# Patient Record
Sex: FEMALE | Race: BLACK OR AFRICAN AMERICAN | Employment: OTHER | URBAN - METROPOLITAN AREA
[De-identification: names, ages, dates, MRNs, and addresses within clinical notes are randomized per-mention and may not be internally consistent; named-entity substitution may affect disease eponyms.]

---

## 2017-01-17 DIAGNOSIS — E03.9 HYPOTHYROIDISM, UNSPECIFIED TYPE: Primary | ICD-10-CM

## 2017-01-20 ENCOUNTER — ANESTHESIA EVENT (OUTPATIENT)
Dept: ENDOSCOPY | Age: 67
End: 2017-01-20
Payer: MEDICARE

## 2017-01-23 ENCOUNTER — SURGERY (OUTPATIENT)
Age: 67
End: 2017-01-23

## 2017-01-23 ENCOUNTER — ANESTHESIA (OUTPATIENT)
Dept: ENDOSCOPY | Age: 67
End: 2017-01-23
Payer: MEDICARE

## 2017-01-23 PROCEDURE — 74011000250 HC RX REV CODE- 250

## 2017-01-23 PROCEDURE — 74011250636 HC RX REV CODE- 250/636

## 2017-01-23 RX ORDER — PROPOFOL 10 MG/ML
INJECTION, EMULSION INTRAVENOUS AS NEEDED
Status: DISCONTINUED | OUTPATIENT
Start: 2017-01-23 | End: 2017-01-23 | Stop reason: HOSPADM

## 2017-01-23 RX ORDER — LIDOCAINE HYDROCHLORIDE 20 MG/ML
INJECTION, SOLUTION EPIDURAL; INFILTRATION; INTRACAUDAL; PERINEURAL AS NEEDED
Status: DISCONTINUED | OUTPATIENT
Start: 2017-01-23 | End: 2017-01-23 | Stop reason: HOSPADM

## 2017-01-23 RX ADMIN — PROPOFOL 20 MG: 10 INJECTION, EMULSION INTRAVENOUS at 08:13

## 2017-01-23 RX ADMIN — PROPOFOL 20 MG: 10 INJECTION, EMULSION INTRAVENOUS at 08:19

## 2017-01-23 RX ADMIN — PROPOFOL 50 MG: 10 INJECTION, EMULSION INTRAVENOUS at 08:10

## 2017-01-23 RX ADMIN — PROPOFOL 20 MG: 10 INJECTION, EMULSION INTRAVENOUS at 08:15

## 2017-01-23 RX ADMIN — LIDOCAINE HYDROCHLORIDE 20 MG: 20 INJECTION, SOLUTION EPIDURAL; INFILTRATION; INTRACAUDAL; PERINEURAL at 08:10

## 2017-01-23 RX ADMIN — PROPOFOL 20 MG: 10 INJECTION, EMULSION INTRAVENOUS at 08:21

## 2017-01-23 RX ADMIN — PROPOFOL 20 MG: 10 INJECTION, EMULSION INTRAVENOUS at 08:17

## 2017-01-23 NOTE — ANESTHESIA PREPROCEDURE EVALUATION
Anesthetic History   No history of anesthetic complications            Review of Systems / Medical History  Patient summary reviewed and pertinent labs reviewed    Pulmonary    COPD: mild    Sleep apnea: No treatment           Neuro/Psych     seizures    TIA     Cardiovascular    Hypertension        Dysrhythmias       Exercise tolerance: >4 METS     GI/Hepatic/Renal  Within defined limits              Endo/Other      Hypothyroidism: well controlled       Other Findings   Comments: Current Smoker? NO       Elective Surgery? Yes       Abstained from smoking 24 hours prior to anesthesia? NA      Risk Factors for Postoperative nausea/vomiting:       History of postoperative nausea/vomiting? NO       Female? Yes       Motion sickness? NO       Intended opioid administration for postoperative analgesia?   NO         Physical Exam    Airway  Mallampati: II  TM Distance: 4 - 6 cm  Neck ROM: normal range of motion   Mouth opening: Normal     Cardiovascular  Regular rate and rhythm,  S1 and S2 normal,  no murmur, click, rub, or gallop  Rhythm: regular  Rate: normal         Dental    Dentition: Lower dentition intact, Upper dentition intact and Caps/crowns  Comments: Cap R upper incisor   Pulmonary  Breath sounds clear to auscultation               Abdominal  GI exam deferred       Other Findings            Anesthetic Plan    ASA: 3  Anesthesia type: MAC          Induction: Intravenous  Anesthetic plan and risks discussed with: Patient

## 2017-01-24 NOTE — ANESTHESIA POSTPROCEDURE EVALUATION
Post-Anesthesia Evaluation and Assessment    Patient: Loki Aleman MRN: [id number]  SSN: [id number]    YOB: 1950  Age: 74 y.o. Sex: female       Cardiovascular Function/Vital Signs  Visit Vitals    /76 (BP 1 Location: Left arm, BP Patient Position: At rest)    Pulse 75    Temp 36.1 °C (97 °F)    Resp 16    Ht 5' 4\" (1.626 m)    Wt 77.1 kg (170 lb)    SpO2 100%    Breastfeeding No    BMI 29.18 kg/m2       Patient is status post MAC anesthesia for Procedure(s):  COLONOSCOPY. Nausea/Vomiting: None    Postoperative hydration reviewed and adequate. Pain:  Pain Scale 1: Numeric (0 - 10) (01/23/17 7794)  Pain Intensity 1: 0 (01/23/17 4554)   Managed    Neurological Status: At baseline    Mental Status and Level of Consciousness: Alert and oriented     Pulmonary Status:   O2 Device: Room air (01/23/17 6461)   Adequate oxygenation and airway patent    Complications related to anesthesia: None    Post-anesthesia assessment completed.  No concerns    Signed By: Kraig Agudelo MD     January 24, 2017

## 2017-01-26 ENCOUNTER — OFFICE VISIT (OUTPATIENT)
Dept: FAMILY MEDICINE CLINIC | Age: 67
End: 2017-01-26

## 2017-01-26 VITALS
SYSTOLIC BLOOD PRESSURE: 119 MMHG | HEIGHT: 64 IN | WEIGHT: 173.8 LBS | HEART RATE: 94 BPM | TEMPERATURE: 96.6 F | BODY MASS INDEX: 29.67 KG/M2 | RESPIRATION RATE: 16 BRPM | OXYGEN SATURATION: 100 % | DIASTOLIC BLOOD PRESSURE: 80 MMHG

## 2017-01-26 DIAGNOSIS — I35.0 AORTIC VALVE STENOSIS, UNSPECIFIED ETIOLOGY: Chronic | ICD-10-CM

## 2017-01-26 DIAGNOSIS — E78.5 DYSLIPIDEMIA: Chronic | ICD-10-CM

## 2017-01-26 DIAGNOSIS — I48.91 ATRIAL FIBRILLATION, UNSPECIFIED TYPE (HCC): Chronic | ICD-10-CM

## 2017-01-26 DIAGNOSIS — M89.9 DISORDER OF BONE: ICD-10-CM

## 2017-01-26 DIAGNOSIS — I34.2 NON-RHEUMATIC MITRAL VALVE STENOSIS: Chronic | ICD-10-CM

## 2017-01-26 RX ORDER — ACETAMINOPHEN AND CODEINE PHOSPHATE 300; 15 MG/1; MG/1
1 TABLET ORAL
Qty: 30 TAB | Refills: 5 | Status: SHIPPED | OUTPATIENT
Start: 2017-01-26 | End: 2017-03-28

## 2017-01-26 NOTE — PROGRESS NOTES
Skylar Strickland is a 74 y.o.  female and presents with    Chief Complaint   Patient presents with    Cholesterol Problem    Irregular Heart Beat    Arthritis    Other     rheumatic heart dz           Subjective:  Has 2 wk of hoarse, sore throat, and chocking. Note she has 30+ pack year smoking hx. Cardiovascular Review:  The patient has hypertension, hyperlipidemia, coronary artery disease and rheumatic heart dz  . Diet and Lifestyle: generally follows a low fat low cholesterol diet  Home BP Monitoring: is not measured at home. Pertinent ROS: taking medications as instructed, no medication side effects noted, no TIA's, no chest pain on exertion, no dyspnea on exertion, no swelling of ankles. Osteoarthritis and Chronic Pain:  Patient has osteoarthritis, primarily affecting the back. Symptoms onset: problem is longstanding. Rheumatological ROS: using NSAID medication regularly, daily. Response to treatment plan: stable. Additional Concerns:          Patient Active Problem List    Diagnosis Date Noted    Advance directive in chart 06/28/2016    Dyslipidemia     Rheumatic heart disease     Atrial fibrillation     Arthritis 01/06/2016    Glaucoma 01/15/2014     Current Outpatient Prescriptions   Medication Sig Dispense Refill    acetaminophen (TYLENOL) 325 mg tablet 650 mg.      bimatoprost (LUMIGAN) 0.01 % ophthalmic drops Administer 1 Drop to left eye nightly.  aspirin delayed-release 81 mg tablet take 1 tablet by mouth once daily 90 Tab 3    ezetimibe (ZETIA) 10 mg tablet Take 1 Tab by mouth daily. 90 Tab 3    omeprazole (PRILOSEC) 20 mg capsule Take 1 Cap by mouth two (2) times a day. 180 Cap 4    ferrous sulfate (IRON) 325 mg (65 mg iron) EC tablet Take 1 Tab by mouth three (3) times daily (with meals). 100 Tab 12    prenatal vit-iron fumarate-fa (PRENATAL PLUS WITH IRON) 28 mg iron- 800 mcg tab Take 1 Tab by mouth daily.  Any reliable brand prenatal vitamins is fine.  100 Tab 12     Allergies   Allergen Reactions    Contrast Dye [Iodine] Itching and Swelling    Iodinated Contrast Media - Oral And Iv Dye Itching and Swelling    Seafood Itching and Swelling     LOBSTER ONLY    Statins-Hmg-Coa Reductase Inhibitors Myalgia and Other (comments)     Myalgia    Sulfa (Sulfonamide Antibiotics) Itching and Swelling     Past Medical History   Diagnosis Date    Advance directive in chart      6/28/2016    Amaurosis fugax     Aortic stenosis      mean gradient  26.5 mmHg (CATH 4/13), 32 mmHg (ECHO 9/15)    Aortic valve replacement      21mm MAGNA EASE pericardial  2/16    Arthritis     Atrial appendage resection      2/16      Atrial fibrillation      CHADS score 2  (-CHF, -HTN, -AGE, -DM +AMAUROSIS FUGAX)    Atrial fibrillation ablation      surgical left sided cryoablation  2/16    Cataract      OS    Cervical dysplasia      2009   post leep and cone    Chronic pain     COPD      mild  2/16    Duodenal AVM      argon plasma coagulation 2/16    Dyslipidemia     Fibroids     Glaucoma      1/15/2014    Lung nodule      3/19/2012   stable    Mitral stenosis      mean gradient  6 mmHg (CATH 4/13), 7.6 mmHg (MANUEL 10/15)    Mitral valve replacement      25 mm MAGNA EASE pericardial  2/16    Pacemaker      dual chamber MEDTRONIC 2/16    Post-op ventricular asystole     Remote tobacco     Rheumatic heart disease     Sleep apnea      no CPAP    Thyroid nodule      3/20/2013   multiple     Past Surgical History   Procedure Laterality Date    Hx gyn       VAINIII, VELASQUEZ 1, keratinous labial cyst    Hx leep procedure       2010    Hx gyn       2012  Cold knife conization of cervix    Hx bunionectomy       left    Hx other surgical       lump left neck, benign    Hx other surgical       multiple breast aspirations    Hx aortic valve replacement       2/16    Hx mitral valve replacement       2/16    Hx afib ablation       2/16  surgical cryoablation    Hx pacemaker      Hx breast biopsy Right 10/13/2014     Right breast needle [facility]OC biopsy performed by Fe Pathak MD at [address] Hx breast lumpectomy       Right    Colonoscopy N/A 2017     COLONOSCOPY performed [facility] Nikia Martines MD at [facility] END[facility]     Family History   Problem Relation Age of Onset    Cancer Mother      oral,    [facility] Alzheimer Father         [facility] Breast Cancer Maternal Aunt      unsure age   [facility] Colon Cancer Maternal Grandfather      Social History   Substance Use Topics    Smoking status: Former Smoker     Years: 37.00     Quit date: 2004    Smokeless tobacco: Never Used      Comment: stop around     Alcohol use No       ROS       All other systems reviewed and are negative. Objective:  Vitals:    17 0950   BP: 119/80   Pulse: 94   Resp: 16   Temp: 96.6 °F (35.9 °C)   TempSrc: Oral   SpO2: 100%   Weight: 173 lb 12.8 oz (78.8 kg)   Height: 5' 4\" (1.626 m)   PainSc:  10 - Worst pain ever   PainLoc: Back                 alert, well appearing, and in no distress, oriented to person, place, and time and normal appearing weight  Nose - normal and patent, no erythema, discharge or polyps  Mouth - mucous membranes moist, pharynx normal without lesions  Neck - supple, no significant adenopathy  Lymphatics - no palpable lymphadenopathy, no hepatosplenomegaly  Chest - clear to auscultation, no wheezes, rales or rhonchi, symmetric air entry  Heart - normal rate, regular rhythm, normal S1, S2, no murmurs, rubs, clicks or gallops  Abdomen - soft, nontender, nondistended, no masses or organomegaly        LABS     TESTS      Assessment/Plan:    Hypertension - stable  Hyperlipidemia - stable  arthritsi ongiong fairly stable  Rheumatic heart dz stable      Lab review: labs are reviewed, up to date and normal      I have discussed the diagnosis with the patient and the intended plan as seen in the above orders.   The patient has received an after-visit summary and questions were answered concerning future plans. I have discussed medication side effects and warnings with the patient as well. I have reviewed the plan of care with the patient, accepted their input and they are in agreement with the treatment goals.      Follow-up Disposition: Not on File

## 2017-01-26 NOTE — MR AVS SNAPSHOT
Visit Information Date & Time Provider Department Dept. Phone Encounter #  
 1/26/2017 10:00 AM Karlene Harrison DO [address] [address] [phone] [id number] Follow-up Instructions Return in about 3 months (around 4/26/2017) for physical, labs prior, mammo prior, dexa prior, EKG next visit, spirometry next visit. Follow-up and Disposition History Your Appointments 2/15/2017  1:00 PM  
PROCEDURE with Nhan Trevino MD  
[facility] [address] ([facility]) Appt Note: Return for cysto, review CTU.  
 [address] [facility] [address] [address]  
  
    
 6/20/2017 10:00 AM  
Follow Up with Baltazar Haile MD  
Cardio Specialist at [facility] Appt Note: 6 m f/u  
 [address] [place] [address] [place] [address] Upcoming Health Maintenance Date Due  
 GLAUCOMA SCREENING Q2Y 1/24/2017 MEDICARE YEARLY EXAM 3/25/2017 Pneumococcal 65+ Low/Medium Risk (2 of 2 - PPSV23) 9/20/2017 BREAST CANCER SCRN MAMMOGRAM 9/26/2018 PAP AKA CERVICAL CYTOLOGY 5/5/2019 DTaP/Tdap/Td series (2 - Td) 6/28/2026 COLONOSCOPY 1/23/2027 Allergies as of 1/26/2017  Review Complete On: 1/26/2017 By: Karlene Harrisno DO Severity Noted Reaction Type Reactions Contrast Dye [Iodine]  10/08/2014    Itching, Swelling Iodinated Contrast Media - Oral And Iv Dye  12/13/2016    Itching, Swelling Seafood  10/08/2014    Itching, Swelling LOBSTER ONLY Statins-hmg-coa Reductase Inhibitors  10/05/2011    Myalgia, Other (comments) Myalgia Sulfa (Sulfonamide Antibiotics)    Itching, Swelling Current Immunizations  Reviewed on 9/15/2014 Name Date Influenza Vaccine 10/5/2016, 9/15/2014, 10/17/2013 Influenza Vaccine Alcario Ravens) 9/3/2015 Influenza Vaccine (Quad) PF  Incomplete Influenza Vaccine Split 10/5/2011 Influenza Vaccine Whole 9/20/2012 Pneumococcal Vaccine (Unspecified Type) 9/20/2012, 10/5/2011 TD Vaccine 8/11/2011 Zoster Vaccine, Live 9/3/2015 Not reviewed this visit You Were Diagnosed With   
  
 Codes Comments At[facility], unspecified [facility]     ICD-10-CM: I48.91 
ICD-9-CM: 427.31 Dyslipidemia     ICD-10-CM: E78.5 ICD-9-CM: 272.4 Aortic valve stenosis, unspecified etiology     ICD-10-CM: I35.0 ICD-9-CM: 424.1 Non-rheumatic mitral valve stenosis     ICD-10-CM: I34.2 ICD-9-CM: 424.0 Disorder of bone     ICD-10-CM: M89.9 ICD-9-CM: 733.90 Vitals BP Pulse Temp Resp Height(growth percentile) Weight(growth percentile) 119/80 (BP 1 Location: Right arm, BP Patient Position: Sitting) 94 96.6 °F (35.9 °C) (Oral) 16 5' 4\" (1.626 m) 173 lb 12.8 oz (78.8 kg) SpO2 BMI OB Status Smoking Status 100% 29.83 kg/m2 Postmenopausal Former Smoker BMI and BSA Data Body Mass Index Body Surface Area  
 29.83 kg/m 2 1.89 m 2 Preferred Pharmacy Pharmacy Name Phone [facility] [phone] Your Updated Medication List  
  
   
This list is accurate as of: 1/26/17 10:09 AM.  Always use your most recent med list.  
  
  
  
  
 acetaminophen-codeine 300-15 mg per tablet Commonly known as:  TYLENOL #2 Take 1 Tab by mouth every four (4) hours as needed for Pain. Max Daily Amount: 6 Tabs. aspirin delayed-release 81 mg tablet  
take 1 tablet by mouth once daily  
  
 ezetimibe 10 mg tablet Commonly known as:  Zelpha Bock Take 1 Tab by mouth daily. ferrous sulfate 325 mg (65 mg iron) EC tablet Commonly known as:  IRON Take 1 Tab by mouth three (3) times daily (with meals). LUMIGAN 0.01 % ophthalmic drops Generic drug:  bimatoprost  
Administer 1 Drop to left eye nightly. omeprazole 20 mg capsule Commonly known as:  PriLOSEC Take 1 Cap by mouth two (2) times a day. prenatal vit-iron fumarate-fa 28 mg iron- 800 mcg Tab Commonly known as:  PRENATAL PLUS with IRON Take 1 Tab by mouth daily. Any reliable brand prenatal vitamins is fine. Prescriptions Printed Refills  
 acetaminophen-codeine (TYLENOL #2) 300-15 mg per tablet 5 Sig: Take 1 Tab by mouth every four (4) hours as needed for Pain. Max Daily Amount: 6 Tabs. Class: Print Route: Oral  
  
Follow-up Instructions Return in about 3 months (around 4/26/2017) for physical, labs prior, mammo prior, dexa prior, EKG next visit, spirometry next visit. To-Do List   
 Around 04/26/2017 Lab:  CBC WITH AUTOMATED DIFF   
  
 04/26/2017 Imaging:  DEXA BONE DENSITY STUDY AXIAL Around 04/26/2017 Lab:  LIPID PANEL   
  
 04/26/2017 Imaging:  BK MAMMO BI SCREENING INCL CAD Around 04/26/2017 Lab:  METABOLIC PANEL, COMPREHENSIVE Around 04/26/2017 Lab:  TSH 3RD GENERATION Around 04/26/2017 Lab:  URINALYSIS W/ RFLX MICROSCOPIC Introducing [facility] & HEALTH SERVICES! Dear Marisel Robertson: Thank you for requesting a Ebook Glue account. Our records indicate that you already have an active Ebook Glue account. You can access your account anytime at [email or web address] Did you know that you can access your hospital and ER discharge instructions at any time in Ebook Glue? You can also review all of your test results from your hospital stay or ER visit. Additional Information If you have questions, please visit the Frequently Asked Questions section of the Ebook Glue website at [email or web address] Remember, Ebook Glue is NOT to be used for urgent needs. For medical emergencies, dial 911. Now available from your iPhone and Android! Please provide this summary of care documentation to your next provider. Your primary care clinician is listed as [address] [facility]. If you have any questions after today's visit, please call [phone].

## 2017-02-02 ENCOUNTER — APPOINTMENT (OUTPATIENT)
Dept: PHYSICAL THERAPY | Age: 67
End: 2017-02-02

## 2017-02-28 ENCOUNTER — OFFICE VISIT (OUTPATIENT)
Dept: FAMILY MEDICINE CLINIC | Age: 67
End: 2017-02-28

## 2017-02-28 VITALS
TEMPERATURE: 97.1 F | OXYGEN SATURATION: 100 % | RESPIRATION RATE: 16 BRPM | SYSTOLIC BLOOD PRESSURE: 113 MMHG | BODY MASS INDEX: 29.26 KG/M2 | DIASTOLIC BLOOD PRESSURE: 86 MMHG | HEART RATE: 88 BPM | HEIGHT: 64 IN | WEIGHT: 171.4 LBS

## 2017-02-28 DIAGNOSIS — N30.00 ACUTE CYSTITIS WITHOUT HEMATURIA: ICD-10-CM

## 2017-02-28 DIAGNOSIS — R31.9 BLOOD IN URINE: Primary | ICD-10-CM

## 2017-02-28 DIAGNOSIS — M79.673 PAIN OF FOOT, UNSPECIFIED LATERALITY: ICD-10-CM

## 2017-02-28 LAB
BILIRUB UR QL STRIP: NEGATIVE
GLUCOSE UR-MCNC: NEGATIVE MG/DL
KETONES P FAST UR STRIP-MCNC: NORMAL MG/DL
PH UR STRIP: 5.5 [PH] (ref 4.6–8)
PROT UR QL STRIP: NORMAL MG/DL
SP GR UR STRIP: 1.02 (ref 1–1.03)
UA UROBILINOGEN AMB POC: NORMAL (ref 0.2–1)
URINALYSIS CLARITY POC: CLEAR
URINALYSIS COLOR POC: NORMAL
URINE BLOOD POC: NORMAL
URINE LEUKOCYTES POC: NORMAL
URINE NITRITES POC: NEGATIVE

## 2017-02-28 RX ORDER — CIPROFLOXACIN 250 MG/1
250 TABLET, FILM COATED ORAL EVERY 12 HOURS
Qty: 30 TAB | Refills: 0 | Status: SHIPPED | OUTPATIENT
Start: 2017-02-28 | End: 2017-03-15

## 2017-02-28 NOTE — PATIENT INSTRUCTIONS
Roma Dakin -- on     [employer]   Directions   4.56 Google reviews Dance store in [place], [address] [facility] in: [place]   Address: [place] [address] [address], [place], [address]   Phone: [phone]   Hours:   Open today · 10AM7PM

## 2017-02-28 NOTE — MR AVS SNAPSHOT
Visit Information Date & Time Provider Department Dept. Phone Encounter #  
 2/28/2017  1:30 PM Brigid Bland [phone] [id number] Your Appointments 3/7/2017  2:30 PM  
ROUTINE CARE with Olena [place].,  [address] [facility]) Appt Note: Pain in left foot [address] [place] [address]  
  
   
 [address] [facility] [place] [address]  
  
    
 6/20/2017 10:00 AM  
Follow Up with Montserrat Hernandez MD  
Cardio Specialist at [facility]) Appt Note: 6 m f/u  
 [facility] [employer] [address] [place] 8/15/2017 10:30 AM  
ULTRASOUND with [facility] ULTRASOUND [facility] ([facility]) Appt Note: Return in about 6 months (around 8/15/2017) for renal us prior. [address] [facility] [address] [address]  
  
    
  
 8/22/2017 10:15 AM  
Any with Dereje Dove MD  
[facility] ([facility]) Appt Note: Return in about 6 months (around 8/15/2017) for renal us prior. [address] [facility] [address]  
[phone]  
  
   
 [facility] [address] [address] Upcoming Health Maintenance Date Due  
 MEDICARE YEARLY EXAM 3/25/2017 Pneumococcal 65+ Low/Medium Risk (2 of 2 - PPSV23) 9/20/2017 BREAST CANCER SCRN MAMMOGRAM 9/26/2018 GLAUCOMA SCREENING Q2Y 2/2/2019 DTaP/Tdap/Td series (2 - Td) 6/28/2026 COLONOSCOPY 1/23/2027 Allergies as of 2/28/2017  Review Complete On: 2/15/2017 By: Bruce Leger Severity Noted Reaction Type Reactions Contrast Dye [Iodine]  10/08/2014    Itching, Swelling Iodinated Contrast Media - Oral And Iv Dye  12/13/2016    Itching, Swelling Seafood  10/08/2014    Itching, Swelling LOBSTER ONLY Statins-hmg-coa Reductase Inhibitors  10/05/2011    Myalgia, Other (comments) Myalgia Sulfa (Sulfonamide Antibiotics)    Itching, Swelling Current Immunizations  Reviewed on 9/15/2014 Name Date Influenza Vaccine 10/5/2016, 9/15/2014, 10/17/2013 Influenza Vaccine Tosha Bloodgood) 9/3/2015 Influenza Vaccine (Quad) PF  Incomplete Influenza Vaccine Split 10/5/2011 Influenza Vaccine Whole 9/20/2012 Pneumococcal Vaccine (Unspecified Type) 9/20/2012, 10/5/2011 TD Vaccine 8/11/2011 Zoster Vaccine, Live 9/3/2015 Not reviewed this visit You Were Diagnosed With   
  
 Codes Comments Blood in urine    -  Primary ICD-10-CM: R31.9 ICD-9-CM: 599.70 Acute cystitis without hematuria     ICD-10-CM: N30.00 ICD-9-CM: 595.0 Pain of foot, unspecified laterality     ICD-10-CM: A86.918 ICD-9-CM: 729.5 Vitals BP  
  
  
  
  
  
 113/86 (BP 1 Location: Right arm, BP Patient Position: Sitting) BMI and BSA Data Body Mass Index Body Surface Area  
 29.42 kg/m 2 1.87 m 2 Preferred Pharmacy Pharmacy Name Phone [facility] [address] [facility] [phone] Your Updated Medication List  
  
   
This list is accurate as of: 2/28/17  1:52 PM.  Always use your most recent med list.  
  
  
  
  
 acetaminophen-codeine 300-15 mg per tablet Commonly known as:  TYLENOL #2 Take 1 Tab by mouth every four (4) hours as needed for Pain. Max Daily Amount: 6 Tabs. aspirin delayed-release 81 mg tablet  
take 1 tablet by mouth once daily  
  
 ciprofloxacin HCl 250 mg tablet Commonly known as:  CIPRO Take 1 Tab by mouth every twelve (12) hours for 15 days. ezetimibe 10 mg tablet Commonly known as:  Pilar Cullen Take 1 Tab by mouth daily. ferrous sulfate 325 mg (65 mg iron) EC tablet Commonly known as:  IRON  
 Take 1 Tab by mouth three (3) times daily (with meals). LUMIGAN 0.01 % ophthalmic drops Generic drug:  bimatoprost  
Administer 1 Drop to left eye nightly. omeprazole 20 mg capsule Commonly known as:  PriLOSEC Take 1 Cap by mouth two (2) times a day. oxybutynin chloride XL 10 mg CR tablet Commonly known as:  DITROPAN XL Take 1 Tab by mouth daily. prenatal vit-iron fumarate-fa 28 mg iron- 800 mcg Tab Commonly known as:  PRENATAL PLUS with IRON Take 1 Tab by mouth daily. Any reliable brand prenatal vitamins is fine. RESTASIS 0.05 % ophthalmic emulsion Generic drug:  cycloSPORINE Administer 1 Drop to both eyes two (2) times a day. Prescriptions Sent to Pharmacy Refills  
 ciprofloxacin HCl (CIPRO) 250 mg tablet 0 Sig: Take 1 Tab by mouth every twelve (12) hours for 15 days. Class: Normal  
 [facility] [address] [facility]: [phone] Route: Oral  
  
We Performed the Following AMB POC URINALYSIS DIP STICK AUTO W/O MICRO [17797 CPT(R)] Comments:  
 Verbal order with read back per Dr. Eladio Mcdonald request  
  
To-Do List   
 03/07/2017 1:30 PM  
  Appointment with [address] at [address] ([phone]) OUTSIDE FILMS  - Any outside films related to the study being scheduled should be brought with you on the day of the exam.  If this cannot be done there may be a delay in the reading of the study. MEDICATIONS  - Patient must bring a complete list of all medications currently taking to include prescriptions, over-the-counter meds, herbals, vitamins & any dietary supplements - Patient must discontinue use of calcium, vitamins, or calcium supplements including antacids (calcium based) 24 hours before scan. CHECK IN INSTRUCTIONS  For DEXA/Bone Density Studies:  Check in to the [facility] Medical Desk 15 minutes prior to your appointment. [address] [address] [address] [facility], [facility] [address] Patient Instructions Maddy Ramirez -- on  
 
Pantry Website Directions 4.56 Google reviews Dance store in [place], [place] Located in: [address] Address: [address], [address] [place] [address] [address], [place], [address] [place] [address] Road Phone: [phone] Hours:  
Open today · 10AM7PM 
 
 
  
Introducing [facility] & HEALTH SERVICES! Dear MONIQUE VILLALOBOS [facility]: Thank you for requesting a TxtFeedback account. Our records indicate that you already have an active TxtFeedback account. You can access your account anytime at [email or web address] Did you know that you can access your hospital and ER discharge instructions at any time in TxtFeedback? You can also review all of your test results from your hospital stay or ER visit. Additional Information If you have questions, please visit the Frequently Asked Questions section of the TxtFeedback website at [email or web address] Remember, TxtFeedback is NOT to be used for urgent needs. For medical emergencies, dial 911. Now available from your iPhone and Android! Please provide this summary of care documentation to your next provider. Your primary care clinician is listed as [address]. If you have any questions after today's visit, please call [phone].

## 2017-02-28 NOTE — PROGRESS NOTES
1. Have you been to the ER, urgent care clinic since your last visit? Hospitalized since your last visit? No    2. Have you seen or consulted any other health care providers outside of the [facility]\"" since your last visit? Include any pap smears or colon screening.  No

## 2017-03-02 DIAGNOSIS — K92.1 MELENA: ICD-10-CM

## 2017-03-02 DIAGNOSIS — D50.0 IRON DEFICIENCY ANEMIA DUE TO CHRONIC BLOOD LOSS: ICD-10-CM

## 2017-03-02 DIAGNOSIS — I48.20 CHRONIC ATRIAL FIBRILLATION (HCC): ICD-10-CM

## 2017-03-02 DIAGNOSIS — K25.4 GASTROINTESTINAL HEMORRHAGE ASSOCIATED WITH GASTRIC ULCER: ICD-10-CM

## 2017-03-03 RX ORDER — LANOLIN ALCOHOL/MO/W.PET/CERES
CREAM (GRAM) TOPICAL
Qty: 100 TAB | Refills: 12 | Status: SHIPPED | OUTPATIENT
Start: 2017-03-03 | End: 2017-03-28

## 2017-03-03 RX ORDER — VITAMIN A ACETATE, BETA CAROTENE, ASCORBIC ACID, CHOLECALCIFEROL, .ALPHA.-TOCOPHEROL ACETATE, DL-, THIAMINE MONONITRATE, RIBOFLAVIN, NIACINAMIDE, PYRIDOXINE HYDROCHLORIDE, FOLIC ACID, CYANOCOBALAMIN, CALCIUM CARBONATE, FERROUS FUMARATE, ZINC OXIDE, CUPRIC OXIDE 3080; 12; 120; 400; 1; 1.84; 3; 20; 22; 920; 25; 200; 27; 10; 2 [IU]/1; UG/1; MG/1; [IU]/1; MG/1; MG/1; MG/1; MG/1; MG/1; [IU]/1; MG/1; MG/1; MG/1; MG/1; MG/1
TABLET, FILM COATED ORAL
Qty: 100 TAB | Refills: 12 | Status: SHIPPED | OUTPATIENT
Start: 2017-03-03 | End: 2017-03-28 | Stop reason: SDUPTHER

## 2017-03-03 RX ORDER — OMEPRAZOLE 20 MG/1
CAPSULE, DELAYED RELEASE ORAL
Qty: 180 CAP | Refills: 4 | Status: SHIPPED | OUTPATIENT
Start: 2017-03-03 | End: 2017-03-28 | Stop reason: SDUPTHER

## 2017-03-07 ENCOUNTER — HOSPITAL ENCOUNTER (OUTPATIENT)
Dept: GENERAL RADIOLOGY | Age: 67
Discharge: HOME | End: 2017-03-07
Attending: FAMILY MEDICINE
Payer: MEDICARE

## 2017-03-07 ENCOUNTER — HOSPITAL ENCOUNTER (OUTPATIENT)
Dept: LAB | Age: 67
Discharge: HOME | End: 2017-03-07
Payer: MEDICARE

## 2017-03-07 DIAGNOSIS — I35.0 AORTIC VALVE STENOSIS, UNSPECIFIED ETIOLOGY: Chronic | ICD-10-CM

## 2017-03-07 DIAGNOSIS — I34.2 NON-RHEUMATIC MITRAL VALVE STENOSIS: Chronic | ICD-10-CM

## 2017-03-07 DIAGNOSIS — M89.9 DISORDER OF BONE: ICD-10-CM

## 2017-03-07 DIAGNOSIS — E78.5 DYSLIPIDEMIA: Chronic | ICD-10-CM

## 2017-03-07 DIAGNOSIS — I48.91 ATRIAL FIBRILLATION, UNSPECIFIED TYPE (HCC): Chronic | ICD-10-CM

## 2017-03-07 LAB
ALBUMIN SERPL BCP-MCNC: 3.9 G/DL (ref 3.4–5)
ALBUMIN/GLOB SERPL: 1.1 {RATIO} (ref 0.8–1.7)
ALP SERPL-CCNC: 80 U/L (ref 45–117)
ALT SERPL-CCNC: 28 U/L (ref 13–56)
ANION GAP BLD CALC-SCNC: 7 MMOL/L (ref 3–18)
APPEARANCE UR: CLEAR
AST SERPL W P-5'-P-CCNC: 24 U/L (ref 15–37)
BASOPHILS # BLD AUTO: 0 K/UL (ref 0–0.06)
BASOPHILS # BLD: 0 % (ref 0–2)
BILIRUB SERPL-MCNC: 0.4 MG/DL (ref 0.2–1)
BILIRUB UR QL: NEGATIVE
BUN SERPL-MCNC: 12 MG/DL (ref 7–18)
BUN/CREAT SERPL: 13 (ref 12–20)
CALCIUM SERPL-MCNC: 9 MG/DL (ref 8.5–10.1)
CHLORIDE SERPL-SCNC: 104 MMOL/L (ref 100–108)
CHOLEST SERPL-MCNC: 246 MG/DL
CO2 SERPL-SCNC: 30 MMOL/L (ref 21–32)
COLOR UR: YELLOW
CREAT SERPL-MCNC: 0.92 MG/DL (ref 0.6–1.3)
DIFFERENTIAL METHOD BLD: NORMAL
EOSINOPHIL # BLD: 0.2 K/UL (ref 0–0.4)
EOSINOPHIL NFR BLD: 4 % (ref 0–5)
ERYTHROCYTE [DISTWIDTH] IN BLOOD BY AUTOMATED COUNT: 14.4 % (ref 11.6–14.5)
GLOBULIN SER CALC-MCNC: 3.5 G/DL (ref 2–4)
GLUCOSE SERPL-MCNC: 85 MG/DL (ref 74–99)
GLUCOSE UR STRIP.AUTO-MCNC: NEGATIVE MG/DL
HCT VFR BLD AUTO: 42.6 % (ref 35–45)
HDLC SERPL-MCNC: 55 MG/DL (ref 40–60)
HDLC SERPL: 4.5 {RATIO} (ref 0–5)
HGB BLD-MCNC: 13.6 G/DL (ref 12–16)
HGB UR QL STRIP: NEGATIVE
KETONES UR QL STRIP.AUTO: NEGATIVE MG/DL
LDLC SERPL CALC-MCNC: 160.2 MG/DL (ref 0–100)
LEUKOCYTE ESTERASE UR QL STRIP.AUTO: NEGATIVE
LIPID PROFILE,FLP: ABNORMAL
LYMPHOCYTES # BLD AUTO: 34 % (ref 21–52)
LYMPHOCYTES # BLD: 1.9 K/UL (ref 0.9–3.6)
MCH RBC QN AUTO: 30.2 PG (ref 24–34)
MCHC RBC AUTO-ENTMCNC: 31.9 G/DL (ref 31–37)
MCV RBC AUTO: 94.7 FL (ref 74–97)
MONOCYTES # BLD: 0.1 K/UL (ref 0.05–1.2)
MONOCYTES NFR BLD AUTO: 3 % (ref 3–10)
NEUTS SEG # BLD: 3.2 K/UL (ref 1.8–8)
NEUTS SEG NFR BLD AUTO: 59 % (ref 40–73)
NITRITE UR QL STRIP.AUTO: NEGATIVE
PH UR STRIP: 6.5 [PH] (ref 5–8)
PLATELET # BLD AUTO: 177 K/UL (ref 135–420)
PMV BLD AUTO: 11.5 FL (ref 9.2–11.8)
POTASSIUM SERPL-SCNC: 3.6 MMOL/L (ref 3.5–5.5)
PROT SERPL-MCNC: 7.4 G/DL (ref 6.4–8.2)
PROT UR STRIP-MCNC: NEGATIVE MG/DL
RBC # BLD AUTO: 4.5 M/UL (ref 4.2–5.3)
SODIUM SERPL-SCNC: 141 MMOL/L (ref 136–145)
SP GR UR REFRACTOMETRY: 1.01 (ref 1–1.03)
TRIGL SERPL-MCNC: 154 MG/DL (ref ?–150)
TSH SERPL DL<=0.05 MIU/L-ACNC: 0.88 UIU/ML (ref 0.36–3.74)
UROBILINOGEN UR QL STRIP.AUTO: 0.2 EU/DL (ref 0.2–1)
VLDLC SERPL CALC-MCNC: 30.8 MG/DL
WBC # BLD AUTO: 5.5 K/UL (ref 4.6–13.2)

## 2017-03-07 PROCEDURE — 77080 DXA BONE DENSITY AXIAL: CPT

## 2017-03-15 ENCOUNTER — DOCUMENTATION ONLY (OUTPATIENT)
Dept: CARDIOLOGY CLINIC | Age: 67
End: 2017-03-15

## 2017-03-15 NOTE — PROGRESS NOTES
Faxed most recent office note to [facility] at [phone]. We are unsure if Dr. Citlaly Cabral referred patiently recently or before he left.

## 2017-03-21 ENCOUNTER — OFFICE VISIT (OUTPATIENT)
Dept: OBGYN CLINIC | Age: 67
End: 2017-03-21

## 2017-03-21 ENCOUNTER — TELEPHONE (OUTPATIENT)
Dept: FAMILY MEDICINE CLINIC | Age: 67
End: 2017-03-21

## 2017-03-21 VITALS
HEART RATE: 91 BPM | WEIGHT: 172 LBS | BODY MASS INDEX: 29.37 KG/M2 | DIASTOLIC BLOOD PRESSURE: 77 MMHG | HEIGHT: 64 IN | SYSTOLIC BLOOD PRESSURE: 116 MMHG

## 2017-03-21 DIAGNOSIS — N76.4 ABSCESS OF RIGHT GENITAL LABIA: Primary | ICD-10-CM

## 2017-03-21 NOTE — PROCEDURES
[facility] OB[facility]  OFFICE PROCEDURE PROGRESS NOTE        Chart reviewed for the following:   Parul ARCEO DO, have reviewed the History, Physical and updated the Allergic reactions for Ashish Lat Ringgold     TIME OUT performed immediately prior to start of procedure:   Parul ARCEO DO, have performed the following reviews on Guerry Seat prior to the start of the procedure:            * Patient was identified by name and date of birth   * Agreement on procedure being performed was verified  * Risks and Benefits explained to the patient  * Procedure site verified and marked as necessary  * Patient was positioned for comfort  * Consent was signed and verified     Time: 1026      Date of procedure: 3/21/2017    Procedure performed by:  Parul Corbin DO    Provider assisted by: Jerson Painter LPN    Patient assisted by: self    How tolerated by patient: tolerated the procedure well with no complications    Post Procedural Pain Scale: 0 - No Hurt    Comments: none      Procedure Note:   After informed consent was obtained, using betadine for cleansing and 1% lidocaine with epinephrine for anesthetic, with sterile technique, cruciate incision and drainage of abscess was performed. Minimal purulent drainage and bleeding noted. Rolled gauze is applied, and wound care instructions provided. Be alert for any signs of cutaneous infection. The procedure was well tolerated without complications. Follow up: the patient may return prn. Plan of care discussed. Patient expressed understanding and agreement.

## 2017-03-21 NOTE — PROGRESS NOTES
Subjective:    Joshua Rubio is a 74 y.o. female who presents for lesion evaluation and possibly incision and drainage. Patient reports noticing burning with wiping last Sunday and palpated a lump on right labia/vagina. States it is painful to sit or walk. Denies drainage. ROS: No fever, nausea, vomiting. No pelvic pain, dysuria, urinary frequency. No vaginal bleeding  Past Medical History:   Diagnosis Date    Advance directive in chart     6/28/2016    Amaurosis fugax     Aortic stenosis     mean gradient  26.5 mmHg (CATH 4/13), 32 mmHg (ECHO 9/15)    Aortic valve replacement     21mm MAGNA EASE pericardial  2/16    Arthritis     Atrial appendage resection     2/16      Atrial fibrillation     CHADS score 2  (-CHF, -HTN, -AGE, -DM +AMAUROSIS FUGAX)    Atrial fibrillation ablation     surgical left sided cryoablation  2/16    Cataract     OS    Cervical dysplasia     2009   post leep and cone    Chronic pain     COPD     mild  2/16    Duodenal AVM     argon plasma coagulation 2/16    Dyslipidemia     Fibroids     Glaucoma     1/15/2014    Lung nodule     3/19/2012   stable    Mitral stenosis     mean gradient  6 mmHg (CATH 4/13), 7.6 mmHg (MANUEL 10/15)    Mitral valve replacement     25 mm MAGNA EASE pericardial  2/16    Pacemaker     dual chamber MEDTRONIC 2/16    Post-op ventricular asystole     Remote tobacco     Rheumatic heart disease     Sleep apnea     no CPAP    Thyroid nodule     3/20/2013   multiple     ALL:  Patient denies allergy to betadine and lidocaine. Oubjective:   Patient appears well. Visit Vitals    /77    Pulse 91    Ht 5' 4\" (1.626 m)    Wt 172 lb (78 kg)    BMI 29.52 kg/m2     Gen:  NAD  Pelvic:  Right labia abscess, mid labia, 1 cm, tender to palpation, no drainage. No evidence of bartholin's cyst/abscess. Assessment:       ICD-10-CM ICD-9-CM    1.  Abscess of right genital labia N76.4 616.4 I&D OF VULVA/PERINEUM ABSCESS Sitz baths vs I&D offered. Patient elects for I&D today. Plan of care discussed. Patient expressed understanding and agreement.

## 2017-03-21 NOTE — TELEPHONE ENCOUNTER
DOCUMENTATION ONLY: Glaucoma Screening examination from 03/14/2017 received and sent to central scanning. Documentation sent to central scanning.

## 2017-03-28 ENCOUNTER — OFFICE VISIT (OUTPATIENT)
Dept: FAMILY MEDICINE CLINIC | Age: 67
End: 2017-03-28

## 2017-03-28 VITALS
BODY MASS INDEX: 29.33 KG/M2 | OXYGEN SATURATION: 96 % | RESPIRATION RATE: 16 BRPM | DIASTOLIC BLOOD PRESSURE: 77 MMHG | SYSTOLIC BLOOD PRESSURE: 115 MMHG | TEMPERATURE: 97.1 F | HEIGHT: 64 IN | WEIGHT: 171.8 LBS | HEART RATE: 86 BPM

## 2017-03-28 DIAGNOSIS — I09.9 RHEUMATIC HEART DISEASE: ICD-10-CM

## 2017-03-28 DIAGNOSIS — I48.91 ATRIAL FIBRILLATION, UNSPECIFIED TYPE (HCC): Chronic | ICD-10-CM

## 2017-03-28 DIAGNOSIS — Z00.00 ROUTINE GENERAL MEDICAL EXAMINATION AT A HEALTH CARE FACILITY: Primary | ICD-10-CM

## 2017-03-28 DIAGNOSIS — I48.20 CHRONIC ATRIAL FIBRILLATION (HCC): ICD-10-CM

## 2017-03-28 DIAGNOSIS — E78.5 DYSLIPIDEMIA: Chronic | ICD-10-CM

## 2017-03-28 DIAGNOSIS — K25.4 GASTROINTESTINAL HEMORRHAGE ASSOCIATED WITH GASTRIC ULCER: ICD-10-CM

## 2017-03-28 PROBLEM — N76.4 ABSCESS OF RIGHT GENITAL LABIA: Status: RESOLVED | Noted: 2017-03-21 | Resolved: 2017-03-28

## 2017-03-28 RX ORDER — OMEPRAZOLE 20 MG/1
CAPSULE, DELAYED RELEASE ORAL
Qty: 180 CAP | Refills: 4 | Status: SHIPPED | OUTPATIENT
Start: 2017-03-28 | End: 2018-03-08 | Stop reason: SDUPTHER

## 2017-03-28 NOTE — PROGRESS NOTES
THE SUBSEQUENT MEDICARE ANNUAL WELLNESS VISIT PROGRESS NOTES    This is a Subsequent Medicare Annual Wellness Visit providing Personalized Prevention Plan Services (PPPS) (Performed 12 months after initial AWV and PPPS )    I have reviewed the patient's medical history in detail and updated the computerized patient record. Radha Matos is a 74 y.o.  female and presents for an subsequent annual wellness exam     Patient Active Problem List    Diagnosis Date Noted    Advance directive in chart 06/28/2016    Dyslipidemia     Rheumatic heart disease     Atrial fibrillation     Arthritis 01/06/2016    Glaucoma 01/15/2014     Current Outpatient Prescriptions   Medication Sig Dispense Refill    ferrous sulfate 325 mg (65 mg iron) tablet take 1 tablet by mouth three times a day with meals 100 Tab 12    PRENATAL PLUS, CALCIUM CARB, 27 mg iron- 1 mg tab TAKE 1 TABLET BY MOUTH DAILY 100 Tab 12    omeprazole (PRILOSEC) 20 mg capsule take 1 capsule by mouth twice a day 180 Cap 4    cycloSPORINE (RESTASIS) 0.05 % ophthalmic emulsion Administer 1 Drop to both eyes two (2) times a day.  oxybutynin chloride XL (DITROPAN XL) 10 mg CR tablet Take 1 Tab by mouth daily. 90 Tab 3    acetaminophen-codeine (TYLENOL #2) 300-15 mg per tablet Take 1 Tab by mouth every four (4) hours as needed for Pain. Max Daily Amount: 6 Tabs. 30 Tab 5    bimatoprost (LUMIGAN) 0.01 % ophthalmic drops Administer 1 Drop to left eye nightly.  aspirin delayed-release 81 mg tablet take 1 tablet by mouth once daily 90 Tab 3    ezetimibe (ZETIA) 10 mg tablet Take 1 Tab by mouth daily.  90 Tab 3     Allergies   Allergen Reactions    Contrast Dye [Iodine] Itching and Swelling    Iodinated Contrast Media - Oral And Iv Dye Itching and Swelling    Seafood Itching and Swelling     LOBSTER ONLY    Statins-Hmg-Coa Reductase Inhibitors Myalgia and Other (comments)     Myalgia    Sulfa (Sulfonamide Antibiotics) Itching and Swelling     Past Medical History:   Diagnosis Date    Advance directive in chart     6/28/2016    Amaurosis fugax     Aortic stenosis     mean gradient  26.5 mmHg (CATH 4/13), 32 mmHg (ECHO 9/15)    Aortic valve replacement     21mm MAGNA EASE pericardial  2/16    Arthritis     Atrial appendage resection     2/16      Atrial fibrillation     CHADS score 2  (-CHF, -HTN, -AGE, -DM +AMAUROSIS FUGAX)    Atrial fibrillation ablation     surgical left sided cryoablation  2/16    Cataract     OS    Cervical dysplasia     2009   post leep and cone    Chronic pain     COPD     mild  2/16    Duodenal AVM     argon plasma coagulation 2/16    Dyslipidemia     Fibroids     Glaucoma     1/15/2014    Lung nodule     3/19/2012   stable    Mitral stenosis     mean gradient  6 mmHg (CATH 4/13), 7.6 mmHg (MANUEL 10/15)    Mitral valve replacement     25 mm MAGNA EASE pericardial  2/16    Pacemaker     dual chamber MEDTRONIC 2/16    Post-op ventricular asystole     Remote tobacco     Rheumatic heart disease     Sleep apnea     no CPAP    Thyroid nodule     3/20/2013   multiple     Past Surgical History:   Procedure Laterality Date    COLONOSCOPY N/A 1/23/2017    [facility]    HX AFIB ABLATION      2/16  surgical cryoablation    HX AORTIC VALVE REPLACEMENT      2/16    HX BREAST BIOPSY Right 10/13/2014    Right breast needle [facility]OC biopsy performed by Galilea Cain MD at [address] [place] [address] HX BREAST LUMPECTOMY      Right    HX BUNIONECTOMY      left    HX GYN      VAINIII, VELASQUEZ 1, keratinous labial cyst    HX GYN      2012  Cold knife conization of cervix    HX LEEP PROCEDURE      2010    HX MITRAL VALVE REPLACEMENT      2/16    HX OTHER SURGICAL      lump left neck, benign    HX OTHER SURGICAL      multiple breast aspirations    HX PACEMAKER      I&D OF VULVA/PERINEUM ABSCESS  3/21/2017          Family History   Problem Relation Age of Onset    Cancer Mother      oral,    Verlinda Smoker Alzheimer Father         Verlinda Smoker Breast Cancer Maternal Aunt      unsure age   Verlinda Smoker Colon Cancer Maternal Grandfather      Social History   Substance Use Topics    Smoking status: Former Smoker     Years: 37.00     Quit date: 2004    Smokeless tobacco: Never Used      Comment: stop around     Alcohol use No         ROS       All other systems reviewed and are negative.       History     Past Medical History:   Diagnosis Date    Advance directive in chart     2016    Amaurosis fugax     Aortic stenosis     mean gradient  26.5 mmHg (CATH ), 32 mmHg (ECHO 9/15)    Aortic valve replacement     21mm MAGNA EASE pericardial      Arthritis     Atrial appendage resection           Atrial fibrillation     CHADS score 2  (-CHF, -HTN, -AGE, -DM +AMAUROSIS FUGAX)    Atrial fibrillation ablation     surgical left sided cryoablation      Cataract     OS    Cervical dysplasia        post leep and cone    Chronic pain     COPD     mild      Duodenal AVM     argon plasma coagulation     Dyslipidemia     Fibroids     Glaucoma     1/15/2014    Lung nodule     3/19/2012   stable    Mitral stenosis     mean gradient  6 mmHg (CATH ), 7.6 mmHg (MANUEL 10/15)    Mitral valve replacement     25 mm MAGNA EASE pericardial      Pacemaker     dual chamber MEDTRONIC     Post-op ventricular asystole     Remote tobacco     Rheumatic heart disease     Sleep apnea     no CPAP    Thyroid nodule     3/20/2013   multiple      Past Surgical History:   Procedure Laterality Date    COLONOSCOPY N/A 2017    COLONOSCOPY performed [facility] Nikia Martines MD at [facility] END[facility]COPY    HX AFIB ABLATION        surgical cryoablation    HX AORTIC VALVE REPLACEMENT          HX BREAST BIOPSY Right 10/13/2014    Right breast needle [facility]OC biopsy performed by Fe Pathak MD at [facility] [address] HX BREAST LUMPECTOMY      Right    HX BUNIONECTOMY left    HX GYN      VAINIII, VELASQUEZ 1, keratinous labial cyst    HX GYN      2012  Cold knife conization of cervix    HX LEEP PROCEDURE          HX MITRAL VALVE REPLACEMENT          HX OTHER SURGICAL      lump left neck, benign    HX OTHER SURGICAL      multiple breast aspirations    HX PACEMAKER      I&D OF VULVA/PERINEUM ABSCESS  3/21/2017          Current Outpatient Prescriptions   Medication Sig Dispense Refill    ferrous sulfate 325 mg (65 mg iron) tablet take 1 tablet by mouth three times a day with meals 100 Tab 12    PRENATAL PLUS, CALCIUM CARB, 27 mg iron- 1 mg tab TAKE 1 TABLET BY MOUTH DAILY 100 Tab 12    omeprazole (PRILOSEC) 20 mg capsule take 1 capsule by mouth twice a day 180 Cap 4    cycloSPORINE (RESTASIS) 0.05 % ophthalmic emulsion Administer 1 Drop to both eyes two (2) times a day.  oxybutynin chloride XL (DITROPAN XL) 10 mg CR tablet Take 1 Tab by mouth daily. 90 Tab 3    acetaminophen-codeine (TYLENOL #2) 300-15 mg per tablet Take 1 Tab by mouth every four (4) hours as needed for Pain. Max Daily Amount: 6 Tabs. 30 Tab 5    bimatoprost (LUMIGAN) 0.01 % ophthalmic drops Administer 1 Drop to left eye nightly.  aspirin delayed-release 81 mg tablet take 1 tablet by mouth once daily 90 Tab 3    ezetimibe (ZETIA) 10 mg tablet Take 1 Tab by mouth daily.  90 Tab 3     Allergies   Allergen Reactions    Contrast Dye [Iodine] Itching and Swelling    Iodinated Contrast Media - Oral And Iv Dye Itching and Swelling    Seafood Itching and Swelling     LOBSTER ONLY    Statins-Hmg-Coa Reductase Inhibitors Myalgia and Other (comments)     Myalgia    Sulfa (Sulfonamide Antibiotics) Itching and Swelling     Family History   Problem Relation Age of Onset    Cancer Mother      oral,     Alzheimer Father          Breast Cancer Maternal Aunt      unsure age   Jean Yousuf Colon Cancer Maternal Grandfather      Social History   Substance Use Topics    Smoking status: Former Smoker     Years: 37.00     Quit date: 2/8/2004    Smokeless tobacco: Never Used      Comment: stop around 2004    Alcohol use No     Patient Active Problem List   Diagnosis Code    Glaucoma H40.9    Arthritis M19.90    Dyslipidemia E78.5    Rheumatic heart disease I09.9    Atrial fibrillation I48.91    Advance directive in chart Z78.9       Health Maintenance History  Immunizations reviewed, dtap utd  , pneumovax utd , flu utd , zoster utd   Colonoscopy: utd ,   Chest CT : na ,  Eye exam: utd   Mammo utd   Dexascan utd    Health Care Directive or Living Will: yes    Depression Risk Factor Screening:      Patient Health Questionnaire (PHQ-2)   Over the last 2 weeks, how often have you been bothered by any of the following problems? · Little interest or pleasure in doing things? · Not at all. [0]  · Feeling down, depressed, or hopeless? · Not at all. [0]    Total Score: 0/6  PHQ-2 Assessment Scoring:   A score of 2 or more requires further screening with the PHQ-9    Alcohol Risk Factor Screening:     Women: On any occasion during the past 3 months, have you had more than 3 drinks containing alcohol? Do you average more than 7 drinks per week? Men: On any occasion during the past 3 months, have you had more than 4 drinks containing alcohol? Do you average more than 14 drinks per week? Functional Ability and Level of Safety:     Hearing Loss    mild    Activities of Daily Living   Self-care. Requires assistance with: no ADLs    Fall Risk   No fall risk factors    Abuse Screen   None      Examination   Physical Examination  Vitals:    03/28/17 1043   BP: 115/77   Pulse: 86   Resp: 16   Temp: 97.1 °F (36.2 °C)   TempSrc: Oral   SpO2: 96%   Weight: 171 lb 12.8 oz (77.9 kg)   Height: 5' 4\" (1.626 m)   PainSc:   8   PainLoc: Vagina     Body mass index is 29.49 kg/(m^2).     Evaluation of Cognitive Function:  Mood/affect:appropriate   Appearance: ell groomed   Family member/caregiver input:na    alert, well appearing, and in no distress, oriented to person, place, and time and normal appearing weight    Patient Care Team:  Ese Nash., DO as PCP - General (Family Practice)  Génesis Jimenez MD (Pulmonary Disease)  Lu Ayala MD (Endocrinology)  Alveta Blizzard, MD (Obstetrics & Gynecology)  Nae Mcdonald MD (Cardiology)  Peyton Shea MD (Ophthalmology)  Ben Crocker MD (Colon and Rectal Surgery)  Betsy Astudillo MD (Cardiology)  Vidal Shearer MD (Gastroenterology)  Ana Mcgarry MD (Surgery)  Rajiv Hernandez MD (Orthopedic Surgery)    Advice/Referrals/Counseling/Plan:   Education and counseling provided:  Are appropriate based on today's review and evaluation  End-of-Life planning (with patient's consent)  Include in education list (weight loss, physical activity, smoking cessation, fall prevention, and nutrition)  current treatment plan is effective, no change in therapy. I have discussed the diagnosis with the patient and the intended plan as seen in the above orders. The patient has received an after-visit summary and questions were answered concerning future plans. I have discussed medication side effects and warnings with the patient as well. I have reviewed the plan of care with the patient, accepted their input and they are in agreement with the treatment goals. Follow-up Disposition: Not on File    _____________________________________________________________    Problem Assessment    for treatment of   Chief Complaint   Patient presents with    Irregular Heart Beat    Arthritis    Cholesterol Problem    Depression    Thyroid Problem    Yeast Infection         SUBJECTIVE      Cardiovascular Review:  The patient has hypertension and hyperlipidemia. Diet and Lifestyle: not attempting to follow a low fat, low cholesterol diet, not attempting to follow a low sodium diet  Home BP Monitoring: is not measured at home.   Pertinent ROS: taking medications as instructed, no medication side effects noted, no TIA's, no chest pain on exertion, no dyspnea on exertion, no swelling of ankles. Depression Review:  Patient is seen for followup of depression. Treatment includes Zoloft and no other therapies. Ongoing symptoms include fatigue. She denies depressed mood. She experiences the following side effects from the treatment: none. Osteoarthritis and Chronic Pain:  Patient has osteoarthritis, primarily affecting the diffuse. Symptoms onset: problem is longstanding. Rheumatological ROS: no current joint or muscle symptoms, essentially pain-free. Response to treatment plan: stable. Vaginal pain worried about infectino vs UTI      Additional Concerns:        Visit Vitals    /77 (BP 1 Location: Left arm, BP Patient Position: Sitting)    Pulse 86    Temp 97.1 °F (36.2 °C) (Oral)    Resp 16    Ht 5' 4\" (1.626 m)    Wt 171 lb 12.8 oz (77.9 kg)    SpO2 96%    BMI 29.49 kg/m2     General:  Alert, cooperative, no distress, appears stated age. Head:  Normocephalic, without obvious abnormality, atraumatic. Eyes:  Conjunctivae/corneas clear. PERRL, EOMs intact. Fundi benign. Ears:  Normal TMs and external ear canals both ears. Nose: Nares normal. Septum midline. Mucosa normal. No drainage or sinus tenderness. Throat: Lips, mucosa, and tongue normal. Teeth and gums normal.   Neck: Supple, symmetrical, trachea midline, no adenopathy, thyroid: no enlargement/tenderness/nodules, no carotid bruit and no JVD. Back:   Symmetric, no curvature. ROM normal. No CVA tenderness. Lungs:   Clear to auscultation bilaterally. Chest wall:  No tenderness or deformity. Heart:  Regular rate and rhythm, S1, S2 normal, no murmur, click, rub or gallop. Breast Exam:  No tenderness, masses, or nipple abnormality. Abdomen:   Soft, non-tender. Bowel sounds normal. No masses,  No organomegaly. Genitalia:  Normal female without lesion, discharge or tenderness. Rectal:  Normal tone,  no masses or tenderness  Guaiac negative stool. Extremities: Extremities normal, atraumatic, no cyanosis or edema. Pulses: 2+ and symmetric all extremities. Skin: Skin color, texture, turgor normal. No rashes or lesions. Lymph nodes: Cervical, supraclavicular, and axillary nodes normal.   Neurologic: CNII-XII intact. Normal strength, sensation and reflexes throughout. LABS   Component      Latest Ref Rng & Units 3/7/2017 3/7/2017 3/7/2017 3/7/2017           2:24 PM  2:24 PM  2:24 PM  2:24 PM   WBC      4.6 - 13.2 K/uL    5.5   RBC      4.20 - 5.30 M/uL    4.50   HGB      12.0 - 16.0 g/dL    13.6   HCT      35.0 - 45.0 %    42.6   MCV      74.0 - 97.0 FL    94.7   MCH      24.0 - 34.0 PG    30.2   MCHC      31.0 - 37.0 g/dL    31.9   RDW      11.6 - 14.5 %    14.4   PLATELET      856 - 270 K/uL    177   MPV      9.2 - 11.8 FL    11.5   NEUTROPHILS      40 - 73 %    59   LYMPHOCYTES      21 - 52 %    34   MONOCYTES      3 - 10 %    3   EOSINOPHILS      0 - 5 %    4   BASOPHILS      0 - 2 %    0   ABS. NEUTROPHILS      1.8 - 8.0 K/UL    3.2   ABS. LYMPHOCYTES      0.9 - 3.6 K/UL    1.9   ABS. MONOCYTES      0.05 - 1.2 K/UL    0.1   ABS. EOSINOPHILS      0.0 - 0.4 K/UL    0.2   ABS. BASOPHILS      0.0 - 0.06 K/UL    0.0   DF          AUTOMATED   Sodium      136 - 145 mmol/L   141    Potassium      3.5 - 5.5 mmol/L   3.6    Chloride      100 - 108 mmol/L   104    CO2      21 - 32 mmol/L   30    Anion gap      3.0 - 18 mmol/L   7    Glucose      74 - 99 mg/dL   85    BUN      7.0 - 18 MG/DL   12    Creatinine      0.6 - 1.3 MG/DL   0.92    BUN/Creatinine ratio      12 - 20     13    GFR est AA      >60 ml/min/1.73m2   >60    GFR est non-AA      >60 ml/min/1.73m2   >60    Calcium      8.5 - 10.1 MG/DL   9.0    Bilirubin, total      0.2 - 1.0 MG/DL   0.4    ALT      13 - 56 U/L   28    AST      15 - 37 U/L   24    Alk.  phosphatase      45 - 117 U/L   80    Protein, total      6.4 - 8.2 g/dL   7.4    Albumin      3.4 - 5.0 g/dL   3.9    Globulin      2.0 - 4.0 g/dL   3.5    A-G Ratio      0.8 - 1.7     1.1    Color (UA POC)             Clarity (UA POC)             Glucose (UA POC)      Negative       Bilirubin (UA POC)      Negative       Ketones (UA POC)      Negative       Specific gravity (UA POC)      1.001 - 1.035       Blood (UA POC)      Negative       pH (UA POC)      4.6 - 8.0       Protein (UA POC)      Negative mg/dL       Urobilinogen (UA POC)      0.2 - 1       Nitrites (UA POC)      Negative       Leukocyte esterase (UA POC)      Negative       Color             Appearance             Specific gravity      1.005 - 1.030         pH (UA)      5.0 - 8.0         Protein      NEG mg/dL       Glucose      NEG mg/dL       Ketone      NEG mg/dL       Bilirubin      NEG         Blood      NEG         Urobilinogen      0.2 - 1.0 EU/dL       Nitrites      NEG         Leukocyte Esterase      NEG         Cholesterol, total      <200 MG/DL  246 (H)     Triglyceride      <150 MG/DL  154 (H)     HDL Cholesterol      40 - 60 MG/DL  55     LDL, calculated      0 - 100 MG/DL  160.2 (H)     VLDL, calculated      MG/DL  30.8     CHOL/HDL Ratio      0 - 5.0    4.5     TSH      0.36 - 3.74 uIU/mL 0.88        Component      Latest Ref Rng & Units 3/7/2017 2/28/2017           2:24 PM  1:41 PM   WBC      4.6 - 13.2 K/uL     RBC      4.20 - 5.30 M/uL     HGB      12.0 - 16.0 g/dL     HCT      35.0 - 45.0 %     MCV      74.0 - 97.0 FL     MCH      24.0 - 34.0 PG     MCHC      31.0 - 37.0 g/dL     RDW      11.6 - 14.5 %     PLATELET      097 - 154 K/uL     MPV      9.2 - 11.8 FL     NEUTROPHILS      40 - 73 %     LYMPHOCYTES      21 - 52 %     MONOCYTES      3 - 10 %     EOSINOPHILS      0 - 5 %     BASOPHILS      0 - 2 %     ABS. NEUTROPHILS      1.8 - 8.0 K/UL     ABS. LYMPHOCYTES      0.9 - 3.6 K/UL     ABS. MONOCYTES      0.05 - 1.2 K/UL     ABS. EOSINOPHILS      0.0 - 0.4 K/UL     ABS.  BASOPHILS      0.0 - 0.06 K/UL     DF           Sodium      136 - 145 mmol/L     Potassium      3.5 - 5.5 mmol/L     Chloride      100 - 108 mmol/L     CO2      21 - 32 mmol/L     Anion gap      3.0 - 18 mmol/L     Glucose      74 - 99 mg/dL     BUN      7.0 - 18 MG/DL     Creatinine      0.6 - 1.3 MG/DL     BUN/Creatinine ratio      12 - 20       GFR est AA      >60 ml/min/1.73m2     GFR est non-AA      >60 ml/min/1.73m2     Calcium      8.5 - 10.1 MG/DL     Bilirubin, total      0.2 - 1.0 MG/DL     ALT      13 - 56 U/L     AST      15 - 37 U/L     Alk.  phosphatase      45 - 117 U/L     Protein, total      6.4 - 8.2 g/dL     Albumin      3.4 - 5.0 g/dL     Globulin      2.0 - 4.0 g/dL     A-G Ratio      0.8 - 1.7       Color (UA POC)        Dark Vicki   Clarity (UA POC)        Clear   Glucose (UA POC)      Negative  Negative   Bilirubin (UA POC)      Negative  Negative   Ketones (UA POC)      Negative  Trace   Specific gravity (UA POC)      1.001 - 1.035  1.025   Blood (UA POC)      Negative  3+   pH (UA POC)      4.6 - 8.0  5.5   Protein (UA POC)      Negative mg/dL  2+   Urobilinogen (UA POC)      0.2 - 1  0.2 mg/dL   Nitrites (UA POC)      Negative  Negative   Leukocyte esterase (UA POC)      Negative  3+   Color       YELLOW    Appearance       CLEAR    Specific gravity      1.005 - 1.030   1.015    pH (UA)      5.0 - 8.0   6.5    Protein      NEG mg/dL NEGATIVE    Glucose      NEG mg/dL NEGATIVE    Ketone      NEG mg/dL NEGATIVE    Bilirubin      NEG   NEGATIVE    Blood      NEG   NEGATIVE    Urobilinogen      0.2 - 1.0 EU/dL 0.2    Nitrites      NEG   NEGATIVE    Leukocyte Esterase      NEG   NEGATIVE    Cholesterol, total      <200 MG/DL     Triglyceride      <150 MG/DL     HDL Cholesterol      40 - 60 MG/DL     LDL, calculated      0 - 100 MG/DL     VLDL, calculated      MG/DL     CHOL/HDL Ratio      0 - 5.0       TSH      0.36 - 3.74 uIU/mL       TESTS  ekg  Paced VVI    Assessment/Plan:      Hypertension - stable  Hyperlipidemia - stable  Depression ongiong  Atrial fib paced   Arthritis ongiong  Thyroid looks fine-- being evaluated by Dr Anuradha Dowling will await his input  Vagina no signs of infection or abscess at this time  - no other tx needed  Urine seeing urlogy -- prev urine culutre neg  I note some atopic changes on vagina have recommended her back to Dr Brittany Sumner for consideration of topical estrogen therapy          Lab review: labs are reviewed, up to date and normal, orders written for new lab studies as appropriate; see orders

## 2017-03-28 NOTE — PROGRESS NOTES
Stacey Cabral is a 74 y.o. female presents to clinic for a complete physical exam. Pt c/o 6/10 with a possible yeast infection. Pt states that she has depression because of where she lives\" the neighbors are cooking meth. \" She has a list of concerns for Dr. Magdaleno Sousa to address. Pt declined student observation. 1. Have you been to the ER, urgent care clinic since your last visit? Hospitalized since your last visit? No    2. Have you seen or consulted any other health care providers outside of the [address] [place] [address] since your last visit? Include any pap smears or colon screening.  No   Learning Assessment 9/7/2016   PRIMARY LEARNER Patient   HIGHEST LEVEL OF EDUCATION - PRIMARY LEARNER  -   PRIMARY LANGUAGE ENGLISH   LEARNER PREFERENCE PRIMARY DEMONSTRATION     -   ANSWERED BY Patient   RELATIONSHIP SELF

## 2017-04-04 ENCOUNTER — OFFICE VISIT (OUTPATIENT)
Dept: OBGYN CLINIC | Age: 67
End: 2017-04-04

## 2017-04-04 VITALS
BODY MASS INDEX: 29.19 KG/M2 | SYSTOLIC BLOOD PRESSURE: 113 MMHG | DIASTOLIC BLOOD PRESSURE: 84 MMHG | HEART RATE: 77 BPM | WEIGHT: 171 LBS | HEIGHT: 64 IN

## 2017-04-04 DIAGNOSIS — N76.0 BV (BACTERIAL VAGINOSIS): ICD-10-CM

## 2017-04-04 DIAGNOSIS — B96.89 BV (BACTERIAL VAGINOSIS): ICD-10-CM

## 2017-04-04 DIAGNOSIS — R31.9 BLOOD IN URINE: ICD-10-CM

## 2017-04-04 DIAGNOSIS — N89.8 VAGINAL DISCHARGE: Primary | ICD-10-CM

## 2017-04-04 DIAGNOSIS — N89.8 VAGINAL ITCHING: ICD-10-CM

## 2017-04-04 LAB
BILIRUB UR QL STRIP: NORMAL
GLUCOSE UR-MCNC: NORMAL MG/DL
KETONES P FAST UR STRIP-MCNC: NORMAL MG/DL
PH UR STRIP: NORMAL [PH] (ref 4.6–8)
PROT UR QL STRIP: NORMAL MG/DL
SP GR UR STRIP: NORMAL (ref 1–1.03)
UA UROBILINOGEN AMB POC: NORMAL (ref 0.2–1)
URINALYSIS CLARITY POC: NORMAL
URINALYSIS COLOR POC: NORMAL
URINE BLOOD POC: NORMAL
URINE LEUKOCYTES POC: NORMAL
URINE NITRITES POC: NORMAL

## 2017-04-04 RX ORDER — FLUCONAZOLE 150 MG/1
150 TABLET ORAL
Qty: 1 TAB | Refills: 0 | Status: SHIPPED | OUTPATIENT
Start: 2017-04-04 | End: 2017-05-30

## 2017-04-04 RX ORDER — METRONIDAZOLE 500 MG/1
500 TABLET ORAL 2 TIMES DAILY
Qty: 14 TAB | Refills: 0 | Status: SHIPPED | OUTPATIENT
Start: 2017-04-04 | End: 2017-04-11

## 2017-04-04 NOTE — PROGRESS NOTES
[facility] WEST  [address] [facility], [facility] Alyssa   [phone]        Priscila Canas is a 74 y.o. female presents today c/o   Chief Complaint   Patient presents with    Vaginal Discharge    Urinary Frequency     Reports recent hematuria and dysuria, self treated with leftover cipro x 3 days. Now has increased vaginal discharge associated with vaginal itching. No odor. No pelvic pain. Denies douching. Uses KY prn intercourse. Has replens at home but not using. Review of Systems:  General ROS: negative for - fever, chills  Gastrointestinal ROS:negative for - abdominal pain, blood in stools, change in bowel habits, constipation, diarrhea, hematemesis or nausea/vomiting  Genito-Urinary ROS: negative for - change in menstrual cycle, dysmenorrhea,  dysuria, genital ulcers, hematuria, incontinence, pelvic pain or urinary frequency/urgency. Positive for vaginal dryness and dyspareunia.     OB History     No data available        Past Medical History:   Diagnosis Date    Advance directive in chart     6/28/2016    Amaurosis fugax     Aortic stenosis     mean gradient  26.5 mmHg (CATH 4/13), 32 mmHg (ECHO 9/15)    Aortic valve replacement     21mm MAGNA EASE pericardial  2/16    Arthritis     Atrial appendage resection     2/16      Atrial fibrillation     CHADS score 2  (-CHF, -HTN, -AGE, -DM +AMAUROSIS FUGAX)    Atrial fibrillation ablation     surgical left sided cryoablation  2/16    Cataract     OS    Cervical dysplasia     2009   post leep and cone    Chronic pain     COPD     mild  2/16    Duodenal AVM     argon plasma coagulation 2/16    Dyslipidemia     Fibroids     Glaucoma     1/15/2014    Lung nodule     3/19/2012   stable    Mitral stenosis     mean gradient  6 mmHg (CATH 4/13), 7.6 mmHg (MANUEL 10/15)    Mitral valve replacement     25 mm MAGNA EASE pericardial  2/16    Pacemaker     dual chamber MEDTRONIC 2/16    Post-op ventricular asystole     Remote tobacco     Rheumatic heart disease     Sleep apnea     no CPAP    Thyroid nodule     3/20/2013   multiple     Past Surgical History:   Procedure Laterality Date    COLONOSCOPY N/A 1/23/2017    COLONOSCOPY performed [facility] Ashanti Kaufman MD at [address] HX AFIB ABLATION      2/16  surgical cryoablation    HX AORTIC VALVE REPLACEMENT      2/16    HX BREAST BIOPSY Right 10/13/2014    Right breast needle [facility]OC biopsy performed by Reggie Valentine MD at [facility] HX BREAST LUMPECTOMY      Right    HX BUNIONECTOMY      left    HX GYN      VAINIII, VELASQUEZ 1, keratinous labial cyst    HX GYN      2012  Cold knife conization of cervix    HX LEEP PROCEDURE      2010    HX MITRAL VALVE REPLACEMENT      2/16    HX OTHER SURGICAL      lump left neck, benign    HX OTHER SURGICAL      multiple breast aspirations    HX PACEMAKER      I&D OF VULVA/PERINEUM ABSCESS  3/21/2017          Social History     Social History    Marital status:      Spouse name: N/A    Number of children: N/A    Years of education: N/A     Occupational History    Not on file. Social History Main Topics    Smoking status: Former Smoker     Years: 37.00     Quit date: 2/8/2004    Smokeless tobacco: Never Used      Comment: stop around 2004    Alcohol use No    Drug use: No    Sexual activity: Yes     Partners: Male     Birth control/ protection: None     Other Topics Concern    Not on file     Social History Narrative     Allergies   Allergen Reactions    Contrast Dye [Iodine] Itching and Swelling    Iodinated Contrast Media - Oral And Iv Dye Itching and Swelling    Seafood Itching and Swelling     LOBSTER ONLY    Statins-Hmg-Coa Reductase Inhibitors Myalgia and Other (comments)     Myalgia    Sulfa (Sulfonamide Antibiotics) Itching and Swelling     Prior to Admission medications    Medication Sig Start Date End Date Taking?  Authorizing Provider   metroNIDAZOLE (FLAGYL) 500 mg tablet Take 1 Tab by mouth two (2) times a day for 7 days. 4/4/17 4/11/17 Yes Marty Rodrigez, DO   fluconazole (DIFLUCAN) 150 mg tablet Take 1 Tab by mouth once as needed for up to 1 dose. 4/4/17  Yes Marty Rodrigez, DO   prenatal vit-calcium-iron-fa (PRENATAL PLUS, CALCIUM CARB,) 27 mg iron- 1 mg tab TAKE 1 TABLET BY MOUTH DAILY 3/28/17  Yes Delfina Archer., DO   omeprazole (PRILOSEC) 20 mg capsule take 1 capsule by mouth twice a day 3/28/17  Yes Delfina Luther, DO   cycloSPORINE (RESTASIS) 0.05 % ophthalmic emulsion Administer 1 Drop to both eyes two (2) times a day. Yes Historical Provider   bimatoprost (LUMIGAN) 0.01 % ophthalmic drops Administer 1 Drop to left eye nightly. 6/5/15  Yes Historical Provider   aspirin delayed-release 81 mg tablet take 1 tablet by mouth once daily 11/3/16  Yes Juan José ROSEN MD   ezetimibe (ZETIA) 10 mg tablet Take 1 Tab by mouth daily. 9/7/16  Yes Francie Bowers MD        Objective:     Vitals:    04/04/17 1348   BP: 113/84   Pulse: 77   Weight: 171 lb (77.6 kg)   Height: 5' 4\" (1.626 m)     Body mass index is 29.35 kg/(m^2). Physical Exam:  General appearance - well appearing, and in no distress and well hydrated  Mental status - alert, oriented to person, place, and time, normal mood, behavior, speech, dress, motor activity, and thought processes  Pelvic - Normal urethral meatus and no bladder tenderness to palpation, VULVA: normal appearing vulva with no masses, tenderness or lesions, right labia I&D site healed, VAGINA:atrophic with minimal discharge, no lesions, Relaxed vaginal wall. Cervix not visible. No palpable mass or tenderness on bimanual exam.  WET MOUNT done - results: clue cells, pH > 4.5  Extremities - No edema/varicosities  Skin - normal coloration and turgor  UA unremarkable. Assessment/Plan:       ICD-10-CM ICD-9-CM    1. Vaginal discharge N89.8 623.5 AMB POC URINALYSIS DIP STICK MANUAL W/O MICRO   2.  Vaginal itching L29.8 698.1 AMB POC URINALYSIS DIP STICK MANUAL W/O MICRO      fluconazole (DIFLUCAN) 150 mg tablet   3. Blood in urine R31.9 599.70 AMB POC URINALYSIS DIP STICK MANUAL W/O MICRO   4. BV (bacterial vaginosis) N76.0 616.10 metroNIDAZOLE (FLAGYL) 500 mg tablet    B96.89 041.9    No evidence of persistent UTI, patient reassured. S/sx of vaginitis, management, and prevention discussed. Use replens as directed and KY prn IC. Plan of care discussed. Patient expressed understanding.          Signed By:  Carmita Jin DO     April 4, 2017

## 2017-04-04 NOTE — PATIENT INSTRUCTIONS
Bacterial Vaginosis: Care Instructions  Your Care Instructions    Bacterial vaginosis is a type of vaginal infection. It is caused by excess growth of certain bacteria that are normally found in the vagina. Symptoms can include itching, swelling, pain when you urinate or have sex, and a gray or yellow discharge with a \"fishy\" odor. It is not considered an infection that is spread through sexual contact. Although symptoms can be annoying and uncomfortable, bacterial vaginosis does not usually cause other health problems. However, if you have it while you are pregnant, it can cause complications. While the infection may go away on its own, most doctors use antibiotics to treat it. You may have been prescribed pills or vaginal cream. With treatment, bacterial vaginosis usually clears up in 5 to 7 days. Follow-up care is a key part of your treatment and safety. Be sure to make and go to all appointments, and call your doctor if you are having problems. It's also a good idea to know your test results and keep a list of the medicines you take. How can you care for yourself at home? · Take your antibiotics as directed. Do not stop taking them just because you feel better. You need to take the full course of antibiotics. · Do not eat or drink anything that contains alcohol if you are taking metronidazole (Flagyl). · Keep using your medicine if you start your period. Use pads instead of tampons while using a vaginal cream or suppository. Tampons can absorb the medicine. · Wear loose cotton clothing. Do not wear nylon and other materials that hold body heat and moisture close to the skin. · Do not scratch. Relieve itching with a cold pack or a cool bath. · Do not wash your vaginal area more than once a day. Use plain water or a mild, unscented soap. Do not douche. When should you call for help?   Watch closely for changes in your health, and be sure to contact your doctor if:  · You have unexpected vaginal bleeding. · You have a fever. · You have new or increased pain in your vagina or pelvis. · You are not getting better after 1 week. · Your symptoms return after you finish the course of your medicine. Where can you learn more? Go to http://diogenes-lee.info/. Genie Fernandez in the search box to learn more about \"Bacterial Vaginosis: Care Instructions. \"  Current as of: October 13, 2016  Content Version: 11.2  © 6181-4846 [employer]. Care instructions adapted under license by [employer] (which disclaims liability or warranty for this information). If you have questions about a medical condition or this instruction, always ask your healthcare professional. [address] any warranty or liability for your use of this information.

## 2017-04-04 NOTE — MR AVS SNAPSHOT
Visit Information Date & Time Provider Department Dept. Phone Encounter #  
 4/4/2017  2:00 PM Lacy Krabbe, DO [place] [phone] [id number] Your Appointments 5/10/2017 10:15 AM  
PROCEDURE with Abbey Liang Csi Cardio Specialist at [facility]) Appt Note: 1 yr [place] now it is a 14 month check, unable to come in sooner [place] [address] [address] [address]  
  
   
 [address]  
  
    
 6/20/2017 10:00 AM  
Follow Up with Deja Douglass MD  
Cardio Specialist at [facility]) Appt Note: 6 m f/u  
 [address]  
  
   
 [address] 8/15/2017 10:30 AM  
ULTRASOUND with [facility] ULTRASOUND [facility]. [address] ([facility]) Appt Note: Return in about 6 months (around 8/15/2017) for renal us prior. [address] [facility] [address]  
  
    
  
 8/22/2017 10:15 AM  
Any with Dav Tolliver MD  
[facility]. [address] ([facility]) Appt Note: Return in about 6 months (around 8/15/2017) for renal us prior. [address] [address]  
[phone]  
  
   
 [facility] [id number] Upcoming Health Maintenance Date Due  
 BREAST CANCER SCRN MAMMOGRAM 9/26/2018 COLONOSCOPY 1/23/2027 Allergies as of 4/4/2017  Review Complete On: 4/4/2017 By: Torrey Granda LPN Severity Noted Reaction Type Reactions Contrast Dye [Iodine]  10/08/2014    Itching, Swelling Iodinated Contrast Media - Oral And Iv Dye  12/13/2016    Itching, Swelling Seafood  10/08/2014    Itching, Swelling LOBSTER ONLY Statins-hmg-coa Reductase Inhibitors  10/05/2011    Myalgia, Other (comments) Myalgia Sulfa (Sulfonamide Antibiotics)    Itching, Swelling Current Immunizations  Reviewed on 9/15/2014 Name Date Influenza Vaccine 10/5/2016, 9/15/2014, 10/17/2013 Influenza Vaccine Fer Feeler) 9/3/2015 Influenza Vaccine (Quad) PF  Incomplete Influenza Vaccine Split 10/5/2011 Influenza Vaccine Whole 9/20/2012 Pneumococcal Vaccine (Unspecified Type) 9/20/2012, 10/5/2011 TD Vaccine 8/11/2011 Zoster Vaccine, Live 9/3/2015 Not reviewed this visit You Were Diagnosed With   
  
 Codes Comments Vaginal discharge    -  Primary ICD-10-CM: N89.8 ICD-9-CM: 623.5 Vaginal itching     ICD-10-CM: L29.8 ICD-9-CM: 698.1 Blood in urine     ICD-10-CM: R31.9 ICD-9-CM: 599.70 BV (bacterial vaginosis)     ICD-10-CM: N76.0, B96.89 
ICD-9-CM: 616.10, 041.9 Vitals BP Pulse Height(growth percentile) Weight(growth percentile) BMI OB Status 113/84 77 5' 4\" (1.626 m) 171 lb (77.6 kg) 29.35 kg/m2 Postmenopausal  
 Smoking Status Former Smoker Vitals History BMI and BSA Data Body Mass Index Body Surface Area  
 29.35 kg/m 2 1.87 m 2 Preferred Pharmacy Pharmacy Name Phone [phone] [facility] [phone] Your Updated Medication List  
  
   
This list is accurate as of: 4/4/17  2:19 PM.  Always use your most recent med list.  
  
  
  
  
 aspirin delayed-release 81 mg tablet  
take 1 tablet by mouth once daily  
  
 ezetimibe 10 mg tablet Commonly known as:  Cleta Pique Take 1 Tab by mouth daily. fluconazole 150 mg tablet Commonly known as:  DIFLUCAN Take 1 Tab by mouth once as needed for up to 1 dose. LUMIGAN 0.01 % ophthalmic drops Generic drug:  bimatoprost  
Administer 1 Drop to left eye nightly. metroNIDAZOLE 500 mg tablet Commonly known as:  FLAGYL Take 1 Tab by mouth two (2) times a day for 7 days. omeprazole 20 mg capsule Commonly known as:  PRILOSEC  
take 1 capsule by mouth twice a day  
  
 prenatal vit-calcium-iron-fa 27 mg iron- 1 mg Tab Commonly known as:  PRENATAL PLUS (CALCIUM CARB) TAKE 1 TABLET BY MOUTH DAILY RESTASIS 0.05 % ophthalmic emulsion Generic drug:  cycloSPORINE Administer 1 Drop to both eyes two (2) times a day. Prescriptions Sent to Pharmacy Refills  
 metroNIDAZOLE (FLAGYL) 500 mg tablet 0 Sig: Take 1 Tab by mouth two (2) times a day for 7 days. Class: Normal  
 [facility] [address] #: [phone] Route: Oral  
 fluconazole (DIFLUCAN) 150 mg tablet 0 Sig: Take 1 Tab by mouth once as needed for up to 1 dose. Class: Normal  
 [facility] #: [phone] Route: Oral  
  
We Performed the Following AMB POC URINALYSIS DIP STICK MANUAL W/O MICRO [24382 CPT(R)] Introducing [facility] SERVICES! Dear Brook Myrick: Thank you for requesting a [email or web address] account. Our records indicate that you already have an active [email or web address] account. You can access your account anytime at [email or web address] Did you know that you can access your hospital and ER discharge instructions at any time in [email or web address]? You can also review all of your test results from your hospital stay or ER visit. Additional Information If you have questions, please visit the Frequently Asked Questions section of the [email or web address] website at [email or web address] Remember, [email or web address] is NOT to be used for urgent needs. For medical emergencies, dial 911. Now available from your iPhone and Android! Please provide this summary of care documentation to your next provider. Your primary care clinician is listed as [address].  If you have any questions after today's visit, please call [phone].

## 2017-04-25 ENCOUNTER — HOSPITAL ENCOUNTER (OUTPATIENT)
Dept: LAB | Age: 67
Discharge: HOME | End: 2017-04-25

## 2017-04-25 ENCOUNTER — HOSPITAL ENCOUNTER (OUTPATIENT)
Dept: CT IMAGING | Age: 67
Discharge: HOME | End: 2017-04-25
Attending: INTERNAL MEDICINE
Payer: MEDICARE

## 2017-04-25 DIAGNOSIS — R91.1 SOLITARY PULMONARY NODULE: ICD-10-CM

## 2017-04-25 LAB
GLUCOSE P FAST SERPL-MCNC: 110 MG/DL (ref 74–106)
GLUCOSE, 1 HR,GTT1: 167 MG/DL (ref 85–160)
GLUCOSE, 2 HR,GTT2: 166 MG/DL (ref 70–125)

## 2017-04-25 PROCEDURE — 71250 CT THORAX DX C-: CPT

## 2017-04-25 PROCEDURE — 36415 COLL VENOUS BLD VENIPUNCTURE: CPT | Performed by: INTERNAL MEDICINE

## 2017-04-25 PROCEDURE — 82951 GLUCOSE TOLERANCE TEST (GTT): CPT | Performed by: INTERNAL MEDICINE

## 2017-05-09 ENCOUNTER — HOSPITAL ENCOUNTER (OUTPATIENT)
Dept: LAB | Age: 67
Discharge: HOME | End: 2017-05-09
Payer: MEDICARE

## 2017-05-09 DIAGNOSIS — E04.2 NONTOXIC MULTINODULAR GOITER: ICD-10-CM

## 2017-05-09 LAB
T3FREE SERPL-MCNC: 3 PG/ML (ref 2.3–4.2)
T4 FREE SERPL-MCNC: 0.9 NG/DL (ref 0.7–1.5)

## 2017-05-09 PROCEDURE — 84439 ASSAY OF FREE THYROXINE: CPT | Performed by: INTERNAL MEDICINE

## 2017-05-09 PROCEDURE — 36415 COLL VENOUS BLD VENIPUNCTURE: CPT | Performed by: INTERNAL MEDICINE

## 2017-05-09 PROCEDURE — 84481 FREE ASSAY (FT-3): CPT | Performed by: INTERNAL MEDICINE

## 2017-05-22 ENCOUNTER — TELEPHONE (OUTPATIENT)
Dept: CARDIOLOGY CLINIC | Age: 67
End: 2017-05-22

## 2017-05-30 ENCOUNTER — OFFICE VISIT (OUTPATIENT)
Dept: FAMILY MEDICINE CLINIC | Age: 67
End: 2017-05-30

## 2017-05-30 VITALS
RESPIRATION RATE: 12 BRPM | OXYGEN SATURATION: 98 % | HEART RATE: 90 BPM | BODY MASS INDEX: 29.6 KG/M2 | TEMPERATURE: 96.2 F | WEIGHT: 173.4 LBS | DIASTOLIC BLOOD PRESSURE: 88 MMHG | SYSTOLIC BLOOD PRESSURE: 129 MMHG | HEIGHT: 64 IN

## 2017-05-30 DIAGNOSIS — K25.4 GASTROINTESTINAL HEMORRHAGE ASSOCIATED WITH GASTRIC ULCER: ICD-10-CM

## 2017-05-30 DIAGNOSIS — E78.5 DYSLIPIDEMIA: Chronic | ICD-10-CM

## 2017-05-30 DIAGNOSIS — Z00.00 ROUTINE GENERAL MEDICAL EXAMINATION AT A HEALTH CARE FACILITY: ICD-10-CM

## 2017-05-30 DIAGNOSIS — I09.9 RHEUMATIC HEART DISEASE: ICD-10-CM

## 2017-05-30 DIAGNOSIS — J06.9 VIRAL UPPER RESPIRATORY TRACT INFECTION: Primary | ICD-10-CM

## 2017-05-30 DIAGNOSIS — I48.20 CHRONIC ATRIAL FIBRILLATION (HCC): ICD-10-CM

## 2017-05-30 DIAGNOSIS — I48.91 ATRIAL FIBRILLATION, UNSPECIFIED TYPE (HCC): Chronic | ICD-10-CM

## 2017-05-30 RX ORDER — CODEINE PHOSPHATE AND GUAIFENESIN 10; 100 MG/5ML; MG/5ML
5 SOLUTION ORAL
Qty: 120 ML | Refills: 1 | Status: SHIPPED | OUTPATIENT
Start: 2017-05-30 | End: 2017-06-20 | Stop reason: ALTCHOICE

## 2017-05-30 RX ORDER — CLARITHROMYCIN 500 MG/1
500 TABLET, FILM COATED ORAL 2 TIMES DAILY
Qty: 20 TAB | Refills: 0 | Status: SHIPPED | OUTPATIENT
Start: 2017-05-30 | End: 2017-06-09

## 2017-05-30 NOTE — PROGRESS NOTES
Mayra Hayward is a 74 y.o. female presented to clinic for persistent cough x 2 weeks. Pt c/o 2/10 shoulder pain. 1. Have you been to the ER, urgent care clinic since your last visit? Hospitalized since your last visit? No    2. Have you seen or consulted any other health care providers outside of the [address] [place] [address] since your last visit? Include any pap smears or colon screening.  No     Learning Assessment 9/7/2016   PRIMARY LEARNER Patient   HIGHEST LEVEL OF EDUCATION - PRIMARY LEARNER  -   PRIMARY LANGUAGE ENGLISH   LEARNER PREFERENCE PRIMARY DEMONSTRATION     -   ANSWERED BY Patient   RELATIONSHIP SELF

## 2017-05-30 NOTE — MR AVS SNAPSHOT
Visit Information Date & Time Provider Department Dept. Phone Encounter #  
 5/30/2017 10:30 AM Randal Austin, Jw 6 [phone] [id number] Follow-up Instructions Return in about 4 weeks (around 6/27/2017). Your Appointments 6/20/2017 10:00 AM  
Follow Up with Pee Cooper MD  
Cardio Specialist at [facility]) Appt Note: 6 m f/u  
 [address] [facility] [address] [address]  
  
   
 [address] [facility] [address]  
  
    
 8/9/2017 10:00 AM  
PROCEDURE with Abbey Liang Csi Cardio Specialist at [facility]) Appt Note: 1 yr pacer check -[facility] now it is a 14 month check, unable to come in sooner. Transmitted last in Dec 2017. -[facility]; r/s due to provider out of office.; LMOM to r/s 5/22/17-hsm; office rescheduled [address] [facility] [address]  
  
   
 [address] [address] 8/15/2017 10:30 AM  
ULTRASOUND with [facility] ULTRASOUND [facility]. [address] ([facility]) Appt Note: Return in about 6 months (around 8/15/2017) for renal us prior. [address] [facility] [facility] [address]  
  
    
  
 8/22/2017 10:15 AM  
Any with Nicole Castañeda MD  
[facility] ([facility]) Appt Note: Return in about 6 months (around 8/15/2017) for renal us prior. [address] [address]  
[phone]  
  
   
 [place] [address] Upcoming Health Maintenance Date Due INFLUENZA AGE 9 TO ADULT 8/1/2017 Pneumococcal 65+ Low/Medium Risk (2 of 2 - PPSV23) 9/20/2017 MEDICARE YEARLY EXAM 3/29/2018 BREAST CANCER SCRN MAMMOGRAM 9/26/2018 GLAUCOMA SCREENING Q2Y 2/2/2019 DTaP/Tdap/Td series (2 - Td) 6/28/2026 COLONOSCOPY 1/23/2027 Allergies as of 5/30/2017  Review Complete On: 5/30/2017 By: Khai Cisneros., DO Severity Noted Reaction Type Reactions Contrast Dye [Iodine]  10/08/2014    Itching, Swelling Iodinated Contrast Media - Oral And Iv Dye  12/13/2016    Itching, Swelling Seafood  10/08/2014    Itching, Swelling LOBSTER ONLY Statins-hmg-coa Reductase Inhibitors  10/05/2011    Myalgia, Other (comments) Myalgia Sulfa (Sulfonamide Antibiotics)    Itching, Swelling Current Immunizations  Reviewed on 9/15/2014 Name Date Influenza Vaccine 10/5/2016, 9/15/2014, 10/17/2013 Influenza Vaccine Elane Francheska) 9/3/2015 Influenza Vaccine (Quad) PF  Incomplete Influenza Vaccine Split 10/5/2011 Influenza Vaccine Whole 9/20/2012 Pneumococcal Vaccine (Unspecified Type) 9/20/2012, 10/5/2011 TD Vaccine 8/11/2011 Zoster Vaccine, Live 9/3/2015 Not reviewed this visit You Were Diagnosed With   
  
 Codes Comments Viral upper respiratory tract infection    -  Primary ICD-10-CM: J06.9, B97.89 ICD-9-CM: 465.9 Chronic atrial fibrillation (HCC)     ICD-10-CM: O64.6 ICD-9-CM: 427.31 Gastrointestinal hemorrhage associated with gastric ulcer     ICD-10-CM: K25.4 ICD-9-CM: 531.40 Rheumatic heart disease     ICD-10-CM: I09.9 ICD-9-CM: 398.90 Atrial fibrillation, unspecified type ([facility]     ICD-10-CM: I48.91 
ICD-9-CM: 427.31 Routine general medical examination at a health care facility     ICD-10-CM: Z00.00 ICD-9-CM: V70.0 Dyslipidemia     ICD-10-CM: E78.5 ICD-9-CM: 272.4 Vitals BP Pulse Temp Resp Height(growth percentile) Weight(growth percentile) 129/88 (BP 1 Location: Right arm, BP Patient Position: Sitting) 90 96.2 °F (35.7 °C) (Oral) 12 5' 4\" (1.626 m) 173 lb 6.4 oz (78.7 kg) SpO2 BMI OB Status Smoking Status 98% 29.76 kg/m2 Postmenopausal Former Smoker BMI and BSA Data Body Mass Index Body Surface Area 29.76 kg/m 2 1.89 m 2 Preferred Pharmacy Pharmacy Name Phone [phone] [facility] [place] [phone] Your Updated Medication List  
  
   
This list is accurate as of: 5/30/17 10:37 AM.  Always use your most recent med list.  
  
  
  
  
 aspirin delayed-release 81 mg tablet  
take 1 tablet by mouth once daily  
  
 clarithromycin 500 mg tablet Commonly known as:  Ranjan Fail Take 1 Tab by mouth two (2) times a day for 10 days. ezetimibe 10 mg tablet Commonly known as:  Marijean Socks Take 1 Tab by mouth daily. guaiFENesin-codeine 100-10 mg/5 mL solution Commonly known as:  ROBITUSSIN AC Take 5 mL by mouth four (4) times daily as needed for Cough. Max Daily Amount: 20 mL. LUMIGAN 0.01 % ophthalmic drops Generic drug:  bimatoprost  
Administer 1 Drop to left eye nightly. omeprazole 20 mg capsule Commonly known as:  PRILOSEC  
take 1 capsule by mouth twice a day  
  
 prenatal vit-calcium-iron-fa 27 mg iron- 1 mg Tab Commonly known as:  PRENATAL PLUS (CALCIUM CARB) TAKE 1 TABLET BY MOUTH DAILY RESTASIS 0.05 % ophthalmic emulsion Generic drug:  cycloSPORINE Administer 1 Drop to both eyes two (2) times a day. Prescriptions Printed Refills  
 guaiFENesin-codeine (ROBITUSSIN AC) 100-10 mg/5 mL solution 1 Sig: Take 5 mL by mouth four (4) times daily as needed for Cough. Max Daily Amount: 20 mL. Class: Print Route: Oral  
  
Prescriptions Sent to Pharmacy Refills  
 clarithromycin (BIAXIN) 500 mg tablet 0 Sig: Take 1 Tab by mouth two (2) times a day for 10 days. Class: Normal  
 [facility]: [facility] [place] Ph #: [phone] Route: Oral  
 prenatal vit-calcium-iron-fa (PRENATAL PLUS, CALCIUM CARB,) 27 mg iron- 1 mg tab 12 Sig: TAKE 1 TABLET BY MOUTH DAILY  Class: Normal  
 [facility] [place] Ph #: [phone] Follow-up Instructions Return in about 4 weeks (around 6/27/2017). Introducing [facility] SERVICES! Dear Tano Mena: Thank you for requesting a LeanData account. Our records indicate that you already have an active LeanData account. You can access your account anytime at [email or web address] Did you know that you can access your hospital and ER discharge instructions at any time in LeanData? You can also review all of your test results from your hospital stay or ER visit. Additional Information If you have questions, please visit the Frequently Asked Questions section of the LeanData website at [email or web address] Remember, LeanData is NOT to be used for urgent needs. For medical emergencies, dial 911. Now available from your iPhone and Android! Please provide this summary of care documentation to your next provider. Your primary care clinician is listed as [id number] [facility]. If you have any questions after today's visit, please call [phone].

## 2017-05-30 NOTE — PROGRESS NOTES
Abebe Barrios is a 74 y.o.  female and presents with    Chief Complaint   Patient presents with    Cough       States that her neighbors have been making especially strong meth and this has caused her to cough and be congested. Has sore throat. And now also has back and right arm pain;      Subjective: Additional Concerns:          Patient Active Problem List    Diagnosis Date Noted    Advance directive in chart 06/28/2016    Dyslipidemia     Rheumatic heart disease     Atrial fibrillation     Arthritis 01/06/2016    Glaucoma 01/15/2014     Current Outpatient Prescriptions   Medication Sig Dispense Refill    clarithromycin (BIAXIN) 500 mg tablet Take 1 Tab by mouth two (2) times a day for 10 days. 20 Tab 0    guaiFENesin-codeine (ROBITUSSIN AC) 100-10 mg/5 mL solution Take 5 mL by mouth four (4) times daily as needed for Cough. Max Daily Amount: 20 mL. 120 mL 1    prenatal vit-calcium-iron-fa (PRENATAL PLUS, CALCIUM CARB,) 27 mg iron- 1 mg tab TAKE 1 TABLET BY MOUTH DAILY 100 Tab 12    omeprazole (PRILOSEC) 20 mg capsule take 1 capsule by mouth twice a day 180 Cap 4    cycloSPORINE (RESTASIS) 0.05 % ophthalmic emulsion Administer 1 Drop to both eyes two (2) times a day.  bimatoprost (LUMIGAN) 0.01 % ophthalmic drops Administer 1 Drop to left eye nightly.  aspirin delayed-release 81 mg tablet take 1 tablet by mouth once daily 90 Tab 3    ezetimibe (ZETIA) 10 mg tablet Take 1 Tab by mouth daily.  90 Tab 3     Allergies   Allergen Reactions    Contrast Dye [Iodine] Itching and Swelling    Iodinated Contrast Media - Oral And Iv Dye Itching and Swelling    Seafood Itching and Swelling     LOBSTER ONLY    Statins-Hmg-Coa Reductase Inhibitors Myalgia and Other (comments)     Myalgia    Sulfa (Sulfonamide Antibiotics) Itching and Swelling     Past Medical History:   Diagnosis Date    Advance directive in chart     6/28/2016    Amaurosis fugax     Aortic stenosis mean gradient  26.5 mmHg (CATH ), 32 mmHg (ECHO 9/15)    Aortic valve replacement     21mm MAGNA EASE pericardial      Arthritis     Atrial appendage resection           Atrial fibrillation     CHADS score 2  (-CHF, -HTN, -AGE, -DM +AMAUROSIS FUGAX)    Atrial fibrillation ablation     surgical left sided cryoablation      Cataract     OS    Cervical dysplasia     2009   post leep and cone    Chronic pain     COPD     mild      Duodenal AVM     argon plasma coagulation     Dyslipidemia     Fibroids     Glaucoma     1/15/2014    Lung nodule     3/19/2012   stable    Mitral stenosis     mean gradient  6 mmHg (CATH ), 7.6 mmHg (MANUEL 10/15)    Mitral valve replacement     25 mm MAGNA EASE pericardial      Pacemaker     dual chamber MEDTRONIC     Post-op ventricular asystole     Remote tobacco     Rheumatic heart disease     Sleep apnea     no CPAP    Thyroid nodule     3/20/2013   multiple     Past Surgical History:   Procedure Laterality Date    COLONOSCOPY N/A 2017    COLONOSCOPY performed [facility] Ashanti Kaufman [facility] at [facility] ENDOSCOPY    HX AFIB ABLATION        surgical cryoablation    HX AORTIC VALVE REPLACEMENT          HX BREAST BIOPSY Right 10/13/2014    Right breast needle [facility]OC biopsy performed by Reggie Valentine MD at [address] HX BREAST LUMPECTOMY      Right    HX BUNIONECTOMY      left    HX GYN      VAINIII, VELASQUEZ 1, keratinous labial cyst    HX GYN      2012  Cold knife conization of cervix    HX LEEP PROCEDURE          HX MITRAL VALVE REPLACEMENT          HX OTHER SURGICAL      lump left neck, benign    HX OTHER SURGICAL      multiple breast aspirations    HX PACEMAKER      I&D OF VULVA/PERINEUM ABSCESS  3/21/2017          Family History   Problem Relation Age of Onset    Cancer Mother      oral,     Alzheimer Father         [facility] Breast Cancer Maternal Aunt      unsure age   [facility] Colon Cancer Maternal Grandfather      Social History   Substance Use Topics    Smoking status: Former Smoker     Years: 37.00     Quit date: 2/8/2004    Smokeless tobacco: Never Used      Comment: stop around 2004    Alcohol use No       ROS       All other systems reviewed and are negative. Objective:  Vitals:    05/30/17 1023   BP: 129/88   Pulse: 90   Resp: 12   Temp: 96.2 °F (35.7 °C)   TempSrc: Oral   SpO2: 98%   Weight: 173 lb 6.4 oz (78.7 kg)   Height: 5' 4\" (1.626 m)   PainSc:   2   PainLoc: Shoulder                 alert, well appearing, and in no distress, oriented to person, place, and time and normal appearing weight  Ears - bilateral TM's and external ear canals normal  Nose - normal and patent, no erythema, discharge or polyps  Mouth - mucous membranes moist, pharynx normal without lesions  Neck - supple, no significant adenopathy  Lymphatics - no palpable lymphadenopathy, no hepatosplenomegaly  Chest - clear to auscultation, no wheezes, rales or rhonchi, symmetric air entry  Heart - irregular rhythm, normal S1, S2, no murmurs, rubs, clicks or gallops        LABS     TESTS      Assessment/Plan:    Probably a uri  Will cover with biaxin and colt ac and f/u INI   1 wk      Lab review:       I have discussed the diagnosis with the patient and the intended plan as seen in the above orders. The patient has received an after-visit summary and questions were answered concerning future plans. I have discussed medication side effects and warnings with the patient as well. I have reviewed the plan of care with the patient, accepted their input and they are in agreement with the treatment goals.      Follow-up Disposition: Not on File

## 2017-06-20 ENCOUNTER — OFFICE VISIT (OUTPATIENT)
Dept: CARDIOLOGY CLINIC | Age: 67
End: 2017-06-20

## 2017-06-20 ENCOUNTER — OFFICE VISIT (OUTPATIENT)
Dept: FAMILY MEDICINE CLINIC | Age: 67
End: 2017-06-20

## 2017-06-20 VITALS
OXYGEN SATURATION: 99 % | RESPIRATION RATE: 18 BRPM | TEMPERATURE: 96.4 F | HEIGHT: 64 IN | BODY MASS INDEX: 29.3 KG/M2 | DIASTOLIC BLOOD PRESSURE: 81 MMHG | WEIGHT: 171.6 LBS | HEART RATE: 86 BPM | SYSTOLIC BLOOD PRESSURE: 108 MMHG

## 2017-06-20 VITALS — BODY MASS INDEX: 29.37 KG/M2 | HEIGHT: 64 IN | WEIGHT: 172 LBS

## 2017-06-20 DIAGNOSIS — I09.9 RHEUMATIC HEART DISEASE: Primary | ICD-10-CM

## 2017-06-20 DIAGNOSIS — J06.9 VIRAL UPPER RESPIRATORY TRACT INFECTION: ICD-10-CM

## 2017-06-20 DIAGNOSIS — E78.5 DYSLIPIDEMIA: ICD-10-CM

## 2017-06-20 DIAGNOSIS — I48.0 PAROXYSMAL ATRIAL FIBRILLATION (HCC): ICD-10-CM

## 2017-06-20 RX ORDER — SIMVASTATIN 10 MG/1
10 TABLET, FILM COATED ORAL
Qty: 30 TAB | Refills: 6 | Status: SHIPPED | OUTPATIENT
Start: 2017-06-20 | End: 2017-10-24

## 2017-06-20 RX ORDER — CHOLECALCIFEROL (VITAMIN D3) 125 MCG
100 CAPSULE ORAL DAILY
Qty: 30 CAP | Refills: 5 | Status: SHIPPED | OUTPATIENT
Start: 2017-06-20 | End: 2017-09-21

## 2017-06-20 RX ORDER — CODEINE PHOSPHATE AND GUAIFENESIN 10; 100 MG/5ML; MG/5ML
5 SOLUTION ORAL
Qty: 120 ML | Refills: 1 | Status: SHIPPED | OUTPATIENT
Start: 2017-06-20 | End: 2017-06-26

## 2017-06-20 NOTE — PROGRESS NOTES
Zeinab Ppoe is a 74 y.o.  female and presents with    Chief Complaint   Patient presents with    Hypertension    Coronary Artery Disease    Cough           Subjective:    Cardiovascular Review:  The patient has hypertension and hyperlipidemia. Diet and Lifestyle: not attempting to follow a low fat, low cholesterol diet, not attempting to follow a low sodium diet  Home BP Monitoring: is not measured at home. Pertinent ROS: taking medications as instructed, no medication side effects noted, no TIA's, no chest pain on exertion, no dyspnea on exertion, no swelling of ankles. Chronic cough. Ongoing mild    Additional Concerns:          Patient Active Problem List    Diagnosis Date Noted    Advance directive in chart 06/28/2016    Dyslipidemia     Rheumatic heart disease     Atrial fibrillation     Arthritis 01/06/2016    Glaucoma 01/15/2014     Current Outpatient Prescriptions   Medication Sig Dispense Refill    guaiFENesin-codeine (ROBITUSSIN AC) 100-10 mg/5 mL solution Take 5 mL by mouth four (4) times daily as needed for Cough. Max Daily Amount: 20 mL. 120 mL 1    LACTOBACILLUS ACIDOPHILUS (PROBIOTIC ACIDOPHILUS PO) Take  by mouth daily.  co-enzyme Q-10 (CO Q-10) 100 mg capsule Take 1 Cap by mouth daily. 30 Cap 5    simvastatin (ZOCOR) 10 mg tablet Take 1 Tab by mouth nightly. 30 Tab 6    prenatal vit-calcium-iron-fa (PRENATAL PLUS, CALCIUM CARB,) 27 mg iron- 1 mg tab TAKE 1 TABLET BY MOUTH DAILY 100 Tab 12    omeprazole (PRILOSEC) 20 mg capsule take 1 capsule by mouth twice a day 180 Cap 4    cycloSPORINE (RESTASIS) 0.05 % ophthalmic emulsion Administer 1 Drop to both eyes two (2) times a day.  bimatoprost (LUMIGAN) 0.01 % ophthalmic drops Administer 1 Drop to left eye nightly.  aspirin delayed-release 81 mg tablet take 1 tablet by mouth once daily 90 Tab 3    ezetimibe (ZETIA) 10 mg tablet Take 1 Tab by mouth daily.  90 Tab 3     Allergies   Allergen Reactions  Contrast Dye [Iodine] Itching and Swelling    Iodinated Contrast- Oral And Iv Dye Itching and Swelling    Seafood Itching and Swelling     LOBSTER ONLY    Statins-Hmg-Coa Reductase Inhibitors Myalgia and Other (comments)     Myalgia    Sulfa (Sulfonamide Antibiotics) Itching and Swelling     Past Medical History:   Diagnosis Date    Advance directive in chart     6/28/2016    Amaurosis fugax     Aortic stenosis     mean gradient  26.5 mmHg (CATH 4/13), 32 mmHg (ECHO 9/15)    Aortic valve replacement     21mm MAGNA EASE pericardial  2/16    Arthritis     Atrial appendage resection     2/16      Atrial fibrillation     CHADS score 2  (-CHF, -HTN, -AGE, -DM +AMAUROSIS FUGAX)    Atrial fibrillation ablation     surgical left sided cryoablation  2/16    Cataract     OS    Cervical dysplasia     2009   post leep and cone    COPD     mild  2/16    Duodenal AVM     argon plasma coagulation 2/16    Dyslipidemia     Fibroids     Glaucoma     1/15/2014    Lung nodule     3/19/2012   stable    Mitral stenosis     mean gradient  6 mmHg (CATH 4/13), 7.6 mmHg (MANUEL 10/15)    Mitral valve replacement     25 mm MAGNA EASE pericardial  2/16    Pacemaker     dual chamber MEDTRONIC 2/16    Post-op ventricular asystole     02/16    Remote tobacco     Sleep apnea     no CPAP    Thyroid nodule     3/20/2013   multiple     Past Surgical History:   Procedure Laterality Date    COLONOSCOPY N/A 1/23/2017    COLONOSCOPY performed [facility] Tiffani L[facility]pez MD at [facility] ENDOSCOPY    HX AFIB ABLATION      2/16  surgical cryoablation    HX AORTIC VALVE REPLACEMENT      2/16    HX BREAST BIOPSY Right 10/13/2014    Right breast needle [facility]OC biopsy performed by Brian Lam MD at [address] HX BREAST LUMPECTOMY      Right    HX BUNIONECTOMY      left    HX GYN      VAINIII, VELASQUEZ 1, keratinous labial cyst    HX GYN      2012  Cold knife conization of cervix    HX LEEP PROCEDURE      2010    HX MITRAL VALVE REPLACEMENT          HX OTHER SURGICAL      lump left neck, benign    HX OTHER SURGICAL      multiple breast aspirations    HX PACEMAKER      I&D OF VULVA/PERINEUM ABSCESS  3/21/2017          Family History   Problem Relation Age of Onset    Cancer Mother      oral,    [facility] Alzheimer Father         [facility] Breast Cancer Maternal Aunt      unsure age   [facility] Colon Cancer Maternal Grandfather      Social History   Substance Use Topics    Smoking status: Former Smoker     Years: 37.00     Quit date: 2004    Smokeless tobacco: Never Used      Comment: stop around     Alcohol use No       ROS       All other systems reviewed and are negative. Objective:  Vitals:    17 1059   BP: 108/81   Pulse: 86   Resp: 18   Temp: 96.4 °F (35.8 °C)   TempSrc: Oral   SpO2: 99%   Weight: 171 lb 9.6 oz (77.8 kg)   Height: 5' 4\" (1.626 m)   PainSc:   0 - No pain                 alert, well appearing, and in no distress, oriented to person, place, and time and normal appearing weight  Chest - clear to auscultation, no wheezes, rales or rhonchi, symmetric air entry  Heart - normal rate, regular rhythm, normal S1, S2, no murmurs, rubs, clicks or gallops  Abdomen - soft, nontender, nondistended, no masses or organomegaly        LABS     TESTS      Assessment/Plan:    Chronic cough -- intermittent will tx symptomatically  Has had extensive pulm eval. Nothing more needed at this time    She has mild aortic stenosis -- not really heart dz  Dr Ike Michaud has recently started zocor -- will see how that goes      Lab review: labs are reviewed, up to date and normal      I have discussed the diagnosis with the patient and the intended plan as seen in the above orders. The patient has received an after-visit summary and questions were answered concerning future plans. I have discussed medication side effects and warnings with the patient as well.  I have reviewed the plan of care with the patient, accepted their input and they are in agreement with the treatment goals. Follow-up Disposition:  Return in about 3 months (around 9/20/2017) for rov.

## 2017-06-20 NOTE — PROGRESS NOTES
Lora Yost is a 74 y.o. female presents today for follow up on her URI. Patient reports she still has a cough. Pt is in Room # 5        1. Have you been to the ER, urgent care clinic since your last visit? Hospitalized since your last visit? No    2. Have you seen or consulted any other health care providers outside of the [address] [place] [address] since your last visit? Include any pap smears or colon screening.  Yes When: last week and yesterday Where: Dr. Les Olszewski and Dr. Cifuentes Early Reason for visit: pulmonary and cardiology follow up care\

## 2017-06-20 NOTE — PROGRESS NOTES
1. Have you been to the ER, urgent care clinic since your last visit? Hospitalized since your last visit? No    2. Have you seen or consulted any other health care providers outside of the [address] [place] [address] since your last visit? Include any pap smears or colon screening.  No

## 2017-06-20 NOTE — PROGRESS NOTES
[facility]    HPI:   Ms. Mine Villarreal is a 74 y.o. woman with dyslipidemia. She does not have obstructive atherosclerotic disease as evaluated by cardiac catheterization in  November 2015. Her anatomy is as follows:    HEMODYNAMICS:  LM - normal  LAD - normal  D1 - normal  Cx - normal  OM1 - normal  OM2 - normal  LPDA - patent  RCA - normal    LEFT:  RA - 6 mmHg  RV - 44/6 mmHg  PA - 38/14 mmHg  Wedge - 16 mmHg  CO / CI (DAVIAN) - 4.15 / 2.27    She has a history of rheumatic heart disease complicated by moderate to severe aortic stenosis and moderate mitral stenosis. She is status post aortic and mitral valve replacements in February of 2016 with the following: Aortic valve - 21 mm MAGNA EASE pericardial bioprosthesis  Mitral valve -  25 mm MAGNA EASE pericardial bioprosthesis    She has paroxysmal atrial fibrillation / flutter complicated by an embolic event manifesting as amaurosis fugax. She is status post surgical left sided cryoablation and atrial appendage resection in February of 2016. This occurred at the time of her aortic and mitral valve replacement. Her surgical course was notable for postoperative ventricular asystole requiring permanent pacemaker implantation. I last saw Ms. Morales in the office in September of 2016. She returns today for follow up. Today she does not report any exertional chest pressure or heaviness. She denies any shortness of breath at rest.  She has mild chronic dyspnea with activity. She does not report any change in her activity level. She denies any PND or orthopnea. She does not report significant edema. She denies any awareness of palpitations. She does not report any syncope or loss of consciousness. She does tell me that she has recently been feeling somewhat anxious and \"jumpy\".       Past Medical History:   Diagnosis Date    Advance directive in chart     6/28/2016    Amaurosis fugax     Aortic stenosis     mean gradient  26.5 mmHg (CATH 4/13), 32 mmHg (ECHO 9/15)    Aortic valve replacement     21mm MAGNA EASE pericardial  2/16    Arthritis     Atrial appendage resection     2/16      Atrial fibrillation     CHADS score 2  (-CHF, -HTN, -AGE, -DM +AMAUROSIS FUGAX)    Atrial fibrillation ablation     surgical left sided cryoablation  2/16    Cataract     OS    Cervical dysplasia     2009   post leep and cone    COPD     mild  2/16    Duodenal AVM     argon plasma coagulation 2/16    Dyslipidemia     Fibroids     Glaucoma     1/15/2014    Lung nodule     3/19/2012   stable    Mitral stenosis     mean gradient  6 mmHg (CATH 4/13), 7.6 mmHg (MANUEL 10/15)    Mitral valve replacement     25 mm MAGNA EASE pericardial  2/16    Pacemaker     dual chamber MEDTRONIC 2/16    Post-op ventricular asystole     02/16    Remote tobacco     Sleep apnea     no CPAP    Thyroid nodule     3/20/2013   multiple       Past Surgical History:   Procedure Laterality Date    COLONOSCOPY N/A 1/23/2017    COL[facility]COPY performed [facility] at [facility] ENDOSCOPY    HX AFIB ABLATION      2/16  surgical cryoablation    HX AORTIC VALVE REPLACEMENT      2/16    HX BREAST BIOPSY Right 10/13/2014    Right breast needle [facility]OC biopsy performed by Igor Talavera MD at [address] HX BREAST LUMPECTOMY      Right    HX BUNIONECTOMY      left    HX GYN      VAINIII, VELASQUEZ 1, keratinous labial cyst    HX GYN      2012  Cold knife conization of cervix    HX LEEP PROCEDURE      2010    HX MITRAL VALVE REPLACEMENT      2/16    HX OTHER SURGICAL      lump left neck, benign    HX OTHER SURGICAL      multiple breast aspirations    HX PACEMAKER      I&D OF VULVA/PERINEUM ABSCESS  3/21/2017            Current Outpatient Prescriptions   Medication Sig    guaiFENesin-codeine (ROBITUSSIN AC) 100-10 mg/5 mL solution Take 5 mL by mouth four (4) times daily as needed for Cough. Max Daily Amount: 20 mL.     prenatal vit-calcium-iron-fa (PRENATAL PLUS, CALCIUM CARB,) 27 mg iron- 1 mg tab TAKE 1 TABLET BY MOUTH DAILY    omeprazole (PRILOSEC) 20 mg capsule take 1 capsule by mouth twice a day    cycloSPORINE (RESTASIS) 0.05 % ophthalmic emulsion Administer 1 Drop to both eyes two (2) times a day.  bimatoprost (LUMIGAN) 0.01 % ophthalmic drops Administer 1 Drop to left eye nightly.  aspirin delayed-release 81 mg tablet take 1 tablet by mouth once daily    ezetimibe (ZETIA) 10 mg tablet Take 1 Tab by mouth daily. No current facility-administered medications for this visit. Allergies   Allergen Reactions    Contrast Dye [Iodine] Itching and Swelling    Iodinated Contrast- Oral And Iv Dye Itching and Swelling    Seafood Itching and Swelling     LOBSTER ONLY    Statins-Hmg-Coa Reductase Inhibitors Myalgia and Other (comments)     Myalgia    Sulfa (Sulfonamide Antibiotics) Itching and Swelling       Family History:   Non contributory for premature coronary disease in first degree relatives (female <65, male <55). Social History:   She  reports that she quit smoking about 13 years ago. She quit after 37.00 years of use. She has never used smokeless tobacco.  She  reports that she does not drink alcohol. Review of Systems:   As above otherwise 11 point review of systems negative including constitutional, skin, HENT, eyes, respiratory, cardiovascular, gastrointestinal, genitourinary, musculoskeletal, endo/heme/aller, neurological.      Physical:   Vitals:   BP:    HR:    Wt:      Exam:   General: Middle aged woman, no complaints.     Head/Neck: No JVD    No bruits. No edema  Lungs:  No respiratory distress. Clear with good air movement. Chest:  Keloid scar  Heart:  Regular. Soft systolic murmur. No rubs or gallops. Abdomen: Bowel sounds present  Extremities: Intact pulses. No edema.     Neurological: Alert and oriented to person, place, time. No gross neurological deficit. Skin:  Warm and dry.     Review of Data:   LABS:   Lab Results   Component Value Date/Time    WBC 5.5 03/07/2017 02:24 PM    Hemoglobin (POC) 9.8 02/22/2016 02:39 PM    Hemoglobin (POC) 8.8 02/10/2016 11:18 AM    HGB 13.6 03/07/2017 02:24 PM    Hematocrit (POC) 26 02/10/2016 11:18 AM    HCT 42.6 03/07/2017 02:24 PM    PLATELET 974 40/21/3616 02:24 PM    MCV 94.7 03/07/2017 02:24 PM     Lab Results   Component Value Date/Time    Sodium 141 03/07/2017 02:24 PM    Potassium 3.6 03/07/2017 02:24 PM    Chloride 104 03/07/2017 02:24 PM    CO2 30 03/07/2017 02:24 PM    Anion gap 7 03/07/2017 02:24 PM    Glucose 85 03/07/2017 02:24 PM    Glucose, fasting 110 04/25/2017 09:56 AM    BUN 12 03/07/2017 02:24 PM    Creatinine 0.92 03/07/2017 02:24 PM    BUN/Creatinine ratio 13 03/07/2017 02:24 PM    GFR est AA >60 03/07/2017 02:24 PM    GFR est non-AA >60 03/07/2017 02:24 PM    Calcium 9.0 03/07/2017 02:24 PM    Bilirubin, total 0.4 03/07/2017 02:24 PM    AST (SGOT) 24 03/07/2017 02:24 PM    Alk.  phosphatase 80 03/07/2017 02:24 PM    Protein, total 7.4 03/07/2017 02:24 PM    Albumin 3.9 03/07/2017 02:24 PM    Globulin 3.5 03/07/2017 02:24 PM    A-G Ratio 1.1 03/07/2017 02:24 PM    ALT (SGPT) 28 03/07/2017 02:24 PM     Lab Results   Component Value Date/Time    Cholesterol, total 246 03/07/2017 02:24 PM    HDL Cholesterol 55 03/07/2017 02:24 PM    LDL, calculated 160.2 03/07/2017 02:24 PM    VLDL, calculated 30.8 03/07/2017 02:24 PM    Triglyceride 154 03/07/2017 02:24 PM    CHOL/HDL Ratio 4.5 03/07/2017 02:24 PM     Lab Results   Component Value Date/Time    Hemoglobin A1c 6.6 07/01/2016 12:34 PM     Lab Results   Component Value Date/Time    TSH 0.88 03/07/2017 02:24 PM    Triiodothyronine (T3), free 3.0 05/09/2017 10:04 AM    T4, Free 0.9 05/09/2017 10:04 AM     10 YEAR CVD RISK:   9.1 %    Age    62 yo    Race    AA     Gender   Female    Total cholesterol  272 mg/dL    HDL    71 mg/dL    SBP    134 mmHg    BP meds   No    Diabetes   No    Tobacco   No    EKG: August 2016:  100% AV paced 80 bpm.    PFTs: July 2015:  No air flow limitation. Adequate exhalation time. Increased airway resistance. No air trapping, hyperinflation or restriction. Diffusion capacity is within the lower limit of normal.  Improved when compared to previous. CAROTID DUPLEX: February 2010:  1. Less than 50% bilateral internal carotid artery stenosis. 2. Vertebral arteries demonstrate normal hemodynamics. TRANSESOPHAGEAL ECHOCARDIOGRAM: October 2015:  Left ventricle:  Size was normal. Systolic function was vigorous. There were no regional wall motion abnormalities. Wall thickness was normal.    Right ventricle: The size was normal.    Left atrium:  The atrium was dilated. Left atrial appendage:  No thrombus was identified. There was no spontaneous echo contrast (\"smoke\") in the appendage. Atrial septum:  The septum bows from left to right, consistent with increased left atrial pressure. There was no evidence of intracardiac shunt by peripherally-administered agitated saline contrast.    Right atrium:  Size was normal.    Mitral valve: The posterior leaflet was thickened and calcific throughout. Excursion was restricted. The anterior leaflet was thickened at the distal third with minimal calcification. Leaflet excursion was restricted primarily at the distal margin. Hemodynamics were obtained when at least three consecutive sinus beats were captured. A mean gradient across the valve was measured at 7.6 mmHg. Pressure half time was measured at 153 ms with a calculated valve area of 1.4 cm. There was mild, multi-jet regurgitation. The subvalvular apparatus was not significantly involved. The findings were consistent with moderate mitral stenosis. Aortic valve: The valve was trileaflet. Leaflets were thickened and nodularly calcific. Leaflet excursion was restricted. Valve area was planimetered between 0.9 and 1.0 cm. There was mild aortic regurgitation. There was mild to moderate stenosis.     Aorta, systemic arteries: The root exhibited normal size. There was no atheroma. There was no evidence for dissection. TRANSTHORACIC ECHOCARDIOGRAM: June 2016:  LEFT VENTRICLE: Size was normal. Systolic function was normal by visual  assessment. Ejection fraction was estimated to be 55 %. No obvious wall  motion abnormalities identified in the views obtained. Wall thickness was  normal. DOPPLER: Unable to evaluate LV diastolic function due to mitral  valve replacement. VENTRICULAR SEPTUM: There was post-op \"bounce\" motion. RIGHT VENTRICLE: The size was normal. Systolic function was normal. Wall  thickness was normal. A pacing wire was present. DOPPLER: Estimated peak  pressure was in the range of 25 mmHg to 30 mmHg. LEFT ATRIUM: Size was at the upper limits of normal. LA volume index was  29 ml/m squared. RIGHT ATRIUM: Size was normal.    MITRAL VALVE: A bioprosthesis was present. It exhibited normal function. It appears well seated. DOPPLER: There was mild regurgitation. Mean  transmitral gradient was 7 mmHg. AORTIC VALVE: A bioprosthesis was present. It exhibited normal function. It appears well seated. DOPPLER: There was no significant regurgitation. Valve mean gradient was 22 mmHg. TRICUSPID VALVE: There was normal leaflet separation. DOPPLER: The  transtricuspid velocity was within the normal range. There was no evidence  for tricuspid stenosis. There was mild to moderate regurgitation. PULMONIC VALVE: Not well visualized, but normal Doppler findings. DOPPLER:  There was no stenosis. There was trivial regurgitation. AORTA: The root exhibited normal size. SYSTEMIC VEINS: IVC: The inferior vena cava was normal in size and course. Respirophasic changes were normal.    PERICARDIUM: No significant pericardial effusion identified. STRESS TEST (EST, PHARM, NUC, ECHO etc): March 2011:  1. NORMAL SCAN. 2. NORMAL GATED ACQUISITION WITH AN EJECTION FRACTION OF 75%.     CATHETERIZATION: November 2015:  HEMODYNAMICS:  LM - normal  LAD - normal  D1 - normal  Cx - normal  OM1 - normal  OM2 - normal  LPDA - patent  RCA - normal    LEFT:  RA - 6 mmHg  RV - 44/6 mmHg  PA - 38/14 mmHg  Wedge - 16 mmHg  CO / CI (DAVIAN) - 4.15 / 2.27      Impression / Plan:     Dyslipidemia    Rheumatic heart disease    Atrial fibrillation    Post-op ventricular asystole       Ms. Cheryl Rivas returns to the office for follow up. With regards to dyslipidemia, she is on Ezetimibe monotherapy. She has been intolerant to numerous statins. The last cholesterol profile that I have available for review is noted above. Ideally she really should be on statin therapy. The last time she was here I asked that she have vitamin D levels drawn. She had blood work drawn, but it does not appear that vitamin D levels were performed. Once again, today I would ask that she have vitamin D levels performed. If her vitamin D levels are low, then she will be offered supplementation. After appropriate supplementation then statins will be reinitiated. Otherwise if she has therapeutic vitamin D levels, I would consider offering her a PCSK9 inhibitor. With regards to rheumatic heart disease, this has been complicated by moderate to severe aortic stenosis, as well as moderate mitral stenosis. Clinically this has resulted in progressive shortness of breath and dyspnea with activity and decline in functional capacity. She underwent aortic valve replacement and mitral valve replacement with bioprosthetic devices in February, 2016. Her last echocardiogram was in July, 2016, the results are noted above. Baseline values for mean gradients have been documented for both bioprostheses. Today she tells me that she is doing well. She is at her functional baseline. She does not have any complaints or findings concerning for volume overload. I would continue with her current medical regimen.   Lifelong antibiotic prophylaxis is recommended. With regards to atrial fibrillation, it is paroxysmal in nature. She is unaware of the arrhythmia. Her course has been complicated by embolic events manifesting as amaurosis fugax. Historically she was intolerant of rate controlling agents because of symptomatic bradycardia. She is now status post permanent pacemaker implantation for complete heart block. Her heart rate today is well controlled on no specific AV ana rosa medications. She was previously anticoagulated with Warfarin. 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 She did undergo argon plasma coagulation in February of 2016. In addition to the history of GI bleed, she has a history of vaginal bleed reportedly secondary to fibroids. The vaginal bleeding occurred in the setting of subtherapeutic INR. Given her bleeding issues and the operative management of her atrial fibrillation with left sided surgical cryoablation and atrial appendage resection, it has been felt that Ms. Morales would be best served by aspirin as her anticoagulant. Today I would not suggest any changes. With regards to postoperative ventricular asystole, this was a complication of her valve surgery. She underwent pacemaker implantation after her surgery. Ms. Coleman Nickerson reports that she has been sending pacemaker interrogations from home. There are no pacemaker interrogations in the electronic medical record. There appears to have been a transmission issue. Ms. Coleman Nickerson today has been given instructions to try and rectify the situation. If she cannot transmit a report within the next day or so then she is to return to the office with her transmission device, at which point a pacemaker representative will assist.    Other than the above, or unless any additional issues arise, I have asked that she follow up in six months.

## 2017-06-20 NOTE — MR AVS SNAPSHOT
Visit Information Date & Time Provider Department Dept. Phone Encounter #  
 6/20/2017 10:00 AM Pee Boyle  [facility] Specialist at [facility] [phone] [id number] Follow-up Instructions Return in about 9 months (around 3/20/2018). Your Appointments 6/20/2017 12:30 PM  
[place] [id number] [facility] [address]) Appt Note: 3 week f/u cough  syb 05/30/17  
 [address] [facility] [address] [id number]  
[address]  
  
    
 8/9/2017 10:00 AM  
PROCEDURE with Pacer Garth [facility] Cardio Specialist at [address]) Appt Note: 1 yr pacer check -kmc now it is a 14 month check, unable to come in sooner. Transmitted last in Dec 2017. -kmc; r/s due to provider out of office.; LMOM to r/s 5/22/17-hsm; office rescheduled [address]  
  
   
 [address] 8/15/2017 10:30 AM  
ULTRASOUND with [facility] ULTRASOUND [facility]. [address] ([address] [place] [address]) Appt Note: Return in about 6 months (around 8/15/2017) for renal us prior. [address] [place] [address] [facility]  
  
   
 [facility] [phone]  
  
    
  
 8/22/2017 10:15 AM  
Any with Soni Coker MD  
[facility]. [address] ([address] [place] [address]) Appt Note: Return in about 6 months (around 8/15/2017) for renal us prior. [address] [address]  
[phone]  
  
   
 [place] [id number] Upcoming Health Maintenance Date Due INFLUENZA AGE 9 TO ADULT 8/1/2017 Pneumococcal 65+ Low/Medium Risk (2 of 2 - PPSV23) 9/20/2017 MEDICARE YEARLY EXAM 3/29/2018 BREAST CANCER SCRN MAMMOGRAM 9/26/2018 GLAUCOMA SCREENING Q2Y 2/2/2019 DTaP/Tdap/Td series (2 - Td) 6/28/2026 COLONOSCOPY 1/23/2027 Allergies as of 6/20/2017  Review Complete On: 5/30/2017 By: Isis Potter, DO Severity Noted Reaction Type Reactions Contrast Dye [Iodine]  10/08/2014    Itching, Swelling Iodinated Contrast- Oral And Iv Dye  12/13/2016    Itching, Swelling Seafood  10/08/2014    Itching, Swelling LOBSTER ONLY Statins-hmg-coa Reductase Inhibitors  10/05/2011    Myalgia, Other (comments) Myalgia Sulfa (Sulfonamide Antibiotics)    Itching, Swelling Current Immunizations  Reviewed on 9/15/2014 Name Date Influenza Vaccine 10/5/2016, 9/15/2014, 10/17/2013 Influenza Vaccine Ronn Benedict) 9/3/2015 Influenza Vaccine (Quad) PF  Incomplete Influenza Vaccine Split 10/5/2011 Influenza Vaccine Whole 9/20/2012 Pneumococcal Vaccine (Unspecified Type) 9/20/2012, 10/5/2011 TD Vaccine 8/11/2011 Zoster Vaccine, Live 9/3/2015 Not reviewed this visit Vitals Height(growth percentile) Weight(growth percentile) BMI OB Status Smoking Status 5' 4\" (1.626 m) 172 lb (78 kg) 29.52 kg/m2 Postmenopausal Former Smoker BMI and BSA Data Body Mass Index Body Surface Area  
 29.52 kg/m 2 1.88 m 2 Preferred Pharmacy Pharmacy Name Phone [facility] [phone] Your Updated Medication List  
  
   
This list is accurate as of: 6/20/17 10:25 AM.  Always use your most recent med list.  
  
  
  
  
 aspirin delayed-release 81 mg tablet  
take 1 tablet by mouth once daily  
  
 ezetimibe 10 mg tablet Commonly known as:  Lyndalady Ryan Take 1 Tab by mouth daily. LUMIGAN 0.01 % ophthalmic drops Generic drug:  bimatoprost  
Administer 1 Drop to left eye nightly. omeprazole 20 mg capsule Commonly known as:  PRILOSEC  
take 1 capsule by mouth twice a day  
  
 prenatal vit-calcium-iron-fa 27 mg iron- 1 mg Tab Commonly known as:  PRENATAL PLUS (CALCIUM CARB) TAKE 1 TABLET BY MOUTH DAILY PROBIOTIC ACIDOPHILUS PO Take  by mouth daily. RESTASIS 0.05 % ophthalmic emulsion Generic drug:  cycloSPORINE Administer 1 Drop to both eyes two (2) times a day. Follow-up Instructions Return in about 9 months (around 3/20/2018). Patient Instructions Start Co Q10 100mg daily Start Simvastatin 10mg Daily Simvastatin (Zocor) - (By mouth) Why this medicine is used:  
Treats high cholesterol and triglyceride levels. May reduce the risk of heart attack, stroke, and related health conditions. Contact a nurse or doctor right away if you have: · Blistering, peeling, red skin rash · Fast or uneven heartbeat · Dark urine or pale stools, yellow skin or eyes · Nausea, vomiting, loss of appetite, stomach pain · Muscle pain, tenderness, weakness, tiredness © 2017 [address] Information is for End User's use only and may not be sold, redistributed or otherwise used for commercial purposes. Coenzyme Q10 (By mouth) Coenzyme Q10 (KO-en-zyme Q10) Used as a dietary supplement. Brand Name(s): [employer]ighbor Coenzyme Q-10, [facility] Co Q10, Leader Co Q10, [employer] Co Q-10, Carlos Natural CO Q-10, Nature's Blend Coenzyme Q10, Nature's Blend HiSorb Co-Enzyme Q10, PharmAssure Coenzyme Q-10, PharmAssure Premium Coenzyme Q-10, [facility] Coenzyme Q-10, Sunmark Coenzyme Q-10 There may be other brand names for this medicine. When This Medicine Should Not Be Used: This medicine is not right for everyone. Do not use it if you had an allergic reaction to coenzyme Q10. How to Use This Medicine:  
Chewable Tablet, Capsule · If you are using this medicine without a doctor's order, follow the instructions on the medicine label. · Missed dose: Try not to miss any doses. However, this is a dietary supplement, so do not worry if you miss 1 or 2 doses. · Store the medicine in a closed container at room temperature, away from heat, moisture, and direct light. Drugs and Foods to Avoid: Ask your doctor or pharmacist before using any other medicine, including over-the-counter medicines, vitamins, and herbal products. Warnings While Using This Medicine: · Tell your doctor if you are pregnant or breastfeeding. · Keep all medicine out of the reach of children. Never share your medicine with anyone. Possible Side Effects While Using This Medicine:  
Call your doctor right away if you notice any of these side effects: · Allergic reaction: Itching or hives, swelling in your face or hands, swelling or tingling in your mouth or throat, chest tightness, trouble breathing If you notice other side effects that you think are caused by this medicine, tell your doctor. Call your doctor for medical advice about side effects. You may report side effects to FDA at 9-974-FDA-2437 © 2017 [address] Information is for End User's use only and may not be sold, redistributed or otherwise used for commercial purposes. The above information is an  only. It is not intended as medical advice for individual conditions or treatments. Talk to your doctor, nurse or pharmacist before following any medical regimen to see if it is safe and effective for you. Introducing \[facility] [place] [facility]\"" & HEALTH SERVICES! Dear Shari Way: Thank you for requesting a [employer] account. Our records indicate that you already have an active [employer] account. You can access your account anytime at [email or web address] Did you know that you can access your hospital and ER discharge instructions at any time in Well[employer]? You can also review all of your test results from your hospital stay or ER visit. Additional Information If you have questions, please visit the Frequently Asked Questions section of the i[employer]i website at [email or web address] Remember, i[employer]i is NOT to be used for urgent needs. For medical emergencies, dial 911. Now available from your iPhone and Android! Please provide this summary of care documentation to your next provider. Your primary care clinician is listed as [id number] [facility]. If you have any questions after today's visit, please call [phone].

## 2017-06-20 NOTE — PATIENT INSTRUCTIONS
Start Co Q10 100mg daily  Start Simvastatin 10mg Daily    Simvastatin (Zocor) - (By mouth)   Why this medicine is used:   Treats high cholesterol and triglyceride levels. May reduce the risk of heart attack, stroke, and related health conditions. Contact a nurse or doctor right away if you have:  · Blistering, peeling, red skin rash  · Fast or uneven heartbeat  · Dark urine or pale stools, yellow skin or eyes  · Nausea, vomiting, loss of appetite, stomach pain  · Muscle pain, tenderness, weakness, tiredness   © 2017 2600 [employer] Information is for End User's use only and may not be sold, redistributed or otherwise used for commercial purposes. Coenzyme Q10 (By mouth)   Coenzyme Q10 (KO-en-zyme Q10)  Used as a dietary supplement. Brand Name(s): [facility] Neighbor Coenzyme Q-10, [facility] Co Q10, Leader Co Q10, Maryana Co Q-10, Carlos Natural CO Q-10, Nature's Blend Coenzyme Q10, Nature's Blend HiSorb Co-Enzyme Q10, PharmAssure Coenzyme Q-10, PharmAssure Premium Coenzyme Q-10, [employer] Coenzyme Q-10, Sunmark Coenzyme Q-10   There may be other brand names for this medicine. When This Medicine Should Not Be Used: This medicine is not right for everyone. Do not use it if you had an allergic reaction to coenzyme Q10. How to Use This Medicine:   Chewable Tablet, Capsule  · If you are using this medicine without a doctor's order, follow the instructions on the medicine label. · Missed dose: Try not to miss any doses. However, this is a dietary supplement, so do not worry if you miss 1 or 2 doses. · Store the medicine in a closed container at room temperature, away from heat, moisture, and direct light. Drugs and Foods to Avoid:      Ask your doctor or pharmacist before using any other medicine, including over-the-counter medicines, vitamins, and herbal products. Warnings While Using This Medicine:   · Tell your doctor if you are pregnant or breastfeeding.   · Keep all medicine out of the reach of children. Never share your medicine with anyone. Possible Side Effects While Using This Medicine:   Call your doctor right away if you notice any of these side effects:  · Allergic reaction: Itching or hives, swelling in your face or hands, swelling or tingling in your mouth or throat, chest tightness, trouble breathing  If you notice other side effects that you think are caused by this medicine, tell your doctor. Call your doctor for medical advice about side effects. You may report side effects to FDA at 9-981-FDA-3670  © 2017 2600 Bimal Luna Information is for End User's use only and may not be sold, redistributed or otherwise used for commercial purposes. The above information is an  only. It is not intended as medical advice for individual conditions or treatments. Talk to your doctor, nurse or pharmacist before following any medical regimen to see if it is safe and effective for you.

## 2017-06-20 NOTE — MR AVS SNAPSHOT
Visit Information Date & Time Provider Department Dept. Phone Encounter #  
 6/20/2017 12:30 PM Patti Saleem., [address] [facility] [phone] [id number] Follow-up Instructions Return in about 3 months (around 9/20/2017) for rov. Your Appointments 6/20/2017 12:30 PM  
[place] [address] [facility] [address]) Appt Note: 3 week f/u cough  syb 05/30/17; $15 CP  syb 05/30/17; LMOM 6/20/2017 ce  
 [address] [facility] [phone]  
[address] [facility] 133[address]  
  
    
 8/9/2017 10:00 AM  
PROCEDURE with Pacer Garth Ureñai Cardio Specialist at [facility]/[address] [place] [address]) Appt Note: 1 yr pacer check -[facility] now it is a 14 month check, unable to come in sooner. Transmitted last in Dec 2017. -km; r/s due to provider out of office.; LMOM to r/s 5/22/17-hsm; office rescheduled [facility] [address] [facility] 8/15/2017 10:30 AM  
ULTRASOUND with [facility] ULTRASOUND [facility]. [address] ([facility]) Appt Note: Return in about 6 months (around 8/15/2017) for renal us prior. [address] [phone]  
  
    
  
 8/22/2017 10:15 AM  
Any with Dawson Beth MD  
[facility]. [address] ([facility]) Appt Note: Return in about 6 months (around 8/15/2017) for renal us prior. [address] [address]  
[phone]  
  
   
 [facility] [phone] Upcoming Health Maintenance Date Due INFLUENZA AGE 9 TO ADULT 8/1/2017 Pneumococcal 65+ Low/Medium Risk (2 of 2 - PPSV23) 9/20/2017 MEDICARE YEARLY EXAM 3/29/2018 BREAST CANCER SCRN MAMMOGRAM 9/26/2018 GLAUCOMA SCREENING Q2Y 2/2/2019 DTaP/Tdap/Td series (2 - Td) 6/28/2026 COLONOSCOPY 1/23/2027 Allergies as of 6/20/2017  Review Complete On: 6/20/2017 By: Delfina Archer., DO Severity Noted Reaction Type Reactions Contrast Dye [Iodine]  10/08/2014    Itching, Swelling Iodinated Contrast- Oral And Iv Dye  12/13/2016    Itching, Swelling Seafood  10/08/2014    Itching, Swelling LOBSTER ONLY Statins-hmg-coa Reductase Inhibitors  10/05/2011    Myalgia, Other (comments) Myalgia Sulfa (Sulfonamide Antibiotics)    Itching, Swelling Current Immunizations  Reviewed on 9/15/2014 Name Date Influenza Vaccine 10/5/2016, 9/15/2014, 10/17/2013 Influenza Vaccine Ruthann Credit) 9/3/2015 Influenza Vaccine (Quad) PF  Incomplete Influenza Vaccine Split 10/5/2011 Influenza Vaccine Whole 9/20/2012 Pneumococcal Vaccine (Unspecified Type) 9/20/2012, 10/5/2011 TD Vaccine 8/11/2011 Zoster Vaccine, Live 9/3/2015 Not reviewed this visit You Were Diagnosed With   
  
 Codes Comments Viral upper respiratory tract infection     ICD-10-CM: J06.9, B97.89 ICD-9-CM: 465.9 Vitals BP Pulse Temp Resp Height(growth percentile) Weight(growth percentile) 108/81 (BP 1 Location: Right arm, BP Patient Position: Sitting) 86 96.4 °F (35.8 °C) (Oral) 18 5' 4\" (1.626 m) 171 lb 9.6 oz (77.8 kg) SpO2 BMI OB Status Smoking Status 99% 29.46 kg/m2 Postmenopausal Former Smoker BMI and BSA Data Body Mass Index Body Surface Area  
 29.46 kg/m 2 1.87 m 2 Preferred Pharmacy Pharmacy Name Phone [phone] [facility] [phone] Your Updated Medication List  
  
   
This list is accurate as of: 6/20/17 11:17 AM.  Always use your most recent med list.  
  
  
  
  
 aspirin delayed-release 81 mg tablet  
take 1 tablet by mouth once daily  
  
 co-enzyme Q-10 100 mg capsule Commonly known as:  CO Q-10 Take 1 Cap by mouth daily. ezetimibe 10 mg tablet Commonly known as:  Arabi Siad Take 1 Tab by mouth daily. guaiFENesin-codeine 100-10 mg/5 mL solution Commonly known as:  ROBITUSSIN AC Take 5 mL by mouth four (4) times daily as needed for Cough. Max Daily Amount: 20 mL. LUMIGAN 0.01 % ophthalmic drops Generic drug:  bimatoprost  
Administer 1 Drop to left eye nightly. omeprazole 20 mg capsule Commonly known as:  PRILOSEC  
take 1 capsule by mouth twice a day  
  
 prenatal vit-calcium-iron-fa 27 mg iron- 1 mg Tab Commonly known as:  PRENATAL PLUS (CALCIUM CARB) TAKE 1 TABLET BY MOUTH DAILY PROBIOTIC ACIDOPHILUS PO Take  by mouth daily. RESTASIS 0.05 % ophthalmic emulsion Generic drug:  cycloSPORINE Administer 1 Drop to both eyes two (2) times a day. simvastatin 10 mg tablet Commonly known as:  ZOCOR Take 1 Tab by mouth nightly. Prescriptions Printed Refills  
 guaiFENesin-codeine (ROBITUSSIN AC) 100-10 mg/5 mL solution 1 Sig: Take 5 mL by mouth four (4) times daily as needed for Cough. Max Daily Amount: 20 mL. Class: Print Route: Oral  
  
Follow-up Instructions Return in about 3 months (around 9/20/2017) for roelaina. Introducing [facility]! Dear Scott Moore: Thank you for requesting a [employer] account. Our records indicate that you already have an active [employer] account. You can access your account anytime at [email or web address] Did you know that you can access your hospital and ER discharge instructions at any time in [employer]? You can also review all of your test results from your hospital stay or ER visit. Additional Information If you have questions, please visit the Frequently Asked Questions section of the [employer] website at [email or web address] Remember, [employer] is NOT to be used for urgent needs.  For medical emergencies, dial 911. Now available from your iPhone and Android! Please provide this summary of care documentation to your next provider. Your primary care clinician is listed as [id number] [facility]. If you have any questions after today's visit, please call [phone].

## 2017-06-20 NOTE — PROGRESS NOTES
[facility]    HPI:   Ms. Tanesha Nagy is a 74 y.o. woman with dyslipidemia. She does not have obstructive atherosclerotic disease as evaluated by cardiac catheterization in  November 2015. Her anatomy is as follows:    HEMODYNAMICS:  LM - normal  LAD - normal  D1 - normal  Cx - normal  OM1 - normal  OM2 - normal  LPDA - patent  RCA - normal    LEFT:  RA - 6 mmHg  RV - 44/6 mmHg  PA - 38/14 mmHg  Wedge - 16 mmHg  CO / CI (DAVIAN) - 4.15 / 2.27    She has a history of rheumatic heart disease complicated by moderate to severe aortic stenosis and moderate mitral stenosis. She is status post aortic and mitral valve replacements in February of 2016 with the following: Aortic valve - 21 mm MAGNA EASE pericardial bioprosthesis  Mitral valve -  25 mm MAGNA EASE pericardial bioprosthesis    She has paroxysmal atrial fibrillation / flutter complicated by an embolic event manifesting as amaurosis fugax. She is status post surgical left sided cryoablation and atrial appendage resection in February of 2016. This occurred at the time of her aortic and mitral valve replacement. Her surgical course was notable for postoperative ventricular asystole requiring permanent pacemaker implantation in 2016. Ms. Tanesha Nagy is here today for follow up appointment. She used to be an established patient of Dr. José Suárez. She is here today to establish care with me. Ms. Tanesha Nagy says that she has no new symptoms. She denies any chest pain or chest tightness to be concerned of angina. She denies any palpitations, presyncope or syncope. She has started going to the gym. She denies any PND. She occasionally continues to have some pain at the site of incision.     Past Medical History:   Diagnosis Date    Advance directive in chart     6/28/2016    Amaurosis fugax     Aortic stenosis     mean gradient  26.5 mmHg (CATH 4/13), 32 mmHg (ECHO 9/15)    Aortic valve replacement     21mm MAGNA EASE pericardial  2/16    Arthritis  Atrial appendage resection     2/16      Atrial fibrillation     CHADS score 2  (-CHF, -HTN, -AGE, -DM +AMAUROSIS FUGAX)    Atrial fibrillation ablation     surgical left sided cryoablation  2/16    Cataract     OS    Cervical dysplasia     2009   post leep and cone    COPD     mild  2/16    Duodenal AVM     argon plasma coagulation 2/16    Dyslipidemia     Fibroids     Glaucoma     1/15/2014    Lung nodule     3/19/2012   stable    Mitral stenosis     mean gradient  6 mmHg (CATH 4/13), 7.6 mmHg (MANUEL 10/15)    Mitral valve replacement     25 mm MAGNA EASE pericardial  2/16    Pacemaker     dual chamber MEDTRONIC 2/16    Post-op ventricular asystole     02/16    Remote tobacco     Sleep apnea     no CPAP    Thyroid nodule     3/20/2013   multiple       Past Surgical History:   Procedure Laterality Date    COLONOSCOPY N/A 1/23/2017    COLONOSCOPY performed [facility] Marii Zazueta MD at [address] [facility] HX AFIB ABLATION      2/16  surgical cryoablation    HX AORTIC VALVE REPLACEMENT      2/16    HX BREAST BIOPSY Right 10/13/2014    Right breast needle [facility]OC biopsy performed by Margarette Leger MD at [address] HX BREAST LUMPECTOMY      Right    HX BUNIONECTOMY      left    HX GYN      VAINIII, VELASQUEZ 1, keratinous labial cyst    HX GYN      2012  Cold knife conization of cervix    HX LEEP PROCEDURE      2010    HX MITRAL VALVE REPLACEMENT      2/16    HX OTHER SURGICAL      lump left neck, benign    HX OTHER SURGICAL      multiple breast aspirations    HX PACEMAKER      I&D OF VULVA/PERINEUM ABSCESS  3/21/2017            Current Outpatient Prescriptions   Medication Sig    LACTOBACILLUS ACIDOPHILUS (PROBIOTIC ACIDOPHILUS PO) Take  by mouth daily.     prenatal vit-calcium-iron-fa (PRENATAL PLUS, CALCIUM CARB,) 27 mg iron- 1 mg tab TAKE 1 TABLET BY MOUTH DAILY    omeprazole (PRILOSEC) 20 mg capsule take 1 capsule by mouth twice a day    cycloSPORINE (RESTASIS) 0.05 % ophthalmic emulsion Administer 1 Drop to both eyes two (2) times a day.  bimatoprost (LUMIGAN) 0.01 % ophthalmic drops Administer 1 Drop to left eye nightly.  aspirin delayed-release 81 mg tablet take 1 tablet by mouth once daily    ezetimibe (ZETIA) 10 mg tablet Take 1 Tab by mouth daily. No current facility-administered medications for this visit. Allergies   Allergen Reactions    Contrast Dye [Iodine] Itching and Swelling    Iodinated Contrast- Oral And Iv Dye Itching and Swelling    Seafood Itching and Swelling     LOBSTER ONLY    Statins-Hmg-Coa Reductase Inhibitors Myalgia and Other (comments)     Myalgia    Sulfa (Sulfonamide Antibiotics) Itching and Swelling       Family History:   Non contributory for premature coronary disease in first degree relatives (female <65, male <55). Social History:   She  reports that she quit smoking about 13 years ago. She quit after 37.00 years of use. She has never used smokeless tobacco.  She  reports that she does not drink alcohol. Review of Systems:   As above otherwise 11 point review of systems negative including constitutional, skin, HENT, eyes, respiratory, cardiovascular, gastrointestinal, genitourinary, musculoskeletal, endo/heme/aller, neurological.      Physical:   Vitals:   BP:    HR:    Wt: 172 lb (78 kg)    Exam:   General: Middle aged woman, no complaints.     Head/Neck: No JVD    No bruits. No edema  Lungs:  No respiratory distress. Clear with good air movement. Chest:  Keloid scar  Heart:  Regular. Soft systolic murmur. No rubs or gallops. Abdomen: Bowel sounds present  Extremities: Intact pulses.     No edema.         Review of Data:   LABS:   Lab Results   Component Value Date/Time    WBC 5.5 03/07/2017 02:24 PM    Hemoglobin (POC) 9.8 02/22/2016 02:39 PM    Hemoglobin (POC) 8.8 02/10/2016 11:18 AM    HGB 13.6 03/07/2017 02:24 PM    Hematocrit (POC) 26 02/10/2016 11:18 AM    HCT 42.6 03/07/2017 02:24 PM    PLATELET 756 03/07/2017 02:24 PM    MCV 94.7 03/07/2017 02:24 PM     Lab Results   Component Value Date/Time    Sodium 141 03/07/2017 02:24 PM    Potassium 3.6 03/07/2017 02:24 PM    Chloride 104 03/07/2017 02:24 PM    CO2 30 03/07/2017 02:24 PM    Anion gap 7 03/07/2017 02:24 PM    Glucose 85 03/07/2017 02:24 PM    Glucose, fasting 110 04/25/2017 09:56 AM    BUN 12 03/07/2017 02:24 PM    Creatinine 0.92 03/07/2017 02:24 PM    BUN/Creatinine ratio 13 03/07/2017 02:24 PM    GFR est AA >60 03/07/2017 02:24 PM    GFR est non-AA >60 03/07/2017 02:24 PM    Calcium 9.0 03/07/2017 02:24 PM    Bilirubin, total 0.4 03/07/2017 02:24 PM    AST (SGOT) 24 03/07/2017 02:24 PM    Alk. phosphatase 80 03/07/2017 02:24 PM    Protein, total 7.4 03/07/2017 02:24 PM    Albumin 3.9 03/07/2017 02:24 PM    Globulin 3.5 03/07/2017 02:24 PM    A-G Ratio 1.1 03/07/2017 02:24 PM    ALT (SGPT) 28 03/07/2017 02:24 PM     Lab Results   Component Value Date/Time    Cholesterol, total 246 03/07/2017 02:24 PM    HDL Cholesterol 55 03/07/2017 02:24 PM    LDL, calculated 160.2 03/07/2017 02:24 PM    VLDL, calculated 30.8 03/07/2017 02:24 PM    Triglyceride 154 03/07/2017 02:24 PM    CHOL/HDL Ratio 4.5 03/07/2017 02:24 PM     Lab Results   Component Value Date/Time    Hemoglobin A1c 6.6 07/01/2016 12:34 PM     Lab Results   Component Value Date/Time    TSH 0.88 03/07/2017 02:24 PM    Triiodothyronine (T3), free 3.0 05/09/2017 10:04 AM    T4, Free 0.9 05/09/2017 10:04 AM     10 YEAR CVD RISK:   9.1 %    Age    64 yo    Race    AA     Gender   Female    Total cholesterol  272 mg/dL    HDL    71 mg/dL    SBP    134 mmHg    BP meds   No    Diabetes   No    Tobacco   No    EKG: August 2016:  100% AV paced 80 bpm.    PFTs: July 2015:  No air flow limitation. Adequate exhalation time. Increased airway resistance. No air trapping, hyperinflation or restriction. Diffusion capacity is within the lower limit of normal.  Improved when compared to previous.     CAROTID DUPLEX: February 2010:  1. Less than 50% bilateral internal carotid artery stenosis. 2. Vertebral arteries demonstrate normal hemodynamics. TRANSESOPHAGEAL ECHOCARDIOGRAM: October 2015:  Left ventricle:  Size was normal. Systolic function was vigorous. There were no regional wall motion abnormalities. Wall thickness was normal.    Right ventricle: The size was normal.    Left atrium:  The atrium was dilated. Left atrial appendage:  No thrombus was identified. There was no spontaneous echo contrast (\"smoke\") in the appendage. Atrial septum:  The septum bows from left to right, consistent with increased left atrial pressure. There was no evidence of intracardiac shunt by peripherally-administered agitated saline contrast.    Right atrium:  Size was normal.    Mitral valve: The posterior leaflet was thickened and calcific throughout. Excursion was restricted. The anterior leaflet was thickened at the distal third with minimal calcification. Leaflet excursion was restricted primarily at the distal margin. Hemodynamics were obtained when at least three consecutive sinus beats were captured. A mean gradient across the valve was measured at 7.6 mmHg. Pressure half time was measured at 153 ms with a calculated valve area of 1.4 cm. There was mild, multi-jet regurgitation. The subvalvular apparatus was not significantly involved. The findings were consistent with moderate mitral stenosis. Aortic valve: The valve was trileaflet. Leaflets were thickened and nodularly calcific. Leaflet excursion was restricted. Valve area was planimetered between 0.9 and 1.0 cm. There was mild aortic regurgitation. There was mild to moderate stenosis. Aorta, systemic arteries: The root exhibited normal size. There was no atheroma. There was no evidence for dissection. TRANSTHORACIC ECHOCARDIOGRAM: June 2016:  LEFT VENTRICLE: Size was normal. Systolic function was normal by visual  assessment.  Ejection fraction was estimated to be 55 %. No obvious wall  motion abnormalities identified in the views obtained. Wall thickness was  normal. DOPPLER: Unable to evaluate LV diastolic function due to mitral  valve replacement. VENTRICULAR SEPTUM: There was post-op \"bounce\" motion. RIGHT VENTRICLE: The size was normal. Systolic function was normal. Wall  thickness was normal. A pacing wire was present. DOPPLER: Estimated peak  pressure was in the range of 25 mmHg to 30 mmHg. LEFT ATRIUM: Size was at the upper limits of normal. LA volume index was  29 ml/m squared. RIGHT ATRIUM: Size was normal.    MITRAL VALVE: A bioprosthesis was present. It exhibited normal function. It appears well seated. DOPPLER: There was mild regurgitation. Mean  transmitral gradient was 7 mmHg. AORTIC VALVE: A bioprosthesis was present. It exhibited normal function. It appears well seated. DOPPLER: There was no significant regurgitation. Valve mean gradient was 22 mmHg. TRICUSPID VALVE: There was normal leaflet separation. DOPPLER: The  transtricuspid velocity was within the normal range. There was no evidence  for tricuspid stenosis. There was mild to moderate regurgitation. PULMONIC VALVE: Not well visualized, but normal Doppler findings. DOPPLER:  There was no stenosis. There was trivial regurgitation. AORTA: The root exhibited normal size. SYSTEMIC VEINS: IVC: The inferior vena cava was normal in size and course. Respirophasic changes were normal.    PERICARDIUM: No significant pericardial effusion identified. STRESS TEST (EST, PHARM, NUC, ECHO etc): March 2011:  1. NORMAL SCAN. 2. NORMAL GATED ACQUISITION WITH AN EJECTION FRACTION OF 75%.     CATHETERIZATION: November 2015:  HEMODYNAMICS:  LM - normal  LAD - normal  D1 - normal  Cx - normal  OM1 - normal  OM2 - normal  LPDA - patent  RCA - normal    LEFT:  RA - 6 mmHg  RV - 44/6 mmHg  PA - 38/14 mmHg  Wedge - 16 mmHg  CO / CI (DAVIAN) - 4.15 / 2.27      Impression / Plan:     Dyslipidemia    Rheumatic heart disease    Atrial fibrillation    Post-op ventricular asystole       Rheumatic heart disease:  Ms. Mine Villarreal has rheumatic heart disease, which was complicated by moderate to severe aortic stenosis and moderate mitral stenosis. She has undergone bioprosthetic aortic and mitral valve replacement in February, 2016. Echocardiogram was performed in July, 2016. She has been doing relatively okay without any symptoms to suggest angina or heart failure or any problems. She is on aspirin, which I recommend to continue lifelong. She has no fluid overload on exam. Lifelong antibiotic prophylaxis is recommended. Hyperlipidemia:  Ms. Mine Villarreal has significant hyperlipidemia. Currently she is only on monotherapy with Zetia. Her last LDL was 160 in March, 2017. She had tried Atorvastatin in the past and other medication, but she does not remember the name. I believe that she should at least try Simvastatin. I am going to prescribe her Simvastatin low dose, along with COQ10. She is going to try this medication. Repeat lipid profile will be performed if she can tolerate this Simvastatin. Atrial fibrillation: This is paroxysmal in nature. 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  She has been in sinus rhythm on exam.  She had a permanent pacemaker paced with complete heart block at the time of her valvular surgery. 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 In addition she also has history of GI bleed, as well as vaginal bleed reportedly due to fibroids. She also had left sided surgical cryoablation and left atrial appendage resection so she is only on aspirin, which I recommend to continue. Pacemaker:  Ms. Mine Villarreal had postoperative ventricular asystole requiring pacemaker implantation in July, 2016.   This will be interrogated per electrophysiology clinic protocol. Currently she has no symptoms and issues with pacemaker. Other than the above, or unless any additional issues arise, I have asked that she follow up in 6-9 months.

## 2017-06-26 ENCOUNTER — HOSPITAL ENCOUNTER (EMERGENCY)
Age: 67
Discharge: HOME | End: 2017-06-26
Attending: EMERGENCY MEDICINE
Payer: MEDICARE

## 2017-06-26 VITALS
HEART RATE: 86 BPM | BODY MASS INDEX: 28.16 KG/M2 | DIASTOLIC BLOOD PRESSURE: 91 MMHG | TEMPERATURE: 98 F | HEIGHT: 65 IN | OXYGEN SATURATION: 97 % | WEIGHT: 169 LBS | SYSTOLIC BLOOD PRESSURE: 139 MMHG | RESPIRATION RATE: 16 BRPM

## 2017-06-26 DIAGNOSIS — N39.0 ACUTE UTI: Primary | ICD-10-CM

## 2017-06-26 LAB
APPEARANCE UR: ABNORMAL
BACTERIA URNS QL MICRO: ABNORMAL /HPF
BILIRUB UR QL: ABNORMAL
COLOR UR: ABNORMAL
EPITH CASTS URNS QL MICRO: 0 /LPF (ref 0–5)
GLUCOSE UR STRIP.AUTO-MCNC: NEGATIVE MG/DL
HGB UR QL STRIP: ABNORMAL
KETONES UR QL STRIP.AUTO: NEGATIVE MG/DL
LEUKOCYTE ESTERASE UR QL STRIP.AUTO: ABNORMAL
NITRITE UR QL STRIP.AUTO: POSITIVE
PH UR STRIP: 5.5 [PH] (ref 5–8)
PROT UR STRIP-MCNC: 100 MG/DL
RBC #/AREA URNS HPF: ABNORMAL /HPF (ref 0–5)
SP GR UR REFRACTOMETRY: 1.01 (ref 1–1.03)
UROBILINOGEN UR QL STRIP.AUTO: 0.2 EU/DL (ref 0.2–1)
WBC URNS QL MICRO: ABNORMAL /HPF (ref 0–4)

## 2017-06-26 PROCEDURE — 99283 EMERGENCY DEPT VISIT LOW MDM: CPT

## 2017-06-26 PROCEDURE — 74011250637 HC RX REV CODE- 250/637: Performed by: EMERGENCY MEDICINE

## 2017-06-26 PROCEDURE — 81001 URINALYSIS AUTO W/SCOPE: CPT | Performed by: EMERGENCY MEDICINE

## 2017-06-26 PROCEDURE — 87086 URINE CULTURE/COLONY COUNT: CPT | Performed by: EMERGENCY MEDICINE

## 2017-06-26 RX ORDER — CEPHALEXIN 250 MG/1
500 CAPSULE ORAL
Status: COMPLETED | OUTPATIENT
Start: 2017-06-26 | End: 2017-06-26

## 2017-06-26 RX ORDER — PHENAZOPYRIDINE HYDROCHLORIDE 100 MG/1
200 TABLET, FILM COATED ORAL ONCE
Status: COMPLETED | OUTPATIENT
Start: 2017-06-26 | End: 2017-06-26

## 2017-06-26 RX ORDER — CEPHALEXIN 500 MG/1
500 CAPSULE ORAL 4 TIMES DAILY
Qty: 28 CAP | Refills: 0 | Status: SHIPPED | OUTPATIENT
Start: 2017-06-26 | End: 2017-07-03

## 2017-06-26 RX ORDER — PHENAZOPYRIDINE HYDROCHLORIDE 200 MG/1
200 TABLET, FILM COATED ORAL 3 TIMES DAILY
Qty: 6 TAB | Refills: 0 | Status: SHIPPED | OUTPATIENT
Start: 2017-06-26 | End: 2017-06-28

## 2017-06-26 RX ORDER — CEPHALEXIN 250 MG/1
500 CAPSULE ORAL EVERY 6 HOURS
Status: DISCONTINUED | OUTPATIENT
Start: 2017-06-26 | End: 2017-06-26 | Stop reason: HOSPADM

## 2017-06-26 RX ADMIN — PHENAZOPYRIDINE HYDROCHLORIDE 200 MG: 100 TABLET ORAL at 18:38

## 2017-06-26 RX ADMIN — CEPHALEXIN 500 MG: 250 CAPSULE ORAL at 18:38

## 2017-06-26 NOTE — DISCHARGE INSTRUCTIONS
Urinary Tract Infection in Women: Care Instructions  Your Care Instructions    A urinary tract infection, or UTI, is a general term for an infection anywhere between the kidneys and the urethra (where urine comes out). Most UTIs are bladder infections. They often cause pain or burning when you urinate. UTIs are caused by bacteria and can be cured with antibiotics. Be sure to complete your treatment so that the infection goes away. Follow-up care is a key part of your treatment and safety. Be sure to make and go to all appointments, and call your doctor if you are having problems. It's also a good idea to know your test results and keep a list of the medicines you take. How can you care for yourself at home? · Take your antibiotics as directed. Do not stop taking them just because you feel better. You need to take the full course of antibiotics. · Drink extra water and other fluids for the next day or two. This may help wash out the bacteria that are causing the infection. (If you have kidney, heart, or liver disease and have to limit fluids, talk with your doctor before you increase your fluid intake.)  · Avoid drinks that are carbonated or have caffeine. They can irritate the bladder. · Urinate often. Try to empty your bladder each time. · To relieve pain, take a hot bath or lay a heating pad set on low over your lower belly or genital area. Never go to sleep with a heating pad in place. To prevent UTIs  · Drink plenty of water each day. This helps you urinate often, which clears bacteria from your system. (If you have kidney, heart, or liver disease and have to limit fluids, talk with your doctor before you increase your fluid intake.)  · Urinate when you need to. · Urinate right after you have sex. · Change sanitary pads often. · Avoid douches, bubble baths, feminine hygiene sprays, and other feminine hygiene products that have deodorants.   · After going to the bathroom, wipe from front to back.  When should you call for help? Call your doctor now or seek immediate medical care if:  · Symptoms such as fever, chills, nausea, or vomiting get worse or appear for the first time. · You have new pain in your back just below your rib cage. This is called flank pain. · There is new blood or pus in your urine. · You have any problems with your antibiotic medicine. Watch closely for changes in your health, and be sure to contact your doctor if:  · You are not getting better after taking an antibiotic for 2 days. · Your symptoms go away but then come back. Where can you learn more? Go to http://diogenes-lee.info/. Enter I434 in the search box to learn more about \"Urinary Tract Infection in Women: Care Instructions. \"  Current as of: November 28, 2016  Content Version: 11.3  © 5492-6278 [employer], [email or web address]. Care instructions adapted under license by [employer] (which disclaims liability or warranty for this information). If you have questions about a medical condition or this instruction, always ask your healthcare professional. [address] any warranty or liability for your use of this information.

## 2017-06-26 NOTE — ED PROVIDER NOTES
HPI Comments: 5:03 PM Akanksha Simon is a 74 y.o. female with h/o a-fib, who presents to ED complaining of hematuria starting today. She is also complaining of some back pain. She denies smoking. There are no other symptoms or concerns at this time. PCP: Patti Saleem., DO      The history is provided by the patient. No  was used.         Past Medical History:   Diagnosis Date    Advance directive in chart     6/28/2016    Amaurosis fugax     Aortic stenosis     mean gradient  26.5 mmHg (CATH 4/13), 32 mmHg (ECHO 9/15)    Aortic valve replacement     21mm MAGNA EASE pericardial  2/16    Arthritis     Atrial appendage resection     2/16      Atrial fibrillation     CHADS score 2  (-CHF, -HTN, -AGE, -DM +AMAUROSIS FUGAX)    Atrial fibrillation ablation     surgical left sided cryoablation  2/16    Cataract     OS    Cervical dysplasia     2009   post leep and cone    COPD     mild  2/16    Duodenal AVM     argon plasma coagulation 2/16    Dyslipidemia     Fibroids     Glaucoma     1/15/2014    Lung nodule     3/19/2012   stable    Mitral stenosis     mean gradient  6 mmHg (CATH 4/13), 7.6 mmHg (MANUEL 10/15)    Mitral valve replacement     25 mm MAGNA EASE pericardial  2/16    Pacemaker     dual chamber MEDTRONIC 2/16    Post-op ventricular asystole     02/16    Remote tobacco     Sleep apnea     no CPAP    Thyroid nodule     3/20/2013   multiple       Past Surgical History:   Procedure Laterality Date    COLONOSCOPY N/A 1/23/2017    COLONOSCOPY performed [facility] Brea Saucedo MD at [facility] ENDOSCOPY    HX AFIB ABLATION      2/16  surgical cryoablation    HX AORTIC VALVE REPLACEMENT      2/16    HX BREAST BIOPSY Right 10/13/2014    Right breast needle [facility]OC biopsy performed by Belle Phalen, MD at [facility] [address] HX BREAST LUMPECTOMY      Right    HX BUNIONECTOMY      left    HX GYN      VAINIII, VELASQUEZ 1, keratinous labial cyst    HX GYN      2012  Cold knife conization of cervix    HX LEEP PROCEDURE          HX MITRAL VALVE REPLACEMENT          HX OTHER SURGICAL      lump left neck, benign    HX OTHER SURGICAL      multiple breast aspirations    HX PACEMAKER      I&D OF VULVA/PERINEUM ABSCESS  3/21/2017              Family History:   Problem Relation Age of Onset    Cancer Mother      oral,     Alzheimer Father         [facility] Breast Cancer Maternal Aunt      unsure age   [facility] Colon Cancer Maternal Grandfather        Social History     Social History    Marital status:      Spouse name: N/A    Number of children: N/A    Years of education: N/A     Occupational History    Not on file. Social History Main Topics    Smoking status: Former Smoker     Years: 37.00     Quit date: 2004    Smokeless tobacco: Never Used      Comment: stop around     Alcohol use No    Drug use: No    Sexual activity: Yes     Partners: Male     Birth control/ protection: None     Other Topics Concern    Not on file     Social History Narrative         ALLERGIES: Contrast dye [iodine]; Iodinated contrast- oral and iv dye; Seafood; Statins-hmg-coa reductase inhibitors; and Sulfa (sulfonamide antibiotics)    Review of Systems   Constitutional: Negative for chills and fever. Respiratory: Negative for shortness of breath. Cardiovascular: Negative for chest pain. Gastrointestinal: Negative for abdominal pain, diarrhea, nausea and vomiting. Genitourinary: Positive for hematuria. Musculoskeletal: Positive for back pain. Negative for neck pain. Neurological: Negative for headaches. All other systems reviewed and are negative. Vitals:    17 1700   BP: (!) 139/91   Pulse: 86   Resp: 16   Temp: 98 °F (36.7 °C)   SpO2: 97%   Weight: 76.7 kg (169 lb)   Height: 5' 4.5\" (1.638 m)            Physical Exam   Constitutional: She is oriented to person, place, and time. She appears well-developed and well-nourished. No distress.    HENT: Head: Normocephalic and atraumatic. Mouth/Throat: Oropharynx is clear and moist.   Eyes: Conjunctivae and EOM are normal. Pupils are equal, round, and reactive to light. No scleral icterus. Neck: Normal range of motion. Neck supple. Cardiovascular: Normal rate, regular rhythm and normal heart sounds. No murmur heard. Pulmonary/Chest: Effort normal and breath sounds normal. No respiratory distress. Abdominal: Soft. Bowel sounds are normal. She exhibits no distension. There is no tenderness. Musculoskeletal: She exhibits no edema. Mild suprapubic tenderness  Mild L CVA tenderness   Lymphadenopathy:     She has no cervical adenopathy. Neurological: She is alert and oriented to person, place, and time. Coordination normal.   Skin: Skin is warm and dry. No rash noted. Psychiatric: She has a normal mood and affect. Her behavior is normal.   Nursing note and vitals reviewed.        MDM  Number of Diagnoses or Management Options  Acute UTI:   Diagnosis management comments: Acute dysuria nontoxic appearing noted ua will tx culture sent        Amount and/or Complexity of Data Reviewed  Clinical lab tests: ordered and reviewed      ED Course       Procedures    Vitals:  Patient Vitals for the past 12 hrs:   Temp Pulse Resp BP SpO2   06/26/17 1700 98 °F (36.7 °C) 86 16 (!) 139/91 97 %       Medications ordered:   Medications   cephALEXin (KEFLEX) capsule 500 mg (not administered)   cephALEXin (KEFLEX) capsule 500 mg (not administered)   phenazopyridine (PYRIDIUM) tablet 200 mg (not administered)         Lab findings:  Recent Results (from the past 12 hour(s))   URINALYSIS W/ RFLX MICROSCOPIC    Collection Time: 06/26/17  5:03 PM   Result Value Ref Range    Color RED      Appearance TURBID      Specific gravity 1.015 1.005 - 1.030      pH (UA) 5.5 5.0 - 8.0      Protein 100 (A) NEG mg/dL    Glucose NEGATIVE  NEG mg/dL    Ketone NEGATIVE  NEG mg/dL    Bilirubin MODERATE (A) NEG      Blood LARGE (A) NEG Urobilinogen 0.2 0.2 - 1.0 EU/dL    Nitrites POSITIVE (A) NEG      Leukocyte Esterase LARGE (A) NEG     URINE MICROSCOPIC ONLY    Collection Time: 06/26/17  5:03 PM   Result Value Ref Range    WBC TOO NUMEROUS TO COUNT 0 - 4 /hpf    RBC TOO NUMEROUS TO COUNT 0 - 5 /hpf    Epithelial cells 0 0 - 5 /lpf    Bacteria 1+ (A) NEG /hpf       EKG interpretation by ED Physician:      X-Ray, CT or other radiology findings or impressions:  No orders to display       Progress notes, Consult notes or additional Procedure notes:     Reevaluation of patient:       Disposition:  Diagnosis:   1. Acute UTI        Disposition: home     Follow-up Information     Follow up With Details Comments Contact Info    [facility] EMERGENCY DEPT   [address] [place] [address] Dixie Ellsworth., DO Schedule an appointment as soon as possible for a visit for ED follow up  [id number] [place], [place] [phone]             Patient's Medications   Start Taking    CEPHALEXIN (KEFLEX) 500 MG CAPSULE    Take 1 Cap by mouth four (4) times daily for 7 days. PHENAZOPYRIDINE (PYRIDIUM) 200 MG TABLET    Take 1 Tab by mouth three (3) times daily for 2 days. Continue Taking    ASPIRIN DELAYED-RELEASE 81 MG TABLET    take 1 tablet by mouth once daily    BIMATOPROST (LUMIGAN) 0.01 % OPHTHALMIC DROPS    Administer 1 Drop to left eye nightly. CO-ENZYME Q-10 (CO Q-10) 100 MG CAPSULE    Take 1 Cap by mouth daily. CYCLOSPORINE (RESTASIS) 0.05 % OPHTHALMIC EMULSION    Administer 1 Drop to both eyes two (2) times a day. EZETIMIBE (ZETIA) 10 MG TABLET    Take 1 Tab by mouth daily. LACTOBACILLUS ACIDOPHILUS (PROBIOTIC ACIDOPHILUS PO)    Take  by mouth daily.     OMEPRAZOLE (PRILOSEC) 20 MG CAPSULE    take 1 capsule by mouth twice a day    PRENATAL VIT-CALCIUM-IRON-FA (PRENATAL PLUS, CALCIUM CARB,) 27 MG IRON- 1 MG TAB    TAKE 1 TABLET BY MOUTH DAILY    SIMVASTATIN (ZOCOR) 10 MG TABLET Take 1 Tab by mouth nightly. These Medications have changed    No medications on file   Stop Taking    GUAIFENESIN-CODEINE (ROBITUSSIN AC) 100-10 MG/5 ML SOLUTION    Take 5 mL by mouth four (4) times daily as needed for Cough. Max Daily Amount: 20 mL. Scribe Attestation  Bárbara Coe acting as a scribe for and in the presence of Gris Segundo MD  June 26, 2017 at 6:27 PM       Provider Attestation:  I personally performed the services described in the documentation, reviewed the documentation, as recorded by the scribe in my presence, and it accurately and completely records my words and actions.   Gris Segundo MD      Signed by: Bárbara Mary, June 26, 2017 at 6:27 PM

## 2017-06-28 LAB
BACTERIA SPEC CULT: NORMAL
SERVICE CMNT-IMP: NORMAL

## 2017-08-09 ENCOUNTER — HOSPITAL ENCOUNTER (OUTPATIENT)
Dept: LAB | Age: 67
Discharge: HOME | End: 2017-08-09
Payer: MEDICARE

## 2017-08-09 ENCOUNTER — CLINICAL SUPPORT (OUTPATIENT)
Dept: CARDIOLOGY CLINIC | Age: 67
End: 2017-08-09

## 2017-08-09 ENCOUNTER — OFFICE VISIT (OUTPATIENT)
Dept: OBGYN CLINIC | Age: 67
End: 2017-08-09

## 2017-08-09 VITALS
HEART RATE: 64 BPM | HEIGHT: 64 IN | BODY MASS INDEX: 30.05 KG/M2 | SYSTOLIC BLOOD PRESSURE: 120 MMHG | WEIGHT: 176 LBS | DIASTOLIC BLOOD PRESSURE: 80 MMHG | RESPIRATION RATE: 18 BRPM

## 2017-08-09 DIAGNOSIS — I48.91 ATRIAL FIBRILLATION, UNSPECIFIED TYPE (HCC): Primary | Chronic | ICD-10-CM

## 2017-08-09 DIAGNOSIS — Z11.3 SCREEN FOR STD (SEXUALLY TRANSMITTED DISEASE): ICD-10-CM

## 2017-08-09 DIAGNOSIS — Z95.0 PACEMAKER: ICD-10-CM

## 2017-08-09 DIAGNOSIS — N89.8 VAGINAL DISCHARGE: ICD-10-CM

## 2017-08-09 DIAGNOSIS — R35.0 URINE FREQUENCY: Primary | ICD-10-CM

## 2017-08-09 LAB
BILIRUB UR QL STRIP: NEGATIVE
GLUCOSE UR-MCNC: NEGATIVE MG/DL
KETONES P FAST UR STRIP-MCNC: NEGATIVE MG/DL
PH UR STRIP: 5.5 [PH] (ref 4.6–8)
PROT UR QL STRIP: NEGATIVE MG/DL
SP GR UR STRIP: 1.03 (ref 1–1.03)
UA UROBILINOGEN AMB POC: NORMAL (ref 0.2–1)
URINALYSIS CLARITY POC: CLEAR
URINALYSIS COLOR POC: YELLOW
URINE BLOOD POC: NEGATIVE
URINE LEUKOCYTES POC: NEGATIVE
URINE NITRITES POC: NEGATIVE

## 2017-08-09 PROCEDURE — 86780 TREPONEMA PALLIDUM: CPT | Performed by: OBSTETRICS & GYNECOLOGY

## 2017-08-09 PROCEDURE — 87389 HIV-1 AG W/HIV-1&-2 AB AG IA: CPT | Performed by: OBSTETRICS & GYNECOLOGY

## 2017-08-09 PROCEDURE — 36415 COLL VENOUS BLD VENIPUNCTURE: CPT | Performed by: OBSTETRICS & GYNECOLOGY

## 2017-08-09 PROCEDURE — 87340 HEPATITIS B SURFACE AG IA: CPT | Performed by: OBSTETRICS & GYNECOLOGY

## 2017-08-09 PROCEDURE — 87491 CHLMYD TRACH DNA AMP PROBE: CPT | Performed by: OBSTETRICS & GYNECOLOGY

## 2017-08-09 PROCEDURE — 86803 HEPATITIS C AB TEST: CPT | Performed by: OBSTETRICS & GYNECOLOGY

## 2017-08-09 NOTE — MR AVS SNAPSHOT
Visit Information Date & Time Provider Department Dept. Phone Encounter #  
 8/9/2017  1:30 PM Alejandra Desai, [address] [facility] [phone] [id number] Your Appointments 9/21/2017 11:00 AM  
Office Visit with Felicity Martinez.,  [address]) Appt Note: Return in 3 months (9/20/17).; Returnin 3 months (9/20/17) ROV. [address] [facility] [phone]  
  
   
 [address] [facility] [address] [place] [address] [address] Upcoming Health Maintenance Date Due INFLUENZA AGE 9 TO ADULT 8/1/2017 BREAST CANCER SCRN MAMMOGRAM 9/26/2018 COLONOSCOPY 1/23/2027 Allergies as of 8/9/2017  Review Complete On: 6/26/2017 By: Dhara Vasquez RN Severity Noted Reaction Type Reactions Contrast Dye [Iodine]  10/08/2014    Itching, Swelling Iodinated Contrast- Oral And Iv Dye  12/13/2016    Itching, Swelling Seafood  10/08/2014    Itching, Swelling LOBSTER ONLY Statins-hmg-coa Reductase Inhibitors  10/05/2011    Myalgia, Other (comments) Myalgia Sulfa (Sulfonamide Antibiotics)    Itching, Swelling Current Immunizations  Reviewed on 9/15/2014 Name Date Influenza Vaccine 10/5/2016, 9/15/2014, 10/17/2013 Influenza Vaccine Raina Broody) 9/3/2015 Influenza Vaccine (Quad) PF  Incomplete Influenza Vaccine Split 10/5/2011 Influenza Vaccine Whole 9/20/2012 TD Vaccine 8/11/2011 ZZZ-RETIRED (DO NOT USE) Pneumococcal Vaccine (Unspecified Type) 9/20/2012, 10/5/2011 Zoster Vaccine, Live 9/3/2015 Not reviewed this visit You Were Diagnosed With   
  
 Codes Comments Urine frequency    -  Primary ICD-10-CM: R35.0 ICD-9-CM: 788.41 Vitals BP Pulse Resp Height(growth percentile) Weight(growth percentile) BMI  
 120/80 64 18 5' 4\" (1.626 m) 176 lb (79.8 kg) 30.21 kg/m2 OB Status Smoking Status Postmenopausal Former Smoker Vitals History BMI and BSA Data Body Mass Index Body Surface Area  
 30.21 kg/m 2 1.9 m 2 Preferred Pharmacy Pharmacy Name Phone [phone] [facility] [phone] Your Updated Medication List  
  
   
This list is accurate as of: 8/9/17  2:21 PM.  Always use your most recent med list.  
  
  
  
  
 aspirin delayed-release 81 mg tablet  
take 1 tablet by mouth once daily  
  
 co-enzyme Q-10 100 mg capsule Commonly known as:  CO Q-10 Take 1 Cap by mouth daily. ezetimibe 10 mg tablet Commonly known as:  Kelli Del Toro Take 1 Tab by mouth daily. LUMIGAN 0.01 % ophthalmic drops Generic drug:  bimatoprost  
Administer 1 Drop to left eye nightly. omeprazole 20 mg capsule Commonly known as:  PRILOSEC  
take 1 capsule by mouth twice a day  
  
 prenatal vit-calcium-iron-fa 27 mg iron- 1 mg Tab Commonly known as:  PRENATAL PLUS (CALCIUM CARB) TAKE 1 TABLET BY MOUTH DAILY PROBIOTIC ACIDOPHILUS PO Take  by mouth daily. RESTASIS 0.05 % ophthalmic emulsion Generic drug:  cycloSPORINE Administer 1 Drop to both eyes two (2) times a day. simvastatin 10 mg tablet Commonly known as:  ZOCOR Take 1 Tab by mouth nightly. We Performed the Following AMB POC URINALYSIS DIP STICK MANUAL W/O MICRO [51791 CPT(R)] Introducing [facility]! Dear Kelly Oconnell: Thank you for requesting a [employer] account. Our records indicate that you already have an active [employer] account. You can access your account anytime at [email or web address] Did you know that you can access your hospital and ER discharge instructions at any time in [employer]? You can also review all of your test results from your hospital stay or ER visit. Additional Information If you have questions, please visit the Frequently Asked Questions section of the [employer] website at [email or web address] Remember, MyChart is NOT to be used for urgent needs. For medical emergencies, dial 911. Now available from your iPhone and Android! Please provide this summary of care documentation to your next provider. Your primary care clinician is listed as [phone] [facility]. If you have any questions after today's visit, please call [phone].

## 2017-08-10 LAB
C TRACH RRNA SPEC QL NAA+PROBE: NEGATIVE
HBV SURFACE AG SER QL: <0.1 INDEX
HBV SURFACE AG SER QL: NEGATIVE
HCV AB SER IA-ACNC: 0.12 INDEX
HCV AB SERPL QL IA: NEGATIVE
HCV COMMENT,HCGAC: NORMAL
N GONORRHOEA RRNA SPEC QL NAA+PROBE: NEGATIVE
SPECIMEN SOURCE: NORMAL
T PALLIDUM AB SER QL IA: NEGATIVE
T VAGINALIS RRNA SPEC QL NAA+PROBE: NEGATIVE
WET MOUNT POCT, WMPOCT: NORMAL

## 2017-08-10 NOTE — PROGRESS NOTES
Subjective:      Patient complains of frequent urination and urgency, but denies dysuria. She states that this has been a longstanding problem, but that she has not wanted medication because she doesn't like the possible side effects. She also complains of an increased vaginal discharge with some irritation, but says that this has been improving. She requests STD screening today because she has become sexually active again. Current Outpatient Prescriptions   Medication Sig Dispense Refill    LACTOBACILLUS ACIDOPHILUS (PROBIOTIC ACIDOPHILUS PO) Take  by mouth daily.  co-enzyme Q-10 (CO Q-10) 100 mg capsule Take 1 Cap by mouth daily. 30 Cap 5    simvastatin (ZOCOR) 10 mg tablet Take 1 Tab by mouth nightly. 30 Tab 6    prenatal vit-calcium-iron-fa (PRENATAL PLUS, CALCIUM CARB,) 27 mg iron- 1 mg tab TAKE 1 TABLET BY MOUTH DAILY 100 Tab 12    omeprazole (PRILOSEC) 20 mg capsule take 1 capsule by mouth twice a day 180 Cap 4    cycloSPORINE (RESTASIS) 0.05 % ophthalmic emulsion Administer 1 Drop to both eyes two (2) times a day.  bimatoprost (LUMIGAN) 0.01 % ophthalmic drops Administer 1 Drop to left eye nightly.  aspirin delayed-release 81 mg tablet take 1 tablet by mouth once daily 90 Tab 3    ezetimibe (ZETIA) 10 mg tablet Take 1 Tab by mouth daily.  90 Tab 3     Allergies   Allergen Reactions    Contrast Dye [Iodine] Itching and Swelling    Iodinated Contrast- Oral And Iv Dye Itching and Swelling    Seafood Itching and Swelling     LOBSTER ONLY    Statins-Hmg-Coa Reductase Inhibitors Myalgia and Other (comments)     Myalgia    Sulfa (Sulfonamide Antibiotics) Itching and Swelling     Past Medical History:   Diagnosis Date    Advance directive in chart     6/28/2016    Amaurosis fugax     Aortic stenosis     mean gradient  26.5 mmHg (CATH 4/13), 32 mmHg (ECHO 9/15)    Aortic valve replacement     21mm MAGNA EASE pericardial  2/16    Arthritis     Atrial appendage resection     2/16  Atrial fibrillation     CHADS score 2  (-CHF, -HTN, -AGE, -DM +AMAUROSIS FUGAX)    Atrial fibrillation ablation     surgical left sided cryoablation      Cataract     OS    Cervical dysplasia     2009   post leep and cone    COPD     mild      Duodenal AVM     argon plasma coagulation     Dyslipidemia     Fibroids     Glaucoma     1/15/2014    Lung nodule     3/19/2012   stable    Mitral stenosis     mean gradient  6 mmHg (CATH ), 7.6 mmHg (MANUEL 10/15)    Mitral valve replacement     25 mm MAGNA EASE pericardial      Pacemaker     dual chamber MEDTRONIC     Post-op ventricular asystole         Remote tobacco     Sleep apnea     no CPAP    Thyroid nodule     3/20/2013   multiple     Past Surgical History:   Procedure Laterality Date    COLONOSCOPY N/A 2017    COLON[facility] Lucas [facility]rios MD at [facility] ENDOSCOPY    HX AFIB ABLATION        surgical cryoablation    HX AORTIC VALVE REPLACEMENT          HX BREAST BIOPSY Right 10/13/2014    Right breast needle [facility]OC biopsy performed by Hong Andersen MD at [address] [place] [address] HX BREAST LUMPECTOMY      Right    HX BUNIONECTOMY      left    HX GYN      VAINIII, VELASQUEZ 1, keratinous labial cyst    HX GYN      2012  Cold knife conization of cervix    HX LEEP PROCEDURE          HX MITRAL VALVE REPLACEMENT          HX OTHER SURGICAL      lump left neck, benign    HX OTHER SURGICAL      multiple breast aspirations    HX PACEMAKER      I&D OF VULVA/PERINEUM ABSCESS  3/21/2017          Family History   Problem Relation Age of Onset    Cancer Mother      oral,    [facility] Alzheimer Father         [facility] Breast Cancer Maternal Aunt      unsure age   [facility] Colon Cancer Maternal Grandfather      Social History   Substance Use Topics    Smoking status: Former Smoker     Years: 37.00     Quit date: 2004    Smokeless tobacco: Never Used      Comment: stop around     Alcohol use No Review of Systems    A comprehensive review of systems was negative except for that written in the HPI. Objective:     Visit Vitals    /80    Pulse 64    Resp 18    Ht 5' 4\" (1.626 m)    Wt 176 lb (79.8 kg)    BMI 30.21 kg/m2     General appearance: alert, cooperative, no distress, appears stated age  Pelvic: External genitalia normal, Vagina normal with physiologic discharge, cervix normal in appearance    Wet prep: no clue cells, no yeast, no trich, minimal WBCs  UA: SG > 1.030, all negative       Assessment/Plan:     Frequent urination consistent with overactive bladder. Discussed with the patient that there are newer medications available with fewer side effects and she states that she would like to try one of them. Rx sent. Vaginal discharge, no evidence of infection. Patient reassured. STD screening pending. Follow up prn. Plan of care discussed. Patient expressed understanding.

## 2017-08-11 LAB
HIV 1+2 AB+HIV1 P24 AG SERPL QL IA: NONREACTIVE
HIV12 RESULT COMMENT, HHIVC: NORMAL

## 2017-09-11 RX ORDER — EZETIMIBE 10 MG/1
10 TABLET ORAL DAILY
Qty: 90 TAB | Refills: 3 | Status: SHIPPED | OUTPATIENT
Start: 2017-09-11 | End: 2018-03-08 | Stop reason: SDUPTHER

## 2017-09-21 ENCOUNTER — OFFICE VISIT (OUTPATIENT)
Dept: FAMILY MEDICINE CLINIC | Age: 67
End: 2017-09-21

## 2017-09-21 ENCOUNTER — OFFICE VISIT (OUTPATIENT)
Dept: CARDIOLOGY CLINIC | Age: 67
End: 2017-09-21

## 2017-09-21 ENCOUNTER — HOSPITAL ENCOUNTER (OUTPATIENT)
Dept: GENERAL RADIOLOGY | Age: 67
Discharge: HOME | End: 2017-09-21
Attending: FAMILY MEDICINE
Payer: MEDICARE

## 2017-09-21 VITALS
HEIGHT: 64 IN | WEIGHT: 170.6 LBS | OXYGEN SATURATION: 97 % | HEART RATE: 90 BPM | DIASTOLIC BLOOD PRESSURE: 88 MMHG | TEMPERATURE: 97.1 F | BODY MASS INDEX: 29.12 KG/M2 | SYSTOLIC BLOOD PRESSURE: 128 MMHG | RESPIRATION RATE: 16 BRPM

## 2017-09-21 VITALS
HEIGHT: 64 IN | OXYGEN SATURATION: 98 % | SYSTOLIC BLOOD PRESSURE: 118 MMHG | HEART RATE: 81 BPM | WEIGHT: 170 LBS | BODY MASS INDEX: 29.02 KG/M2 | DIASTOLIC BLOOD PRESSURE: 80 MMHG

## 2017-09-21 DIAGNOSIS — R20.0 NUMBNESS AND TINGLING OF UPPER AND LOWER EXTREMITIES OF BOTH SIDES: ICD-10-CM

## 2017-09-21 DIAGNOSIS — M19.90 ARTHRITIS: ICD-10-CM

## 2017-09-21 DIAGNOSIS — Z23 ENCOUNTER FOR IMMUNIZATION: ICD-10-CM

## 2017-09-21 DIAGNOSIS — R20.0 NUMBNESS AND TINGLING OF UPPER AND LOWER EXTREMITIES OF BOTH SIDES: Primary | ICD-10-CM

## 2017-09-21 DIAGNOSIS — R20.2 NUMBNESS AND TINGLING OF UPPER AND LOWER EXTREMITIES OF BOTH SIDES: Primary | ICD-10-CM

## 2017-09-21 DIAGNOSIS — I48.91 ATRIAL FIBRILLATION, UNSPECIFIED TYPE (HCC): Chronic | ICD-10-CM

## 2017-09-21 DIAGNOSIS — I63.89 OTHER CEREBRAL INFARCTION (HCC): ICD-10-CM

## 2017-09-21 DIAGNOSIS — G45.3 AMAUROSIS FUGAX: ICD-10-CM

## 2017-09-21 DIAGNOSIS — M25.512 CHRONIC LEFT SHOULDER PAIN: ICD-10-CM

## 2017-09-21 DIAGNOSIS — G89.29 CHRONIC LEFT SHOULDER PAIN: ICD-10-CM

## 2017-09-21 DIAGNOSIS — I48.91 ATRIAL FIBRILLATION, UNSPECIFIED TYPE (HCC): Primary | Chronic | ICD-10-CM

## 2017-09-21 DIAGNOSIS — I09.9 RHEUMATIC HEART DISEASE: ICD-10-CM

## 2017-09-21 DIAGNOSIS — R20.2 NUMBNESS AND TINGLING OF UPPER AND LOWER EXTREMITIES OF BOTH SIDES: ICD-10-CM

## 2017-09-21 PROCEDURE — 73030 X-RAY EXAM OF SHOULDER: CPT

## 2017-09-21 NOTE — MR AVS SNAPSHOT
Visit Information Date & Time Provider Department Dept. Phone Encounter #  
 9/21/2017  1:30 PM Pee Stoner MD [address] [place] [address] Drive Specialist at [facility] [phone] [id number] Follow-up Instructions Return in about 3 weeks (around 10/12/2017). Your Appointments 10/4/2017  9:30 AM  
ROUTINE CARE with Gisele Owen.,  [address] [address]) Appt Note: Return in about 2 weeks (around 10/5/2017) for EOV. [address]  [phone]  
[address]  
  
    
 10/24/2017  1:00 PM  
Follow Up with Steven Vickers MD  
Cardio Specialist at [facility]) Appt Note: 4 wk f/u after med change and echo -[facility] [phone] [place] Upcoming Health Maintenance Date Due INFLUENZA AGE 9 TO ADULT 8/1/2017 Pneumococcal 65+ Low/Medium Risk (2 of 2 - PPSV23) 9/20/2017 MEDICARE YEARLY EXAM 3/29/2018 BREAST CANCER SCRN MAMMOGRAM 9/26/2018 GLAUCOMA SCREENING Q2Y 2/2/2019 DTaP/Tdap/Td series (2 - Td) 6/28/2026 COLONOSCOPY 1/23/2027 Allergies as of 9/21/2017  Review Complete On: 9/21/2017 By: Quinton December Severity Noted Reaction Type Reactions Contrast Dye [Iodine]  10/08/2014    Itching, Swelling Iodinated Contrast- Oral And Iv Dye  12/13/2016    Itching, Swelling Seafood  10/08/2014    Itching, Swelling LOBSTER ONLY Statins-hmg-coa Reductase Inhibitors  10/05/2011    Myalgia, Other (comments) Myalgia Sulfa (Sulfonamide Antibiotics)    Itching, Swelling Current Immunizations  Reviewed on 9/15/2014 Name Date Influenza High Dose Vaccine PF 9/21/2017 11:44 AM  
 Influenza Vaccine 10/5/2016, 9/15/2014, 10/17/2013 Influenza Vaccine Geojignesh Kelly) 9/3/2015 Influenza Vaccine Split 10/5/2011 Influenza Vaccine Whole 9/20/2012 Pneumococcal Conjugate (PCV-13) 9/21/2017 11:45 AM  
 TD Vaccine 8/11/2011 ZZZ-RETIRED (DO NOT USE) Pneumococcal Vaccine (Unspecified Type) 9/20/2012, 10/5/2011 Zoster Vaccine, Live 9/3/2015 Not reviewed this visit You Were Diagnosed With   
  
 Codes Comments Atrial fibrillation, unspecified type ([facility]    -  Primary ICD-10-CM: I48.91 
ICD-9-CM: 427.31 Weakness due to cerebrovascular accident (CVA) ([facility]     ICD-10-CM: I63.9, R53.1 ICD-9-CM: 434.91, 780.79 Vitals BP Pulse Height(growth percentile) Weight(growth percentile) SpO2 BMI  
 118/80 81 5' 4\" (1.626 m) 170 lb (77.1 kg) 98% 29.18 kg/m2 OB Status Smoking Status Postmenopausal Former Smoker BMI and BSA Data Body Mass Index Body Surface Area  
 29.18 kg/m 2 1.87 m 2 Preferred Pharmacy Pharmacy Name Phone [address] [place] [address] [phone] Your Updated Medication List  
  
   
This list is accurate as of: 9/21/17  1:43 PM.  Always use your most recent med list.  
  
  
  
  
 aspirin delayed-release 81 mg tablet  
take 1 tablet by mouth once daily  
  
 ezetimibe 10 mg tablet Commonly known as:  Colitay Morgans Take 1 Tab by mouth daily. LUMIGAN 0.01 % ophthalmic drops Generic drug:  bimatoprost  
Administer 1 Drop to left eye nightly. mirabegron ER 25 mg ER tablet Commonly known as:  MYRBETRIQ Take 1 Tab by mouth daily. omeprazole 20 mg capsule Commonly known as:  PRILOSEC  
take 1 capsule by mouth twice a day  
  
 prenatal vit-calcium-iron-fa 27 mg iron- 1 mg Tab Commonly known as:  PRENATAL PLUS (CALCIUM CARB) TAKE 1 TABLET BY MOUTH DAILY PROBIOTIC ACIDOPHILUS PO Take  by mouth daily. RESTASIS 0.05 % ophthalmic emulsion Generic drug:  cycloSPORINE Administer 1 Drop to both eyes two (2) times a day. simvastatin 10 mg tablet Commonly known as:  ZOCOR Take 1 Tab by mouth nightly. We Performed the Following AMB POC EKG ROUTINE W/ 12 LEADS, INTER & REP [11432 CPT(R)] Follow-up Instructions Return in about 3 weeks (around 10/12/2017). Patient Instructions Aspirin 162mg Daily Krystal Woodall will call to schedule echo [phone] if you do not hear from her in 1-2 days Repatha prescription will be sent to a specialty pharmacy to find out if it will be covered through your insurance. This may take several weeks. Introducing [facility]! Dear Kimberly Patel: Thank you for requesting a [employer] account. Our records indicate that you already have an active [employer] account. You can access your account anytime at [email or web address] Did you know that you can access your hospital and ER discharge instructions at any time in [employer]? You can also review all of your test results from your hospital stay or ER visit. Additional Information If you have questions, please visit the Frequently Asked Questions section of the [employer] website at [email or web address] Remember, [employer] is NOT to be used for urgent needs. For medical emergencies, dial 911. Now available from your iPhone and Android! Please provide this summary of care documentation to your next provider. Your primary care clinician is listed as [phone] [facility]. If you have any questions after today's visit, please call [phone].

## 2017-09-21 NOTE — PROGRESS NOTES
Swati Solis is a 74 y.o.  female and presents with    Chief Complaint   Patient presents with    Neurologic Problem    Shoulder Pain    Numbness    Sleep Apnea       Last sunday she thinks she had a stroke. Was in [address] and something came over her vision. Felt dizzy. Lasted a few seconds. Called friend on phone. Then symptoms went away    Has chronic left shoulder pain    Has intermittent arm numbness    Trouble breathing at night while sleeping. Had prev sleep eval 4 years ago. Has sleep apnea. Worried that neck is blocking . Subjective: Additional Concerns:          Patient Active Problem List    Diagnosis Date Noted    Advance directive in chart 06/28/2016    Dyslipidemia     Rheumatic heart disease     Atrial fibrillation     Arthritis 01/06/2016    Glaucoma 01/15/2014     Current Outpatient Prescriptions   Medication Sig Dispense Refill    ezetimibe (ZETIA) 10 mg tablet Take 1 Tab by mouth daily. 90 Tab 3    mirabegron ER (MYRBETRIQ) 25 mg ER tablet Take 1 Tab by mouth daily. 30 Tab 11    LACTOBACILLUS ACIDOPHILUS (PROBIOTIC ACIDOPHILUS PO) Take  by mouth daily.  simvastatin (ZOCOR) 10 mg tablet Take 1 Tab by mouth nightly. 30 Tab 6    prenatal vit-calcium-iron-fa (PRENATAL PLUS, CALCIUM CARB,) 27 mg iron- 1 mg tab TAKE 1 TABLET BY MOUTH DAILY 100 Tab 12    omeprazole (PRILOSEC) 20 mg capsule take 1 capsule by mouth twice a day 180 Cap 4    cycloSPORINE (RESTASIS) 0.05 % ophthalmic emulsion Administer 1 Drop to both eyes two (2) times a day.  bimatoprost (LUMIGAN) 0.01 % ophthalmic drops Administer 1 Drop to left eye nightly.       aspirin delayed-release 81 mg tablet take 1 tablet by mouth once daily 90 Tab 3     Allergies   Allergen Reactions    Contrast Dye [Iodine] Itching and Swelling    Iodinated Contrast- Oral And Iv Dye Itching and Swelling    Seafood Itching and Swelling     LOBSTER ONLY    Statins-Hmg-Coa Reductase Inhibitors Myalgia and Other (comments)     Myalgia    Sulfa (Sulfonamide Antibiotics) Itching and Swelling     Past Medical History:   Diagnosis Date    Advance directive in chart     6/28/2016    Amaurosis fugax     Aortic stenosis     mean gradient  26.5 mmHg (CATH 4/13), 32 mmHg (ECHO 9/15)    Aortic valve replacement     21mm MAGNA EASE pericardial  2/16    Arthritis     Atrial appendage resection     2/16      Atrial fibrillation     CHADS score 2  (-CHF, -HTN, -AGE, -DM +AMAUROSIS FUGAX)    Atrial fibrillation ablation     surgical left sided cryoablation  2/16    Cataract     OS    Cervical dysplasia     2009   post leep and cone    COPD     mild  2/16    Duodenal AVM     argon plasma coagulation 2/16    Dyslipidemia     Fibroids     Glaucoma     1/15/2014    Lung nodule     3/19/2012   stable    Mitral stenosis     mean gradient  6 mmHg (CATH 4/13), 7.6 mmHg (MANUEL 10/15)    Mitral valve replacement     25 mm MAGNA EASE pericardial  2/16    Pacemaker     dual chamber MEDTRONIC 2/16    Post-op ventricular asystole     02/16    Remote tobacco     Sleep apnea     no CPAP    Thyroid nodule     3/20/2013   multiple     Past Surgical History:   Procedure Laterality Date    COLONOSCOPY N/A 1/23/2017    COL[facility]COPY performed [facility] Jamar [facility] at [facility] ENDOSCOPY    HX AFIB ABLATION      2/16  surgical cryoablation    HX AORTIC VALVE REPLACEMENT      2/16    HX BREAST BIOPSY Right 10/13/2014    Right breast needle [facility]OC biopsy performed by Giselle Vanegas MD at [address] [place] [address] [address] HX BREAST LUMPECTOMY      Right    HX BUNIONECTOMY      left    HX GYN      VAINIII, VELASQUEZ 1, keratinous labial cyst    HX GYN      2012  Cold knife conization of cervix    HX LEEP PROCEDURE      2010    HX MITRAL VALVE REPLACEMENT      2/16    HX OTHER SURGICAL      lump left neck, benign    HX OTHER SURGICAL      multiple breast aspirations    HX PACEMAKER      I&D OF VULVA/PERINEUM ABSCESS  3/21/2017 Family History   Problem Relation Age of Onset    Cancer Mother      oral,    [employer] Alzheimer Father         [employer] Breast Cancer Maternal Aunt      unsure age   [employer] Colon Cancer Maternal Grandfather      Social History   Substance Use Topics    Smoking status: Former Smoker     Years: 37.00     Quit date: 2004    Smokeless tobacco: Never Used      Comment: stop around     Alcohol use No       ROS       All other systems reviewed and are negative. Objective:  Vitals:    17 1106   BP: 128/88   Pulse: 90   Resp: 16   Temp: 97.1 °F (36.2 °C)   TempSrc: Oral   SpO2: 97%   Weight: 170 lb 9.6 oz (77.4 kg)   Height: 5' 4\" (1.626 m)   PainSc:  10 - Worst pain ever   PainLoc: Shoulder                 alert, well appearing, and in no distress, oriented to person, place, and time and normal appearing weight  Mental status - alert, oriented to person, place, and time  Eyes - pupils equal and reactive, extraocular eye movements intact  Neck - supple, no significant adenopathy  Chest - clear to auscultation, no wheezes, rales or rhonchi, symmetric air entry  Heart - normal rate, regular rhythm, normal S1, S2, no murmurs, rubs, clicks or gallops  Abdomen - soft, nontender, nondistended, no masses or organomegaly  Neurological - alert, oriented, normal speech, no focal findings or movement disorder noted  Shoulders upple        LABS     TESTS      Assessment/Plan:    1. Possible Gio Gunnar will refer to neuro  2. Sleep problems will refer to sleep studies  3. Eye problem ? ? TIA will contact [facility] - cancel surgery  4. shoulde rpain will get x rays  5. Numbness intermittetn will refer to neuro  6. Also wants vaccines today     Lab review:     Diagnoses and all orders for this visit:    1.  Numbness and tingling of upper and lower extremities of both sides  -     Influenza virus vaccine (FLUZONE HIGH-DOSE) 65 years and older (24789)  -     Administration fee () for Medicare insured patients  - Pneumococcal Conjugate vaccine - 13 valent (50 years and older)  -     ADMIN PNEUMOCOCCAL VACCINE  Medicare Injection Admin Charge  -     REFERRAL TO OPHTHALMOLOGY  -     REFERRAL TO NEUROLOGY  -     REFERRAL TO SLEEP STUDIES  -     XR SHOULDER LT AP/LAT MIN 2 V; Future    2. Encounter for immunization  -     Influenza virus vaccine ([address]) 72 years and older ([id number])  -     Administration fee () for Medicare insured patients  -     Pneumococcal Conjugate vaccine - 13 valent (50 years and older)  -     ADMIN PNEUMOCOCCAL VACCINE  Medicare Injection Admin Charge  -     REFERRAL TO OPHTHALMOLOGY  -     REFERRAL TO NEUROLOGY  -     REFERRAL TO SLEEP STUDIES  -     XR SHOULDER LT AP/LAT MIN 2 V; Future    3. Atrial fibrillation, unspecified type ([facility]  -     Influenza virus vaccine ([address]) 72 years and older ([id number])  -     Administration fee () for Medicare insured patients  -     Pneumococcal Conjugate vaccine - 13 valent (50 years and older)  -     ADMIN PNEUMOCOCCAL VACCINE  Medicare Injection Admin Charge  -     REFERRAL TO OPHTHALMOLOGY  -     REFERRAL TO NEUROLOGY  -     REFERRAL TO SLEEP STUDIES  -     XR SHOULDER LT AP/LAT MIN 2 V; Future    4. Arthritis  -     Influenza virus vaccine ([address]) 72 years and older ([id number])  -     Administration fee () for Medicare insured patients  -     Pneumococcal Conjugate vaccine - 13 valent (50 years and older)  -     ADMIN PNEUMOCOCCAL VACCINE  Medicare Injection Admin Charge  -     REFERRAL TO OPHTHALMOLOGY  -     REFERRAL TO NEUROLOGY  -     REFERRAL TO SLEEP STUDIES  -     XR SHOULDER LT AP/LAT MIN 2 V; Future    5.  Rheumatic heart disease  -     Influenza virus vaccine ([address]) 72 years and older ([id number])  -     Administration fee () for Medicare insured patients  -     Pneumococcal Conjugate vaccine - 13 valent (50 years and older)  -     [address] Medicare Injection Admin Charge  -     REFERRAL TO OPHTHALMOLOGY  -     REFERRAL TO NEUROLOGY  -     REFERRAL TO SLEEP STUDIES  -     XR SHOULDER LT AP/LAT MIN 2 V; Future    6. Chronic left shoulder pain  -     Influenza virus vaccine (FLUZONE HIGH-DOSE) 65 years and older (48078)  -     Administration fee () for Medicare insured patients  -     Pneumococcal Conjugate vaccine - 13 valent (50 years and older)  -     ADMIN PNEUMOCOCCAL VACCINE  Medicare Injection Admin Charge  -     REFERRAL TO OPHTHALMOLOGY  -     REFERRAL TO NEUROLOGY  -     REFERRAL TO SLEEP STUDIES  -     XR SHOULDER LT AP/LAT MIN 2 V; Future    7. Amaurosis fugax  -     Influenza virus vaccine (FLUZONE HIGH-DOSE) 72 years and older (57697)  -     Administration fee () for Medicare insured patients  -     Pneumococcal Conjugate vaccine - 13 valent (50 years and older)  -     ADMIN PNEUMOCOCCAL VACCINE  Medicare Injection Admin Charge  -     REFERRAL TO OPHTHALMOLOGY  -     REFERRAL TO NEUROLOGY  -     REFERRAL TO SLEEP STUDIES  -     XR SHOULDER LT AP/LAT MIN 2 V; Future          I have discussed the diagnosis with the patient and the intended plan as seen in the above orders. The patient has received an after-visit summary and questions were answered concerning future plans. I have discussed medication side effects and warnings with the patient as well. I have reviewed the plan of care with the patient, accepted their input and they are in agreement with the treatment goals. Follow-up Disposition:  Return in about 2 weeks (around 10/5/2017) for EOV.

## 2017-09-21 NOTE — PROGRESS NOTES
[facility]    HPI:   Ms. Africa Barrios is a 74 y.o. woman with dyslipidemia. She does not have obstructive atherosclerotic disease as evaluated by cardiac catheterization in  November 2015. Her anatomy is as follows:    HEMODYNAMICS:  LM - normal  LAD - normal  D1 - normal  Cx - normal  OM1 - normal  OM2 - normal  LPDA - patent  RCA - normal    LEFT:  RA - 6 mmHg  RV - 44/6 mmHg  PA - 38/14 mmHg  Wedge - 16 mmHg  CO / CI (DAVIAN) - 4.15 / 2.27    She has a history of rheumatic heart disease complicated by moderate to severe aortic stenosis and moderate mitral stenosis. She is status post aortic and mitral valve replacements in February of 2016 with the following: Aortic valve - 21 mm MAGNA EASE pericardial bioprosthesis  Mitral valve -  25 mm MAGNA EASE pericardial bioprosthesis    She has paroxysmal atrial fibrillation / flutter complicated by an embolic event manifesting as amaurosis fugax. She is status post surgical left sided cryoablation and atrial appendage resection in February of 2016. This occurred at the time of her aortic and mitral valve replacement. Her surgical course was notable for postoperative ventricular asystole requiring permanent pacemaker implantation in 2016. Ms. Africa Barrios is here today for follow up appointment. Approximately last Sunday, she was at [facility][1] and she felt like she had stroke-like symptoms. She felt like something came over in her head and she had some tingling and numbness of both upper extremities. She felt somewhat dizzy. This lasted less than a minute. She thought she may have had a stroke. currently she denies any symptoms. She denies any muscle weakness. She denies any palpitations, presyncope or syncope. She denies any chest pain or chest tightness. She said that her aspirin has been stopped for almost two weeks as she was supposed to get cataract surgery.     Past Medical History:   Diagnosis Date    Advance directive in chart 6/28/2016    Amaurosis fugax     Aortic stenosis     mean gradient  26.5 mmHg (CATH 4/13), 32 mmHg (ECHO 9/15)    Aortic valve replacement     21mm MAGNA EASE pericardial  2/16    Arthritis     Atrial appendage resection     2/16      Atrial fibrillation     CHADS score 2  (-CHF, -HTN, -AGE, -DM +AMAUROSIS FUGAX)    Atrial fibrillation ablation     surgical left sided cryoablation  2/16    Cataract     OS    Cervical dysplasia     2009   post leep and cone    COPD     mild  2/16    Duodenal AVM     argon plasma coagulation 2/16    Dyslipidemia     Fibroids     Glaucoma     1/15/2014    Lung nodule     3/19/2012   stable    Mitral stenosis     mean gradient  6 mmHg (CATH 4/13), 7.6 mmHg (MANUEL 10/15)    Mitral valve replacement     25 mm MAGNA EASE pericardial  2/16    Pacemaker     dual chamber MEDTRONIC 2/16    Post-op ventricular asystole     02/16    Remote tobacco     Sleep apnea     no CPAP    Thyroid nodule     3/20/2013   multiple       Past Surgical History:   Procedure Laterality Date    COLONOSCOPY N/A 1/23/2017    COLON[facility] performed [facility] Raquel Gomez [facility] at [facility] ENDOSCOPY    HX AFIB ABLATION      2/16  surgical cryoablation    HX AORTIC VALVE REPLACEMENT      2/16    HX BREAST BIOPSY Right 10/13/2014    Right breast needle [facility]OC biopsy performed by Sergio Luna MD at [address] HX BREAST LUMPECTOMY      Right    HX BUNIONECTOMY      left    HX GYN      VAINIII, VELASQUEZ 1, keratinous labial cyst    HX GYN      2012  Cold knife conization of cervix    HX LEEP PROCEDURE      2010    HX MITRAL VALVE REPLACEMENT      2/16    HX OTHER SURGICAL      lump left neck, benign    HX OTHER SURGICAL      multiple breast aspirations    HX PACEMAKER      I&D OF VULVA/PERINEUM ABSCESS  3/21/2017            Current Outpatient Prescriptions   Medication Sig    ezetimibe (ZETIA) 10 mg tablet Take 1 Tab by mouth daily.     mirabegron ER (MYRBETRIQ) 25 mg ER tablet Take 1 Tab by mouth daily.  LACTOBACILLUS ACIDOPHILUS (PROBIOTIC ACIDOPHILUS PO) Take  by mouth daily.  simvastatin (ZOCOR) 10 mg tablet Take 1 Tab by mouth nightly.  prenatal vit-calcium-iron-fa (PRENATAL PLUS, CALCIUM CARB,) 27 mg iron- 1 mg tab TAKE 1 TABLET BY MOUTH DAILY    omeprazole (PRILOSEC) 20 mg capsule take 1 capsule by mouth twice a day    cycloSPORINE (RESTASIS) 0.05 % ophthalmic emulsion Administer 1 Drop to both eyes two (2) times a day.  bimatoprost (LUMIGAN) 0.01 % ophthalmic drops Administer 1 Drop to left eye nightly.  aspirin delayed-release 81 mg tablet take 1 tablet by mouth once daily     No current facility-administered medications for this visit. Allergies   Allergen Reactions    Contrast Dye [Iodine] Itching and Swelling    Iodinated Contrast- Oral And Iv Dye Itching and Swelling    Seafood Itching and Swelling     LOBSTER ONLY    Statins-Hmg-Coa Reductase Inhibitors Myalgia and Other (comments)     Myalgia    Sulfa (Sulfonamide Antibiotics) Itching and Swelling       Family History:   Non contributory for premature coronary disease in first degree relatives (female <65, male <55). Social History:   She  reports that she quit smoking about 13 years ago. She quit after 37.00 years of use. She has never used smokeless tobacco.  She  reports that she does not drink alcohol. Physical:   Vitals:   BP: 118/80  HR: 81  Wt: 170 lb (77.1 kg)    Exam:   General: Middle aged woman, no complaints.     Head/Neck: No JVD    No bruits. No edema  Lungs:  No respiratory distress. Clear with good air movement. Chest:  Keloid scar  Heart:  Regular. Soft systolic murmur. No rubs or gallops. Abdomen: Bowel sounds present  Extremities: Intact pulses.     No edema.         Review of Data:   LABS:   Lab Results   Component Value Date/Time    WBC 5.5 03/07/2017 02:24 PM    Hemoglobin (POC) 9.8 02/22/2016 02:39 PM    Hemoglobin, POC 8.8 02/10/2016 11:18 AM    HGB 13.6 03/07/2017 02:24 PM    Hematocrit, POC 26 02/10/2016 11:18 AM    HCT 42.6 03/07/2017 02:24 PM    PLATELET 341 33/67/4348 02:24 PM    MCV 94.7 03/07/2017 02:24 PM     Lab Results   Component Value Date/Time    Sodium 141 03/07/2017 02:24 PM    Potassium 3.6 03/07/2017 02:24 PM    Chloride 104 03/07/2017 02:24 PM    CO2 30 03/07/2017 02:24 PM    Anion gap 7 03/07/2017 02:24 PM    Glucose 85 03/07/2017 02:24 PM    Glucose, fasting 110 04/25/2017 09:56 AM    BUN 12 03/07/2017 02:24 PM    Creatinine 0.92 03/07/2017 02:24 PM    BUN/Creatinine ratio 13 03/07/2017 02:24 PM    GFR est AA >60 03/07/2017 02:24 PM    GFR est non-AA >60 03/07/2017 02:24 PM    Calcium 9.0 03/07/2017 02:24 PM    Bilirubin, total 0.4 03/07/2017 02:24 PM    AST (SGOT) 24 03/07/2017 02:24 PM    Alk. phosphatase 80 03/07/2017 02:24 PM    Protein, total 7.4 03/07/2017 02:24 PM    Albumin 3.9 03/07/2017 02:24 PM    Globulin 3.5 03/07/2017 02:24 PM    A-G Ratio 1.1 03/07/2017 02:24 PM    ALT (SGPT) 28 03/07/2017 02:24 PM     Lab Results   Component Value Date/Time    Cholesterol, total 246 03/07/2017 02:24 PM    HDL Cholesterol 55 03/07/2017 02:24 PM    LDL, calculated 160.2 03/07/2017 02:24 PM    VLDL, calculated 30.8 03/07/2017 02:24 PM    Triglyceride 154 03/07/2017 02:24 PM    CHOL/HDL Ratio 4.5 03/07/2017 02:24 PM     Lab Results   Component Value Date/Time    Hemoglobin A1c 6.6 07/01/2016 12:34 PM     Lab Results   Component Value Date/Time    TSH 0.88 03/07/2017 02:24 PM    Triiodothyronine (T3), free 3.0 05/09/2017 10:04 AM    T4, Free 0.9 05/09/2017 10:04 AM     10 YEAR CVD RISK:   9.1 %    Age    64 yo    Race    AA     Gender   Female    Total cholesterol  272 mg/dL    HDL    71 mg/dL    SBP    134 mmHg    BP meds   No    Diabetes   No    Tobacco   No    EKG: August 2016:  100% AV paced 80 bpm.  (09/17) Sinus rhythm. Intermittent Atrial paced beats.      TRANSESOPHAGEAL ECHOCARDIOGRAM: October 2015:  Left ventricle:  Size was normal. Systolic function was vigorous. There were no regional wall motion abnormalities. Wall thickness was normal.    Right ventricle: The size was normal.    Left atrium:  The atrium was dilated. Left atrial appendage:  No thrombus was identified. There was no spontaneous echo contrast (\"smoke\") in the appendage. Atrial septum:  The septum bows from left to right, consistent with increased left atrial pressure. There was no evidence of intracardiac shunt by peripherally-administered agitated saline contrast.    Right atrium:  Size was normal.    Mitral valve: The posterior leaflet was thickened and calcific throughout. Excursion was restricted. The anterior leaflet was thickened at the distal third with minimal calcification. Leaflet excursion was restricted primarily at the distal margin. Hemodynamics were obtained when at least three consecutive sinus beats were captured. A mean gradient across the valve was measured at 7.6 mmHg. Pressure half time was measured at 153 ms with a calculated valve area of 1.4 cm. There was mild, multi-jet regurgitation. The subvalvular apparatus was not significantly involved. The findings were consistent with moderate mitral stenosis. Aortic valve: The valve was trileaflet. Leaflets were thickened and nodularly calcific. Leaflet excursion was restricted. Valve area was planimetered between 0.9 and 1.0 cm. There was mild aortic regurgitation. There was mild to moderate stenosis. Aorta, systemic arteries: The root exhibited normal size. There was no atheroma. There was no evidence for dissection. TRANSTHORACIC ECHOCARDIOGRAM: June 2016:  LEFT VENTRICLE: Size was normal. Systolic function was normal by visual  assessment. Ejection fraction was estimated to be 55 %. No obvious wall  motion abnormalities identified in the views obtained. Wall thickness was  normal. DOPPLER: Unable to evaluate LV diastolic function due to mitral  valve replacement.     VENTRICULAR SEPTUM: There was post-op \"bounce\" motion. RIGHT VENTRICLE: The size was normal. Systolic function was normal. Wall  thickness was normal. A pacing wire was present. DOPPLER: Estimated peak  pressure was in the range of 25 mmHg to 30 mmHg. LEFT ATRIUM: Size was at the upper limits of normal. LA volume index was  29 ml/m squared. RIGHT ATRIUM: Size was normal.    MITRAL VALVE: A bioprosthesis was present. It exhibited normal function. It appears well seated. DOPPLER: There was mild regurgitation. Mean  transmitral gradient was 7 mmHg. AORTIC VALVE: A bioprosthesis was present. It exhibited normal function. It appears well seated. DOPPLER: There was no significant regurgitation. Valve mean gradient was 22 mmHg. TRICUSPID VALVE: There was normal leaflet separation. DOPPLER: The  transtricuspid velocity was within the normal range. There was no evidence  for tricuspid stenosis. There was mild to moderate regurgitation. PULMONIC VALVE: Not well visualized, but normal Doppler findings. DOPPLER:  There was no stenosis. There was trivial regurgitation. AORTA: The root exhibited normal size. SYSTEMIC VEINS: IVC: The inferior vena cava was normal in size and course. Respirophasic changes were normal.    PERICARDIUM: No significant pericardial effusion identified. STRESS TEST (EST, PHARM, NUC, ECHO etc): March 2011:  1. NORMAL SCAN. 2. NORMAL GATED ACQUISITION WITH AN EJECTION FRACTION OF 75%.     CATHETERIZATION: November 2015:  HEMODYNAMICS:  LM - normal  LAD - normal  D1 - normal  Cx - normal  OM1 - normal  OM2 - normal  LPDA - patent  RCA - normal    LEFT:  RA - 6 mmHg  RV - 44/6 mmHg  PA - 38/14 mmHg  Wedge - 16 mmHg  CO / CI (DAVIAN) - 4.15 / 2.27      Impression / Plan:     Dyslipidemia    Rheumatic heart disease    Atrial fibrillation    Post-op ventricular asystole       Rheumatic heart disease:  Ms. Radha Sanon has rheumatic heart disease, which was complicated by moderate to severe aortic stenosis and moderate mitral stenosis. She has undergone bioprosthetic aortic and mitral valve replacement in February, 2016. Echocardiogram was performed in July, 2016. She has been doing relatively okay without any symptoms to suggest angina or heart failure or any problems. ASA has been stopped for cataract surgery. But she had ?? Stroke./ TIA symptoms last Sunday. Asked her to start back ASA  ECHO will be ordered to make sure not cardio embolic event especially from valves. Hyperlipidemia:  Ms. Wendie Pereira has significant hyperlipidemia. Currently she is only on monotherapy with Zetia. Her last LDL was 160 in March, 2017. She had tried Atorvastatin and simvastatin in past but had to stop it because of significant myalgia. Also think she tried other statin as well, does not remember the name. Considering significant hyperlipidemia, not at goal despite zetia, would try PCSK-9 inhibitor. Atrial fibrillation: This is paroxysmal in nature. 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  She has been in sinus rhythm on exam and by EKG today. She had a permanent pacemaker paced with complete heart block at the time of her valvular surgery. 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 In addition she also has history of GI bleed, as well as vaginal bleed reportedly due to fibroids. She also had left sided surgical cryoablation and left atrial appendage resection so she is only on aspirin, which I recommend to continue. She stopped it for cataract surgery but asked her to start again at 162 mg daily and postponed surgery till we have echo. Pacemaker:  Ms. Wendie Pereira had postoperative ventricular asystole requiring pacemaker implantation in July, 2016. This will be interrogated per electrophysiology clinic protocol. Currently she has no symptoms and issues with pacemaker.     Other than the above, or unless any additional issues arise, I have asked that she follow up in 6-9 months.

## 2017-09-21 NOTE — MR AVS SNAPSHOT
Visit Information Date & Time Provider Department Dept. Phone Encounter #  
 9/21/2017 11:00 AM Sigrid Bess,  [address] [address] [phone] [id number] Follow-up Instructions Return in about 2 weeks (around 10/5/2017) for EOV. Your Appointments 9/21/2017  1:30 PM  
SURGERY CLEARANCE with Pee Mac MD  
Cardio Specialist at [address] [place] [address] [facility]) Appt Note: for [facility] [address] [place] [address] [place] [address] Upcoming Health Maintenance Date Due INFLUENZA AGE 9 TO ADULT 8/1/2017 Pneumococcal 65+ Low/Medium Risk (2 of 2 - PPSV23) 9/20/2017 MEDICARE YEARLY EXAM 3/29/2018 BREAST CANCER SCRN MAMMOGRAM 9/26/2018 GLAUCOMA SCREENING Q2Y 2/2/2019 DTaP/Tdap/Td series (2 - Td) 6/28/2026 COLONOSCOPY 1/23/2027 Allergies as of 9/21/2017  Review Complete On: 9/21/2017 By: Sigrid Bess, DO Severity Noted Reaction Type Reactions Contrast Dye [Iodine]  10/08/2014    Itching, Swelling Iodinated Contrast- Oral And Iv Dye  12/13/2016    Itching, Swelling Seafood  10/08/2014    Itching, Swelling LOBSTER ONLY Statins-hmg-coa Reductase Inhibitors  10/05/2011    Myalgia, Other (comments) Myalgia Sulfa (Sulfonamide Antibiotics)    Itching, Swelling Current Immunizations  Reviewed on 9/15/2014 Name Date Influenza High Dose Vaccine PF  Incomplete Influenza Vaccine 10/5/2016, 9/15/2014, 10/17/2013 Influenza Vaccine Annette Hollow) 9/3/2015 Influenza Vaccine (Quad) PF  Incomplete Influenza Vaccine Split 10/5/2011 Influenza Vaccine Whole 9/20/2012 Pneumococcal Conjugate (PCV-13)  Incomplete TD Vaccine 8/11/2011 ZZZ-RETIRED (DO NOT USE) Pneumococcal Vaccine (Unspecified Type) 9/20/2012, 10/5/2011 Zoster Vaccine, Live 9/3/2015 Not reviewed this visit You Were Diagnosed With   
  
 Codes Comments Numbness and tingling of upper and lower extremities of both sides    -  Primary ICD-10-CM: R20.0, R20.2 ICD-9-CM: 782.0 Encounter for immunization     ICD-10-CM: Z81 ICD-9-CM: V03.89 Atrial fibrillation, unspecified type ([facility] 75.)     ICD-10-CM: I48.91 
ICD-9-CM: 427.31 Arthritis     ICD-10-CM: M19.90 ICD-9-CM: 716.90 Rheumatic heart disease     ICD-10-CM: I09.9 ICD-9-CM: 398.90 Chronic left shoulder pain     ICD-10-CM: M25.512, G89.29 ICD-9-CM: 719.41, 338.29 Amaurosis fugax     ICD-10-CM: G45.3 ICD-9-CM: 362.34 Vitals BP Pulse Temp Resp Height(growth percentile) Weight(growth percentile) 128/88 (BP 1 Location: Right arm, BP Patient Position: Sitting) 90 97.1 °F (36.2 °C) (Oral) 16 5' 4\" (1.626 m) 170 lb 9.6 oz (77.4 kg) SpO2 BMI OB Status Smoking Status 97% 29.28 kg/m2 Postmenopausal Former Smoker BMI and BSA Data Body Mass Index Body Surface Area  
 29.28 kg/m 2 1.87 m 2 Preferred Pharmacy Pharmacy Name Phone [facility] [phone] Your Updated Medication List  
  
   
This list is accurate as of: 9/21/17 11:35 AM.  Always use your most recent med list.  
  
  
  
  
 aspirin delayed-release 81 mg tablet  
take 1 tablet by mouth once daily  
  
 ezetimibe 10 mg tablet Commonly known as:  Daphine Grow Take 1 Tab by mouth daily. LUMIGAN 0.01 % ophthalmic drops Generic drug:  bimatoprost  
Administer 1 Drop to left eye nightly. mirabegron ER 25 mg ER tablet Commonly known as:  MYRBETRIQ Take 1 Tab by mouth daily. omeprazole 20 mg capsule Commonly known as:  PRILOSEC  
take 1 capsule by mouth twice a day  
  
 prenatal vit-calcium-iron-fa 27 mg iron- 1 mg Tab Commonly known as:  PRENATAL PLUS (CALCIUM CARB) TAKE 1 TABLET BY MOUTH DAILY PROBIOTIC ACIDOPHILUS PO Take  by mouth daily. RESTASIS 0.05 % ophthalmic emulsion Generic drug:  cycloSPORINE Administer 1 Drop to both eyes two (2) times a day. simvastatin 10 mg tablet Commonly known as:  ZOCOR Take 1 Tab by mouth nightly. We Performed the Following ADMIN INFLUENZA VIRUS VAC [ [facility]] ADMIN PNEUMOCOCCAL VACCINE [ [facility]] INFLUENZA VIRUS VACCINE, HIGH DOSE SEASONAL, PRESERVATIVE FREE [62728 CPT(R)] PNEUMOCOCCAL CONJ VACCINE 13 VALENT IM [id number] CPT(R)] REFERRAL TO NEUROLOGY [AOS31 Custom] Comments:  
 Please evaluate patient for transient episodes of visual loss, episodes of bilat arm numbness. Having sleep issues. .  1st available is fine. REFERRAL TO OPHTHALMOLOGY [REF57 Custom] Comments:  
 Please evaluate patient for episodes of transient visual loss. Has cataract surgery schedule in next wk. Not cleared for surgery at this time REFERRAL TO SLEEP STUDIES [REF99 Custom] Comments:  
 Sleep Medicine Consult. Dr Vicki Damon, or first available. Follow-up Instructions Return in about 2 weeks (around 10/5/2017) for EOV. To-Do List   
 09/21/2017 Imaging:  XR SHOULDER LT AP/LAT MIN 2 V Referral Information Referral ID Referred By Referred To  
  
 [id number] Jojo Burden [facility] [address] [facility] [address] Phone: [phone] Fax: [phone] Visits Status Start Date End Date 1 New Request 9/21/17 9/21/18 If your referral has a status of pending review or denied, additional information will be sent to support the outcome of this decision. Referral ID Referred By Referred To  
 [id number] Leola Jacome, [address] [place] [address] [address] [facility] 229 Phone: [phone] Fax: [phone] Visits Status Start Date End Date 1 New Request 9/21/17 9/21/18 If your referral has a status of pending review or denied, additional information will be sent to support the outcome of this decision. Referral ID Referred By Referred To  
 [id number] Isaac Linder, [address], [place]  
   [address] [place] [address] [address] Uzair Flores Phone: [phone] Fax: [phone] Visits Status Start Date End Date 1 New Request 9/21/17 9/21/18 If your referral has a status of pending review or denied, additional information will be sent to support the outcome of this decision. Introducing [facility] HE[facility]! Dear Kulwant Dunn: Thank you for requesting a [employer] account. Our records indicate that you already have an active PreV[employer] account. You can access your account anytime at [email or web address] Did you know that you can access your hospital and ER discharge instructions at any time in [employer]? You can also review all of your test results from your hospital stay or ER visit. Additional Information If you have questions, please visit the Frequently Asked Questions section of the [employer] website at [email or web address] Remember, Pre[employer] is NOT to be used for urgent needs. For medical emergencies, dial 911. Now available from your iPhone and Android! Please provide this summary of care documentation to your next provider. Your primary care clinician is listed as [id number] [facility]. If you have any questions after today's visit, please call [phone].

## 2017-09-21 NOTE — PATIENT INSTRUCTIONS
Aspirin 162mg Daily     Idalia Nation will call to schedule echo [phone] if you do not hear from her in 1-2 days     Repatha prescription will be sent to a specialty pharmacy to find out if it will be covered through your insurance. This may take several weeks.

## 2017-09-21 NOTE — PROGRESS NOTES
Jonnathan Sabillon is a 74 y.o. female presented to clinic with multiple issuses. Pt c/o 10/10 left shoulder pain and has concerns the she might have had a mini stroke x 72 hours ago. 1. Have you been to the ER, urgent care clinic since your last visit? Hospitalized since your last visit? No    2. Have you seen or consulted any other health care providers outside of the [address] [place] [address] since your last visit? Include any pap smears or colon screening.  No     Learning Assessment 9/7/2016   PRIMARY LEARNER Patient   HIGHEST LEVEL OF EDUCATION - PRIMARY LEARNER  -   PRIMARY LANGUAGE ENGLISH   LEARNER PREFERENCE PRIMARY DEMONSTRATION     -   ANSWERED BY Patient   RELATIONSHIP SELF

## 2017-10-04 ENCOUNTER — HOSPITAL ENCOUNTER (OUTPATIENT)
Dept: NON INVASIVE DIAGNOSTICS | Age: 67
Discharge: HOME | End: 2017-10-04
Attending: INTERNAL MEDICINE
Payer: MEDICARE

## 2017-10-04 ENCOUNTER — HOSPITAL ENCOUNTER (OUTPATIENT)
Dept: LAB | Age: 67
Discharge: HOME | End: 2017-10-04
Payer: MEDICARE

## 2017-10-04 ENCOUNTER — OFFICE VISIT (OUTPATIENT)
Dept: FAMILY MEDICINE CLINIC | Age: 67
End: 2017-10-04

## 2017-10-04 VITALS
RESPIRATION RATE: 16 BRPM | SYSTOLIC BLOOD PRESSURE: 102 MMHG | TEMPERATURE: 97.8 F | BODY MASS INDEX: 29.67 KG/M2 | WEIGHT: 173.8 LBS | DIASTOLIC BLOOD PRESSURE: 62 MMHG | OXYGEN SATURATION: 100 % | HEART RATE: 68 BPM | HEIGHT: 64 IN

## 2017-10-04 DIAGNOSIS — I34.2 NON-RHEUMATIC MITRAL VALVE STENOSIS: Chronic | ICD-10-CM

## 2017-10-04 DIAGNOSIS — G45.9 TRANSIENT CEREBRAL ISCHEMIA, UNSPECIFIED TYPE: ICD-10-CM

## 2017-10-04 DIAGNOSIS — I48.91 ATRIAL FIBRILLATION, UNSPECIFIED TYPE (HCC): Chronic | ICD-10-CM

## 2017-10-04 DIAGNOSIS — M19.90 ARTHRITIS: ICD-10-CM

## 2017-10-04 DIAGNOSIS — G47.30 SLEEP APNEA, UNSPECIFIED TYPE: ICD-10-CM

## 2017-10-04 DIAGNOSIS — I09.9 RHEUMATIC HEART DISEASE: ICD-10-CM

## 2017-10-04 DIAGNOSIS — I63.89 OTHER CEREBRAL INFARCTION (HCC): ICD-10-CM

## 2017-10-04 DIAGNOSIS — R35.0 URINARY FREQUENCY: ICD-10-CM

## 2017-10-04 DIAGNOSIS — G89.29 CHRONIC LEFT SHOULDER PAIN: Primary | ICD-10-CM

## 2017-10-04 DIAGNOSIS — I35.0 AORTIC VALVE STENOSIS, UNSPECIFIED ETIOLOGY: Chronic | ICD-10-CM

## 2017-10-04 DIAGNOSIS — M25.512 CHRONIC LEFT SHOULDER PAIN: ICD-10-CM

## 2017-10-04 DIAGNOSIS — E78.5 DYSLIPIDEMIA: Chronic | ICD-10-CM

## 2017-10-04 DIAGNOSIS — R20.0 ARM NUMBNESS LEFT: ICD-10-CM

## 2017-10-04 DIAGNOSIS — M25.512 CHRONIC LEFT SHOULDER PAIN: Primary | ICD-10-CM

## 2017-10-04 DIAGNOSIS — G89.4 CHRONIC PAIN SYNDROME: ICD-10-CM

## 2017-10-04 DIAGNOSIS — G89.29 CHRONIC LEFT SHOULDER PAIN: ICD-10-CM

## 2017-10-04 PROCEDURE — 93306 TTE W/DOPPLER COMPLETE: CPT

## 2017-10-04 RX ORDER — ACETAMINOPHEN AND CODEINE PHOSPHATE 300; 15 MG/1; MG/1
1 TABLET ORAL
Qty: 30 TAB | Refills: 5 | Status: SHIPPED | OUTPATIENT
Start: 2017-10-04 | End: 2018-03-08 | Stop reason: SDUPTHER

## 2017-10-04 NOTE — PROGRESS NOTES
Yvette Bravo is a 74 y.o. female presents today for follow up on her left shoulder pain and possible TIA. She would also like to discuss left shoulder xrays. Patient reports that she still has left shoulder pain. Pain is rated at 8/10. Pt is in Room # 5      1. Have you been to the ER, urgent care clinic since your last visit? Hospitalized since your last visit? No    2. Have you seen or consulted any other health care providers outside of the [address] [place] [address] since your last visit? Include any pap smears or colon screening.  Yes When: 9/25/17 Where: Dr. Maggy Segura- neurologist Reason for visit: possible TIA

## 2017-10-04 NOTE — PROGRESS NOTES
Tomas Lizama is a 74 y.o.  female and presents with    Chief Complaint   Patient presents with    Urinary Frequency    Shoulder Pain    Sleep Apnea    TIA    Other     arm numbness           Subjective:  Has chronic urinary frequqncy. Already seeing ashish. Frequency getting worse. Wants to check urine culture today  2. Sleep apnea has appt schedule with [facility] for eval  3. ?? tia -- has appt with [facility] for that also  4. Episodes of arm numbness -- has emg schedule with [facility]  5. Shoulder pain here for f/u   6. Vision loss. Has appt with [facility]         Additional Concerns:          Patient Active Problem List    Diagnosis Date Noted    Advance directive in chart 06/28/2016    Dyslipidemia     Rheumatic heart disease     Atrial fibrillation     Arthritis 01/06/2016    Glaucoma 01/15/2014     Current Outpatient Prescriptions   Medication Sig Dispense Refill    acetaminophen-codeine (TYLENOL #2) 300-15 mg per tablet Take 1 Tab by mouth every four (4) hours as needed for Pain. Max Daily Amount: 6 Tabs. 30 Tab 5    evolocumab (REPATHA SURECLICK) pen injection 1 mL by SubCUTAneous route every fourteen (14) days. 2 Pen 4    ezetimibe (ZETIA) 10 mg tablet Take 1 Tab by mouth daily. 90 Tab 3    mirabegron ER (MYRBETRIQ) 25 mg ER tablet Take 1 Tab by mouth daily. 30 Tab 11    LACTOBACILLUS ACIDOPHILUS (PROBIOTIC ACIDOPHILUS PO) Take  by mouth daily.  simvastatin (ZOCOR) 10 mg tablet Take 1 Tab by mouth nightly. 30 Tab 6    prenatal vit-calcium-iron-fa (PRENATAL PLUS, CALCIUM CARB,) 27 mg iron- 1 mg tab TAKE 1 TABLET BY MOUTH DAILY 100 Tab 12    omeprazole (PRILOSEC) 20 mg capsule take 1 capsule by mouth twice a day 180 Cap 4    cycloSPORINE (RESTASIS) 0.05 % ophthalmic emulsion Administer 1 Drop to both eyes two (2) times a day.  bimatoprost (LUMIGAN) 0.01 % ophthalmic drops Administer 1 Drop to left eye nightly.       aspirin delayed-release 81 mg tablet take 1 tablet by mouth once daily 90 Tab 3     Allergies   Allergen Reactions    Contrast Dye [Iodine] Itching and Swelling    Iodinated Contrast- Oral And Iv Dye Itching and Swelling    Seafood Itching and Swelling     LOBSTER ONLY    Statins-Hmg-Coa Reductase Inhibitors Myalgia and Other (comments)     Myalgia    Sulfa (Sulfonamide Antibiotics) Itching and Swelling     Past Medical History:   Diagnosis Date    Advance directive in chart     6/28/2016    Amaurosis fugax     Aortic stenosis     mean gradient  26.5 mmHg (CATH 4/13), 32 mmHg (ECHO 9/15)    Aortic valve replacement     21mm MAGNA EASE pericardial  2/16    Arthritis     Atrial appendage resection     2/16      Atrial fibrillation     CHADS score 2  (-CHF, -HTN, -AGE, -DM +AMAUROSIS FUGAX)    Atrial fibrillation ablation     surgical left sided cryoablation  2/16    Cataract     OS    Cervical dysplasia     2009   post leep and cone    COPD     mild  2/16    Duodenal AVM     argon plasma coagulation 2/16    Dyslipidemia     Fibroids     Glaucoma     Lung nodule     3/19/2012   stable    Mitral stenosis     mean gradient  6 mmHg (CATH 4/13), 7.6 mmHg (MANUEL 10/15)    Mitral valve replacement     25 mm MAGNA EASE pericardial  2/16    Pacemaker     dual chamber MEDTRONIC 2/16    Post-op ventricular asystole     02/16    Remote tobacco     Sleep apnea     no CPAP    Thyroid nodule     3/20/2013   multiple    TIA (transient ischemic attack)      Past Surgical History:   Procedure Laterality Date    COLONOSCOPY N/A 1/23/2017    COL[facility] Lawyer Clayton MD at [facility] ENDOSCOPY    HX AFIB ABLATION      2/16  surgical cryoablation    HX AORTIC VALVE REPLACEMENT      2/16    HX BREAST BIOPSY Right 10/13/2014    Right breast needle [facility]OC biopsy performed by Nicol Hoover MD at [address] HX BREAST LUMPECTOMY      Right    HX BUNIONECTOMY      left    HX GYN      VAINIII, VELASQUEZ 1, keratinous labial cyst    HX GYN      2012  Cold knife conization of cervix    HX LEEP PROCEDURE          HX MITRAL VALVE REPLACEMENT          HX OTHER SURGICAL      lump left neck, benign    HX OTHER SURGICAL      multiple breast aspirations    HX PACEMAKER      I&D OF VULVA/PERINEUM ABSCESS  3/21/2017          Family History   Problem Relation Age of Onset    Cancer Mother      oral,    [address] [facility] Baldo Alzheimer Father         [address] [facility] Breast Cancer Maternal Aunt      unsure age   [address] [facility] Baldo Colon Cancer Maternal Grandfather      Social History   Substance Use Topics    Smoking status: Former Smoker     Years: 37.00     Quit date: 2004    Smokeless tobacco: Never Used      Comment: stop around     Alcohol use No       ROS       All other systems reviewed and are negative. Objective:  Vitals:    10/04/17 0937   BP: 102/62   Pulse: 68   Resp: 16   Temp: 97.8 °F (36.6 °C)   TempSrc: Oral   SpO2: 100%   Weight: 173 lb 12.8 oz (78.8 kg)   Height: 5' 4\" (1.626 m)   PainSc:   8   PainLoc: Shoulder                 alert, well appearing, and in no distress, oriented to person, place, and time and normal appearing weight  Chest - clear to auscultation, no wheezes, rales or rhonchi, symmetric air entry  Heart - normal rate, regular rhythm, normal S1, S2, no murmurs, rubs, clicks or gallops  Shoulder with LOM        LABS     TESTS    EXAM:  XR SHOULDER LT AP/LAT MIN 2 V     INDICATION:   shoulder pain     COMPARISON: None.     FINDINGS: Two views of the left shoulder , internal and external frontal  rotation views demonstrate no fracture, dislocation or other significant osseous  abnormality. Portions of the scapular obscured by the left chest wall generator  device.     IMPRESSION  IMPRESSION: No significant osseous abnormality.      Assessment/Plan:    1. Should erpain  -- chronic -- will give pain meds. Refer to ortho for eval  2. tia awaiating neuro appt  3. Arm numbness awaiting emg   4. Sleep apnea awaiting neuro eval  5.  Vision loss [employer] yogesh Velarde Sat [facility] today to follow up on chronic pain diagnosis. We discussed her osteoarthritis that is affecting her shoulders. .  Significant changes since last visit: none. She is  able to do her normal daily activities. She reports the following adverse side effects: none. Least pain over the last week has been 4/10. Worst pain over the last week has been 5/10. Opioid Risk Tool Reviewed: YES    Aberrant behaviors: None. Urine Drug Screen: due - order placed. Controlled substance agreement on file: YES.  reviewed:yes    Pill count is consistent with her prescription: yes    Concomitant use of a benzodiazepine: no             Also,  abstinence syndrome was reviewed and discussed with her today N/A    Lab review: orders written for new lab studies as appropriate; see orders    Diagnoses and all orders for this visit:    1. Chronic left shoulder pain  -     REFERRAL TO ORTHOPEDIC SURGERY  -     CULTURE, URINE; Future  -     COMPLIANCE DRUG SCREEN/PRESCRIPTION MONITORING; Future    2. Atrial fibrillation, unspecified type ([address]  -     REFERRAL TO ORTHOPEDIC SURGERY  -     CULTURE, URINE; Future  -     COMPLIANCE DRUG SCREEN/PRESCRIPTION MONITORING; Future  -     acetaminophen-codeine (TYLENOL #2) 300-15 mg per tablet; Take 1 Tab by mouth every four (4) hours as needed for Pain. Max Daily Amount: 6 Tabs. 3. Dyslipidemia  -     REFERRAL TO ORTHOPEDIC SURGERY  -     CULTURE, URINE; Future  -     COMPLIANCE DRUG SCREEN/PRESCRIPTION MONITORING; Future  -     acetaminophen-codeine (TYLENOL #2) 300-15 mg per tablet; Take 1 Tab by mouth every four (4) hours as needed for Pain. Max Daily Amount: 6 Tabs. 4. Arthritis  -     REFERRAL TO ORTHOPEDIC SURGERY  -     CULTURE, URINE; Future  -     COMPLIANCE DRUG SCREEN/PRESCRIPTION MONITORING; Future    5. Rheumatic heart disease  -     REFERRAL TO ORTHOPEDIC SURGERY  -     CULTURE, URINE;  Future  -     COMPLIANCE DRUG SCREEN/PRESCRIPTION MONITORING; Future    6. Sleep apnea, unspecified type  -     REFERRAL TO ORTHOPEDIC SURGERY  -     CULTURE, URINE; Future  -     COMPLIANCE DRUG SCREEN/PRESCRIPTION MONITORING; Future    7. Arm numbness left  -     REFERRAL TO ORTHOPEDIC SURGERY  -     CULTURE, URINE; Future  -     COMPLIANCE DRUG SCREEN/PRESCRIPTION MONITORING; Future    8. Transient cerebral ischemia, unspecified type  -     REFERRAL TO ORTHOPEDIC SURGERY  -     CULTURE, URINE; Future  -     COMPLIANCE DRUG SCREEN/PRESCRIPTION MONITORING; Future    9. Urinary frequency  -     REFERRAL TO ORTHOPEDIC SURGERY  -     CULTURE, URINE; Future  -     COMPLIANCE DRUG SCREEN/PRESCRIPTION MONITORING; Future    10. Chronic pain syndrome    11. Aortic valve stenosis, unspecified etiology  -     acetaminophen-codeine (TYLENOL #2) 300-15 mg per tablet; Take 1 Tab by mouth every four (4) hours as needed for Pain. Max Daily Amount: 6 Tabs. 12. Non-rheumatic mitral valve stenosis  -     acetaminophen-codeine (TYLENOL #2) 300-15 mg per tablet; Take 1 Tab by mouth every four (4) hours as needed for Pain. Max Daily Amount: 6 Tabs. I have discussed the diagnosis with the patient and the intended plan as seen in the above orders. The patient has received an after-visit summary and questions were answered concerning future plans. I have discussed medication side effects and warnings with the patient as well. I have reviewed the plan of care with the patient, accepted their input and they are in agreement with the treatment goals. Follow-up Disposition:  Return in about 4 weeks (around 11/1/2017) for EOV. More than 1/2 of this 45 minute visit was spent in counselling and coordination of care, as described above.

## 2017-10-04 NOTE — MR AVS SNAPSHOT
Visit Information Date & Time Provider Department Dept. Phone Encounter #  
 10/4/2017  9:30 AM Brigid Bland [phone] [id number] Follow-up Instructions Return in about 4 weeks (around 11/1/2017) for EOV. Your Appointments 10/24/2017  1:00 PM  
Follow Up with Sima Jacob MD  
Cardio Specialist at [facility]/[address]) Appt Note: 4 wk f/u after med change and echo -kmc [id number] [facility] [address]  
  
   
 [address] [facility] [address] Upcoming Health Maintenance Date Due  
 MEDICARE YEARLY EXAM 3/29/2018 Pneumococcal 65+ Low/Medium Risk (2 of 2 - PPSV23) 9/21/2018 BREAST CANCER SCRN MAMMOGRAM 9/26/2018 GLAUCOMA SCREENING Q2Y 2/2/2019 DTaP/Tdap/Td series (2 - Td) 6/28/2026 COLONOSCOPY 1/23/2027 Allergies as of 10/4/2017  Review Complete On: 9/21/2017 By: Alyse Rolle Severity Noted Reaction Type Reactions Contrast Dye [Iodine]  10/08/2014    Itching, Swelling Iodinated Contrast- Oral And Iv Dye  12/13/2016    Itching, Swelling Seafood  10/08/2014    Itching, Swelling LOBSTER ONLY Statins-hmg-coa Reductase Inhibitors  10/05/2011    Myalgia, Other (comments) Myalgia Sulfa (Sulfonamide Antibiotics)    Itching, Swelling Current Immunizations  Reviewed on 9/15/2014 Name Date Influenza High Dose Vaccine PF 9/21/2017 11:44 AM  
 Influenza Vaccine 10/5/2016, 9/15/2014, 10/17/2013 Influenza Vaccine Adrien Goring) 9/3/2015 Influenza Vaccine Split 10/5/2011 Influenza Vaccine Whole 9/20/2012 Pneumococcal Conjugate (PCV-13) 9/21/2017 11:45 AM  
 TD Vaccine 8/11/2011 ZZZ-RETIRED (DO NOT USE) Pneumococcal Vaccine (Unspecified Type) 9/20/2012, 10/5/2011 Zoster Vaccine, Live 9/3/2015 Not reviewed this visit You Were Diagnosed With   
  
 Codes Comments Chronic left shoulder pain    -  Primary ICD-10-CM: M25.512, U47.90 ICD-9-CM: 719.41, 338.29 Atrial fibrillation, unspecified type ([facility]     ICD-10-CM: I48.91 
ICD-9-CM: 427.31 Dyslipidemia     ICD-10-CM: E78.5 ICD-9-CM: 272.4 Arthritis     ICD-10-CM: M19.90 ICD-9-CM: 716.90 Rheumatic heart disease     ICD-10-CM: I09.9 ICD-9-CM: 398.90 Sleep apnea, unspecified type     ICD-10-CM: G47.30 ICD-9-CM: 780.57 Arm numbness left     ICD-10-CM: R20.0 ICD-9-CM: 782.0 Transient cerebral ischemia, unspecified type     ICD-10-CM: G45.9 ICD-9-CM: 435.9 Urinary frequency     ICD-10-CM: R35.0 ICD-9-CM: 788.41 Chronic pain syndrome     ICD-10-CM: G89.4 ICD-9-CM: 338.4 Aortic valve stenosis, unspecified etiology     ICD-10-CM: I35.0 ICD-9-CM: 424.1 Non-rheumatic mitral valve stenosis     ICD-10-CM: I34.2 ICD-9-CM: 424.0 Vitals BP Pulse Temp Resp Height(growth percentile) Weight(growth percentile) 102/62 (BP 1 Location: Right arm, BP Patient Position: Sitting) 68 97.8 °F (36.6 °C) (Oral) 16 5' 4\" (1.626 m) 173 lb 12.8 oz (78.8 kg) SpO2 BMI OB Status Smoking Status 100% 29.83 kg/m2 Postmenopausal Former Smoker BMI and BSA Data Body Mass Index Body Surface Area  
 29.83 kg/m 2 1.89 m 2 Preferred Pharmacy Pharmacy Name Phone [phone] [facility] [phone] Your Updated Medication List  
  
   
This list is accurate as of: 10/4/17 10:08 AM.  Always use your most recent med list.  
  
  
  
  
 acetaminophen-codeine 300-15 mg per tablet Commonly known as:  TYLENOL #2 Take 1 Tab by mouth every four (4) hours as needed for Pain. Max Daily Amount: 6 Tabs. aspirin delayed-release 81 mg tablet  
take 1 tablet by mouth once daily  
  
 evolocumab pen injection Commonly known as:  Gladys Moyer 1 mL by SubCUTAneous route every fourteen (14) days. ezetimibe 10 mg tablet Commonly known as:  Zelpha Bock Take 1 Tab by mouth daily. LUMIGAN 0.01 % ophthalmic drops Generic drug:  bimatoprost  
Administer 1 Drop to left eye nightly. mirabegron ER 25 mg ER tablet Commonly known as:  MYRBETRIQ Take 1 Tab by mouth daily. omeprazole 20 mg capsule Commonly known as:  PRILOSEC  
take 1 capsule by mouth twice a day  
  
 prenatal vit-calcium-iron-fa 27 mg iron- 1 mg Tab Commonly known as:  PRENATAL PLUS (CALCIUM CARB) TAKE 1 TABLET BY MOUTH DAILY PROBIOTIC ACIDOPHILUS PO Take  by mouth daily. RESTASIS 0.05 % ophthalmic emulsion Generic drug:  cycloSPORINE Administer 1 Drop to both eyes two (2) times a day. simvastatin 10 mg tablet Commonly known as:  ZOCOR Take 1 Tab by mouth nightly. Prescriptions Printed Refills  
 acetaminophen-codeine (TYLENOL #2) 300-15 mg per tablet 5 Sig: Take 1 Tab by mouth every four (4) hours as needed for Pain. Max Daily Amount: 6 Tabs. Class: Print Route: Oral  
  
We Performed the Following REFERRAL TO ORTHOPEDIC SURGERY [REF62 Custom] Comments:  
 Please evaluate patient for left shoulder pain. 1st available is fine. Follow-up Instructions Return in about 4 weeks (around 11/1/2017) for EOV. To-Do List   
 10/04/2017 Lab:  COMPLIANCE DRUG SCREEN/PRESCRIPTION MONITORING   
  
 10/04/2017 Microbiology:  CULTURE, URINE   
  
 10/04/2017 10:30 AM  
  Appointment with [facility] [address] [facility] CV SERV at [facility] NON-INVASIVE CARD ([phone]) Patient needs to bring a current list of all medications  No preparation is required for this study  This study requires patient to bring a written physicians order or the MD office may fax the order to Central Scheduling at [phone]. This exam is performed in the [address] at [facility]/[address] in the echo department.   Please have the patient arrive 13 minutes prior to their schedule appointment time and report to Patient Registration at the main entrance of the hospital.     AGE LIMIT for this procedure at [facility]/[address] is 25years old. All patients under 25years of age should be referred to [address] [place] [address]. Referral Information Referral ID Referred By Referred To  
  
 [id number] [facility], [address] [place] [address] MD Nic Carrasquillo [address] [place], Formerly named [facility] [address] Phone: [phone] Fax: [phone] Visits Status Start Date End Date 1 New Request 10/4/17 10/4/18 If your referral has a status of pending review or denied, additional information will be sent to support the outcome of this decision. Introducing [facility]! Dear Magi Wolf: Thank you for requesting a [employer] account. Our records indicate that you already have an active [employer] account. You can access your account anytime at [email or web address] Did you know that you can access your hospital and ER discharge instructions at any time in [employer]? You can also review all of your test results from your hospital stay or ER visit. Additional Information If you have questions, please visit the Frequently Asked Questions section of the [employer] website at [email or web address] Remember, [employer] is NOT to be used for urgent needs. For medical emergencies, dial 911. Now available from your iPhone and Android! Please provide this summary of care documentation to your next provider. Your primary care clinician is listed as [id number] [facility]. If you have any questions after today's visit, please call [phone].

## 2017-10-09 ENCOUNTER — TELEPHONE (OUTPATIENT)
Dept: FAMILY MEDICINE CLINIC | Age: 67
End: 2017-10-09

## 2017-10-09 DIAGNOSIS — N39.0 URINARY TRACT INFECTION WITHOUT HEMATURIA, SITE UNSPECIFIED: Primary | ICD-10-CM

## 2017-10-09 LAB — BACTERIA UR CULT: ABNORMAL

## 2017-10-09 RX ORDER — CIPROFLOXACIN 250 MG/1
250 TABLET, FILM COATED ORAL EVERY 12 HOURS
Qty: 20 TAB | Refills: 0 | Status: SHIPPED | OUTPATIENT
Start: 2017-10-09 | End: 2017-10-19

## 2017-10-09 NOTE — TELEPHONE ENCOUNTER
Notify pt she is carrying a low grade bladder infection. rx for cipro done.    F/u for repeat ua and cs in 2 wks  Dr Davide Feldman

## 2017-10-09 NOTE — TELEPHONE ENCOUNTER
Attempted to contact patient Anthony Nova regarding lab finding. Call back number left and will call again later today.

## 2017-10-09 NOTE — TELEPHONE ENCOUNTER
Spoke with Castro Bradshaw, Verified 2 patient identifiers. Spoke with patient in regards to lab results. Relayed Doctor's notes.   Patient acknowledges understanding and voices no further questions or concerns at this time

## 2017-10-10 RX ORDER — ASPIRIN 81 MG/1
TABLET ORAL
Qty: 90 TAB | Refills: 3 | Status: SHIPPED | OUTPATIENT
Start: 2017-10-10 | End: 2017-10-24 | Stop reason: SDUPTHER

## 2017-10-12 LAB — DRUGS UR: NORMAL

## 2017-10-24 ENCOUNTER — OFFICE VISIT (OUTPATIENT)
Dept: FAMILY MEDICINE CLINIC | Age: 67
End: 2017-10-24

## 2017-10-24 ENCOUNTER — OFFICE VISIT (OUTPATIENT)
Dept: CARDIOLOGY CLINIC | Age: 67
End: 2017-10-24

## 2017-10-24 VITALS
OXYGEN SATURATION: 97 % | DIASTOLIC BLOOD PRESSURE: 82 MMHG | BODY MASS INDEX: 29.71 KG/M2 | SYSTOLIC BLOOD PRESSURE: 125 MMHG | RESPIRATION RATE: 16 BRPM | HEART RATE: 104 BPM | WEIGHT: 174 LBS | TEMPERATURE: 97.6 F | HEIGHT: 64 IN

## 2017-10-24 VITALS
DIASTOLIC BLOOD PRESSURE: 83 MMHG | OXYGEN SATURATION: 97 % | SYSTOLIC BLOOD PRESSURE: 121 MMHG | WEIGHT: 174 LBS | HEIGHT: 64 IN | HEART RATE: 85 BPM | BODY MASS INDEX: 29.71 KG/M2

## 2017-10-24 DIAGNOSIS — Z87.898 HISTORY OF URINARY URGENCY: Primary | ICD-10-CM

## 2017-10-24 DIAGNOSIS — I48.0 PAF (PAROXYSMAL ATRIAL FIBRILLATION) (HCC): ICD-10-CM

## 2017-10-24 DIAGNOSIS — E78.00 PURE HYPERCHOLESTEROLEMIA: Primary | ICD-10-CM

## 2017-10-24 DIAGNOSIS — Z95.0 PACEMAKER: ICD-10-CM

## 2017-10-24 DIAGNOSIS — I09.9 RHEUMATIC HEART DISEASE: ICD-10-CM

## 2017-10-24 LAB
BILIRUB UR QL STRIP: NEGATIVE
GLUCOSE UR-MCNC: NEGATIVE MG/DL
KETONES P FAST UR STRIP-MCNC: NEGATIVE MG/DL
PH UR STRIP: 6.5 [PH] (ref 4.6–8)
PROT UR QL STRIP: NORMAL MG/DL
SP GR UR STRIP: 1.02 (ref 1–1.03)
UA UROBILINOGEN AMB POC: NORMAL (ref 0.2–1)
URINALYSIS CLARITY POC: CLEAR
URINALYSIS COLOR POC: YELLOW
URINE BLOOD POC: NEGATIVE
URINE LEUKOCYTES POC: NORMAL
URINE NITRITES POC: NEGATIVE

## 2017-10-24 RX ORDER — ASPIRIN 81 MG/1
162 TABLET ORAL DAILY
Qty: 180 TAB | Refills: 3 | Status: SHIPPED | OUTPATIENT
Start: 2017-10-24 | End: 2018-03-08 | Stop reason: SDUPTHER

## 2017-10-24 NOTE — PROGRESS NOTES
Madyosn Castro is a 74 y.o. female presents today for follow up on her recent UTI. Patient reports continued urinary urgency. Pt is in Room # 4          1. Have you been to the ER, urgent care clinic since your last visit? Hospitalized since your last visit? No    2. Have you seen or consulted any other health care providers outside of the [address] [place] [address] since your last visit? Include any pap smears or colon screening.  Yes When: today and last week Where: Dr. Shilpa Rinaldi and Dr. Santoro [facility] Reason for visit: routine care

## 2017-10-24 NOTE — PROGRESS NOTES
Jignesh Duran is a 74 y.o.  female and presents with    Chief Complaint   Patient presents with    Urinary Frequency     Shoulder pain        Subjective: Additional Concerns:          Patient Active Problem List    Diagnosis Date Noted    Advance directive in chart 06/28/2016    Dyslipidemia     Rheumatic heart disease     Atrial fibrillation     Arthritis 01/06/2016    Glaucoma 01/15/2014     Current Outpatient Prescriptions   Medication Sig Dispense Refill    aspirin delayed-release 81 mg tablet Take 2 Tabs by mouth daily. take 1 tablet by mouth once daily 180 Tab 3    acetaminophen-codeine (TYLENOL #2) 300-15 mg per tablet Take 1 Tab by mouth every four (4) hours as needed for Pain. Max Daily Amount: 6 Tabs. 30 Tab 5    ezetimibe (ZETIA) 10 mg tablet Take 1 Tab by mouth daily. 90 Tab 3    mirabegron ER (MYRBETRIQ) 25 mg ER tablet Take 1 Tab by mouth daily. 30 Tab 11    LACTOBACILLUS ACIDOPHILUS (PROBIOTIC ACIDOPHILUS PO) Take  by mouth daily.  prenatal vit-calcium-iron-fa (PRENATAL PLUS, CALCIUM CARB,) 27 mg iron- 1 mg tab TAKE 1 TABLET BY MOUTH DAILY 100 Tab 12    omeprazole (PRILOSEC) 20 mg capsule take 1 capsule by mouth twice a day 180 Cap 4    cycloSPORINE (RESTASIS) 0.05 % ophthalmic emulsion Administer 1 Drop to both eyes two (2) times a day.  bimatoprost (LUMIGAN) 0.01 % ophthalmic drops Administer 1 Drop to left eye nightly.        Allergies   Allergen Reactions    Contrast Dye [Iodine] Itching and Swelling    Iodinated Contrast- Oral And Iv Dye Itching and Swelling    Seafood Itching and Swelling     LOBSTER ONLY    Statins-Hmg-Coa Reductase Inhibitors Myalgia and Other (comments)     Myalgia    Sulfa (Sulfonamide Antibiotics) Itching and Swelling     Past Medical History:   Diagnosis Date    Advance directive in chart     6/28/2016    Amaurosis fugax     Aortic stenosis     mean gradient  26.5 mmHg (CATH 4/13), 32 mmHg (ECHO 9/15)    Aortic valve replacement     21mm MAGNA EASE pericardial      Arthritis     Atrial appendage resection           Atrial fibrillation     CHADS score 2  (-CHF, -HTN, -AGE, -DM +AMAUROSIS FUGAX)    Atrial fibrillation ablation     surgical left sided cryoablation      Cataract     OS    Cervical dysplasia     2009   post leep and cone    COPD     mild      Duodenal AVM     argon plasma coagulation     Dyslipidemia     Fibroids     Glaucoma     Lung nodule     3/19/2012   stable    Mitral stenosis     mean gradient  6 mmHg (CATH ), 7.6 mmHg (MANUEL 10/15)    Mitral valve replacement     25 mm MAGNA EASE pericardial      Pacemaker     dual chamber MEDTRONIC     Post-op ventricular asystole         Remote tobacco     Sleep apnea     no CPAP    Thyroid nodule     3/20/2013   multiple    TIA (transient ischemic attack)      Past Surgical History:   Procedure Laterality Date    COLONOSCOPY N/A 2017    COLON[facility] performed [facility] Dillon Heard MD at [facility] ENDOSCOPY    HX AFIB ABLATION        surgical cryoablation    HX AORTIC VALVE REPLACEMENT          HX BREAST BIOPSY Right 10/13/2014    Right breast needle [facility]OC biopsy performed by Natanael Presley MD at [address] [place] [address] HX BREAST LUMPECTOMY      Right    HX BUNIONECTOMY      left    HX GYN      VAINIII, VELASQUEZ 1, keratinous labial cyst    HX GYN      2012  Cold knife conization of cervix    HX LEEP PROCEDURE          HX MITRAL VALVE REPLACEMENT          HX OTHER SURGICAL      lump left neck, benign    HX OTHER SURGICAL      multiple breast aspirations    HX PACEMAKER      I&D OF VULVA/PERINEUM ABSCESS  3/21/2017          Family History   Problem Relation Age of Onset    Cancer Mother      oral,     Alzheimer Father         Velta Ganser Breast Cancer Maternal Aunt      unsure age   Velta Ganser Colon Cancer Maternal Grandfather      Social History   Substance Use Topics    Smoking status: Former Smoker     Years: 37.00     Quit date: 2/8/2004    Smokeless tobacco: Never Used      Comment: stop around 2004    Alcohol use No       ROS       All other systems reviewed and are negative. Objective:  Vitals:    10/24/17 1340   BP: 125/82   Pulse: (!) 104   Resp: 16   Temp: 97.6 °F (36.4 °C)   TempSrc: Oral   SpO2: 97%   Weight: 174 lb (78.9 kg)   Height: 5' 4\" (1.626 m)   PainSc:   0 - No pain                 alert, well appearing, and in no distress and oriented to person, place, and time  Chest - clear to auscultation, no wheezes, rales or rhonchi, symmetric air entry  Heart - normal rate, regular rhythm, normal S1, S2, no murmurs, rubs, clicks or gallops  Abdomen - soft, nontender, nondistended, no masses or organomegaly        LABS   ua today benign  TESTS      Assessment/Plan:    Urinary frequency will refer back to urology  shouler pain ongoing  Sleep study with neurology      Lab review: labs are reviewed, up to date and normal    Diagnoses and all orders for this visit:    1. History of urinary urgency  -     AMB POC URINALYSIS DIP STICK AUTO W/O MICRO  -     REFERRAL TO UROLOGY          I have discussed the diagnosis with the patient and the intended plan as seen in the above orders. The patient has received an after-visit summary and questions were answered concerning future plans. I have discussed medication side effects and warnings with the patient as well. I have reviewed the plan of care with the patient, accepted their input and they are in agreement with the treatment goals. Follow-up Disposition:  Return in about 4 weeks (around 11/21/2017) for EOV. More than 1/2 of this 30 minute visit was spent in counselling and coordination of care, as described above.

## 2017-10-24 NOTE — PATIENT INSTRUCTIONS
TAKE ASPIRIN 162MG DAILY      Heart-Healthy Diet: Care Instructions  Your Care Instructions    A heart-healthy diet has lots of vegetables, fruits, nuts, beans, and whole grains, and is low in salt. It limits foods that are high in saturated fat, such as meats, cheeses, and fried foods. It may be hard to change your diet, but even small changes can lower your risk of heart attack and heart disease. Follow-up care is a key part of your treatment and safety. Be sure to make and go to all appointments, and call your doctor if you are having problems. It's also a good idea to know your test results and keep a list of the medicines you take. How can you care for yourself at home? Watch your portions  · Learn what a serving is. A \"serving\" and a \"portion\" are not always the same thing. Make sure that you are not eating larger portions than are recommended. For example, a serving of pasta is ½ cup. A serving size of meat is 2 to 3 ounces. A 3-ounce serving is about the size of a deck of cards. Measure serving sizes until you are good at [place]" them. Keep in mind that restaurants often serve portions that are 2 or 3 times the size of one serving. · To keep your energy level up and keep you from feeling hungry, eat often but in smaller portions. · Eat only the number of calories you need to stay at a healthy weight. If you need to lose weight, eat fewer calories than your body burns (through exercise and other physical activity). Eat more fruits and vegetables  · Eat a variety of fruit and vegetables every day. Dark green, deep orange, red, or yellow fruits and vegetables are especially good for you. Examples include spinach, carrots, peaches, and berries. · Keep carrots, celery, and other veggies handy for snacks. Buy fruit that is in season and store it where you can see it so that you will be tempted to eat it. · Cook dishes that have a lot of veggies in them, such as stir-fries and soups.   Limit saturated and trans fat  · Read food labels, and try to avoid saturated and trans fats. They increase your risk of heart disease. Trans fat is found in many processed foods such as cookies and crackers. · Use olive or canola oil when you cook. Try cholesterol-lowering spreads, such as Benecol or Take Control. · Bake, broil, grill, or steam foods instead of frying them. · Choose lean meats instead of high-fat meats such as hot dogs and sausages. Cut off all visible fat when you prepare meat. · Eat fish, skinless poultry, and meat alternatives such as soy products instead of high-fat meats. Soy products, such as tofu, may be especially good for your heart. · Choose low-fat or fat-free milk and dairy products. Eat fish  · Eat at least two servings of fish a week. Certain fish, such as salmon and tuna, contain omega-3 fatty acids, which may help reduce your risk of heart attack. Eat foods high in fiber  · Eat a variety of grain products every day. Include whole-grain foods that have lots of fiber and nutrients. Examples of whole-grain foods include oats, whole wheat bread, and brown rice. · Buy whole-grain breads and cereals, instead of white bread or pastries. Limit salt and sodium  · Limit how much salt and sodium you eat to help lower your blood pressure. · Taste food before you salt it. Add only a little salt when you think you need it. With time, your taste buds will adjust to less salt. · Eat fewer snack items, fast foods, and other high-salt, processed foods. Check food labels for the amount of sodium in packaged foods. · Choose low-sodium versions of canned goods (such as soups, vegetables, and beans). Limit sugar  · Limit drinks and foods with added sugar. These include candy, desserts, and soda pop. Limit alcohol  · Limit alcohol to no more than 2 drinks a day for men and 1 drink a day for women. Too much alcohol can cause health problems. When should you call for help?   Watch closely for changes in your health, and be sure to contact your doctor if:  · You would like help planning heart-healthy meals. Where can you learn more? Go to http://diogenes-lee.info/. Enter V137 in the search box to learn more about \"Heart-Healthy Diet: Care Instructions. \"  Current as of: April 3, 2017  Content Version: 11.3  © 7641-7458 InviBox. Care instructions adapted under license by Ruth Kunstadter â€“ [employer] (which disclaims liability or warranty for this information). If you have questions about a medical condition or this instruction, always ask your healthcare professional. [place] [address] any warranty or liability for your use of this information. Low Sodium Diet (2,000 Milligram): Care Instructions  Your Care Instructions  Too much sodium causes your body to hold on to extra water. This can raise your blood pressure and force your heart and kidneys to work harder. In very serious cases, this could cause you to be put in the hospital. It might even be life-threatening. By limiting sodium, you will feel better and lower your risk of serious problems. The most common source of sodium is salt. People get most of the salt in their diet from canned, prepared, and packaged foods. Fast food and restaurant meals also are very high in sodium. Your doctor will probably limit your sodium to less than 2,000 milligrams (mg) a day. This limit counts all the sodium in prepared and packaged foods and any salt you add to your food. Follow-up care is a key part of your treatment and safety. Be sure to make and go to all appointments, and call your doctor if you are having problems. It's also a good idea to know your test results and keep a list of the medicines you take. How can you care for yourself at home? Read food labels  · Read labels on cans and food packages. The labels tell you how much sodium is in each serving. Make sure that you look at the serving size.  If you eat more than the serving size, you have eaten more sodium. · Food labels also tell you the Percent Daily Value for sodium. Choose products with low Percent Daily Values for sodium. · Be aware that sodium can come in forms other than salt, including monosodium glutamate (MSG), sodium citrate, and sodium bicarbonate (baking soda). MSG is often added to Asian food. When you eat out, you can sometimes ask for food without MSG or added salt. Buy low-sodium foods  · Buy foods that are labeled \"unsalted\" (no salt added), \"sodium-free\" (less than 5 mg of sodium per serving), or \"low-sodium\" (less than 140 mg of sodium per serving). Foods labeled \"reduced-sodium\" and \"light sodium\" may still have too much sodium. Be sure to read the label to see how much sodium you are getting. · Buy fresh vegetables, or frozen vegetables without added sauces. Buy low-sodium versions of canned vegetables, soups, and other canned goods. Prepare low-sodium meals  · Cut back on the amount of salt you use in cooking. This will help you adjust to the taste. Do not add salt after cooking. One teaspoon of salt has about 2,300 mg of sodium. · Take the salt shaker off the table. · Flavor your food with garlic, lemon juice, onion, vinegar, herbs, and spices. Do not use soy sauce, lite soy sauce, steak sauce, onion salt, garlic salt, celery salt, mustard, or ketchup on your food. · Use low-sodium salad dressings, sauces, and ketchup. Or make your own salad dressings and sauces without adding salt. · Use less salt (or none) when recipes call for it. You can often use half the salt a recipe calls for without losing flavor. Other foods such as rice, pasta, and grains do not need added salt. · Rinse canned vegetables, and cook them in fresh water. This removes some--but not all--of the salt. · Avoid water that is naturally high in sodium or that has been treated with water softeners, which add sodium.  Call your local water company to find out the sodium content of your water supply. If you buy bottled water, read the label and choose a sodium-free brand. Avoid high-sodium foods  · Avoid eating:  ¨ Smoked, cured, salted, and canned meat, fish, and poultry. ¨ Ham, cummings, hot dogs, and luncheon meats. ¨ Regular, hard, and processed cheese and regular peanut butter. ¨ Crackers with salted tops, and other salted snack foods such as pretzels, chips, and salted popcorn. ¨ Frozen prepared meals, unless labeled low-sodium. ¨ Canned and dried soups, broths, and bouillon, unless labeled sodium-free or low-sodium. ¨ Canned vegetables, unless labeled sodium-free or low-sodium. ¨ [place] fries, pizza, tacos, and other fast foods. ¨ Pickles, olives, ketchup, and other condiments, especially soy sauce, unless labeled sodium-free or low-sodium. Where can you learn more? Go to http://diogenes-lee.info/. Enter A242 in the search box to learn more about \"Low Sodium Diet (2,000 Milligram): Care Instructions. \"  Current as of: July 26, 2016  Content Version: 11.3  © 7496-7827 [employer]isort[employer]. Care instructions adapted under license by See[employer]Fix (which disclaims liability or warranty for this information). If you have questions about a medical condition or this instruction, always ask your healthcare professional. [address] any warranty or liability for your use of this information.

## 2017-10-24 NOTE — PROGRESS NOTES
1. Have you been to the ER, urgent care clinic since your last visit? Hospitalized since your last visit? No    2. Have you seen or consulted any other health care providers outside of the [address] [place] [address] since your last visit? Include any pap smears or colon screening.  No

## 2017-10-24 NOTE — MR AVS SNAPSHOT
Visit Information Date & Time Provider Department Dept. Phone Encounter #  
 10/24/2017  1:00 PM Pee Mccabe MD [address] [place] [address] Specialist at [facility] [phone] [id number] Follow-up Instructions Return in about 9 months (around 7/24/2018). Your Appointments 10/24/2017  2:00 PM  
Follow Up with Paulette De La Vega.,  [address] [facility] [address]) Appt Note: f/u ua/cs [phone]  
  
   
 [address] [place] [address] [phone]  
  
    
 7/24/2018  1:30 PM  
Follow Up with Toyin Maldonado MD  
Cardio Specialist at [facility] [place] [address]) Appt Note: 9 months follow up  
 [address]  
  
   
 [address] Upcoming Health Maintenance Date Due  
 MEDICARE YEARLY EXAM 3/29/2018 Pneumococcal 65+ Low/Medium Risk (2 of 2 - PPSV23) 9/21/2018 BREAST CANCER SCRN MAMMOGRAM 9/26/2018 GLAUCOMA SCREENING Q2Y 2/2/2019 DTaP/Tdap/Td series (2 - Td) 6/28/2026 COLONOSCOPY 1/23/2027 Allergies as of 10/24/2017  Review Complete On: 9/21/2017 By: Katherine Josue Severity Noted Reaction Type Reactions Contrast Dye [Iodine]  10/08/2014    Itching, Swelling Iodinated Contrast- Oral And Iv Dye  12/13/2016    Itching, Swelling Seafood  10/08/2014    Itching, Swelling LOBSTER ONLY Statins-hmg-coa Reductase Inhibitors  10/05/2011    Myalgia, Other (comments) Myalgia Sulfa (Sulfonamide Antibiotics)    Itching, Swelling Current Immunizations  Reviewed on 9/15/2014 Name Date Influenza High Dose Vaccine PF 9/21/2017 11:44 AM  
 Influenza Vaccine 10/5/2016, 9/15/2014, 10/17/2013 Influenza Vaccine Ariella Goody) 9/3/2015 Influenza Vaccine Split 10/5/2011 Influenza Vaccine Whole 9/20/2012  Pneumococcal Conjugate (PCV-13) 9/21/2017 11:45 AM  
 TD Vaccine 8/11/2011 ZZZ-RETIRED (DO NOT USE) Pneumococcal Vaccine (Unspecified Type) 9/20/2012, 10/5/2011 Zoster Vaccine, Live 9/3/2015 Not reviewed this visit Vitals BP Pulse Height(growth percentile) Weight(growth percentile) SpO2 BMI  
 121/83 85 5' 4\" (1.626 m) 174 lb (78.9 kg) 97% 29.87 kg/m2 OB Status Smoking Status Postmenopausal Former Smoker BMI and BSA Data Body Mass Index Body Surface Area  
 29.87 kg/m 2 1.89 m 2 Preferred Pharmacy Pharmacy Name Phone [phone] [facility] [phone] Your Updated Medication List  
  
   
This list is accurate as of: 10/24/17  1:15 PM.  Always use your most recent med list.  
  
  
  
  
 acetaminophen-codeine 300-15 mg per tablet Commonly known as:  TYLENOL #2 Take 1 Tab by mouth every four (4) hours as needed for Pain. Max Daily Amount: 6 Tabs. aspirin delayed-release 81 mg tablet  
take 1 tablet by mouth once daily  
  
 ezetimibe 10 mg tablet Commonly known as:  Sanjana [place] Take 1 Tab by mouth daily. LUMIGAN 0.01 % ophthalmic drops Generic drug:  bimatoprost  
Administer 1 Drop to left eye nightly. mirabegron ER 25 mg ER tablet Commonly known as:  MYRBETRIQ Take 1 Tab by mouth daily. omeprazole 20 mg capsule Commonly known as:  PRILOSEC  
take 1 capsule by mouth twice a day  
  
 prenatal vit-calcium-iron-fa 27 mg iron- 1 mg Tab Commonly known as:  PRENATAL PLUS (CALCIUM CARB) TAKE 1 TABLET BY MOUTH DAILY PROBIOTIC ACIDOPHILUS PO Take  by mouth daily. RESTASIS 0.05 % ophthalmic emulsion Generic drug:  cycloSPORINE Administer 1 Drop to both eyes two (2) times a day. Follow-up Instructions Return in about 9 months (around 7/24/2018). Patient Instructions TAKE ASPIRIN 162MG DAILY Heart-Healthy Diet: Care Instructions Your Care Instructions A heart-healthy diet has lots of vegetables, fruits, nuts, beans, and whole grains, and is low in salt. It limits foods that are high in saturated fat, such as meats, cheeses, and fried foods. It may be hard to change your diet, but even small changes can lower your risk of heart attack and heart disease. Follow-up care is a key part of your treatment and safety. Be sure to make and go to all appointments, and call your doctor if you are having problems. It's also a good idea to know your test results and keep a list of the medicines you take. How can you care for yourself at home? Watch your portions · Learn what a serving is. A \"serving\" and a \"portion\" are not always the same thing. Make sure that you are not eating larger portions than are recommended. For example, a serving of pasta is ½ cup. A serving size of meat is 2 to 3 ounces. A 3-ounce serving is about the size of a deck of cards. Measure serving sizes until you are good at [place]" them. Keep in mind that restaurants often serve portions that are 2 or 3 times the size of one serving. · To keep your energy level up and keep you from feeling hungry, eat often but in smaller portions. · Eat only the number of calories you need to stay at a healthy weight. If you need to lose weight, eat fewer calories than your body burns (through exercise and other physical activity). Eat more fruits and vegetables · Eat a variety of fruit and vegetables every day. Dark green, deep orange, red, or yellow fruits and vegetables are especially good for you. Examples include spinach, carrots, peaches, and berries. · Keep carrots, celery, and other veggies handy for snacks. Buy fruit that is in season and store it where you can see it so that you will be tempted to eat it. · Cook dishes that have a lot of veggies in them, such as stir-fries and soups. Limit saturated and trans fat · Read food labels, and try to avoid saturated and trans fats.  They increase your risk of heart disease. Trans fat is found in many processed foods such as cookies and crackers. · Use olive or canola oil when you cook. Try cholesterol-lowering spreads, such as Benecol or Take Control. · Bake, broil, grill, or steam foods instead of frying them. · Choose lean meats instead of high-fat meats such as hot dogs and sausages. Cut off all visible fat when you prepare meat. · Eat fish, skinless poultry, and meat alternatives such as soy products instead of high-fat meats. Soy products, such as tofu, may be especially good for your heart. · Choose low-fat or fat-free milk and dairy products. Eat fish · Eat at least two servings of fish a week. Certain fish, such as salmon and tuna, contain omega-3 fatty acids, which may help reduce your risk of heart attack. Eat foods high in fiber · Eat a variety of grain products every day. Include whole-grain foods that have lots of fiber and nutrients. Examples of whole-grain foods include oats, whole wheat bread, and brown rice. · Buy whole-grain breads and cereals, instead of white bread or pastries. Limit salt and sodium · Limit how much salt and sodium you eat to help lower your blood pressure. · Taste food before you salt it. Add only a little salt when you think you need it. With time, your taste buds will adjust to less salt. · Eat fewer snack items, fast foods, and other high-salt, processed foods. Check food labels for the amount of sodium in packaged foods. · Choose low-sodium versions of canned goods (such as soups, vegetables, and beans). Limit sugar · Limit drinks and foods with added sugar. These include candy, desserts, and soda pop. Limit alcohol · Limit alcohol to no more than 2 drinks a day for men and 1 drink a day for women. Too much alcohol can cause health problems. When should you call for help? Watch closely for changes in your health, and be sure to contact your doctor if: · You would like help planning heart-healthy meals. Where can you learn more? Go to http://diogenes-lee.info/. Enter V137 in the search box to learn more about \"Heart-Healthy Diet: Care Instructions. \" Current as of: April 3, 2017 Content Version: 11.3 © 4452-3865 Sr.Pago. Care instructions adapted under license by Realm (which disclaims liability or warranty for this information). If you have questions about a medical condition or this instruction, always ask your healthcare professional. [address] any warranty or liability for your use of this information. Low Sodium Diet (2,000 Milligram): Care Instructions Your Care Instructions Too much sodium causes your body to hold on to extra water. This can raise your blood pressure and force your heart and kidneys to work harder. In very serious cases, this could cause you to be put in the hospital. It might even be life-threatening. By limiting sodium, you will feel better and lower your risk of serious problems. The most common source of sodium is salt. People get most of the salt in their diet from canned, prepared, and packaged foods. Fast food and restaurant meals also are very high in sodium. Your doctor will probably limit your sodium to less than 2,000 milligrams (mg) a day. This limit counts all the sodium in prepared and packaged foods and any salt you add to your food. Follow-up care is a key part of your treatment and safety. Be sure to make and go to all appointments, and call your doctor if you are having problems. It's also a good idea to know your test results and keep a list of the medicines you take. How can you care for yourself at home? Read food labels · Read labels on cans and food packages. The labels tell you how much sodium is in each serving. Make sure that you look at the serving size.  If you eat more than the serving size, you have eaten more sodium. · Food labels also tell you the Percent Daily Value for sodium. Choose products with low Percent Daily Values for sodium. · Be aware that sodium can come in forms other than salt, including monosodium glutamate (MSG), sodium citrate, and sodium bicarbonate (baking soda). MSG is often added to Asian food. When you eat out, you can sometimes ask for food without MSG or added salt. Buy low-sodium foods · Buy foods that are labeled \"unsalted\" (no salt added), \"sodium-free\" (less than 5 mg of sodium per serving), or \"low-sodium\" (less than 140 mg of sodium per serving). Foods labeled \"reduced-sodium\" and \"light sodium\" may still have too much sodium. Be sure to read the label to see how much sodium you are getting. · Buy fresh vegetables, or frozen vegetables without added sauces. Buy low-sodium versions of canned vegetables, soups, and other canned goods. Prepare low-sodium meals · Cut back on the amount of salt you use in cooking. This will help you adjust to the taste. Do not add salt after cooking. One teaspoon of salt has about 2,300 mg of sodium. · Take the salt shaker off the table. · Flavor your food with garlic, lemon juice, onion, vinegar, herbs, and spices. Do not use soy sauce, lite soy sauce, steak sauce, onion salt, garlic salt, celery salt, mustard, or ketchup on your food. · Use low-sodium salad dressings, sauces, and ketchup. Or make your own salad dressings and sauces without adding salt. · Use less salt (or none) when recipes call for it. You can often use half the salt a recipe calls for without losing flavor. Other foods such as rice, pasta, and grains do not need added salt. · Rinse canned vegetables, and cook them in fresh water. This removes somebut not allof the salt. · Avoid water that is naturally high in sodium or that has been treated with water softeners, which add sodium.  Call your local water company to find out the sodium content of your water supply. If you buy bottled water, read the label and choose a sodium-free brand. Avoid high-sodium foods · Avoid eating: ¨ Smoked, cured, salted, and canned meat, fish, and poultry. ¨ Ham, cummings, hot dogs, and luncheon meats. ¨ Regular, hard, and processed cheese and regular peanut butter. ¨ Crackers with salted tops, and other salted snack foods such as pretzels, chips, and salted popcorn. ¨ Frozen prepared meals, unless labeled low-sodium. ¨ Canned and dried soups, broths, and bouillon, unless labeled sodium-free or low-sodium. ¨ Canned vegetables, unless labeled sodium-free or low-sodium. ¨ [place] fries, pizza, tacos, and other fast foods. ¨ Pickles, olives, ketchup, and other condiments, especially soy sauce, unless labeled sodium-free or low-sodium. Where can you learn more? Go to http://diogenes-lee.info/. Enter F368 in the search box to learn more about \"Low Sodium Diet (2,000 Milligram): Care Instructions. \" Current as of: July 26, 2016 Content Version: 11.3 © 5717-5330 Cardiosolutions. Care instructions adapted under license by [employer] (which disclaims liability or warranty for this information). If you have questions about a medical condition or this instruction, always ask your healthcare professional. [place] [address] any warranty or liability for your use of this information. Introducing [facility]! Dear Lory Christianson: Thank you for requesting a Aledia account. Our records indicate that you already have an active Aledia account. You can access your account anytime at [email or web address] Did you know that you can access your hospital and ER discharge instructions at any time in Aledia? You can also review all of your test results from your hospital stay or ER visit. Additional Information If you have questions, please visit the Frequently Asked Questions section of the Sunnovationshart website at [email or web address] (Management [employer] Data Services). [email or web address] Remember, Apptopia is NOT to be used for urgent needs. For medical emergencies, dial 911. Now available from your iPhone and Android! Please provide this summary of care documentation to your next provider. Your primary care clinician is listed as [id number] [place] [address]. If you have any questions after today's visit, please call [phone].

## 2017-10-24 NOTE — PROGRESS NOTES
[facility]    HPI:   Ms. Jamar Murphy is a 74 y.o. woman with dyslipidemia. She does not have obstructive atherosclerotic disease as evaluated by cardiac catheterization in  November 2015. Her anatomy is as follows:    HEMODYNAMICS:  LM - normal  LAD - normal  D1 - normal  Cx - normal  OM1 - normal  OM2 - normal  LPDA - patent  RCA - normal    LEFT:  RA - 6 mmHg  RV - 44/6 mmHg  PA - 38/14 mmHg  Wedge - 16 mmHg  CO / CI (DAVIAN) - 4.15 / 2.27    She has a history of rheumatic heart disease complicated by moderate to severe aortic stenosis and moderate mitral stenosis. She is status post aortic and mitral valve replacements in February of 2016 with the following: Aortic valve - 21 mm MAGNA EASE pericardial bioprosthesis  Mitral valve -  25 mm MAGNA EASE pericardial bioprosthesis    She has paroxysmal atrial fibrillation / flutter complicated by an embolic event manifesting as amaurosis fugax. She is status post surgical left sided cryoablation and atrial appendage resection in February of 2016. This occurred at the time of her aortic and mitral valve replacement. Her surgical course was notable for postoperative ventricular asystole requiring permanent pacemaker implantation in 2016. Ms. Jamar Murphy is here today for follow up appointment. No new symptoms since last time  Taking meds regularly  NO CP SOB or palpitations presyncope or syncope.      Past Medical History:   Diagnosis Date    Advance directive in chart     6/28/2016    Amaurosis fugax     Aortic stenosis     mean gradient  26.5 mmHg (CATH 4/13), 32 mmHg (ECHO 9/15)    Aortic valve replacement     21mm MAGNA EASE pericardial  2/16    Arthritis     Atrial appendage resection     2/16      Atrial fibrillation     CHADS score 2  (-CHF, -HTN, -AGE, -DM +AMAUROSIS FUGAX)    Atrial fibrillation ablation     surgical left sided cryoablation  2/16    Cataract     OS    Cervical dysplasia     2009   post leep and cone    COPD     mild 2/16    Duodenal AVM     argon plasma coagulation 2/16    Dyslipidemia     Fibroids     Glaucoma     Lung nodule     3/19/2012   stable    Mitral stenosis     mean gradient  6 mmHg (CATH 4/13), 7.6 mmHg (MANUEL 10/15)    Mitral valve replacement     25 mm MAGNA EASE pericardial  2/16    Pacemaker     dual chamber MEDTRONIC 2/16    Post-op ventricular asystole     02/16    Remote tobacco     Sleep apnea     no CPAP    Thyroid nodule     3/20/2013   multiple    TIA (transient ischemic attack)        Past Surgical History:   Procedure Laterality Date    COLONOSCOPY N/A 1/23/2017    COLONOSCOPY performed [facility] Bell Dominguez MD at [address] [place] [address] HX AFIB ABLATION      2/16  surgical cryoablation    HX AORTIC VALVE REPLACEMENT      2/16    HX BREAST BIOPSY Right 10/13/2014    Right breast needle [facility]OC biopsy performed by Mariana Mendoza MD at [address] [facility] HX BREAST LUMPECTOMY      Right    HX BUNIONECTOMY      left    HX GYN      VAINIII, VELASQUEZ 1, keratinous labial cyst    HX GYN      2012  Cold knife conization of cervix    HX LEEP PROCEDURE      2010    HX MITRAL VALVE REPLACEMENT      2/16    HX OTHER SURGICAL      lump left neck, benign    HX OTHER SURGICAL      multiple breast aspirations    HX PACEMAKER      I&D OF VULVA/PERINEUM ABSCESS  3/21/2017            Current Outpatient Prescriptions   Medication Sig    aspirin delayed-release 81 mg tablet take 1 tablet by mouth once daily    acetaminophen-codeine (TYLENOL #2) 300-15 mg per tablet Take 1 Tab by mouth every four (4) hours as needed for Pain. Max Daily Amount: 6 Tabs.  ezetimibe (ZETIA) 10 mg tablet Take 1 Tab by mouth daily.  mirabegron ER (MYRBETRIQ) 25 mg ER tablet Take 1 Tab by mouth daily.  LACTOBACILLUS ACIDOPHILUS (PROBIOTIC ACIDOPHILUS PO) Take  by mouth daily.     prenatal vit-calcium-iron-fa (PRENATAL PLUS, CALCIUM CARB,) 27 mg iron- 1 mg tab TAKE 1 TABLET BY MOUTH DAILY    omeprazole (PRILOSEC) 20 mg capsule take 1 capsule by mouth twice a day    cycloSPORINE (RESTASIS) 0.05 % ophthalmic emulsion Administer 1 Drop to both eyes two (2) times a day.  bimatoprost (LUMIGAN) 0.01 % ophthalmic drops Administer 1 Drop to left eye nightly. No current facility-administered medications for this visit. Allergies   Allergen Reactions    Contrast Dye [Iodine] Itching and Swelling    Iodinated Contrast- Oral And Iv Dye Itching and Swelling    Seafood Itching and Swelling     LOBSTER ONLY    Statins-Hmg-Coa Reductase Inhibitors Myalgia and Other (comments)     Myalgia    Sulfa (Sulfonamide Antibiotics) Itching and Swelling       Family History:   Non contributory for premature coronary disease in first degree relatives (female <65, male <55). Social History:   She  reports that she quit smoking about 13 years ago. She quit after 37.00 years of use. She has never used smokeless tobacco.  She  reports that she does not drink alcohol. Physical:   Vitals:   BP: 121/83  HR: 85  Wt: 174 lb (78.9 kg)    Exam:   General: Middle aged woman, no complaints.     Head/Neck: No JVD    No bruits. No edema  Lungs:  No respiratory distress. Clear with good air movement. Chest:  Keloid scar  Heart:  Regular. Soft systolic murmur. No rubs or gallops. Abdomen: Bowel sounds present  Extremities: Intact pulses.     No edema.         Review of Data:   LABS:   Lab Results   Component Value Date/Time    WBC 5.5 03/07/2017 02:24 PM    Hemoglobin (POC) 9.8 02/22/2016 02:39 PM    Hemoglobin, POC 8.8 02/10/2016 11:18 AM    HGB 13.6 03/07/2017 02:24 PM    Hematocrit, POC 26 02/10/2016 11:18 AM    HCT 42.6 03/07/2017 02:24 PM    PLATELET 305 42/39/7674 02:24 PM    MCV 94.7 03/07/2017 02:24 PM     Lab Results   Component Value Date/Time    Sodium 141 03/07/2017 02:24 PM    Potassium 3.6 03/07/2017 02:24 PM    Chloride 104 03/07/2017 02:24 PM    CO2 30 03/07/2017 02:24 PM    Anion gap 7 03/07/2017 02:24 PM    Glucose 85 03/07/2017 02:24 PM    GLUCOSE, FASTING 110 04/25/2017 09:56 AM    BUN 12 03/07/2017 02:24 PM    Creatinine 0.92 03/07/2017 02:24 PM    BUN/Creatinine ratio 13 03/07/2017 02:24 PM    GFR est AA >60 03/07/2017 02:24 PM    GFR est non-AA >60 03/07/2017 02:24 PM    Calcium 9.0 03/07/2017 02:24 PM    Bilirubin, total 0.4 03/07/2017 02:24 PM    AST (SGOT) 24 03/07/2017 02:24 PM    Alk. phosphatase 80 03/07/2017 02:24 PM    Protein, total 7.4 03/07/2017 02:24 PM    Albumin 3.9 03/07/2017 02:24 PM    Globulin 3.5 03/07/2017 02:24 PM    A-G Ratio 1.1 03/07/2017 02:24 PM    ALT (SGPT) 28 03/07/2017 02:24 PM     Lab Results   Component Value Date/Time    Cholesterol, total 246 03/07/2017 02:24 PM    HDL Cholesterol 55 03/07/2017 02:24 PM    LDL, calculated 160.2 03/07/2017 02:24 PM    VLDL, calculated 30.8 03/07/2017 02:24 PM    Triglyceride 154 03/07/2017 02:24 PM    CHOL/HDL Ratio 4.5 03/07/2017 02:24 PM     Lab Results   Component Value Date/Time    Hemoglobin A1c 6.6 07/01/2016 12:34 PM     Lab Results   Component Value Date/Time    TSH 0.88 03/07/2017 02:24 PM    Triiodothyronine (T3), free 3.0 05/09/2017 10:04 AM    T4, Free 0.9 05/09/2017 10:04 AM     10 YEAR CVD RISK:   9.1 %    Age    62 yo    Race    AA     Gender   Female    Total cholesterol  272 mg/dL    HDL    71 mg/dL    SBP    134 mmHg    BP meds   No    Diabetes   No    Tobacco   No    EKG: August 2016:  100% AV paced 80 bpm.  (09/17) Sinus rhythm. Intermittent Atrial paced beats. TRANSESOPHAGEAL ECHOCARDIOGRAM: October 2015:  Left ventricle:  Size was normal. Systolic function was vigorous. There were no regional wall motion abnormalities. Wall thickness was normal.    Right ventricle: The size was normal.    Left atrium:  The atrium was dilated. Left atrial appendage:  No thrombus was identified. There was no spontaneous echo contrast (\"smoke\") in the appendage.     Atrial septum:  The septum bows from left to right, consistent with increased left atrial pressure. There was no evidence of intracardiac shunt by peripherally-administered agitated saline contrast.    Right atrium:  Size was normal.    Mitral valve: The posterior leaflet was thickened and calcific throughout. Excursion was restricted. The anterior leaflet was thickened at the distal third with minimal calcification. Leaflet excursion was restricted primarily at the distal margin. Hemodynamics were obtained when at least three consecutive sinus beats were captured. A mean gradient across the valve was measured at 7.6 mmHg. Pressure half time was measured at 153 ms with a calculated valve area of 1.4 cm. There was mild, multi-jet regurgitation. The subvalvular apparatus was not significantly involved. The findings were consistent with moderate mitral stenosis. Aortic valve: The valve was trileaflet. Leaflets were thickened and nodularly calcific. Leaflet excursion was restricted. Valve area was planimetered between 0.9 and 1.0 cm. There was mild aortic regurgitation. There was mild to moderate stenosis. Aorta, systemic arteries: The root exhibited normal size. There was no atheroma. There was no evidence for dissection. ECHO: 09/17  Left ventricle: Systolic function was normal. Ejection fraction was estimated   in the range of 55 % to 60 %. There were  no regional wall motion abnormalities. The study was not technically   sufficient to allow evaluation of LV diastolic  function. Right ventricle: The ventricle was dilated. Systolic function was normal. A   pacing wire was present. Left atrium: The atrium was dilated. Atrial septum: There was no evidence of intracardiac shunt by   peripherally-administered agitated saline contrast.  Right atrium: The atrium was mildly dilated. Mitral valve: A bioprosthesis was present. It exhibited normal function. There was no evidence for stenosis. There was  mild regurgitation. Mean transmitral gradient was 4 mmHg.   Aortic valve: A bioprosthesis was present. It exhibited normal function. Valve mean gradient was 10 mmHg. Tricuspid valve: There was moderate regurgitation. STRESS TEST (EST, PHARM, NUC, ECHO etc): March 2011:  1. NORMAL SCAN. 2. NORMAL GATED ACQUISITION WITH AN EJECTION FRACTION OF 75%. CATHETERIZATION: November 2015:  HEMODYNAMICS:  LM - normal  LAD - normal  D1 - normal  Cx - normal  OM1 - normal  OM2 - normal  LPDA - patent  RCA - normal    LEFT:  RA - 6 mmHg  RV - 44/6 mmHg  PA - 38/14 mmHg  Wedge - 16 mmHg  CO / CI (DAVIAN) - 4.15 / 2.27      Impression / Plan:     Dyslipidemia    Rheumatic heart disease    Atrial fibrillation    Post-op ventricular asystole       Rheumatic heart disease:  Ms. Saulo Estrella has rheumatic heart disease, which was complicated by moderate to severe aortic stenosis and moderate mitral stenosis. She has undergone bioprosthetic aortic and mitral valve replacement in February, 2016. Last Echocardiogram was performed in 09/17 and valve function appeared to be ok as mentioned above. Continue  mg daily . Hyperlipidemia:  Ms. Saulo Estrella has significant hyperlipidemia. Currently she is only on monotherapy with Zetia. Her last LDL was 160 in March, 2017. She had tried Atorvastatin and simvastatin in past but had to stop it because of significant myalgia. Also think she tried other statin as well, does not remember the name. Considering significant hyperlipidemia, not at goal despite zetia, we ordered Ector Herrera but patient read about medication and injectin and does not want to take it. We discussed risk, benefit alternatives. Discussed her 10 year risk for ASCVD and despite higher risk, she does not want any further management but zetia. Atrial fibrillation: This is paroxysmal in nature. 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  She has been in sinus rhythm on exam today.   She had a permanent pacemaker paced with complete heart block at the time of her valvular surgery. 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 In addition she also has history of GI bleed, as well as vaginal bleed reportedly due to fibroids. She also had left sided surgical cryoablation and left atrial appendage resection so she is only on aspirin 162 mg daily. Pacemaker:  Ms. Andree Linn had postoperative ventricular asystole requiring pacemaker implantation in July, 2016. This will be interrogated per electrophysiology clinic protocol. Currently she has no symptoms and issues with pacemaker. Other than the above, or unless any additional issues arise, I have asked that she follow up in 6-9 months.

## 2017-10-24 NOTE — MR AVS SNAPSHOT
Visit Information Date & Time Provider Department Dept. Phone Encounter #  
 10/24/2017  2:00 PM Keli Degroot., [address] [facility] [phone] [id number] Follow-up Instructions Return in about 4 weeks (around 11/21/2017) for EOV. Your Appointments 7/24/2018  1:30 PM  
Follow Up with Pee Edwards MD  
Cardio Specialist at [place] [id number] [facility]) Appt Note: 9 months follow up  
 [facility] [id number] [place] [address] Upcoming Health Maintenance Date Due  
 MEDICARE YEARLY EXAM 3/29/2018 Pneumococcal 65+ Low/Medium Risk (2 of 2 - PPSV23) 9/21/2018 BREAST CANCER SCRN MAMMOGRAM 9/26/2018 GLAUCOMA SCREENING Q2Y 2/2/2019 DTaP/Tdap/Td series (2 - Td) 6/28/2026 COLONOSCOPY 1/23/2027 Allergies as of 10/24/2017  Review Complete On: 10/24/2017 By: Keli Degroot., DO Severity Noted Reaction Type Reactions Contrast Dye [Iodine]  10/08/2014    Itching, Swelling Iodinated Contrast- Oral And Iv Dye  12/13/2016    Itching, Swelling Seafood  10/08/2014    Itching, Swelling LOBSTER ONLY Statins-hmg-coa Reductase Inhibitors  10/05/2011    Myalgia, Other (comments) Myalgia Sulfa (Sulfonamide Antibiotics)    Itching, Swelling Current Immunizations  Reviewed on 9/15/2014 Name Date Influenza High Dose Vaccine PF 9/21/2017 11:44 AM  
 Influenza Vaccine 10/5/2016, 9/15/2014, 10/17/2013 Influenza Vaccine Veronia Nancy) 9/3/2015 Influenza Vaccine Split 10/5/2011 Influenza Vaccine Whole 9/20/2012 Pneumococcal Conjugate (PCV-13) 9/21/2017 11:45 AM  
 TD Vaccine 8/11/2011 ZZZ-RETIRED (DO NOT USE) Pneumococcal Vaccine (Unspecified Type) 9/20/2012, 10/5/2011 Zoster Vaccine, Live 9/3/2015 Not reviewed this visit You Were Diagnosed With   
  
 Codes Comments History of urinary urgency    -  Primary ICD-10-CM: S32.206 ICD-9-CM: V13.00 Vitals BP Pulse Temp Resp Height(growth percentile) Weight(growth percentile) 125/82 (BP 1 Location: Right arm, BP Patient Position: Sitting) (!) 104 97.6 °F (36.4 °C) (Oral) 16 5' 4\" (1.626 m) 174 lb (78.9 kg) SpO2 BMI OB Status Smoking Status 97% 29.87 kg/m2 Postmenopausal Former Smoker BMI and BSA Data Body Mass Index Body Surface Area  
 29.87 kg/m 2 1.89 m 2 Preferred Pharmacy Pharmacy Name Phone [phone] [facility], [facility] [phone] Your Updated Medication List  
  
   
This list is accurate as of: 10/24/17  2:03 PM.  Always use your most recent med list.  
  
  
  
  
 acetaminophen-codeine 300-15 mg per tablet Commonly known as:  TYLENOL #2 Take 1 Tab by mouth every four (4) hours as needed for Pain. Max Daily Amount: 6 Tabs. aspirin delayed-release 81 mg tablet Take 2 Tabs by mouth daily. take 1 tablet by mouth once daily  
  
 ezetimibe 10 mg tablet Commonly known as:  Zelpha Bock Take 1 Tab by mouth daily. LUMIGAN 0.01 % ophthalmic drops Generic drug:  bimatoprost  
Administer 1 Drop to left eye nightly. mirabegron ER 25 mg ER tablet Commonly known as:  MYRBETRIQ Take 1 Tab by mouth daily. omeprazole 20 mg capsule Commonly known as:  PRILOSEC  
take 1 capsule by mouth twice a day  
  
 prenatal vit-calcium-iron-fa 27 mg iron- 1 mg Tab Commonly known as:  PRENATAL PLUS (CALCIUM CARB) TAKE 1 TABLET BY MOUTH DAILY PROBIOTIC ACIDOPHILUS PO Take  by mouth daily. RESTASIS 0.05 % ophthalmic emulsion Generic drug:  cycloSPORINE Administer 1 Drop to both eyes two (2) times a day. We Performed the Following AMB POC URINALYSIS DIP STICK AUTO W/O MICRO [20113 CPT(R)] REFERRAL TO UROLOGY [ODN173 Custom] Comments: Please evaluate patient for frequency. 1st available is fine Follow-up Instructions Return in about 4 weeks (around 11/21/2017) for EOV. Referral Information Referral ID Referred By Referred To  
  
 [id number] Kathrin Grant, Emogene EasternMD [address]  [address] [place], [address] [facility] [address] Phone: [phone] Fax: [phone] Visits Status Start Date End Date 1 New Request 10/24/17 10/24/18 If your referral has a status of pending review or denied, additional information will be sent to support the outcome of this decision. Introducing [facility]! Dear Akanksha Arcos: Thank you for requesting a [employer]APORT account. Our records indicate that you already have an active ONTRAPORT account. You can access your account anytime at [email or web address] Did you know that you can access your hospital and ER discharge instructions at any time in ONTRAPORT? You can also review all of your test results from your hospital stay or ER visit. Additional Information If you have questions, please visit the Frequently Asked Questions section of the ON[employer]APORT website at [email or web address] Remember, ONTRAPORT is NOT to be used for urgent needs. For medical emergencies, dial 911. Now available from your iPhone and Android! Please provide this summary of care documentation to your next provider. Your primary care clinician is listed as [id number] [facility]. If you have any questions after today's visit, please call [phone].

## 2017-12-05 ENCOUNTER — HOSPITAL ENCOUNTER (OUTPATIENT)
Dept: LAB | Age: 67
Discharge: HOME | End: 2017-12-05

## 2017-12-05 ENCOUNTER — OFFICE VISIT (OUTPATIENT)
Dept: FAMILY MEDICINE CLINIC | Age: 67
End: 2017-12-05

## 2017-12-05 DIAGNOSIS — I09.9 RHEUMATIC HEART DISEASE: ICD-10-CM

## 2017-12-05 DIAGNOSIS — L30.9 ECZEMA, UNSPECIFIED TYPE: ICD-10-CM

## 2017-12-05 DIAGNOSIS — E78.5 DYSLIPIDEMIA: Chronic | ICD-10-CM

## 2017-12-05 DIAGNOSIS — R79.9 ABNORMAL FINDING OF BLOOD CHEMISTRY: ICD-10-CM

## 2017-12-05 DIAGNOSIS — M19.90 ARTHRITIS: ICD-10-CM

## 2017-12-05 DIAGNOSIS — I48.91 ATRIAL FIBRILLATION, UNSPECIFIED TYPE (HCC): Primary | Chronic | ICD-10-CM

## 2017-12-05 PROCEDURE — 99001 SPECIMEN HANDLING PT-LAB: CPT | Performed by: FAMILY MEDICINE

## 2017-12-05 RX ORDER — TRIAMCINOLONE ACETONIDE 1 MG/G
OINTMENT TOPICAL 2 TIMES DAILY
Qty: 30 G | Refills: 12 | Status: SHIPPED | OUTPATIENT
Start: 2017-12-05 | End: 2018-03-08 | Stop reason: SDUPTHER

## 2017-12-05 NOTE — PROGRESS NOTES
Elmer Lay is a 74 y.o.  female and presents with    Chief Complaint   Patient presents with    Dizziness    Rash     red splotches on her legs    Numbness     small toes on right foot    Knee Pain     Onset 2 or 3 wk ago of dizziness. Episodic. Room doesn't spin. She feels off balance. Also feels she may be having a stroke. Strange feeling in face, feels foggy. 2. Has red splotches on her legs -- started 1 wk ago after getting out of hot shower. gettilng worse. On lower legs    3. Numbness in small toes on right foot - ongoing for 3 mo no known inju;ry. This is constant      4. Pain in back of knees -- started 2 mo ago. No known injury. Comes and goes. 5. Pain in left shoulder -- ongoing for months constant         Subjective: Additional Concerns:          Patient Active Problem List    Diagnosis Date Noted    Advance directive in chart 06/28/2016    Dyslipidemia     Rheumatic heart disease     Atrial fibrillation     Arthritis 01/06/2016    Glaucoma 01/15/2014     Current Outpatient Prescriptions   Medication Sig Dispense Refill    triamcinolone acetonide (KENALOG) 0.1 % ointment Apply  to affected area two (2) times a day. use thin layer 30 g 12    aspirin delayed-release 81 mg tablet Take 2 Tabs by mouth daily. take 1 tablet by mouth once daily 180 Tab 3    acetaminophen-codeine (TYLENOL #2) 300-15 mg per tablet Take 1 Tab by mouth every four (4) hours as needed for Pain. Max Daily Amount: 6 Tabs. 30 Tab 5    ezetimibe (ZETIA) 10 mg tablet Take 1 Tab by mouth daily. 90 Tab 3    mirabegron ER (MYRBETRIQ) 25 mg ER tablet Take 1 Tab by mouth daily.  30 Tab 11    prenatal vit-calcium-iron-fa (PRENATAL PLUS, CALCIUM CARB,) 27 mg iron- 1 mg tab TAKE 1 TABLET BY MOUTH DAILY 100 Tab 12    omeprazole (PRILOSEC) 20 mg capsule take 1 capsule by mouth twice a day 180 Cap 4    cycloSPORINE (RESTASIS) 0.05 % ophthalmic emulsion Administer 1 Drop to both eyes two (2) times a day.      bimatoprost (LUMIGAN) 0.01 % ophthalmic drops Administer 1 Drop to left eye nightly.        Allergies   Allergen Reactions    Contrast Dye [Iodine] Itching and Swelling    Iodinated Contrast- Oral And Iv Dye Itching and Swelling    Seafood Itching and Swelling     LOBSTER ONLY    Statins-Hmg-Coa Reductase Inhibitors Myalgia and Other (comments)     Myalgia    Sulfa (Sulfonamide Antibiotics) Itching and Swelling     Past Medical History:   Diagnosis Date    Advance directive in chart     6/28/2016    Amaurosis fugax     Aortic stenosis     mean gradient  26.5 mmHg (CATH 4/13), 32 mmHg (ECHO 9/15)    Aortic valve replacement     21mm MAGNA EASE pericardial  2/16    Arthritis     Atrial appendage resection     2/16      Atrial fibrillation     CHADS score 2  (-CHF, -HTN, -AGE, -DM +AMAUROSIS FUGAX)    Atrial fibrillation ablation     surgical left sided cryoablation  2/16    Cataract     OS    Cervical dysplasia     2009   post leep and cone    COPD     mild  2/16    Duodenal AVM     argon plasma coagulation 2/16    Dyslipidemia     Fibroids     Glaucoma     Lung nodule     3/19/2012   stable    Mitral stenosis     mean gradient  6 mmHg (CATH 4/13), 7.6 mmHg (MANUEL 10/15)    Mitral valve replacement     25 mm MAGNA EASE pericardial  2/16    Pacemaker     dual chamber MEDTRONIC 2/16    Post-op ventricular asystole     02/16    Remote tobacco     Sleep apnea     no CPAP    Thyroid nodule     3/20/2013   multiple    TIA (transient ischemic attack)      Past Surgical History:   Procedure Laterality Date    COLONOSCOPY N/A 1/23/2017    COL[facility] ENDOSCOPY    HX AFIB ABLATION      2/16  surgical cryoablation    HX AORTIC VALVE REPLACEMENT      2/16    HX BREAST BIOPSY Right 10/13/2014    Right breast needle [facility]OC biopsy performed by Cristiano Rodriguez MD at [address] [place] [address] HX BREAST LUMPECTOMY      Right    HX BUNIONECTOMY      left    HX GYN      VAINIII, VELASQUEZ 1, keratinous labial cyst    HX GYN      2012  Cold knife conization of cervix    HX LEEP PROCEDURE          HX MITRAL VALVE REPLACEMENT          HX OTHER SURGICAL      lump left neck, benign    HX OTHER SURGICAL      multiple breast aspirations    HX PACEMAKER      I&D OF VULVA/PERINEUM ABSCESS  3/21/2017          Family History   Problem Relation Age of Onset    Cancer Mother      oral,    Alexandre Dorman Alzheimer Father         Alexandre Amis Breast Cancer Maternal Aunt      unsure age   Vertie Amis Colon Cancer Maternal Grandfather      Social History   Substance Use Topics    Smoking status: Former Smoker     Years: 37.00     Quit date: 2004    Smokeless tobacco: Never Used      Comment: stop around     Alcohol use No       ROS       All other systems reviewed and are negative. Objective: There were no vitals filed for this visit. alert, well appearing, and in no distress, oriented to person, place, and time and normal appearing weight  Mental status - alert, oriented to person, place, and time, normal mood, behavior, speech, dress, motor activity, and thought processes  Chest - clear to auscultation, no wheezes, rales or rhonchi, symmetric air entry  Heart - normal rate, regular rhythm, normal S1, S2, no murmurs, rubs, clicks or gallops  Abdomen - soft, nontender, nondistended, no masses or organomegaly  Neurological - alert, oriented, normal speech, no focal findings or movement disorder noted, motor and sensory grossly normal bilaterally, normal muscle tone, no tremors, strength 5/5        LABS     TESTS      Assessment/Plan:    Pt is worried about possible diabetes will check aic and cmp at her insistence. I do not feel she is diabetic. Dizziness -- not sure if anxiety or seconary to tiny cerebral anerysm -- no tx at this time. Rash on legs is eczema try kenalog f/u prn  Numb toe etio? ?  Pain is shoulder is long term -- adh cap has t 2 if needed  Pain in knees is bakers cyst OA no other tx needed        Lab review: labs are reviewed, up to date and normal    Diagnoses and all orders for this visit:    1. Atrial fibrillation, unspecified type (HCC)  -     METABOLIC PANEL, COMPREHENSIVE; Future  -     HEMOGLOBIN A1C WITH EAG; Future  -     TSH 3RD GENERATION; Future  -     T4, FREE; Future  -     triamcinolone acetonide (KENALOG) 0.1 % ointment; Apply  to affected area two (2) times a day. use thin layer    2. Dyslipidemia  -     METABOLIC PANEL, COMPREHENSIVE; Future  -     HEMOGLOBIN A1C WITH EAG; Future  -     TSH 3RD GENERATION; Future  -     T4, FREE; Future  -     triamcinolone acetonide (KENALOG) 0.1 % ointment; Apply  to affected area two (2) times a day. use thin layer    3. Rheumatic heart disease  -     METABOLIC PANEL, COMPREHENSIVE; Future  -     HEMOGLOBIN A1C WITH EAG; Future  -     TSH 3RD GENERATION; Future  -     T4, FREE; Future  -     triamcinolone acetonide (KENALOG) 0.1 % ointment; Apply  to affected area two (2) times a day. use thin layer    4. Arthritis  -     METABOLIC PANEL, COMPREHENSIVE; Future  -     HEMOGLOBIN A1C WITH EAG; Future  -     TSH 3RD GENERATION; Future  -     T4, FREE; Future  -     triamcinolone acetonide (KENALOG) 0.1 % ointment; Apply  to affected area two (2) times a day. use thin layer    5. Eczema, unspecified type  -     METABOLIC PANEL, COMPREHENSIVE; Future  -     HEMOGLOBIN A1C WITH EAG; Future  -     TSH 3RD GENERATION; Future  -     T4, FREE; Future  -     triamcinolone acetonide (KENALOG) 0.1 % ointment; Apply  to affected area two (2) times a day. use thin layer    6. Abnormal finding of blood chemistry   -     HEMOGLOBIN A1C WITH EAG; Future          I have discussed the diagnosis with the patient and the intended plan as seen in the above orders. The patient has received an after-visit summary and questions were answered concerning future plans.   I have discussed medication side effects and warnings with the patient as well. I have reviewed the plan of care with the patient, accepted their input and they are in agreement with the treatment goals. Follow-up Disposition:  Return in about 3 months (around 3/5/2018) for MWE, physical, labs today, EKG next visit. More than 1/2 of this 45 minute visit was spent in counselling and coordination of care, as described above.

## 2017-12-05 NOTE — MR AVS SNAPSHOT
Visit Information Date & Time Provider Department Dept. Phone Encounter #  
 12/5/2017  9:00 AM Brigid Bland [phone] [id number] Follow-up Instructions Return in about 3 months (around 3/5/2018) for MWE, physical, labs today, EKG next visit. Follow-up and Disposition History Your Appointments 7/24/2018  1:30 PM  
Follow Up with Pee Vyas MD  
Cardio Specialist at [facility] Appt Note: 9 months follow up  
 [address] [place] [address]  
  
   
 [address] [place] Upcoming Health Maintenance Date Due  
 MEDICARE YEARLY EXAM 3/29/2018 Pneumococcal 65+ Low/Medium Risk (2 of 2 - PPSV23) 9/21/2018 BREAST CANCER SCRN MAMMOGRAM 9/26/2018 GLAUCOMA SCREENING Q2Y 8/8/2019 DTaP/Tdap/Td series (2 - Td) 6/28/2026 COLONOSCOPY 1/23/2027 Allergies as of 12/5/2017  Review Complete On: 10/24/2017 By: Manny May., DO Severity Noted Reaction Type Reactions Contrast Dye [Iodine]  10/08/2014    Itching, Swelling Iodinated Contrast- Oral And Iv Dye  12/13/2016    Itching, Swelling Seafood  10/08/2014    Itching, Swelling LOBSTER ONLY Statins-hmg-coa Reductase Inhibitors  10/05/2011    Myalgia, Other (comments) Myalgia Sulfa (Sulfonamide Antibiotics)    Itching, Swelling Current Immunizations  Reviewed on 9/15/2014 Name Date Influenza High Dose Vaccine PF 9/21/2017 11:44 AM  
 Influenza Vaccine 10/5/2016, 9/15/2014, 10/17/2013 Influenza Vaccine [place]) 9/3/2015 Influenza Vaccine Split 10/5/2011 Influenza Vaccine Whole 9/20/2012 Pneumococcal Conjugate (PCV-13) 9/21/2017 11:45 AM  
 TD Vaccine 8/11/2011 ZZZ-RETIRED (DO NOT USE) Pneumococcal Vaccine (Unspecified Type) 9/20/2012, 10/5/2011 Zoster Vaccine, Live 9/3/2015 Not reviewed this visit You Were Diagnosed With   
 Codes Comments Atrial fibrillation, unspecified type ([facility]    -  Primary ICD-10-CM: I48.91 
ICD-9-CM: 427.31 Dyslipidemia     ICD-10-CM: E78.5 ICD-9-CM: 272.4 Rheumatic heart disease     ICD-10-CM: I09.9 ICD-9-CM: 398.90 Arthritis     ICD-10-CM: M19.90 ICD-9-CM: 716.90 Eczema, unspecified type     ICD-10-CM: L30.9 ICD-9-CM: 692.9 Abnormal finding of blood chemistry     ICD-10-CM: R79.9 ICD-9-CM: 790.6 Vitals OB Status Smoking Status Postmenopausal Former Smoker Preferred Pharmacy Pharmacy Name Phone [phone] [facility] [phone] Your Updated Medication List  
  
   
This list is accurate as of: 12/5/17  9:38 AM.  Always use your most recent med list.  
  
  
  
  
 acetaminophen-codeine 300-15 mg Tab Commonly known as:  TYLENOL #2 Take 1 Tab by mouth every four (4) hours as needed for Pain. Max Daily Amount: 6 Tabs. aspirin delayed-release 81 mg tablet Take 2 Tabs by mouth daily. take 1 tablet by mouth once daily  
  
 ezetimibe 10 mg tablet Commonly known as:  Paulina Lawtonner Take 1 Tab by mouth daily. LUMIGAN 0.01 % ophthalmic drops Generic drug:  bimatoprost  
Administer 1 Drop to left eye nightly. mirabegron ER 25 mg ER tablet Commonly known as:  MYRBETRIQ Take 1 Tab by mouth daily. omeprazole 20 mg capsule Commonly known as:  PRILOSEC  
take 1 capsule by mouth twice a day  
  
 prenatal vit-calcium-iron-fa 27 mg iron- 1 mg Tab Commonly known as:  PRENATAL PLUS (CALCIUM CARB) TAKE 1 TABLET BY MOUTH DAILY RESTASIS 0.05 % ophthalmic emulsion Generic drug:  cycloSPORINE Administer 1 Drop to both eyes two (2) times a day. triamcinolone acetonide 0.1 % ointment Commonly known as:  KENALOG Apply  to affected area two (2) times a day. use thin layer Prescriptions Sent to Pharmacy Refills triamcinolone acetonide (KENALOG) 0.1 % ointment 12 Sig: Apply  to affected area two (2) times a day. use thin layer Class: Normal  
 [facility]: [facility] Ph #: [phone] Route: Topical  
  
Follow-up Instructions Return in about 3 months (around 3/5/2018) for MWE, physical, labs today, EKG next visit. To-Do List   
 12/05/2017 Lab:  HEMOGLOBIN A1C WITH EAG   
  
 12/05/2017 Lab:  METABOLIC PANEL, COMPREHENSIVE   
  
 12/05/2017 Lab:  T4, FREE   
  
 12/05/2017 Lab:  TSH 3RD GENERATION Introducing [facility] SERVICES! Dear Linda Kurtz: Thank you for requesting a [facility] account. Our records indicate that you already have an active [facility] account. You can access your account anytime at [email or web address] Did you know that you can access your hospital and ER discharge instructions at any time in [facility]? You can also review all of your test results from your hospital stay or ER visit. Additional Information If you have questions, please visit the Frequently Asked Questions section of the [employer] website at [email or web address] Remember, [facility] is NOT to be used for urgent needs. For medical emergencies, dial 911. Now available from your iPhone and Android! Please provide this summary of care documentation to your next provider. Your primary care clinician is listed as [address]. If you have any questions after today's visit, please call [phone].

## 2017-12-06 LAB
ALBUMIN SERPL-MCNC: 4.2 G/DL (ref 3.6–4.8)
ALBUMIN/GLOB SERPL: 1.6 {RATIO} (ref 1.2–2.2)
ALP SERPL-CCNC: 81 IU/L (ref 39–117)
ALT SERPL-CCNC: 26 IU/L (ref 0–32)
AST SERPL-CCNC: 23 IU/L (ref 0–40)
BILIRUB SERPL-MCNC: 0.4 MG/DL (ref 0–1.2)
BUN SERPL-MCNC: 15 MG/DL (ref 8–27)
BUN/CREAT SERPL: 17 (ref 12–28)
CALCIUM SERPL-MCNC: 9.3 MG/DL (ref 8.7–10.3)
CHLORIDE SERPL-SCNC: 101 MMOL/L (ref 96–106)
CO2 SERPL-SCNC: 25 MMOL/L (ref 18–29)
CREAT SERPL-MCNC: 0.88 MG/DL (ref 0.57–1)
EST. AVERAGE GLUCOSE BLD GHB EST-MCNC: 134 MG/DL
GFR SERPLBLD CREATININE-BSD FMLA CKD-EPI: 69 ML/MIN/1.73
GFR SERPLBLD CREATININE-BSD FMLA CKD-EPI: 80 ML/MIN/1.73
GLOBULIN SER CALC-MCNC: 2.7 G/DL (ref 1.5–4.5)
GLUCOSE SERPL-MCNC: 135 MG/DL (ref 65–99)
HBA1C MFR BLD: 6.3 % (ref 4.8–5.6)
POTASSIUM SERPL-SCNC: 3.9 MMOL/L (ref 3.5–5.2)
PROT SERPL-MCNC: 6.9 G/DL (ref 6–8.5)
SODIUM SERPL-SCNC: 144 MMOL/L (ref 134–144)
T4 FREE SERPL-MCNC: 0.99 NG/DL (ref 0.82–1.77)
TSH SERPL DL<=0.005 MIU/L-ACNC: 1.4 UIU/ML (ref 0.45–4.5)

## 2018-02-13 ENCOUNTER — OFFICE VISIT (OUTPATIENT)
Dept: CARDIOLOGY CLINIC | Age: 68
End: 2018-02-13

## 2018-02-13 ENCOUNTER — OFFICE VISIT (OUTPATIENT)
Dept: OBGYN CLINIC | Age: 68
End: 2018-02-13

## 2018-02-13 VITALS
SYSTOLIC BLOOD PRESSURE: 106 MMHG | WEIGHT: 173.8 LBS | DIASTOLIC BLOOD PRESSURE: 77 MMHG | BODY MASS INDEX: 29.67 KG/M2 | HEART RATE: 69 BPM | HEIGHT: 64 IN

## 2018-02-13 VITALS
HEIGHT: 64 IN | BODY MASS INDEX: 29.53 KG/M2 | OXYGEN SATURATION: 96 % | DIASTOLIC BLOOD PRESSURE: 76 MMHG | HEART RATE: 99 BPM | SYSTOLIC BLOOD PRESSURE: 97 MMHG | WEIGHT: 173 LBS

## 2018-02-13 DIAGNOSIS — I48.91 ATRIAL FIBRILLATION, UNSPECIFIED TYPE (HCC): Primary | Chronic | ICD-10-CM

## 2018-02-13 DIAGNOSIS — N89.8 VAGINAL DISCHARGE: Primary | ICD-10-CM

## 2018-02-13 RX ORDER — FLUCONAZOLE 150 MG/1
150 TABLET ORAL DAILY
Qty: 1 TAB | Refills: 0 | Status: SHIPPED | OUTPATIENT
Start: 2018-02-13 | End: 2018-02-14

## 2018-02-13 NOTE — PROGRESS NOTES
[facility]    HPI:   Ms. Marisela Kaur is a 79 y.o. woman with dyslipidemia. She does not have obstructive atherosclerotic disease as evaluated by cardiac catheterization in  November 2015. Her anatomy is as follows:    HEMODYNAMICS:  LM - normal  LAD - normal  D1 - normal  Cx - normal  OM1 - normal  OM2 - normal  LPDA - patent  RCA - normal    LEFT:  RA - 6 mmHg  RV - 44/6 mmHg  PA - 38/14 mmHg  Wedge - 16 mmHg  CO / CI (DAVIAN) - 4.15 / 2.27    She has a history of rheumatic heart disease complicated by moderate to severe aortic stenosis and moderate mitral stenosis. She is status post aortic and mitral valve replacements in February of 2016 with the following: Aortic valve - 21 mm MAGNA EASE pericardial bioprosthesis  Mitral valve -  25 mm MAGNA EASE pericardial bioprosthesis    She has paroxysmal atrial fibrillation / flutter complicated by an embolic event manifesting as amaurosis fugax. She is status post surgical left sided cryoablation and atrial appendage resection in February of 2016. This occurred at the time of her aortic and mitral valve replacement. Her surgical course was notable for postoperative ventricular asystole requiring permanent pacemaker implantation in 2016. Ms. Marisela Kaur is here today for follow up appointment. She is scheduled for cataract surgery. No new cardiac symptoms since last time. She has intermittent palpitations lasting a couple seconds every few weeks without known triggers, which she states is chronic. Taking meds regularly. NO CP ,SOB, orthopnea, PND, presyncope, or syncope.      Past Medical History:   Diagnosis Date    Advance directive in chart     6/28/2016    Amaurosis fugax     Aortic stenosis     mean gradient  26.5 mmHg (CATH 4/13), 32 mmHg (ECHO 9/15)    Aortic valve replacement     21mm MAGNA EASE pericardial  2/16    Arthritis     Atrial appendage resection     2/16      Atrial fibrillation     CHADS score 2  (-CHF, -HTN, -AGE, -DM +AMAUROSIS FUGAX)    Atrial fibrillation ablation     surgical left sided cryoablation  2/16    Cataract     OS    Cervical dysplasia     2009   post leep and cone    COPD     mild  2/16    Duodenal AVM     argon plasma coagulation 2/16    Dyslipidemia     Fibroids     Glaucoma     Lung nodule     3/19/2012   stable    Mitral stenosis     mean gradient  6 mmHg (CATH 4/13), 7.6 mmHg (MANUEL 10/15)    Mitral valve replacement     25 mm MAGNA EASE pericardial  2/16    Pacemaker     dual chamber MEDTRONIC 2/16    Post-op ventricular asystole     02/16    Remote tobacco     Sleep apnea     no CPAP    Thyroid nodule     3/20/2013   multiple    TIA (transient ischemic attack)        Past Surgical History:   Procedure Laterality Date    COLONOSCOPY N/A 1/23/2017    COLONOSCOPY performed [facility] [facility]hma [facility] at [facility] ENDOSCOPY    HX AFIB ABLATION      2/16  surgical cryoablation    HX AORTIC VALVE REPLACEMENT      2/16    HX BREAST BIOPSY Right 10/13/2014    Right breast needle [facility]OC biopsy performed by Carolina Terry MD at [address] [place] [address] [address] HX BREAST LUMPECTOMY      Right    HX BUNIONECTOMY      left    HX GYN      VAINIII, VELASQUEZ 1, keratinous labial cyst    HX GYN      2012  Cold knife conization of cervix    HX LEEP PROCEDURE      2010    HX MITRAL VALVE REPLACEMENT      2/16    HX OTHER SURGICAL      lump left neck, benign    HX OTHER SURGICAL      multiple breast aspirations    HX PACEMAKER      I&D OF VULVA/PERINEUM ABSCESS  3/21/2017            Current Outpatient Prescriptions   Medication Sig    triamcinolone acetonide (KENALOG) 0.1 % ointment Apply  to affected area two (2) times a day. use thin layer    aspirin delayed-release 81 mg tablet Take 2 Tabs by mouth daily. take 1 tablet by mouth once daily    acetaminophen-codeine (TYLENOL #2) 300-15 mg per tablet Take 1 Tab by mouth every four (4) hours as needed for Pain. Max Daily Amount: 6 Tabs.     ezetimibe (ZETIA) 10 mg tablet Take 1 Tab by mouth daily.  mirabegron ER (MYRBETRIQ) 25 mg ER tablet Take 1 Tab by mouth daily.  prenatal vit-calcium-iron-fa (PRENATAL PLUS, CALCIUM CARB,) 27 mg iron- 1 mg tab TAKE 1 TABLET BY MOUTH DAILY    omeprazole (PRILOSEC) 20 mg capsule take 1 capsule by mouth twice a day    cycloSPORINE (RESTASIS) 0.05 % ophthalmic emulsion Administer 1 Drop to both eyes two (2) times a day.  bimatoprost (LUMIGAN) 0.01 % ophthalmic drops Administer 1 Drop to left eye nightly. No current facility-administered medications for this visit. Allergies   Allergen Reactions    Contrast Dye [Iodine] Itching and Swelling    Iodinated Contrast- Oral And Iv Dye Itching and Swelling    Seafood Itching and Swelling     LOBSTER ONLY    Statins-Hmg-Coa Reductase Inhibitors Myalgia and Other (comments)     Myalgia    Sulfa (Sulfonamide Antibiotics) Itching and Swelling       Family History:   Non contributory for premature coronary disease in first degree relatives (female <65, male <55). Social History:   She  reports that she quit smoking about 14 years ago. She quit after 37.00 years of use. She has never used smokeless tobacco.  She  reports that she does not drink alcohol. Physical:   Vitals:   BP: 97/76  HR: 99  Wt: 173 lb (78.5 kg)    Exam:   General: Middle aged woman, no complaints.     Head/Neck: No JVD    No bruits. No edema  Lungs:  No respiratory distress. Clear with good air movement. Chest:  Keloid scar  Heart:  Regular rate and rhythm. Soft systolic murmur. No rubs or gallops. Abdomen: Bowel sounds present  Extremities: Intact pulses.     No edema.         Review of Data:   LABS:   Lab Results   Component Value Date/Time    WBC 5.5 03/07/2017 02:24 PM    Hemoglobin (POC) 9.8 02/22/2016 02:39 PM    Hemoglobin, POC 8.8 (L) 02/10/2016 11:18 AM    HGB 13.6 03/07/2017 02:24 PM    Hematocrit, POC 26 (L) 02/10/2016 11:18 AM    HCT 42.6 03/07/2017 02:24 PM    PLATELET 445 38/89/2205 02:24 PM MCV 94.7 03/07/2017 02:24 PM     Lab Results   Component Value Date/Time    Sodium 144 12/05/2017 09:49 AM    Potassium 3.9 12/05/2017 09:49 AM    Chloride 101 12/05/2017 09:49 AM    CO2 25 12/05/2017 09:49 AM    Anion gap 7 03/07/2017 02:24 PM    Glucose 135 (H) 12/05/2017 09:49 AM    BUN 15 12/05/2017 09:49 AM    Creatinine 0.88 12/05/2017 09:49 AM    BUN/Creatinine ratio 17 12/05/2017 09:49 AM    GFR est AA 80 12/05/2017 09:49 AM    GFR est non-AA 69 12/05/2017 09:49 AM    Calcium 9.3 12/05/2017 09:49 AM    Bilirubin, total 0.4 12/05/2017 09:49 AM    AST (SGOT) 23 12/05/2017 09:49 AM    Alk. phosphatase 81 12/05/2017 09:49 AM    Protein, total 6.9 12/05/2017 09:49 AM    Albumin 4.2 12/05/2017 09:49 AM    Globulin 3.5 03/07/2017 02:24 PM    A-G Ratio 1.6 12/05/2017 09:49 AM    ALT (SGPT) 26 12/05/2017 09:49 AM     Lab Results   Component Value Date/Time    Cholesterol, total 246 (H) 03/07/2017 02:24 PM    HDL Cholesterol 55 03/07/2017 02:24 PM    LDL, calculated 160.2 (H) 03/07/2017 02:24 PM    VLDL, calculated 30.8 03/07/2017 02:24 PM    Triglyceride 154 (H) 03/07/2017 02:24 PM    CHOL/HDL Ratio 4.5 03/07/2017 02:24 PM     Lab Results   Component Value Date/Time    Hemoglobin A1c 6.3 (H) 12/05/2017 09:49 AM     Lab Results   Component Value Date/Time    TSH 1.400 12/05/2017 09:49 AM    Triiodothyronine (T3), free 3.0 05/09/2017 10:04 AM    T4, Free 0.99 12/05/2017 09:49 AM     10 YEAR CVD RISK:   9.1 %    Age    64 yo    Race    AA     Gender   Female    Total cholesterol  272 mg/dL    HDL    71 mg/dL    SBP    134 mmHg    BP meds   No    Diabetes   No    Tobacco   No    EKG: August 2016:  100% AV paced 80 bpm.  (09/17) Sinus rhythm. Intermittent Atrial paced beats. TRANSESOPHAGEAL ECHOCARDIOGRAM: October 2015:  Left ventricle:  Size was normal. Systolic function was vigorous. There were no regional wall motion abnormalities. Wall thickness was normal.    Right ventricle:   The size was normal.    Left atrium:  The atrium was dilated. Left atrial appendage:  No thrombus was identified. There was no spontaneous echo contrast (\"smoke\") in the appendage. Atrial septum:  The septum bows from left to right, consistent with increased left atrial pressure. There was no evidence of intracardiac shunt by peripherally-administered agitated saline contrast.    Right atrium:  Size was normal.    Mitral valve: The posterior leaflet was thickened and calcific throughout. Excursion was restricted. The anterior leaflet was thickened at the distal third with minimal calcification. Leaflet excursion was restricted primarily at the distal margin. Hemodynamics were obtained when at least three consecutive sinus beats were captured. A mean gradient across the valve was measured at 7.6 mmHg. Pressure half time was measured at 153 ms with a calculated valve area of 1.4 cm. There was mild, multi-jet regurgitation. The subvalvular apparatus was not significantly involved. The findings were consistent with moderate mitral stenosis. Aortic valve: The valve was trileaflet. Leaflets were thickened and nodularly calcific. Leaflet excursion was restricted. Valve area was planimetered between 0.9 and 1.0 cm. There was mild aortic regurgitation. There was mild to moderate stenosis. Aorta, systemic arteries: The root exhibited normal size. There was no atheroma. There was no evidence for dissection. ECHO: 09/17  Left ventricle: Systolic function was normal. Ejection fraction was estimated   in the range of 55 % to 60 %. There were no regional wall motion abnormalities. The study was not technically sufficient to allow evaluation of LV diastolic function. Right ventricle: The ventricle was dilated. Systolic function was normal. A   pacing wire was present. Left atrium: The atrium was dilated. Atrial septum: There was no evidence of intracardiac shunt by   peripherally-administered agitated saline contrast.  Right atrium:  The atrium was mildly dilated. Mitral valve: A bioprosthesis was present. It exhibited normal function. There was no evidence for stenosis. There was mild regurgitation. Mean transmitral gradient was 4 mmHg. Aortic valve: A bioprosthesis was present. It exhibited normal function. Valve mean gradient was 10 mmHg. Tricuspid valve: There was moderate regurgitation. STRESS TEST (EST, PHARM, NUC, ECHO etc): March 2011:  1. NORMAL SCAN. 2. NORMAL GATED ACQUISITION WITH AN EJECTION FRACTION OF 75%. CATHETERIZATION: November 2015:  HEMODYNAMICS:  LM - normal  LAD - normal  D1 - normal  Cx - normal  OM1 - normal  OM2 - normal  LPDA - patent  RCA - normal    LEFT:  RA - 6 mmHg  RV - 44/6 mmHg  PA - 38/14 mmHg  Wedge - 16 mmHg  CO / CI (DAVIAN) - 4.15 / 2.27      Impression / Plan:     Dyslipidemia    Rheumatic heart disease    Atrial fibrillation    Post-op ventricular asystole       Rheumatic heart disease:  Ms. Calderon Chaparro has rheumatic heart disease, which was complicated by moderate to severe aortic stenosis and moderate mitral stenosis. She has undergone bioprosthetic aortic and mitral valve replacement in February, 2016. Last Echocardiogram was performed in 09/17 and valve function appeared to be ok as mentioned above. Continue  mg daily    Hyperlipidemia:  Ms. Calderon Chaparro has significant hyperlipidemia. Currently she is only on monotherapy with Zetia. Her last LDL was 160 in March, 2017. She had tried Atorvastatin and simvastatin in past but had to stop it because of significant myalgia. Also think she tried other statins as well, does not remember the name. She has significant hyperlipidemia, not at goal despite Zetia. Has declined [address] in the past, despite discussions regarding higher 10-year risk for ASCVD. Atrial fibrillation: This is paroxysmal in nature. 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  She has been in sinus rhythm on exam today.   She had a permanent pacemaker paced with complete heart block at the time of her valvular surgery. 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 In addition, she has history of vaginal bleed reportedly due to fibroids. She also had left sided surgical cryoablation and left atrial appendage resection, so she is only on  mg daily. Pacemaker:  Ms. Racheal Figueroa had postoperative ventricular asystole requiring pacemaker implantation in July, 2016. Last interrogated in 08/2017, will arrange for device interrogation. Currently she has no symptoms and issues with pacemaker. Pre-operative clearance: Ms. Racheal Figueroa is scheduled to undergo R cataract surgery 2/19/18, followed by L cataract surgery next month. She has no symptoms to suggest decompensated heart failure or unstable angina. EKG today with evidence of intermittent paced rhythm with rate 78. Pt would be at low, at the most intermediate risk of cardiac complications with general anesthesia given prior history as noted above, but ok to proceed with planned cataract surgery. Would recommend to proceed with cataract surgery while on ASA if ok with ophthalmology team.     Other than the above, or unless any additional issues arise, I have asked that she follow up in 6-9 months.

## 2018-02-13 NOTE — PROGRESS NOTES
Jennifer Wang is a 79 y.o. female (: 1950) presenting to address:    Chief Complaint   Patient presents with    Vaginal Discharge     watery white discharge with odor       Vitals:    18 1126   BP: 106/77   Pulse: 69   Weight: 173 lb 12.8 oz (78.8 kg)   Height: 5' 4\" (1.626 m)   PainSc:   0 - No pain       Hearing/Vision:   No exam data present    Learning Assessment:     Learning Assessment 2016   PRIMARY LEARNER Patient   HIGHEST LEVEL OF EDUCATION - PRIMARY LEARNER  -   PRIMARY LANGUAGE ENGLISH   LEARNER PREFERENCE PRIMARY DEMONSTRATION     -   ANSWERED BY Patient   RELATIONSHIP SELF     Depression Screening:     PHQ over the last two weeks 2018   Little interest or pleasure in doing things Several days   Feeling down, depressed or hopeless Several days   Total Score PHQ 2 2   Trouble falling or staying asleep, or sleeping too much -   Feeling tired or having little energy -   Poor appetite or overeating -   Feeling bad about yourself - or that you are a failure or have let yourself or your family down -   Trouble concentrating on things such as school, work, reading or watching TV -   Moving or speaking so slowly that other people could have noticed; or the opposite being so fidgety that others notice -   Thoughts of being better off dead, or hurting yourself in some way -   PHQ 9 Score -   How difficult have these problems made it for you to do your work, take care of your home and get along with others -     Fall Risk Assessment:     Fall Risk Assessment, last 12 mths 2018   Able to walk? Yes   Fall in past 12 months? No     Abuse Screening:     Abuse Screening Questionnaire 2018   Do you ever feel afraid of your partner? N   Are you in a relationship with someone who physically or mentally threatens you? N   Is it safe for you to go home? N     Coordination of Care Questionaire:   1. Have you been to the ER, urgent care clinic since your last visit?   Hospitalized since your last visit? NO    2. Have you seen or consulted any other health care providers outside of the [address] [place] [address] since your last visit? Include any pap smears or colon screening. YES, Cardiologist Dr. Owen Medrano, 2/13/2018.

## 2018-02-13 NOTE — MR AVS SNAPSHOT
[facility] [address] [facility] [address] [id number] 
[phone] Patient: Phoebe Nair MRN: [id number] SDX:8/13/8898 Visit Information Date & Time Provider Department Dept. Phone Encounter #  
 2/13/2018 10:00 AM Saumil Carliss Cabot, MD [address] [place] [address] Specialist at [facility] [phone] [id number] Follow-up Instructions Return in about 6 months (around 8/13/2018). Your Appointments 2/13/2018 11:30 AM  
Office Visit with Modesto Branch DO  
[facility] ([address] [place] [address]) Appt Note: gyn issue - pt requested this day due to transportation [place] [id number] [phone]  
  
   
 [facility] [address] [phone]  
  
    
 3/8/2018 10:00 AM  
Medicare Physical with Joni Harrison DO [address] [place] [address] [address]) Appt Note: Return in about 3 months (around 3/5/2018) for MWE, physical, labs today, EKG next visit. [address] [place] [address] [address] [place] [address]  
  
    
 8/21/2018 10:45 AM  
Follow Up with Tej Campos MD  
Cardio Specialist at [address]) Appt Note: 9 months follow up; 6 months [place] [id number] Upcoming Health Maintenance Date Due  
 MEDICARE YEARLY EXAM 3/29/2018 Pneumococcal 65+ Low/Medium Risk (2 of 2 - PPSV23) 9/21/2018 BREAST CANCER SCRN MAMMOGRAM 9/26/2018 GLAUCOMA SCREENING Q2Y 8/8/2019 DTaP/Tdap/Td series (2 - Td) 6/28/2026 COLONOSCOPY 1/23/2027 Allergies as of 2/13/2018  Review Complete On: 2/13/2018 By: Mono Rand RN Severity Noted Reaction Type Reactions Contrast Dye [Iodine]  10/08/2014    Itching, Swelling Iodinated Contrast- Oral And Iv Dye  12/13/2016    Itching, Swelling Seafood  10/08/2014    Itching, Swelling LOBSTER ONLY Statins-hmg-coa Reductase Inhibitors  10/05/2011    Myalgia, Other (comments) Myalgia Sulfa (Sulfonamide Antibiotics)    Itching, Swelling Current Immunizations  Reviewed on 9/15/2014 Name Date Influenza High Dose Vaccine PF 9/21/2017 11:44 AM  
 Influenza Vaccine 10/5/2016, 9/15/2014, 10/17/2013 Influenza Vaccine Deborha Inoue) 9/3/2015 Influenza Vaccine Split 10/5/2011 Influenza Vaccine Whole 9/20/2012 Pneumococcal Conjugate (PCV-13) 9/21/2017 11:45 AM  
 TD Vaccine 8/11/2011 ZZZ-RETIRED (DO NOT USE) Pneumococcal Vaccine (Unspecified Type) 9/20/2012, 10/5/2011 Zoster Vaccine, Live 9/3/2015 Not reviewed this visit You Were Diagnosed With   
  
 Codes Comments At[facility], unspecified [facility] ([facility]    -  Primary ICD-10-CM: I48.91 
ICD-9-CM: 427.31 Vitals BP Pulse Height(growth percentile) Weight(growth percentile) SpO2 BMI  
 97/76 99 5' 4\" (1.626 m) 173 lb (78.5 kg) 96% 29.7 kg/m2 OB Status Smoking Status Postmenopausal Former Smoker BMI and BSA Data Body Mass Index Body Surface Area  
 29.7 kg/m 2 1.88 m 2 Preferred Pharmacy Pharmacy Name Phone [facility] [address] [place] [address] [phone] Your Updated Medication List  
  
   
This list is accurate as of: 2/13/18 10:34 AM.  Always use your most recent med list.  
  
  
  
  
 acetaminophen-codeine 300-15 mg Tab Commonly known as:  TYLENOL #2 Take 1 Tab by mouth every four (4) hours as needed for Pain. Max Daily Amount: 6 Tabs. aspirin delayed-release 81 mg tablet Take 2 Tabs by mouth daily. take 1 tablet by mouth once daily  
  
 ezetimibe 10 mg tablet Commonly known as:  Aviva El Take 1 Tab by mouth daily. LUMIGAN 0.01 % ophthalmic drops Generic drug:  bimatoprost  
Administer 1 Drop to left eye nightly. mirabegron ER 25 mg ER tablet Commonly known as:  MYRBETRIQ Take 1 Tab by mouth daily. omeprazole 20 mg capsule Commonly known as:  PRILOSEC  
take 1 capsule by mouth twice a day  
  
 prenatal vit-calcium-iron-fa 27 mg iron- 1 mg Tab Commonly known as:  PRENATAL PLUS (CALCIUM CARB) TAKE 1 TABLET BY MOUTH DAILY RESTASIS 0.05 % ophthalmic emulsion Generic drug:  cycloSPORINE Administer 1 Drop to both eyes two (2) times a day. triamcinolone acetonide 0.1 % ointment Commonly known as:  KENALOG Apply  to affected area two (2) times a day. use thin layer We Performed the Following AMB POC EKG ROUTINE W/ 12 LEADS, INTER & REP [31408 CPT(R)] Follow-up Instructions Return in about 6 months (around 8/13/2018). Introducing [facility]! Dear Cristiana Reilly: Thank you for requesting a [facility] account. Our records indicate that you already have an active [facility] account. You can access your account anytime at [email or web address] Did you know that you can access your hospital and ER discharge instructions at any time in [facility]? You can also review all of your test results from your hospital stay or ER visit. Additional Information If you have questions, please visit the Frequently Asked Questions section of the [facility] website at [email or web address] Remember, [facility] is NOT to be used for urgent needs. For medical emergencies, dial 911. Now available from your iPhone and Android! Please provide this summary of care documentation to your next provider. Your primary care clinician is listed as [phone] [facility]. If you have any questions after today's visit, please call [phone].

## 2018-02-13 NOTE — PROGRESS NOTES
1. Have you been to the ER, urgent care clinic since your last visit? Hospitalized since your last visit? No    2. Have you seen or consulted any other health care providers outside of the [address] [place] [address] since your last visit? Include any pap smears or colon screening.  No

## 2018-02-14 ENCOUNTER — TELEPHONE (OUTPATIENT)
Dept: CARDIOLOGY CLINIC | Age: 68
End: 2018-02-14

## 2018-02-14 LAB — WET MOUNT POCT, WMPOCT: NORMAL

## 2018-02-14 NOTE — TELEPHONE ENCOUNTER
Pt spoke with Maryana Pisano regarding information we are waiting on for her surgery clearance and then requested to speak with me so she could apologize. I told her thank you and that I understand waiting for clearance may be frustrating. She enthusiastically agreed.

## 2018-02-14 NOTE — TELEPHONE ENCOUNTER
Pt called in requesting to speak to Osmel Geller. I informed the patient she was currently assisting other patients. Pt then asked to speak directly to Dr Ezio London, again I informed her he is also with patients. I offered to transfer her to the nurses line to leave a message for Osmel Geller and her call would be returned by the end of the day. The patient declined stating she needs to know if she is cleared for surgery or not. I apologized stating that I did not know. The phone line was then disconnected.

## 2018-02-14 NOTE — PROGRESS NOTES
Subjective:      Patient complains of an abnormal vaginal discharge with odor and itching. She is not currently sexually active, no other complaints. Current Outpatient Prescriptions   Medication Sig Dispense Refill    fluconazole (DIFLUCAN) 150 mg tablet Take 1 Tab by mouth daily for 1 day. FDA advises cautious prescribing of oral fluconazole in pregnancy. 1 Tab 0    aspirin delayed-release 81 mg tablet Take 2 Tabs by mouth daily. take 1 tablet by mouth once daily 180 Tab 3    acetaminophen-codeine (TYLENOL #2) 300-15 mg per tablet Take 1 Tab by mouth every four (4) hours as needed for Pain. Max Daily Amount: 6 Tabs. 30 Tab 5    ezetimibe (ZETIA) 10 mg tablet Take 1 Tab by mouth daily. 90 Tab 3    mirabegron ER (MYRBETRIQ) 25 mg ER tablet Take 1 Tab by mouth daily. 30 Tab 11    prenatal vit-calcium-iron-fa (PRENATAL PLUS, CALCIUM CARB,) 27 mg iron- 1 mg tab TAKE 1 TABLET BY MOUTH DAILY 100 Tab 12    omeprazole (PRILOSEC) 20 mg capsule take 1 capsule by mouth twice a day 180 Cap 4    cycloSPORINE (RESTASIS) 0.05 % ophthalmic emulsion Administer 1 Drop to both eyes two (2) times a day.  bimatoprost (LUMIGAN) 0.01 % ophthalmic drops Administer 1 Drop to left eye nightly.  triamcinolone acetonide (KENALOG) 0.1 % ointment Apply  to affected area two (2) times a day.  use thin layer 30 g 12     Allergies   Allergen Reactions    Contrast Dye [Iodine] Itching and Swelling    Iodinated Contrast- Oral And Iv Dye Itching and Swelling    Seafood Itching and Swelling     LOBSTER ONLY    Statins-Hmg-Coa Reductase Inhibitors Myalgia and Other (comments)     Myalgia    Sulfa (Sulfonamide Antibiotics) Itching and Swelling     Past Medical History:   Diagnosis Date    Advance directive in chart     6/28/2016    Amaurosis fugax     Aortic stenosis     mean gradient  26.5 mmHg (CATH 4/13), 32 mmHg (ECHO 9/15)    Aortic valve replacement     21mm MAGNA EASE pericardial  2/16    Arthritis     Atrial appendage resection           Atrial fibrillation     CHADS score 2  (-CHF, -HTN, -AGE, -DM +AMAUROSIS FUGAX)    Atrial fibrillation ablation     surgical left sided cryoablation      Cataract     OS    Cervical dysplasia        post leep and cone    COPD     mild      Duodenal AVM     argon plasma coagulation     Dyslipidemia     Fibroids     Glaucoma     Lung nodule     3/19/2012   stable    Mitral stenosis     mean gradient  6 mmHg (CATH ), 7.6 mmHg (MANUEL 10/15)    Mitral valve replacement     25 mm MAGNA EASE pericardial      Pacemaker     dual chamber MEDTRONIC     Post-op ventricular asystole         Remote tobacco     Sleep apnea     no CPAP    Thyroid nodule     3/20/2013   multiple    TIA (transient ischemic attack)      Past Surgical History:   Procedure Laterality Date    COLONOSCOPY N/A 2017    COL[facility] Frank [facility] ENDOSCOPY    HX AFIB ABLATION        surgical cryoablation    HX AORTIC VALVE REPLACEMENT          HX BREAST BIOPSY Right 10/13/2014    Right breast needle [facility]OC biopsy performed [facility] Bertram Meyers [facility] [address] HX BREAST LUMPECTOMY      Right    HX BUNIONECTOMY      left    HX GYN      VAINIII, VELASQUEZ 1, keratinous labial cyst    HX GYN      2012  Cold knife conization of cervix    HX LEEP PROCEDURE          HX MITRAL VALVE REPLACEMENT          HX OTHER SURGICAL      lump left neck, benign    HX OTHER SURGICAL      multiple breast aspirations    HX PACEMAKER      I&D OF VULVA/PERINEUM ABSCESS  3/21/2017          Family History   Problem Relation Age of Onset    Cancer Mother      oral,    [facility] Alzheimer Father         [facility] Breast Cancer Maternal Aunt      unsure age   [facility] Colon Cancer Maternal Grandfather      Social History   Substance Use Topics    Smoking status: Former Smoker     Years: 37.00     Quit date: 2004    Smokeless tobacco: Never Used Comment: stop around 2004    Alcohol use No      Review of Systems    A comprehensive review of systems was negative except for that written in the HPI. Objective:     Visit Vitals    /77 (BP 1 Location: Left arm, BP Patient Position: Sitting)    Pulse 69    Ht 5' 4\" (1.626 m)    Wt 173 lb 12.8 oz (78.8 kg)    BMI 29.83 kg/m2     General appearance: alert, cooperative, no distress, appears stated age  Pelvic: External genitalia normal, Vagina normal with minimal discharge, cervix normal in appearance, no CMT, rectovaginal septum normal    Wet prep: no clue cells, trace yeast, no trich, minimal WBCs       Assessment/Plan:     Vaginal discharge, yeast vaginitis. Rx Diflucan. Follow up prn. Plan of care discussed. Patient expressed understanding.

## 2018-02-16 ENCOUNTER — TELEPHONE (OUTPATIENT)
Dept: CARDIOLOGY CLINIC | Age: 68
End: 2018-02-16

## 2018-02-16 NOTE — TELEPHONE ENCOUNTER
Mike Guo from [employer] called requesting patients last device check for surgery clearance. Faxed request to [phone].

## 2018-03-08 ENCOUNTER — OFFICE VISIT (OUTPATIENT)
Dept: FAMILY MEDICINE CLINIC | Age: 68
End: 2018-03-08

## 2018-03-08 VITALS
DIASTOLIC BLOOD PRESSURE: 83 MMHG | HEIGHT: 64 IN | BODY MASS INDEX: 29.78 KG/M2 | RESPIRATION RATE: 16 BRPM | TEMPERATURE: 95.4 F | HEART RATE: 76 BPM | SYSTOLIC BLOOD PRESSURE: 113 MMHG | OXYGEN SATURATION: 97 % | WEIGHT: 174.4 LBS

## 2018-03-08 DIAGNOSIS — M19.90 ARTHRITIS: ICD-10-CM

## 2018-03-08 DIAGNOSIS — I09.9 RHEUMATIC HEART DISEASE: ICD-10-CM

## 2018-03-08 DIAGNOSIS — L30.9 ECZEMA, UNSPECIFIED TYPE: ICD-10-CM

## 2018-03-08 DIAGNOSIS — I35.0 AORTIC VALVE STENOSIS, UNSPECIFIED ETIOLOGY: Chronic | ICD-10-CM

## 2018-03-08 DIAGNOSIS — E78.5 DYSLIPIDEMIA: Chronic | ICD-10-CM

## 2018-03-08 DIAGNOSIS — I48.91 ATRIAL FIBRILLATION, UNSPECIFIED TYPE (HCC): Chronic | ICD-10-CM

## 2018-03-08 DIAGNOSIS — Z00.00 ROUTINE GENERAL MEDICAL EXAMINATION AT A HEALTH CARE FACILITY: Primary | ICD-10-CM

## 2018-03-08 DIAGNOSIS — K25.4 GASTROINTESTINAL HEMORRHAGE ASSOCIATED WITH GASTRIC ULCER: ICD-10-CM

## 2018-03-08 DIAGNOSIS — R13.19 ESOPHAGEAL DYSPHAGIA: ICD-10-CM

## 2018-03-08 RX ORDER — ACETAMINOPHEN AND CODEINE PHOSPHATE 300; 15 MG/1; MG/1
1 TABLET ORAL
Qty: 30 TAB | Refills: 5 | Status: SHIPPED | OUTPATIENT
Start: 2018-03-08 | End: 2018-09-17

## 2018-03-08 RX ORDER — OMEPRAZOLE 20 MG/1
CAPSULE, DELAYED RELEASE ORAL
Qty: 180 CAP | Refills: 4 | Status: SHIPPED | OUTPATIENT
Start: 2018-03-08 | End: 2018-07-09 | Stop reason: SDUPTHER

## 2018-03-08 RX ORDER — ASPIRIN 81 MG/1
162 TABLET ORAL DAILY
Qty: 180 TAB | Refills: 3 | Status: SHIPPED | OUTPATIENT
Start: 2018-03-08 | End: 2019-03-23 | Stop reason: SDUPTHER

## 2018-03-08 RX ORDER — TRIAMCINOLONE ACETONIDE 1 MG/G
OINTMENT TOPICAL 2 TIMES DAILY
Qty: 30 G | Refills: 12 | Status: SHIPPED | OUTPATIENT
Start: 2018-03-08 | End: 2019-03-27

## 2018-03-08 RX ORDER — EZETIMIBE 10 MG/1
10 TABLET ORAL DAILY
Qty: 90 TAB | Refills: 3 | Status: SHIPPED | OUTPATIENT
Start: 2018-03-08 | End: 2018-08-21 | Stop reason: SDUPTHER

## 2018-03-08 NOTE — PROGRESS NOTES
THE SUBSEQUENT MEDICARE ANNUAL WELLNESS VISIT PROGRESS NOTES    This is a Subsequent Medicare Annual Wellness Visit providing Personalized Prevention Plan Services (PPPS) (Performed 12 months after initial AWV and PPPS )    I have reviewed the patient's medical history in detail and updated the computerized patient record. Tarun Morgan is a 79 y.o.  female and presents for an subsequent annual wellness exam     Patient Active Problem List    Diagnosis Date Noted    Advance directive in chart 06/28/2016    Dyslipidemia     Rheumatic heart disease     Atrial fibrillation     Arthritis 01/06/2016    Glaucoma 01/15/2014     Current Outpatient Prescriptions   Medication Sig Dispense Refill    prenatal vit-calcium-iron-fa (PRENATAL PLUS, CALCIUM CARB,) 27 mg iron- 1 mg tab TAKE 1 TABLET BY MOUTH DAILY 100 Tab 12    omeprazole (PRILOSEC) 20 mg capsule take 1 capsule by mouth twice a day 180 Cap 4    ezetimibe (ZETIA) 10 mg tablet Take 1 Tab by mouth daily. 90 Tab 3    aspirin delayed-release 81 mg tablet Take 2 Tabs by mouth daily. take 1 tablet by mouth once daily 180 Tab 3    triamcinolone acetonide (KENALOG) 0.1 % ointment Apply  to affected area two (2) times a day. use thin layer 30 g 12    acetaminophen-codeine (TYLENOL #2) 300-15 mg tab Take 1 Tab by mouth every four (4) hours as needed for Pain. Max Daily Amount: 6 Tabs. 30 Tab 5    mirabegron ER (MYRBETRIQ) 25 mg ER tablet Take 1 Tab by mouth daily. 30 Tab 11    cycloSPORINE (RESTASIS) 0.05 % ophthalmic emulsion Administer 1 Drop to both eyes two (2) times a day.  bimatoprost (LUMIGAN) 0.01 % ophthalmic drops Administer 1 Drop to left eye nightly.        Allergies   Allergen Reactions    Contrast Dye [Iodine] Itching and Swelling    Iodinated Contrast- Oral And Iv Dye Itching and Swelling    Seafood Itching and Swelling     LOBSTER ONLY    Statins-Hmg-Coa Reductase Inhibitors Myalgia and Other (comments)     Myalgia  Sulfa (Sulfonamide Antibiotics) Itching and Swelling     Past Medical History:   Diagnosis Date    Advance directive in chart     6/28/2016    Amaurosis fugax     Aortic stenosis     mean gradient  26.5 mmHg (CATH 4/13), 32 mmHg (ECHO 9/15)    Aortic valve replacement     21mm MAGNA EASE pericardial  2/16    Arthritis     Atrial appendage resection     2/16      Atrial fibrillation     CHADS score 2  (-CHF, -HTN, -AGE, -DM +AMAUROSIS FUGAX)    Atrial fibrillation ablation     surgical left sided cryoablation  2/16    Cataract     OS    Cervical dysplasia     2009   post leep and cone    COPD     mild  2/16    Duodenal AVM     argon plasma coagulation 2/16    Dyslipidemia     Fibroids     Glaucoma     Lung nodule     3/19/2012   stable    Mitral stenosis     mean gradient  6 mmHg (CATH 4/13), 7.6 mmHg (MANUEL 10/15)    Mitral valve replacement     25 mm MAGNA EASE pericardial  2/16    Pacemaker     dual chamber MEDTRONIC 2/16    Post-op ventricular asystole     02/16    Remote tobacco     Sleep apnea     no CPAP    Thyroid nodule     3/20/2013   multiple    TIA (transient ischemic attack)      Past Surgical History:   Procedure Laterality Date    COLONOSCOPY N/A 1/23/2017    COLON[facility] performed [facility] Yaw Cota [facility] at [facility] ENDOSCOPY    HX AFIB ABLATION      2/16  surgical cryoablation    HX AORTIC VALVE REPLACEMENT      2/16    HX BREAST BIOPSY Right 10/13/2014    Right breast needle [facility]OC biopsy [facility] [address] HX BREAST LUMPECTOMY      Right    HX BUNIONECTOMY      left    HX GYN      VAINIII, VELASQUEZ 1, keratinous labial cyst    HX GYN      2012  Cold knife conization of cervix    HX LEEP PROCEDURE      2010    HX MITRAL VALVE REPLACEMENT      2/16    HX OTHER SURGICAL      lump left neck, benign    HX OTHER SURGICAL      multiple breast aspirations    HX PACEMAKER      I&D OF VULVA/PERINEUM ABSCESS  3/21/2017          Family History Problem Relation Age of Onset    Cancer Mother      oral,    [place] Alzheimer Father         [place] Breast Cancer Maternal Aunt      unsure age   [place] Colon Cancer Maternal Grandfather      Social History   Substance Use Topics    Smoking status: Former Smoker     Years: 37.00     Quit date: 2004    Smokeless tobacco: Never Used      Comment: stop around     Alcohol use No         ROS       All other systems reviewed and are negative.       History     Past Medical History:   Diagnosis Date    Advance directive in chart     2016    Amaurosis fugax     Aortic stenosis     mean gradient  26.5 mmHg (CATH ), 32 mmHg (ECHO 9/15)    Aortic valve replacement     21mm MAGNA EASE pericardial      Arthritis     Atrial appendage resection           Atrial fibrillation     CHADS score 2  (-CHF, -HTN, -AGE, -DM +AMAUROSIS FUGAX)    Atrial fibrillation ablation     surgical left sided cryoablation      Cataract     OS    Cervical dysplasia        post leep and cone    COPD     mild      Duodenal AVM     argon plasma coagulation     Dyslipidemia     Fibroids     Glaucoma     Lung nodule     3/19/2012   stable    Mitral stenosis     mean gradient  6 mmHg (CATH ), 7.6 mmHg (MANUEL 10/15)    Mitral valve replacement     25 mm MAGNA EASE pericardial      Pacemaker     dual chamber MEDTRONIC     Post-op ventricular asystole         Remote tobacco     Sleep apnea     no CPAP    Thyroid nodule     3/20/2013   multiple    TIA (transient ischemic attack)       Past Surgical History:   Procedure Laterality Date    COLONOSCOPY N/A 2017    COLONOSCOPY performed [facility] Janelle Funes MD at [facility] ENDOSCOPY    HX AFIB ABLATION        surgical cryoablation    HX AORTIC VALVE REPLACEMENT          HX BREAST BIOPSY Right 10/13/2014    Right breast needle [facility]OC biopsy performed by Odalys Pfeiffer MD at [facility] MAIN OR    HX BREAST LUMPECTOMY      Right  HX BUNIONECTOMY      left    HX GYN      VAINIII, VELASQUEZ 1, keratinous labial cyst    HX GYN      2012  Cold knife conization of cervix    HX LEEP PROCEDURE          HX MITRAL VALVE REPLACEMENT          HX OTHER SURGICAL      lump left neck, benign    HX OTHER SURGICAL      multiple breast aspirations    HX PACEMAKER      I&D OF VULVA/PERINEUM ABSCESS  3/21/2017          Current Outpatient Prescriptions   Medication Sig Dispense Refill    prenatal vit-calcium-iron-fa (PRENATAL PLUS, CALCIUM CARB,) 27 mg iron- 1 mg tab TAKE 1 TABLET BY MOUTH DAILY 100 Tab 12    omeprazole (PRILOSEC) 20 mg capsule take 1 capsule by mouth twice a day 180 Cap 4    ezetimibe (ZETIA) 10 mg tablet Take 1 Tab by mouth daily. 90 Tab 3    aspirin delayed-release 81 mg tablet Take 2 Tabs by mouth daily. take 1 tablet by mouth once daily 180 Tab 3    triamcinolone acetonide (KENALOG) 0.1 % ointment Apply  to affected area two (2) times a day. use thin layer 30 g 12    acetaminophen-codeine (TYLENOL #2) 300-15 mg tab Take 1 Tab by mouth every four (4) hours as needed for Pain. Max Daily Amount: 6 Tabs. 30 Tab 5    mirabegron ER (MYRBETRIQ) 25 mg ER tablet Take 1 Tab by mouth daily. 30 Tab 11    cycloSPORINE (RESTASIS) 0.05 % ophthalmic emulsion Administer 1 Drop to both eyes two (2) times a day.  bimatoprost (LUMIGAN) 0.01 % ophthalmic drops Administer 1 Drop to left eye nightly.        Allergies   Allergen Reactions    Contrast Dye [Iodine] Itching and Swelling    Iodinated Contrast- Oral And Iv Dye Itching and Swelling    Seafood Itching and Swelling     LOBSTER ONLY    Statins-Hmg-Coa Reductase Inhibitors Myalgia and Other (comments)     Myalgia    Sulfa (Sulfonamide Antibiotics) Itching and Swelling     Family History   Problem Relation Age of Onset    Cancer Mother      oral,     Alzheimer Father          Breast Cancer Maternal Aunt      unsure age   [place] [facility] Colon Cancer Maternal Grandfather Social History   Substance Use Topics    Smoking status: Former Smoker     Years: 37.00     Quit date: 2/8/2004    Smokeless tobacco: Never Used      Comment: stop around 2004    Alcohol use No     Patient Active Problem List   Diagnosis Code    Glaucoma H40.9    Arthritis M19.90    Dyslipidemia E78.5    Rheumatic heart disease I09.9    Atrial fibrillation I48.91    Advance directive in chart Z78.9       Health Maintenance History  Immunizations reviewed, dtap utd  , pneumovax urtd , fluutd , zoster utd   Colonoscopy:utd ,   Chest CT :,  Eye exam:utd   Mammo utd nl   Dexascan usd nl     Health Care Directive or Living Will: yes    Depression Risk Factor Screening:      Patient Health Questionnaire (PHQ-2)   Over the last 2 weeks, how often have you been bothered by any of the following problems? · Little interest or pleasure in doing things? · Not at all. [0]  · Feeling down, depressed, or hopeless? · Not at all. [0]    Total Score: 0/6  PHQ-2 Assessment Scoring:   A score of 2 or more requires further screening with the PHQ-9    Alcohol Risk Factor Screening:     Women: On any occasion during the past 3 months, have you had more than 3 drinks containing alcohol? Do you average more than 7 drinks per week? Men: On any occasion during the past 3 months, have you had more than 4 drinks containing alcohol? Do you average more than 14 drinks per week? Functional Ability and Level of Safety:     Hearing Loss    Hearing is good. Activities of Daily Living   Self-care. Requires assistance with: no ADLs    Fall Risk   No fall risk factors    Abuse Screen   None      Examination   Physical Examination  Vitals:    03/08/18 1025   BP: 113/83   Pulse: 76   Resp: 16   Temp: 95.4 °F (35.2 °C)   TempSrc: Oral   SpO2: 97%   Weight: 174 lb 6.4 oz (79.1 kg)   Height: 5' 4\" (1.626 m)   PainSc:  10 - Worst pain ever   PainLoc: Shoulder     Body mass index is 29.94 kg/(m^2).     Evaluation of Cognitive Function:  Mood/affect:appropriate   Appearance: well groomed   Family member/caregiver input: na     alert, well appearing, and in no distress, oriented to person, place, and time and normal appearing weight    Patient Care Team:  La Davis DO as PCP - General (Family Practice)  Luann Singh MD (Pulmonary Disease)  Lambert Bustamante MD (Obstetrics & Gynecology)  Dana Vu MD (Ophthalmology)  Lisa Hamilton MD (Colon and Rectal Surgery)  Christine Chen MD (Cardiology)  Ann Murguia MD (Gastroenterology)  June Vasquez MD (Surgery)  Crystal Jernigan MD (Orthopedic Surgery)  Stanford Saleem MD (Internal Medicine)    Advice/Referrals/Counseling/Plan:   Education and counseling provided:  Are appropriate based on today's review and evaluation  Include in education list (weight loss, physical activity, smoking cessation, fall prevention, and nutrition)  current treatment plan is effective, no change in therapy. I have discussed the diagnosis with the patient and the intended plan as seen in the above orders. The patient has received an after-visit summary and questions were answered concerning future plans. I have discussed medication side effects and warnings with the patient as well. I have reviewed the plan of care with the patient, accepted their input and they are in agreement with the treatment goals. Follow-up Disposition: Not on File    _____________________________________________________________    Problem Assessment    for treatment of   Chief Complaint   Patient presents with    Irregular Heart Beat    Arthritis    Cholesterol Problem         SUBJECTIVE      Cardiovascular Review:  The patient has hypertension and hyperlipidemia. Diet and Lifestyle: not attempting to follow a low fat, low cholesterol diet, not attempting to follow a low sodium diet  Home BP Monitoring: is not measured at home.   Pertinent ROS: taking medications as instructed, no medication side effects noted, no TIA's, no chest pain on exertion, no dyspnea on exertion, no swelling of ankles. Osteoarthritis and Chronic Pain:  Patient has osteoarthritis, primarily affecting the shoulders. Symptoms onset: problem is longstanding. Rheumatological ROS: no current joint or muscle symptoms, essentially pain-free. Response to treatment plan: well controlled. Additional Concerns:        Visit Vitals    /83 (BP 1 Location: Left arm, BP Patient Position: Sitting)    Pulse 76    Temp 95.4 °F (35.2 °C) (Oral)    Resp 16    Ht 5' 4\" (1.626 m)    Wt 174 lb 6.4 oz (79.1 kg)    SpO2 97%    BMI 29.94 kg/m2     General:  Alert, cooperative, no distress, appears stated age. Head:  Normocephalic, without obvious abnormality, atraumatic. Eyes:  Conjunctivae/corneas clear. PERRL, EOMs intact. Fundi benign. Ears:  Normal TMs and external ear canals both ears. Nose: Nares normal. Septum midline. Mucosa normal. No drainage or sinus tenderness. Throat: Lips, mucosa, and tongue normal. Teeth and gums normal.   Neck: Supple, symmetrical, trachea midline, no adenopathy, thyroid: no enlargement/tenderness/nodules, no carotid bruit and no JVD. Back:   Symmetric, no curvature. ROM normal. No CVA tenderness. Lungs:   Clear to auscultation bilaterally. Chest wall:  No tenderness or deformity. Heart:  Regular rate and rhythm, S1, S2 normal, no murmur, click, rub or gallop. Breast Exam:  No tenderness, masses, or nipple abnormality. Abdomen:   Soft, non-tender. Bowel sounds normal. No masses,  No organomegaly. Genitalia:  Normal female without lesion, discharge or tenderness. Rectal:  Normal tone,  no masses or tenderness  Guaiac negative stool. Extremities: Extremities normal, atraumatic, no cyanosis or edema. Pulses: 2+ and symmetric all extremities. Skin: Skin color, texture, turgor normal. No rashes or lesions.    Lymph nodes: Cervical, supraclavicular, and axillary nodes normal.   Neurologic: CNII-XII intact. Normal strength, sensation and reflexes throughout. LABS   Component      Latest Ref Rng & Units 12/5/2017 12/5/2017 12/5/2017 12/5/2017           9:49 AM  9:49 AM  9:49 AM  9:49 AM   Glucose      65 - 99 mg/dL    135 (H)   BUN      8 - 27 mg/dL    15   Creatinine      0.57 - 1.00 mg/dL    0.88   GFR est non-AA      >59 mL/min/1.73    69   GFR est AA      >59 mL/min/1.73    80   BUN/Creatinine ratio      12 - 28    17   Sodium      134 - 144 mmol/L    144   Potassium      3.5 - 5.2 mmol/L    3.9   Chloride      96 - 106 mmol/L    101   CO2      18 - 29 mmol/L    25   Calcium      8.7 - 10.3 mg/dL    9.3   Protein, total      6.0 - 8.5 g/dL    6.9   Albumin      3.6 - 4.8 g/dL    4.2   GLOBULIN, TOTAL      1.5 - 4.5 g/dL    2.7   A-G Ratio      1.2 - 2.2    1.6   Bilirubin, total      0.0 - 1.2 mg/dL    0.4   Alk. phosphatase      39 - 117 IU/L    81   AST      0 - 40 IU/L    23   ALT (SGPT)      0 - 32 IU/L    26   Hemoglobin A1c, (calculated)      4.8 - 5.6 %   6.3 (H)    Estimated average glucose      mg/dL   134    TSH      0.450 - 4.500 uIU/mL  1.400     T4, Free      0.82 - 1.77 ng/dL 0.99        TESTS  ekg  nsr      Assessment/Plan:      Hypertension - stable  Hyperlipidemia - stable  arthritsi severe needs to go to [place] to see about shoulder because of her ins    Diagnoses and all orders for this visit:    1. Routine general medical examination at a health care facility  -     prenatal vit-calcium-iron-fa (PRENATAL PLUS, CALCIUM CARB,) 27 mg iron- 1 mg tab; TAKE 1 TABLET BY MOUTH DAILY  -     omeprazole (PRILOSEC) 20 mg capsule; take 1 capsule by mouth twice a day    2.  Atrial fibrillation, unspecified type (HCC)  -     AMB POC EKG ROUTINE W/ 12 LEADS, INTER & REP  -     prenatal vit-calcium-iron-fa (PRENATAL PLUS, CALCIUM CARB,) 27 mg iron- 1 mg tab; TAKE 1 TABLET BY MOUTH DAILY  -     omeprazole (PRILOSEC) 20 mg capsule; take 1 capsule by mouth twice a day  - triamcinolone acetonide (KENALOG) 0.1 % ointment; Apply  to affected area two (2) times a day. use thin layer  -     acetaminophen-codeine (TYLENOL #2) 300-15 mg tab; Take 1 Tab by mouth every four (4) hours as needed for Pain. Max Daily Amount: 6 Tabs. 3. Gastrointestinal hemorrhage associated with gastric ulcer  -     prenatal vit-calcium-iron-fa (PRENATAL PLUS, CALCIUM CARB,) 27 mg iron- 1 mg tab; TAKE 1 TABLET BY MOUTH DAILY  -     omeprazole (PRILOSEC) 20 mg capsule; take 1 capsule by mouth twice a day    4. Rheumatic heart disease  -     prenatal vit-calcium-iron-fa (PRENATAL PLUS, CALCIUM CARB,) 27 mg iron- 1 mg tab; TAKE 1 TABLET BY MOUTH DAILY  -     omeprazole (PRILOSEC) 20 mg capsule; take 1 capsule by mouth twice a day  -     triamcinolone acetonide (KENALOG) 0.1 % ointment; Apply  to affected area two (2) times a day. use thin layer    5. Dyslipidemia  -     prenatal vit-calcium-iron-fa (PRENATAL PLUS, CALCIUM CARB,) 27 mg iron- 1 mg tab; TAKE 1 TABLET BY MOUTH DAILY  -     omeprazole (PRILOSEC) 20 mg capsule; take 1 capsule by mouth twice a day  -     triamcinolone acetonide (KENALOG) 0.1 % ointment; Apply  to affected area two (2) times a day. use thin layer  -     acetaminophen-codeine (TYLENOL #2) 300-15 mg tab; Take 1 Tab by mouth every four (4) hours as needed for Pain. Max Daily Amount: 6 Tabs. 6. Arthritis  -     triamcinolone acetonide (KENALOG) 0.1 % ointment; Apply  to affected area two (2) times a day. use thin layer    7. Eczema, unspecified type  -     triamcinolone acetonide (KENALOG) 0.1 % ointment; Apply  to affected area two (2) times a day. use thin layer    8. Aortic valve stenosis, unspecified etiology  -     acetaminophen-codeine (TYLENOL #2) 300-15 mg tab; Take 1 Tab by mouth every four (4) hours as needed for Pain. Max Daily Amount: 6 Tabs. Other orders  -     ezetimibe (ZETIA) 10 mg tablet; Take 1 Tab by mouth daily. -     aspirin delayed-release 81 mg tablet;  Take 2 Tabs by mouth daily.  take 1 tablet by mouth once daily          Lab review: labs are reviewed, up to date and normal    Also mentions problems swallowing pills wants GI eval and egd will refer

## 2018-03-08 NOTE — PROGRESS NOTES
Nicki Degree is a 79 y.o. female presents today for medicare wellness exam.  She would also like to discuss continued left shoulder/arm pain. Pt is in Room # 6        Learning Assessment (baseline): Completed  Depression Screening: Completed  Fall Risk Screening: Completed  Abuse screening: Completed  ADL Assessment: Completed    1. Have you been to the ER, urgent care clinic since your last visit? Hospitalized since your last visit? No    2. Have you seen or consulted any other health care providers outside of the [address] [place] [address] since your last visit? Include any pap smears or colon screening.  Yes When: last week Where: Dr. Adelaide Tineo- [facility] Reason for visit: follow up from eye sx

## 2018-03-08 NOTE — MR AVS SNAPSHOT
[address]  [place] [address] [address] 
[phone] Patient: Artemio Pathak MRN: [id number] RDD:7/36/3966 Visit Information Date & Time Provider Department Dept. Phone Encounter #  
 3/8/2018 10:00 [place] [address] [address] [phone] [id number] Follow-up Instructions Return in about 3 months (around 6/8/2018) for EOV, labs at next visit,  and drug screen. Your Appointments 3/14/2018 10:30 AM  
PROCEDURE with Abbey Liang Csi Cardio Specialist at [facility]) Appt Note: medtronic device [facility] [address] [place] [address] [place] [address]  
  
    
 8/21/2018 10:45 AM  
Follow Up with Shawn Kruger MD  
Cardio Specialist at [facility]) Appt Note: 9 months follow up; 6 months [facility] [address] [place] [place] [address] Upcoming Health Maintenance Date Due Pneumococcal 65+ Low/Medium Risk (2 of 2 - PPSV23) 9/21/2018 BREAST CANCER SCRN MAMMOGRAM 9/26/2018 GLAUCOMA SCREENING Q2Y 8/8/2019 DTaP/Tdap/Td series (2 - Td) 6/28/2026 COLONOSCOPY 1/23/2027 Allergies as of 3/8/2018  Review Complete On: 2/14/2018 By: Jose Mayes DO Severity Noted Reaction Type Reactions Contrast Dye [Iodine]  10/08/2014    Itching, Swelling Iodinated Contrast- Oral And Iv Dye  12/13/2016    Itching, Swelling Seafood  10/08/2014    Itching, Swelling LOBSTER ONLY Statins-hmg-coa Reductase Inhibitors  10/05/2011    Myalgia, Other (comments) Myalgia Sulfa (Sulfonamide Antibiotics)    Itching, Swelling Current Immunizations  Reviewed on 9/15/2014 Name Date  Influenza High Dose Vaccine PF 9/21/2017 11:44 AM  
 Influenza Vaccine 10/5/2016, 9/15/2014, 10/17/2013 Influenza Vaccine Janeann Trung) 9/3/2015 Influenza Vaccine Split 10/5/2011 Influenza Vaccine Whole 9/20/2012 Pneumococcal Conjugate (PCV-13) 9/21/2017 11:45 AM  
 TD Vaccine 8/11/2011 ZZZ-RETIRED (DO NOT USE) Pneumococcal Vaccine (Unspecified Type) 9/20/2012, 10/5/2011 Zoster Vaccine, Live 9/3/2015 Not reviewed this visit You Were Diagnosed With   
  
 Codes Comments Routine general medical examination at a health care facility    -  Primary ICD-10-CM: Z00.00 ICD-9-CM: V70.0 Atrial fibrillation, unspecified type ([facility].)     ICD-10-CM: I48.91 
ICD-9-CM: 427.31 Gastrointestinal hemorrhage associated with gastric ulcer     ICD-10-CM: K25.4 ICD-9-CM: 531.40 Rheumatic heart disease     ICD-10-CM: I09.9 ICD-9-CM: 398.90 Dyslipidemia     ICD-10-CM: E78.5 ICD-9-CM: 272.4 Arthritis     ICD-10-CM: M19.90 ICD-9-CM: 716.90 Eczema, unspecified type     ICD-10-CM: L30.9 ICD-9-CM: 692.9 Aortic valve stenosis, unspecified etiology     ICD-10-CM: I35.0 ICD-9-CM: 424.1 Esophageal dysphagia     ICD-10-CM: R13.10 ICD-9-CM: 787.20 Vitals BP Pulse Temp Resp Height(growth percentile) Weight(growth percentile) 113/83 (BP 1 Location: Left arm, BP Patient Position: Sitting) 76 95.4 °F (35.2 °C) (Oral) 16 5' 4\" (1.626 m) 174 lb 6.4 oz (79.1 kg) SpO2 BMI OB Status Smoking Status 97% 29.94 kg/m2 Postmenopausal Former Smoker BMI and BSA Data Body Mass Index Body Surface Area  
 29.94 kg/m 2 1.89 m 2 Preferred Pharmacy Pharmacy Name Phone [id number] [phone] Your Updated Medication List  
  
   
This list is accurate as of 3/8/18 11:06 AM.  Always use your most recent med list.  
  
  
  
  
 acetaminophen-codeine 300-15 mg Tab Commonly known as:  TYLENOL #2  
 Take 1 Tab by mouth every four (4) hours as needed for Pain. Max Daily Amount: 6 Tabs. aspirin delayed-release 81 mg tablet Take 2 Tabs by mouth daily. take 1 tablet by mouth once daily  
  
 ezetimibe 10 mg tablet Commonly known as:  Ricky Sandra Take 1 Tab by mouth daily. LUMIGAN 0.01 % ophthalmic drops Generic drug:  bimatoprost  
Administer 1 Drop to left eye nightly. mirabegron ER 25 mg ER tablet Commonly known as:  MYRBETRIQ Take 1 Tab by mouth daily. omeprazole 20 mg capsule Commonly known as:  PRILOSEC  
take 1 capsule by mouth twice a day  
  
 prenatal vit-calcium-iron-fa 27 mg iron- 1 mg Tab Commonly known as:  PRENATAL PLUS (CALCIUM CARB) TAKE 1 TABLET BY MOUTH DAILY RESTASIS 0.05 % ophthalmic emulsion Generic drug:  cycloSPORINE Administer 1 Drop to both eyes two (2) times a day. triamcinolone acetonide 0.1 % ointment Commonly known as:  KENALOG Apply  to affected area two (2) times a day. use thin layer Prescriptions Printed Refills  
 acetaminophen-codeine (TYLENOL #2) 300-15 mg tab 5 Sig: Take 1 Tab by mouth every four (4) hours as needed for Pain. Max Daily Amount: 6 Tabs. Class: Print Route: Oral  
  
Prescriptions Sent to Pharmacy Refills  
 prenatal vit-calcium-iron-fa (PRENATAL PLUS, CALCIUM CARB,) 27 mg iron- 1 mg tab 12 Sig: TAKE 1 TABLET BY MOUTH DAILY Class: Normal  
 [facility]acy: [facility] [address] [place] [address] [place] Ph #: [phone]  
 omeprazole (PRILOSEC) 20 mg capsule 4 Sig: take 1 capsule by mouth twice a day Class: Normal  
 [facility] [place] Ph #: [phone]  
 ezetimibe (ZETIA) 10 mg tablet 3 Sig: Take 1 Tab by mouth daily. Class: Normal  
 [facility] [place] Ph #: [phone]  Route: Oral  
 aspirin delayed-release 81 mg tablet 3 Sig: Take 2 Tabs by mouth daily. take 1 tablet by mouth once daily Class: Normal  
 [facility] [place] Ph #: [phone] Route: Oral  
 triamcinolone acetonide (KENALOG) 0.1 % ointment 12 Sig: Apply  to affected area two (2) times a day. use thin layer Class: Normal  
 [facility]acy[facility] [place] Ph #: [phone] Route: Topical  
  
We Performed the Following AMB POC EKG ROUTINE W/ 12 LEADS, INTER & REP [38853 CPT(R)] REFERRAL TO GASTROENTEROLOGY [WIE82 Custom] Comments:  
 Please evaluate patient for difficulty swallowing. 1st available is fine. Follow-up Instructions Return in about 3 months (around 6/8/2018) for EOV, labs at next visit,  and drug screen. Referral Information Referral ID Referred By Referred To  
  
 [id number] Merari Montenegro [address] [place] [address], MD   
   [address] [place] [address] Phone: [phone] Fax: [phone] Visits Status Start Date End Date 1 New Request 3/8/18 3/8/19 If your referral has a status of pending review or denied, additional information will be sent to support the outcome of this decision. [facility]! Dear Rylie Chaudhry: Thank you for requesting a [email or web address] account. Our records indicate that you already have an active [email or web address] account. You can access your account anytime at [email or web address] Did you know that you can access your hospital and ER discharge instructions at any time in [email or web address]? You can also review all of your test results from your hospital stay or ER visit. Additional Information If you have questions, please visit the Frequently Asked Questions section of the [email or web address] website at [email or web address] Remember, [employer] BIT[employer] is NOT to be used for urgent needs. For medical emergencies, dial 911. Now available from your iPhone and Android! Please provide this summary of care documentation to your next provider. Your primary care clinician is listed as [id number] [place] [address]. If you have any questions after today's visit, please call [phone].

## 2018-03-14 ENCOUNTER — CLINICAL SUPPORT (OUTPATIENT)
Dept: CARDIOLOGY CLINIC | Age: 68
End: 2018-03-14

## 2018-03-14 DIAGNOSIS — Z95.0 PACEMAKER: ICD-10-CM

## 2018-03-14 DIAGNOSIS — I48.91 ATRIAL FIBRILLATION, UNSPECIFIED TYPE (HCC): Primary | Chronic | ICD-10-CM

## 2018-03-26 RX ORDER — LANOLIN ALCOHOL/MO/W.PET/CERES
CREAM (GRAM) TOPICAL
Qty: 100 TAB | Refills: 10 | Status: SHIPPED | OUTPATIENT
Start: 2018-03-26 | End: 2019-03-27 | Stop reason: SDUPTHER

## 2018-03-28 DIAGNOSIS — M25.519 SHOULDER PAIN, UNSPECIFIED CHRONICITY, UNSPECIFIED LATERALITY: Primary | ICD-10-CM

## 2018-04-30 ENCOUNTER — OFFICE VISIT (OUTPATIENT)
Dept: FAMILY MEDICINE CLINIC | Age: 68
End: 2018-04-30

## 2018-04-30 VITALS
TEMPERATURE: 95.1 F | HEART RATE: 88 BPM | BODY MASS INDEX: 29.71 KG/M2 | DIASTOLIC BLOOD PRESSURE: 82 MMHG | OXYGEN SATURATION: 98 % | RESPIRATION RATE: 14 BRPM | WEIGHT: 174 LBS | HEIGHT: 64 IN | SYSTOLIC BLOOD PRESSURE: 132 MMHG

## 2018-04-30 DIAGNOSIS — R10.9 STOMACH PAIN: ICD-10-CM

## 2018-04-30 DIAGNOSIS — R30.0 DYSURIA: Primary | ICD-10-CM

## 2018-04-30 LAB
BILIRUB UR QL STRIP: NEGATIVE
GLUCOSE UR-MCNC: NEGATIVE MG/DL
KETONES P FAST UR STRIP-MCNC: NEGATIVE MG/DL
PH UR STRIP: 7 [PH] (ref 4.6–8)
PROT UR QL STRIP: NORMAL
SP GR UR STRIP: 1.01 (ref 1–1.03)
UA UROBILINOGEN AMB POC: NORMAL (ref 0.2–1)
URINALYSIS CLARITY POC: CLEAR
URINALYSIS COLOR POC: YELLOW
URINE BLOOD POC: NEGATIVE
URINE LEUKOCYTES POC: NORMAL
URINE NITRITES POC: NEGATIVE

## 2018-04-30 NOTE — PROGRESS NOTES
Keena Odell is a 79 y.o. female presented to clinic c/o possible UTI sx x 1week. Pt c/o 9/10 left shoulder pain and pt is already being treated with PT.     1. Have you been to the ER, urgent care clinic since your last visit? Hospitalized since your last visit? No    2. Have you seen or consulted any other health care providers outside of the [address] [place] [address] since your last visit? Include any pap smears or colon screening. No     Learning Assessment 3/8/2018   PRIMARY LEARNER Patient   HIGHEST LEVEL OF EDUCATION - PRIMARY LEARNER  -   PRIMARY LANGUAGE ENGLISH   LEARNER PREFERENCE PRIMARY VIDEOS     -   ANSWERED BY patient   RELATIONSHIP SELF     There are no preventive care reminders to display for this patient.

## 2018-04-30 NOTE — PROGRESS NOTES
Jazmine Calvillo is a 79 y.o.  female and presents with    Chief Complaint   Patient presents with    Dysuria       Pt in stating she has uti      Subjective: Additional Concerns:          Patient Active Problem List    Diagnosis Date Noted    Advance directive in chart 06/28/2016    Dyslipidemia     Rheumatic heart disease     Atrial fibrillation     Arthritis 01/06/2016    Glaucoma 01/15/2014     Current Outpatient Prescriptions   Medication Sig Dispense Refill    methen-m.blue-s.phos-phsal-hyo (URELLE) 81-10.8-40.8 mg tab tablet Take 1 Tab by mouth four (4) times daily (with meals). If needed for dysuria 30 Tab 12    ferrous sulfate 325 mg (65 mg iron) tablet take 1 tablet by mouth three times a day with meals 100 Tab 10    prenatal vit-calcium-iron-fa (PRENATAL PLUS, CALCIUM CARB,) 27 mg iron- 1 mg tab TAKE 1 TABLET BY MOUTH DAILY 100 Tab 12    omeprazole (PRILOSEC) 20 mg capsule take 1 capsule by mouth twice a day 180 Cap 4    ezetimibe (ZETIA) 10 mg tablet Take 1 Tab by mouth daily. 90 Tab 3    aspirin delayed-release 81 mg tablet Take 2 Tabs by mouth daily. take 1 tablet by mouth once daily 180 Tab 3    triamcinolone acetonide (KENALOG) 0.1 % ointment Apply  to affected area two (2) times a day. use thin layer 30 g 12    acetaminophen-codeine (TYLENOL #2) 300-15 mg tab Take 1 Tab by mouth every four (4) hours as needed for Pain. Max Daily Amount: 6 Tabs. 30 Tab 5    mirabegron ER (MYRBETRIQ) 25 mg ER tablet Take 1 Tab by mouth daily. 30 Tab 11    cycloSPORINE (RESTASIS) 0.05 % ophthalmic emulsion Administer 1 Drop to both eyes two (2) times a day.  bimatoprost (LUMIGAN) 0.01 % ophthalmic drops Administer 1 Drop to left eye nightly.        Allergies   Allergen Reactions    Contrast Dye [Iodine] Itching and Swelling    Iodinated Contrast- Oral And Iv Dye Itching and Swelling    Seafood Itching and Swelling     LOBSTER ONLY    Statins-Hmg-Coa Reductase Inhibitors Myalgia and Other (comments)     Myalgia    Sulfa (Sulfonamide Antibiotics) Itching and Swelling     Past Medical History:   Diagnosis Date    Advance directive in chart     6/28/2016    Amaurosis fugax     Aortic stenosis     mean gradient  26.5 mmHg (CATH 4/13), 32 mmHg (ECHO 9/15)    Aortic valve replacement     21mm MAGNA EASE pericardial  2/16    Arthritis     Atrial appendage resection     2/16      Atrial fibrillation     CHADS score 2  (-CHF, -HTN, -AGE, -DM +AMAUROSIS FUGAX)    Atrial fibrillation ablation     surgical left sided cryoablation  2/16    Cataract     OS    Cervical dysplasia     2009   post leep and cone    COPD     mild  2/16    Duodenal AVM     argon plasma coagulation 2/16    Dyslipidemia     Fibroids     Glaucoma     Lung nodule     3/19/2012   stable    Mitral stenosis     mean gradient  6 mmHg (CATH 4/13), 7.6 mmHg (MANUEL 10/15)    Mitral valve replacement     25 mm MAGNA EASE pericardial  2/16    Pacemaker     dual chamber MEDTRONIC 2/16    Post-op ventricular asystole     02/16    Remote tobacco     Sleep apnea     no CPAP    Thyroid nodule     3/20/2013   multiple    TIA (transient ischemic attack)      Past Surgical History:   Procedure Laterality Date    COLONOSCOPY N/A 1/23/2017    COLONOSCOPY performed [facility] Lisbet Vizcarra MD at [facility] ENDOSCOPY    HX AFIB ABLATION      2/16  surgical cryoablation    HX AORTIC VALVE REPLACEMENT      2/16    HX BREAST BIOPSY Right 10/13/2014    Right breast needle [facility]OC biopsy performed by Jesus Flower MD at [address] [place] [address] HX BREAST LUMPECTOMY      Right    HX BUNIONECTOMY      left    HX GYN      VAINIII, VELASQUEZ 1, keratinous labial cyst    HX GYN      2012  Cold knife conization of cervix    HX LEEP PROCEDURE      2010    HX MITRAL VALVE REPLACEMENT      2/16    HX OTHER SURGICAL      lump left neck, benign    HX OTHER SURGICAL      multiple breast aspirations    HX PACEMAKER      I&D OF VULVA/PERINEUM ABSCESS 3/21/2017          Family History   Problem Relation Age of Onset    Cancer Mother      oral,    [address] [facility] Alzheimer Father         [address] [facility] [employer] [facility] Breast Cancer Maternal Aunt      unsure age   24 [facility] [employer] [facility] Colon Cancer Maternal Grandfather      Social History   Substance Use Topics    Smoking status: Former Smoker     Years: 37.00     Quit date: 2004    Smokeless tobacco: Never Used      Comment: stop around     Alcohol use No       ROS       All other systems reviewed and are negative. Objective:  Vitals:    18 1213   BP: 132/82   Pulse: 88   Resp: 14   Temp: 95.1 °F (35.1 °C)   TempSrc: Oral   SpO2: 98%   Weight: 174 lb (78.9 kg)   Height: 5' 4\" (1.626 m)   PainSc:   9   PainLoc: Shoulder                 alert, well appearing, and in no distress and normal appearing weight  Chest - clear to auscultation, no wheezes, rales or rhonchi, symmetric air entry  Heart - normal rate, regular rhythm, normal S1, S2, no murmurs, rubs, clicks or gallops        LABS   ua today is benign  TESTS      Assessment/Plan:    Dysuria  Will try some urised  Note milla already has her on myrbetriq      Lab review: sent for culture    Diagnoses and all orders for this visit:    1. Dysuria    2. Stomach pain  -     CULTURE, URINE; Future  -     AMB POC URINALYSIS DIP STICK AUTO W/O MICRO    Other orders  -     jocelyne-m.blue-s.phos-phsal-hyo (URELLE) 81-10.8-40.8 mg tab tablet; Take 1 Tab by mouth four (4) times daily (with meals). If needed for dysuria          I have discussed the diagnosis with the patient and the intended plan as seen in the above orders. The patient has received an after-visit summary and questions were answered concerning future plans. I have discussed medication side effects and warnings with the patient as well. I have reviewed the plan of care with the patient, accepted their input and they are in agreement with the treatment goals.      Follow-up Disposition: Not on File

## 2018-04-30 NOTE — MR AVS SNAPSHOT
[address] [place] [phone] 
[phone] Patient: Gume Crowley MRN: [id number] KFZ:4/40/5535 Visit Information Date & Time Provider Department Dept. Phone Encounter #  
 4/30/2018 12:30 PM Kate Hoyos., [address] [place] [address] [phone] [id number] Your Appointments 6/13/2018  4:00 PM  
CARELINK with Pacer Hv [facility]si [facility] ([address]) Appt Note: 3 month after in office March check -[facility] [place] [id number]  
[phone] [place] [phone]  
  
    
 8/21/2018 10:45 AM  
Follow Up with Supa Schulte MD  
Cardio Specialist at [facility]) Appt Note: 9 months follow up; 6 months [address] 9/12/2018  2:20 PM  
CARELINK with Pacer Hv [facility] ([address]) Appt Note: 3 month after June check -[facility] [place] [phone]  
[phone]  
  
    
 12/12/2018  2:20 PM  
CARELINK with Pacer Hv [facility] ([address]) Appt Note: 3 month check after September -[facility] [place] [phone]  
[phone] Upcoming Health Maintenance Date Due Influenza Age 5 to Adult 8/1/2018 Pneumococcal 65+ Low/Medium Risk (2 of 2 - PPSV23) 9/21/2018 BREAST CANCER SCRN MAMMOGRAM 9/26/2018 MEDICARE YEARLY EXAM 3/9/2019 GLAUCOMA SCREENING Q2Y 8/8/2019 DTaP/Tdap/Td series (2 - Td) 6/28/2026 COLONOSCOPY 1/23/2027 Allergies as of 4/30/2018  Review Complete On: 4/30/2018 By: Kate Sellers, DO Severity Noted Reaction Type Reactions Contrast Dye [Iodine]  10/08/2014    Itching, Swelling Iodinated Contrast- Oral And Iv Dye  12/13/2016    Itching, Swelling Seafood  10/08/2014    Itching, Swelling LOBSTER ONLY Statins-hmg-coa Reductase Inhibitors  10/05/2011    Myalgia, Other (comments) Myalgia Sulfa (Sulfonamide Antibiotics)    Itching, Swelling Current Immunizations  Reviewed on 9/15/2014 Name Date Influenza High Dose Vaccine PF 9/21/2017 11:44 AM  
 Influenza Vaccine 10/5/2016, 9/15/2014, 10/17/2013 Influenza Vaccine Gerline Kitten) 9/3/2015 Influenza Vaccine Split 10/5/2011 Influenza Vaccine Whole 9/20/2012 Pneumococcal Conjugate (PCV-13) 9/21/2017 11:45 AM  
 TD Vaccine 8/11/2011 ZZZ-RETIRED (DO NOT USE) Pneumococcal Vaccine (Unspecified Type) 9/20/2012, 10/5/2011 Zoster Vaccine, Live 9/3/2015 Not reviewed this visit You Were Diagnosed With   
  
 Codes Comments Dysuria    -  Primary ICD-10-CM: R30.0 ICD-9-CM: 788.1 Stomach pain     ICD-10-CM: R10.9 ICD-9-CM: 536.8 Vitals BP Pulse Temp Resp Height(growth percentile) Weight(growth percentile) 132/82 (BP 1 Location: Right arm, BP Patient Position: Sitting) 88 95.1 °F (35.1 °C) (Oral) 14 5' 4\" (1.626 m) 174 lb (78.9 kg) SpO2 BMI OB Status Smoking Status 98% 29.87 kg/m2 Postmenopausal Former Smoker BMI and BSA Data Body Mass Index Body Surface Area  
 29.87 kg/m 2 1.89 m 2 Preferred Pharmacy Pharmacy Name Phone [phone] [facility] [phone] Your Updated Medication List  
  
   
This list is accurate as of 4/30/18 12:38 PM.  Always use your most recent med list.  
  
  
  
  
 acetaminophen-codeine 300-15 mg Tab Commonly known as:  TYLENOL #2 Take 1 Tab by mouth every four (4) hours as needed for Pain. Max Daily Amount: 6 Tabs. aspirin delayed-release 81 mg tablet Take 2 Tabs by mouth daily. take 1 tablet by mouth once daily  
  
 ezetimibe 10 mg tablet Commonly known as:  Guera Sibley Take 1 Tab by mouth daily. ferrous sulfate 325 mg (65 mg iron) tablet  
take 1 tablet by mouth three times a day with meals LUMIGAN 0.01 % ophthalmic drops Generic drug:  bimatoprost  
Administer 1 Drop to left eye nightly. methen-lisa.blue-s.phos-phsal-hyo 81-10.8-40.8 mg Tab tablet Commonly known as:  Mary Annechino Morales Take 1 Tab by mouth four (4) times daily (with meals). If needed for dysuria  
  
 mirabegron ER 25 mg ER tablet Commonly known as:  MYRBETRIQ Take 1 Tab by mouth daily. omeprazole 20 mg capsule Commonly known as:  PRILOSEC  
take 1 capsule by mouth twice a day  
  
 prenatal vit-calcium-iron-fa 27 mg iron- 1 mg Tab Commonly known as:  PRENATAL PLUS (CALCIUM CARB) TAKE 1 TABLET BY MOUTH DAILY RESTASIS 0.05 % ophthalmic emulsion Generic drug:  cycloSPORINE Administer 1 Drop to both eyes two (2) times a day. triamcinolone acetonide 0.1 % ointment Commonly known as:  KENALOG Apply  to affected area two (2) times a day. use thin layer Prescriptions Printed Refills  
 methen-m.blue-s.phos-phsal-hyo (URELLE) 81-10.8-40.8 mg tab tablet 12 Sig: Take 1 Tab by mouth four (4) times daily (with meals). If needed for dysuria Class: Print Route: Oral  
  
We Performed the Following AMB POC URINALYSIS DIP STICK AUTO W/O MICRO [99906 CPT(R)] To-Do List   
 04/30/2018 Microbiology:  CULTURE, URINE Introducing [facility]! Dear Maik Cox: Thank you for requesting a DepotPoint account. Our records indicate that you already have an active DepotPoint account. You can access your account anytime at [email or web address] Did you know that you can access your hospital and ER discharge instructions at any time in DepotPoint?   You can also review all of your test results from your hospital stay or ER visit. Additional Information If you have questions, please visit the Frequently Asked Questions section of the [employer] website at [email or web address] Remember, Att[employer] World is NOT to be used for urgent needs. For medical emergencies, dial 911. Now available from your iPhone and Android! Please provide this summary of care documentation to your next provider. Your primary care clinician is listed as [address]. If you have any questions after today's visit, please call [phone].

## 2018-05-01 ENCOUNTER — TELEPHONE (OUTPATIENT)
Dept: FAMILY MEDICINE CLINIC | Age: 68
End: 2018-05-01

## 2018-05-01 NOTE — TELEPHONE ENCOUNTER
Pt. Called and stated the medication Shama Larson is not covered by her insurance nor does the pharmacy have it in stock. She is asking for a medication change. Asking to be called back when it is done. Please advise.

## 2018-05-02 LAB — BACTERIA UR CULT: NORMAL

## 2018-05-02 NOTE — TELEPHONE ENCOUNTER
Attempted to call patient to relay doctors notes regarding her concerns with her Urelle medication not being covered by her insurance plan. Left message with doctors notes and advised if she had any further questions to please call the office. No further action required.

## 2018-05-07 ENCOUNTER — HOSPITAL ENCOUNTER (OUTPATIENT)
Dept: GENERAL RADIOLOGY | Age: 68
Discharge: HOME | End: 2018-05-07
Attending: INTERNAL MEDICINE
Payer: MEDICARE

## 2018-05-07 DIAGNOSIS — R13.10 DYSPHAGIA: ICD-10-CM

## 2018-05-07 PROCEDURE — 74220 X-RAY XM ESOPHAGUS 1CNTRST: CPT

## 2018-05-07 PROCEDURE — 74011000250 HC RX REV CODE- 250: Performed by: INTERNAL MEDICINE

## 2018-05-07 PROCEDURE — 74011000255 HC RX REV CODE- 255: Performed by: INTERNAL MEDICINE

## 2018-05-07 RX ADMIN — BARIUM SULFATE 176 G: 960 POWDER, FOR SUSPENSION ORAL at 10:42

## 2018-05-07 RX ADMIN — BARIUM SULFATE 700 MG: 700 TABLET ORAL at 10:42

## 2018-05-07 RX ADMIN — BARIUM SULFATE 135 ML: 980 POWDER, FOR SUSPENSION ORAL at 10:42

## 2018-05-07 RX ADMIN — ANTACID/ANTIFLATULENT 4 G: 380; 550; 10; 10 GRANULE, EFFERVESCENT ORAL at 10:42

## 2018-05-18 NOTE — MR AVS SNAPSHOT
Visit Information Date & Time Provider Department Dept. Phone Encounter #  
 3/28/2017 10:30 AM Annette Flannery, [address] [facility] [phone] [id number] Follow-up Instructions Return in about 3 months (around 6/28/2017) for EOV. Your Appointments 5/10/2017 10:15 AM  
PROCEDURE with Abbey Liang Csi Cardio Specialist at [facility]) Appt Note: 1 yr pacer check [place] [place] it is a 14 month check, unable to come in sooner -[facility] [address] [place] [address]  
  
    
 6/20/2017 10:00 AM  
Follow Up with Marshall Heck MD  
Cardio Specialist at [facility]) Appt Note: 6 m f/u  
 [facility] [address] [place] [address] 8/15/2017 10:30 AM  
ULTRASOUND with [facility] ULTRASOUND [facility]. [address] ([facility]) Appt Note: Return in about 6 months (around 8/15/2017) for renal us prior. [address] [facility] [phone]  
  
    
  
 8/22/2017 10:15 AM  
Any with Alisa Carmona MD  
[facility]. [address] ([facility]) Appt Note: Return in about 6 months (around 8/15/2017) for renal us prior. [address] [address]  
[phone]  
  
   
 [facility] 68 [phone] Upcoming Health Maintenance Date Due  
 MEDICARE YEARLY EXAM 3/25/2017 Pneumococcal 65+ Low/Medium Risk (2 of 2 - PPSV23) 9/20/2017 BREAST CANCER SCRN MAMMOGRAM 9/26/2018 GLAUCOMA SCREENING Q2Y 2/2/2019 DTaP/Tdap/Td series (2 - Td) 6/28/2026 COLONOSCOPY 1/23/2027 Allergies as of 3/28/2017  Review Complete On: 3/28/2017 By: Annette Flannery, DO  
  
 Severity Noted Reaction Type Reactions Contrast Dye [Iodine]  10/08/2014    Itching, Swelling Iodinated Contrast Media - Oral And Iv Dye  12/13/2016    Itching, Swelling Seafood  10/08/2014    Itching, Swelling LOBSTER ONLY Statins-hmg-coa Reductase Inhibitors  10/05/2011    Myalgia, Other (comments) Myalgia Sulfa (Sulfonamide Antibiotics)    Itching, Swelling Current Immunizations  Reviewed on 9/15/2014 Name Date Influenza Vaccine 10/5/2016, 9/15/2014, 10/17/2013 Influenza Vaccine Readingdinorah Flores) 9/3/2015 Influenza Vaccine (Quad) PF  Incomplete Influenza Vaccine Split 10/5/2011 Influenza Vaccine Whole 9/20/2012 Pneumococcal Vaccine (Unspecified Type) 9/20/2012, 10/5/2011 TD Vaccine 8/11/2011 Zoster Vaccine, Live 9/3/2015 Not reviewed this visit You Were Diagnosed With   
  
 Codes Comments Routine general medical examination at a health care facility    -  Primary ICD-10-CM: Z00.00 ICD-9-CM: V70.0 Rheumatic heart disease     ICD-10-CM: I09.9 ICD-9-CM: 398.90 Atrial fibrillation, unspecified type ([facility]     ICD-10-CM: I48.91 
ICD-9-CM: 427.31 Chronic atrial fibrillation (HCC)     ICD-10-CM: Z38.9 ICD-9-CM: 427.31 Gastrointestinal hemorrhage associated with gastric ulcer     ICD-10-CM: K25.4 ICD-9-CM: 531.40 Dyslipidemia     ICD-10-CM: E78.5 ICD-9-CM: 272.4 Vitals BP Pulse Temp Resp Height(growth percentile) Weight(growth percentile) 115/77 (BP 1 Location: Left arm, BP Patient Position: Sitting) 86 97.1 °F (36.2 °C) (Oral) 16 5' 4\" (1.626 m) 171 lb 12.8 oz (77.9 kg) SpO2 BMI OB Status Smoking Status 96% 29.49 kg/m2 Postmenopausal Former Smoker BMI and BSA Data Body Mass Index Body Surface Area  
 29.49 kg/m 2 1.88 m 2 Preferred Pharmacy Pharmacy Name Phone [phone] [facility] [phone] Your Updated Medication List  
  
   
 This list is accurate as of: 3/28/17 11:59 AM.  Always use your most recent med list.  
  
  
  
  
 aspirin delayed-release 81 mg tablet  
take 1 tablet by mouth once daily  
  
 ezetimibe 10 mg tablet Commonly known as:  Christiano Javan Take 1 Tab by mouth daily. LUMIGAN 0.01 % ophthalmic drops Generic drug:  bimatoprost  
Administer 1 Drop to left eye nightly. omeprazole 20 mg capsule Commonly known as:  PRILOSEC  
take 1 capsule by mouth twice a day  
  
 prenatal vit-calcium-iron-fa 27 mg iron- 1 mg Tab Commonly known as:  PRENATAL PLUS (CALCIUM CARB) TAKE 1 TABLET BY MOUTH DAILY RESTASIS 0.05 % ophthalmic emulsion Generic drug:  cycloSPORINE Administer 1 Drop to both eyes two (2) times a day. Prescriptions Sent to Pharmacy Refills  
 prenatal vit-calcium-iron-fa (PRENATAL PLUS, CALCIUM CARB,) 27 mg iron- 1 mg tab 12 Sig: TAKE 1 TABLET BY MOUTH DAILY Class: Normal  
 [facility] [address] [place] [address] Donna Ph #: [phone]  
 omeprazole (PRILOSEC) 20 mg capsule 4 Sig: take 1 capsule by mouth twice a day Class: Normal  
 [facility] #: [phone] We Performed the Following AMB POC EKG ROUTINE W/ 12 LEADS, INTER & REP [63403 CPT(R)] Follow-up Instructions Return in about 3 months (around 6/28/2017) for EOV. Introducing \[facility] [place] [facility]\"" & HEALTH SERVICES! Dear Suhail Morales: Thank you for requesting a [employer] account. Our records indicate that you already have an active [employer] account. You can access your account anytime at [email or web address] Did you know that you can access your hospital and ER discharge instructions at any time in [employer]? You can also review all of your test results from your hospital stay or ER visit. Additional Information If you have questions, please visit the Frequently Asked Questions section of the [employer]marth[employer] website at [email or web address] Remember, [employer] is NOT to be used for urgent needs. For medical emergencies, dial 911. Now available from your iPhone and Android! Please provide this summary of care documentation to your next provider. Your primary care clinician is listed as [id number] [place] [address]. If you have any questions after today's visit, please call [phone]. Alert and oriented to person, place and time/No adverse reaction to first time med in ED/Symptoms improved/Awake

## 2018-06-13 ENCOUNTER — OFFICE VISIT (OUTPATIENT)
Dept: CARDIOLOGY CLINIC | Age: 68
End: 2018-06-13

## 2018-06-13 DIAGNOSIS — Z95.0 CARDIAC PACEMAKER IN SITU: ICD-10-CM

## 2018-06-13 DIAGNOSIS — I48.91 ATRIAL FIBRILLATION, UNSPECIFIED TYPE (HCC): Primary | Chronic | ICD-10-CM

## 2018-06-20 NOTE — PROGRESS NOTES
I have personally seen and evaluated the device findings. Interrogation reviewed and I agree with assessment.     Imelda Giron

## 2018-06-25 ENCOUNTER — DOCUMENTATION ONLY (OUTPATIENT)
Dept: CARDIOLOGY CLINIC | Age: 68
End: 2018-06-25

## 2018-06-25 ENCOUNTER — OFFICE VISIT (OUTPATIENT)
Dept: FAMILY MEDICINE CLINIC | Age: 68
End: 2018-06-25

## 2018-06-25 ENCOUNTER — HOSPITAL ENCOUNTER (OUTPATIENT)
Dept: LAB | Age: 68
Discharge: HOME | End: 2018-06-25
Payer: MEDICARE

## 2018-06-25 VITALS
HEIGHT: 64 IN | HEART RATE: 69 BPM | WEIGHT: 170.6 LBS | TEMPERATURE: 95.4 F | BODY MASS INDEX: 29.12 KG/M2 | OXYGEN SATURATION: 99 % | DIASTOLIC BLOOD PRESSURE: 76 MMHG | RESPIRATION RATE: 16 BRPM | SYSTOLIC BLOOD PRESSURE: 120 MMHG

## 2018-06-25 DIAGNOSIS — R30.9 URINARY PAIN: ICD-10-CM

## 2018-06-25 DIAGNOSIS — R30.9 URINARY PAIN: Primary | ICD-10-CM

## 2018-06-25 LAB
BILIRUB UR QL STRIP: NEGATIVE
GLUCOSE UR-MCNC: NEGATIVE MG/DL
KETONES P FAST UR STRIP-MCNC: NEGATIVE MG/DL
PH UR STRIP: 6 [PH] (ref 4.6–8)
PROT UR QL STRIP: NORMAL
SP GR UR STRIP: 1.02 (ref 1–1.03)
UA UROBILINOGEN AMB POC: NORMAL (ref 0.2–1)
URINALYSIS CLARITY POC: NORMAL
URINALYSIS COLOR POC: YELLOW
URINE BLOOD POC: NORMAL
URINE LEUKOCYTES POC: NORMAL
URINE NITRITES POC: NEGATIVE

## 2018-06-25 PROCEDURE — 87086 URINE CULTURE/COLONY COUNT: CPT | Performed by: FAMILY MEDICINE

## 2018-06-25 PROCEDURE — 87077 CULTURE AEROBIC IDENTIFY: CPT | Performed by: FAMILY MEDICINE

## 2018-06-25 PROCEDURE — 87186 SC STD MICRODIL/AGAR DIL: CPT | Performed by: FAMILY MEDICINE

## 2018-06-25 RX ORDER — CIPROFLOXACIN 250 MG/1
250 TABLET, FILM COATED ORAL EVERY 12 HOURS
Qty: 20 TAB | Refills: 0 | Status: SHIPPED | OUTPATIENT
Start: 2018-06-25 | End: 2018-07-05

## 2018-06-25 NOTE — PROGRESS NOTES
Ron Montero is a 79 y.o.  female and presents with    Chief Complaint   Patient presents with    Bladder Infection           Subjective: Additional Concerns: has acute dysuir  a         Patient Active Problem List    Diagnosis Date Noted    Advance directive in chart 06/28/2016    Dyslipidemia     Rheumatic heart disease     Atrial fibrillation     Arthritis 01/06/2016    Glaucoma 01/15/2014     Current Outpatient Prescriptions   Medication Sig Dispense Refill    ciprofloxacin HCl (CIPRO) 250 mg tablet Take 1 Tab by mouth every twelve (12) hours for 10 days. 20 Tab 0    methen-m.blue-s.phos-phsal-hyo (URELLE) 81-10.8-40.8 mg tab tablet Take 1 Tab by mouth four (4) times daily (with meals). If needed for dysuria 30 Tab 12    ferrous sulfate 325 mg (65 mg iron) tablet take 1 tablet by mouth three times a day with meals 100 Tab 10    prenatal vit-calcium-iron-fa (PRENATAL PLUS, CALCIUM CARB,) 27 mg iron- 1 mg tab TAKE 1 TABLET BY MOUTH DAILY 100 Tab 12    omeprazole (PRILOSEC) 20 mg capsule take 1 capsule by mouth twice a day 180 Cap 4    ezetimibe (ZETIA) 10 mg tablet Take 1 Tab by mouth daily. 90 Tab 3    aspirin delayed-release 81 mg tablet Take 2 Tabs by mouth daily. take 1 tablet by mouth once daily 180 Tab 3    triamcinolone acetonide (KENALOG) 0.1 % ointment Apply  to affected area two (2) times a day. use thin layer 30 g 12    acetaminophen-codeine (TYLENOL #2) 300-15 mg tab Take 1 Tab by mouth every four (4) hours as needed for Pain. Max Daily Amount: 6 Tabs. 30 Tab 5    mirabegron ER (MYRBETRIQ) 25 mg ER tablet Take 1 Tab by mouth daily. 30 Tab 11    cycloSPORINE (RESTASIS) 0.05 % ophthalmic emulsion Administer 1 Drop to both eyes two (2) times a day.  bimatoprost (LUMIGAN) 0.01 % ophthalmic drops Administer 1 Drop to left eye nightly.        Allergies   Allergen Reactions    Contrast Dye [Iodine] Itching and Swelling    Iodinated Contrast- Oral And Iv Dye Itching and Swelling    Seafood Itching and Swelling     LOBSTER ONLY    Statins-Hmg-Coa Reductase Inhibitors Myalgia and Other (comments)     Myalgia    Sulfa (Sulfonamide Antibiotics) Itching and Swelling     Past Medical History:   Diagnosis Date    Advance directive in chart     6/28/2016    Amaurosis fugax     Aortic stenosis     mean gradient  26.5 mmHg (CATH 4/13), 32 mmHg (ECHO 9/15)    Aortic valve replacement     21mm MAGNA EASE pericardial  2/16    Arthritis     Atrial appendage resection     2/16      Atrial fibrillation     CHADS score 2  (-CHF, -HTN, -AGE, -DM +AMAUROSIS FUGAX)    Atrial fibrillation ablation     surgical left sided cryoablation  2/16    Cataract     OS    Cervical dysplasia     2009   post leep and cone    COPD     mild  2/16    Duodenal AVM     argon plasma coagulation 2/16    Dyslipidemia     Fibroids     Glaucoma     Lung nodule     3/19/2012   stable    Mitral stenosis     mean gradient  6 mmHg (CATH 4/13), 7.6 mmHg (MANUEL 10/15)    Mitral valve replacement     25 mm MAGNA EASE pericardial  2/16    Pacemaker     dual chamber MEDTRONIC 2/16    Post-op ventricular asystole     02/16    Remote tobacco     Sleep apnea     no CPAP    Thyroid nodule     3/20/2013   multiple    TIA (transient ischemic attack)      Past Surgical History:   Procedure Laterality Date    COLONOSCOPY N/A 1/23/2017    [facility]    HX AFIB ABLATION      2/16  surgical cryoablation    HX AORTIC VALVE REPLACEMENT      2/16    HX BREAST BIOPSY Right 10/13/2014    Right breast needle [facility]OC biopsy performed by Robert Queen MD at [address] [place] [address] HX BREAST LUMPECTOMY      Right    HX BUNIONECTOMY      left    HX GYN      VAINIII, VELASQUEZ 1, keratinous labial cyst    HX GYN      2012  Cold knife conization of cervix    HX LEEP PROCEDURE      2010    HX MITRAL VALVE REPLACEMENT      2/16    HX OTHER SURGICAL      lump left neck, benign    HX OTHER SURGICAL      multiple breast aspirations    HX PACEMAKER      I&D OF VULVA/PERINEUM ABSCESS  3/21/2017          Family History   Problem Relation Age of Onset    Cancer Mother      oral,    Frannie Morales Alzheimer Father         Frannie Morales Breast Cancer Maternal Aunt      unsure age   Frannie Morales Colon Cancer Maternal Grandfather      Social History   Substance Use Topics    Smoking status: Former Smoker     Years: 37.00     Quit date: 2004    Smokeless tobacco: Never Used      Comment: stop around     Alcohol use No       ROS       All other systems reviewed and are negative. Objective:  Vitals:    18 1016   BP: 120/76   Pulse: 69   Resp: 16   Temp: 95.4 °F (35.2 °C)   TempSrc: Oral   SpO2: 99%   Weight: 170 lb 9.6 oz (77.4 kg)   Height: 5' 4\" (1.626 m)   PainSc:   7   PainLoc: Back                 alert, well appearing, and in no distress and oriented to person, place, and time  Chest - clear to auscultation, no wheezes, rales or rhonchi, symmetric air entry  Heart - normal rate, regular rhythm, normal S1, S2, no murmurs, rubs, clicks or gallops  Abdomen - soft, nontender, nondistended, no masses or organomegaly        LABS   ua with signs of uti  TESTS      Assessment/Plan:    uti  Cover with cipro pending culture    Lab review:     Diagnoses and all orders for this visit:    1. Urinary pain  -     AMB POC URINALYSIS DIP STICK AUTO W/O MICRO  -     CULTURE, URINE; Future    Other orders  -     ciprofloxacin HCl (CIPRO) 250 mg tablet; Take 1 Tab by mouth every twelve (12) hours for 10 days. I have discussed the diagnosis with the patient and the intended plan as seen in the above orders. The patient has received an after-visit summary and questions were answered concerning future plans. I have discussed medication side effects and warnings with the patient as well.  I have reviewed the plan of care with the patient, accepted their input and they are in agreement with the treatment goals. Follow-up Disposition:  Return if symptoms worsen or fail to improve.

## 2018-06-25 NOTE — PROGRESS NOTES
Ben Heath is a 79 y.o. female presents today for possible UTI. Patient reports urinary frequency, urinary pain and flank pain since Friday. Pt is in Room # tx          1. Have you been to the ER, urgent care clinic since your last visit? Hospitalized since your last visit? No    2. Have you seen or consulted any other health care providers outside of the [address] [place] [address] since your last visit? Include any pap smears or colon screening. No     Health Maintenance reviewed - .

## 2018-06-25 NOTE — PROGRESS NOTES
Incoming fax from [facility]. Two patient Identifiers confirmed. They are requesting that zetia be changed to fenofibrate. medication originally rx by Dr Coty Maharaj. This note is for documentation of receipt only.

## 2018-06-25 NOTE — MR AVS SNAPSHOT
[address] [facility] 
 
 
 [address] [facility] [address]  [address] [address] 
[phone] Patient: Ham Knowles MRN: [id number] Visit Information Date & Time Provider Department Dept. Phone Encounter #  
 6/25/2018  9:30 AM Brigid Bland [phone] [id number] Follow-up Instructions Return if symptoms worsen or fail to improve. Follow-up and Disposition History Your Appointments 8/21/2018 10:45 AM  
Follow Up with Pee Mckenzie MD  
Cardio Specialist at [facility]) Appt Note: 9 months follow up; 6 months [facility] [address] 9/12/2018  2:20 PM  
CARELINK with Pacer Hv Csi Cardiovascular Specialists [facility] [facility]) Appt Note: 3 month after June check [place] [place] [id number]  
[phone] [place]  
  
    
 12/12/2018  2:20 PM  
CARELINK with Pacer Hv Csi Cardiovascular Specialists [facility] [facility] Appt Note: 3 month check after September [place] [place] [id number]  
[phone] Upcoming Health Maintenance Date Due  
 BREAST CANCER SCRN MAMMOGRAM 9/26/2018 Influenza Age 5 to Adult 8/1/2018 Pneumococcal 65+ Low/Medium Risk (2 of 2 - PPSV23) 9/21/2018 MEDICARE YEARLY EXAM 3/9/2019 GLAUCOMA SCREENING Q2Y 8/8/2019 DTaP/Tdap/Td series (2 - Td) 6/28/2026 COLONOSCOPY 1/23/2027 Allergies as of 6/25/2018  Review Complete On: 6/25/2018 By: Danii Ulrich,  Severity Noted Reaction Type Reactions Contrast Dye [Iodine]  10/08/2014    Itching, Swelling Iodinated Contrast- Oral And Iv Dye  12/13/2016    Itching, Swelling Seafood  10/08/2014    Itching, Swelling LOBSTER ONLY Statins-hmg-coa Reductase Inhibitors  10/05/2011    Myalgia, Other (comments) Myalgia Sulfa (Sulfonamide Antibiotics)    Itching, Swelling Current Immunizations  Reviewed on 9/15/2014 Name Date Influenza High Dose Vaccine PF 9/21/2017 11:44 AM  
 Influenza Vaccine 10/5/2016, 9/15/2014, 10/17/2013 Influenza Vaccine Hallie Gloria) 9/3/2015 Influenza Vaccine Split 10/5/2011 Influenza Vaccine Whole 9/20/2012 Pneumococcal Conjugate (PCV-13) 9/21/2017 11:45 AM  
 TD Vaccine 8/11/2011 ZZZ-RETIRED (DO NOT USE) Pneumococcal Vaccine (Unspecified Type) 9/20/2012, 10/5/2011 Zoster Vaccine, Live 9/3/2015 Not reviewed this visit You Were Diagnosed With   
  
 Codes Comments Urinary pain    -  Primary ICD-10-CM: R30.9 ICD-9-CM: 616. 1 Vitals BP Pulse Temp Resp Height(growth percentile) Weight(growth percentile) 120/76 (BP 1 Location: Left arm, BP Patient Position: Sitting) 69 95.4 °F (35.2 °C) (Oral) 16 5' 4\" (1.626 m) 170 lb 9.6 oz (77.4 kg) SpO2 BMI OB Status Smoking Status 99% 29.28 kg/m2 Postmenopausal Former Smoker BMI and BSA Data Body Mass Index Body Surface Area  
 29.28 kg/m 2 1.87 m 2 Preferred Pharmacy Pharmacy Name Phone [phone] [facility] [phone] Your Updated Medication List  
  
   
This list is accurate as of 6/25/18 11:03 AM.  Always use your most recent med list.  
  
  
  
  
 acetaminophen-codeine 300-15 mg Tab Commonly known as:  TYLENOL #2 Take 1 Tab by mouth every four (4) hours as needed for Pain. Max Daily Amount: 6 Tabs. aspirin delayed-release 81 mg tablet Take 2 Tabs by mouth daily. take 1 tablet by mouth once daily  
  
 ciprofloxacin HCl 250 mg tablet Commonly known as:  CIPRO Take 1 Tab by mouth every twelve (12) hours for 10 days. ezetimibe 10 mg tablet Commonly known as:  Ros[place] Chain Take 1 Tab by mouth daily. ferrous sulfate 325 mg (65 mg iron) tablet  
take 1 tablet by mouth three times a day with meals LUMIGAN 0.01 % ophthalmic drops Generic drug:  bimatoprost  
Administer 1 Drop to left eye nightly. jocelyne-m.blue-s.phos-phsal-hyo 81-10.8-40.8 mg Tab tablet Commonly known as:  Niels Genao Take 1 Tab by mouth four (4) times daily (with meals). If needed for dysuria  
  
 mirabegron ER 25 mg ER tablet Commonly known as:  MYRBETRIQ Take 1 Tab by mouth daily. omeprazole 20 mg capsule Commonly known as:  PRILOSEC  
take 1 capsule by mouth twice a day  
  
 prenatal vit-calcium-iron-fa 27 mg iron- 1 mg Tab Commonly known as:  PRENATAL PLUS (CALCIUM CARB) TAKE 1 TABLET BY MOUTH DAILY RESTASIS 0.05 % ophthalmic emulsion Generic drug:  cycloSPORINE Administer 1 Drop to both eyes two (2) times a day. triamcinolone acetonide 0.1 % ointment Commonly known as:  KENALOG Apply  to affected area two (2) times a day. use thin layer Prescriptions Sent to Pharmacy Refills  
 ciprofloxacin HCl (CIPRO) 250 mg tablet 0 Sig: Take 1 Tab by mouth every twelve (12) hours for 10 days. Class: Normal  
 [facility] [place]  #: [phone] Route: Oral  
  
We Performed the Following AMB POC URINALYSIS DIP STICK AUTO W/O MICRO [44480 CPT(R)] Follow-up Instructions Return if symptoms worsen or fail to improve. Introducing [facility] & HEALTH SERVICES! Dear Benito Hernandez: Thank you for requesting a [employer] account. Our records indicate that you already have an active [employer] account. You can access your account anytime at [email or web address] Did you know that you can access your hospital and ER discharge instructions at any time in [employer]? You can also review all of your test results from your hospital stay or ER visit. Additional Information If you have questions, please visit the Frequently Asked Questions section of the TYFFONt website at [email or web address] Remember, [employer] is NOT to be used for urgent needs. For medical emergencies, dial 911. Now available from your iPhone and Android! Please provide this summary of care documentation to your next provider. Your primary care clinician is listed as [id number] [facility]. If you have any questions after today's visit, please call [phone].

## 2018-06-28 LAB
BACTERIA SPEC CULT: ABNORMAL
SERVICE CMNT-IMP: ABNORMAL

## 2018-07-02 ENCOUNTER — TELEPHONE (OUTPATIENT)
Dept: FAMILY MEDICINE CLINIC | Age: 68
End: 2018-07-02

## 2018-07-02 NOTE — TELEPHONE ENCOUNTER
Spoke with patient after discussing with Dr. Cristina Pillai. Patient was informed that her urine culture was negative for any bacteria and that the saira was likely contaminated by her genital skin. Patient was advised that the antibiotic that she was prescribed would help with the founded saira. Patient was also recommended to follow up with urology if symptoms continue or worsen. Patient acknowledges verbalized understanding and no further action is required.

## 2018-07-02 NOTE — TELEPHONE ENCOUNTER
Patient called to report that she continues with the same urinary symptoms and the antibiotic is not working. Please advise.

## 2018-07-09 DIAGNOSIS — R13.19 ESOPHAGEAL DYSPHAGIA: ICD-10-CM

## 2018-07-09 DIAGNOSIS — K25.4 GASTROINTESTINAL HEMORRHAGE ASSOCIATED WITH GASTRIC ULCER: ICD-10-CM

## 2018-07-09 DIAGNOSIS — Z00.00 ROUTINE GENERAL MEDICAL EXAMINATION AT A HEALTH CARE FACILITY: ICD-10-CM

## 2018-07-09 DIAGNOSIS — I09.9 RHEUMATIC HEART DISEASE: ICD-10-CM

## 2018-07-09 DIAGNOSIS — M19.90 ARTHRITIS: ICD-10-CM

## 2018-07-09 DIAGNOSIS — E78.5 DYSLIPIDEMIA: Chronic | ICD-10-CM

## 2018-07-09 DIAGNOSIS — L30.9 ECZEMA, UNSPECIFIED TYPE: ICD-10-CM

## 2018-07-09 DIAGNOSIS — I48.91 ATRIAL FIBRILLATION, UNSPECIFIED TYPE (HCC): Chronic | ICD-10-CM

## 2018-07-09 DIAGNOSIS — I35.0 AORTIC VALVE STENOSIS, UNSPECIFIED ETIOLOGY: Chronic | ICD-10-CM

## 2018-07-09 RX ORDER — OMEPRAZOLE 20 MG/1
CAPSULE, DELAYED RELEASE ORAL
Qty: 180 CAP | Refills: 4 | Status: SHIPPED | OUTPATIENT
Start: 2018-07-09 | End: 2019-03-27 | Stop reason: SDUPTHER

## 2018-07-27 NOTE — PROGRESS NOTES
Spoke to patient regarding Cori Reyes Prep and to follow instructions to take for procedure on July 30.   Per DSG

## 2018-07-30 ENCOUNTER — HOSPITAL ENCOUNTER (OUTPATIENT)
Dept: CT IMAGING | Age: 68
Discharge: HOME | End: 2018-07-30
Attending: NEUROLOGICAL SURGERY
Payer: MEDICARE

## 2018-07-30 ENCOUNTER — HOSPITAL ENCOUNTER (OUTPATIENT)
Dept: GENERAL RADIOLOGY | Age: 68
Discharge: HOME | End: 2018-07-30
Attending: RADIOLOGY | Admitting: RADIOLOGY
Payer: MEDICARE

## 2018-07-30 VITALS
DIASTOLIC BLOOD PRESSURE: 73 MMHG | RESPIRATION RATE: 16 BRPM | HEIGHT: 65 IN | TEMPERATURE: 97.2 F | OXYGEN SATURATION: 99 % | SYSTOLIC BLOOD PRESSURE: 117 MMHG | BODY MASS INDEX: 28.21 KG/M2 | WEIGHT: 169.31 LBS

## 2018-07-30 DIAGNOSIS — M50.120 CERVICAL DISC DISORDER WITH RADICULOPATHY OF MID-CERVICAL REGION: ICD-10-CM

## 2018-07-30 PROCEDURE — 74011000250 HC RX REV CODE- 250: Performed by: RADIOLOGY

## 2018-07-30 PROCEDURE — 74011250637 HC RX REV CODE- 250/637: Performed by: RADIOLOGY

## 2018-07-30 PROCEDURE — 74011636320 HC RX REV CODE- 636/320: Performed by: RADIOLOGY

## 2018-07-30 PROCEDURE — 72126 CT NECK SPINE W/DYE: CPT

## 2018-07-30 PROCEDURE — 62302 MYELOGRAPHY LUMBAR INJECTION: CPT

## 2018-07-30 RX ORDER — DIAZEPAM 5 MG/1
10 TABLET ORAL
Status: COMPLETED | OUTPATIENT
Start: 2018-07-30 | End: 2018-07-30

## 2018-07-30 RX ORDER — LIDOCAINE HYDROCHLORIDE 10 MG/ML
1-5 INJECTION INFILTRATION; PERINEURAL
Status: DISCONTINUED | OUTPATIENT
Start: 2018-07-30 | End: 2018-07-30 | Stop reason: CLARIF

## 2018-07-30 RX ORDER — PREDNISOLONE ACETATE 10 MG/ML
1 SUSPENSION/ DROPS OPHTHALMIC 4 TIMES DAILY
COMMUNITY
End: 2018-10-15

## 2018-07-30 RX ORDER — HYDROCODONE BITARTRATE AND ACETAMINOPHEN 5; 325 MG/1; MG/1
1 TABLET ORAL
Status: DISCONTINUED | OUTPATIENT
Start: 2018-07-30 | End: 2018-07-30 | Stop reason: HOSPADM

## 2018-07-30 RX ORDER — LIDOCAINE HYDROCHLORIDE 10 MG/ML
1-5 INJECTION, SOLUTION EPIDURAL; INFILTRATION; INTRACAUDAL; PERINEURAL
Status: COMPLETED | OUTPATIENT
Start: 2018-07-30 | End: 2018-07-30

## 2018-07-30 RX ADMIN — IOPAMIDOL 15 ML: 612 INJECTION, SOLUTION INTRATHECAL at 12:29

## 2018-07-30 RX ADMIN — DIAZEPAM 10 MG: 5 TABLET ORAL at 11:57

## 2018-07-30 RX ADMIN — LIDOCAINE HYDROCHLORIDE 3 ML: 10 INJECTION, SOLUTION EPIDURAL; INFILTRATION; INTRACAUDAL; PERINEURAL at 12:28

## 2018-07-30 NOTE — PROGRESS NOTES
Vascular & Interventional Radiology Brief Procedure Note    Interventional Radiologist: Gunnar Carmichael MD    Assistants: None    Pre-operative Diagnosis:  Cervical radiculopathy    Post-operative Diagnosis: Same as pre-op dx    Procedure(s) Performed:  Cervical myelogram  Via lumbar puncture    Anesthesia:  Local     Findings:  Clear csf    Complications: None    Estimated Blood Loss:  minimal    Tubes and Drains: None    Specimens: None    Condition: Good    Disposition:  To CT    Plan: discharge      Gunnar Carmichael MD  [address] [address]  Vascular & Interventional Radiology  7/30/2018

## 2018-07-30 NOTE — DISCHARGE INSTRUCTIONS
Valium (Diazepam) Discharge instructions    You received the narcotic listed above. This medication was given to you to help decrease your anxiety/discomfort and allow you to complete your examination       Common Side Effects:    Impairment of memory for up to 24 hours, sleepiness, slurred speech, dizziness, visual disturbance such as blurred vision or difficulty focusing, confusion, euphoria (pain relief), chills, ears feeling blocked or dreaming. It is recommended that you do not drive or operate equipment for at least 24 hours. You should not drink any alcoholic beverages for at least 24 hours. Do not take any other muscle relaxers, sedatives, hypnotics, or mood altering medications for 24 hours unless ordered by your physician who is aware that you received this medication. Call your physician for any questions or problems. He/She may contact this Radiology Department for further information. Salina Gonzalez RN       Myelogram: About This Test  What is it? A myelogram uses X-rays and a special dye to make pictures of bones and nerves of the spine (spinal canal). The spinal canal holds the spinal cord, the spinal nerve roots, and the fluid-filled space between the bones in your spine. Why is this test done? A myelogram is done to check for:  · The cause of arm or leg numbness, weakness, or pain. · Narrowing of the spinal canal (spinal stenosis). · A tumor or infection causing problems with the spinal cord or nerve roots. · A spinal disc that has ruptured (herniated disc). · Inflammation of the membrane that covers the brain and spinal cord. · Problems with the blood vessels to the spine. How can you prepare for the test?  Your doctor will tell you if you need to change how much you eat and drink before the myelogram. You may be asked to increase the amount of water you drink before the test. Follow the instructions your doctor gives you about eating and drinking.   Before a myelogram, tell your doctor if:  · You are allergic to any medicines, contrast material, or iodine dye. · You have had bleeding problems, or you take a blood thinner, such as aspirin, clopidogrel (Plavix), or warfarin (Coumadin), or you take over-the-counter medicines, such as ibuprofen (Advil, Motrin) or naproxen (Aleve). Your doctor will tell you when you should stop taking these medicines several days before your procedure. Make sure that you understand exactly what he or she wants you to do. · You have had kidney problems. · You have diabetes, especially if you take metformin (Glucophage, for example). · You are or might be pregnant. Ask someone to take you home and stay with you after the test.  What happens during the test?  The dye is put into your spinal canal with a thin needle. This is called a lumbar puncture. The dye moves through the space so the nerve roots and spinal cord can be seen more clearly. After the dye is put in, you will lie still while the X-ray pictures are taken. How does it feel? You will feel a quick sting from a small needle that has medicine to numb the skin on your back. You will also feel some pressure as the long, thin spinal needle is put into your spinal canal. You may feel a quick sharp pain down your buttock or leg when the needle is moved in your spine. The dye may make you feel warm and flushed and have a metallic taste in your mouth. What else should you know about the test?  In rare cases, the hole made by the needle in the sac around the spine does not close normally. This can allow spinal fluid to leak out. This leak may need to be repaired through a procedure called an epidural blood patch. To do the patch, your doctor injects some of your own blood to cover the hole. How long does the test take? · A myelogram usually takes 30 minutes to 1 hour.   What happens after the test?  · You will probably be able to go home 30 minutes to 2 hours after the test.  · You may need to lie in bed with your head raised for 4 to 24 hours after the test. To prevent seizures, which are a rare side effect, do not bend over or lie down with your head lower than your body. Keeping your head higher than your body after a myelogram also may help prevent or reduce other side effects, such as headache, nausea, or vomiting. · Avoid strenuous activity, such as running or heavy lifting, for at least 1 day after your myelogram.  · Drink plenty of water after the myelogram. Your doctor will give you instructions on taking your regular medicines. When should you call for help? Call 911 anytime you think you may need emergency care. For example, call if:  · You have a seizure. Call your doctor now or seek immediate medical care if:  · You have any increase in pain, weakness, or numbness in your legs. · You have a severe headache or stiff neck, or your eyes become very sensitive to light. · You have a headache that lasts longer than 24 hours. · You have problems urinating or having a bowel movement. · You have a fever. Follow-up care is a key part of your treatment and safety. Be sure to make and go to all appointments, and call your doctor if you are having problems. It's also a good idea to keep a list of the medicines you take. Ask your doctor when you can expect to have your test results. Where can you learn more? Go to http://diogenes-lee.info/. Enter M177 in the search box to learn more about \"Myelogram: About This Test.\"  Current as of: October 9, 2017  Content Version: 11.7  © 5616-7916 [employer]. Care instructions adapted under license by [employer] (which disclaims liability or warranty for this information). If you have questions about a medical condition or this instruction, always ask your healthcare professional. [place] [address] any warranty or liability for your use of this information.     DISCHARGE SUMMARY from Nurse    PATIENT INSTRUCTIONS:    After general anesthesia or intravenous sedation, for 24 hours or while taking prescription Narcotics:  · Limit your activities  · Do not drive and operate hazardous machinery  · Do not make important personal or business decisions  · Do  not drink alcoholic beverages  · If you have not urinated within 8 hours after discharge, please contact your surgeon on call. Report the following to your surgeon:  · Excessive pain, swelling, redness or odor of or around the surgical area  · Temperature over 100.5  · Nausea and vomiting lasting longer than 4 hours or if unable to take medications  · Any signs of decreased circulation or nerve impairment to extremity: change in color, persistent  numbness, tingling, coldness or increase pain  · Any questions    What to do at Home:  Recommended activity: Activity as tolerated and no driving for today    These are general instructions for a healthy lifestyle:    No smoking/ No tobacco products/ Avoid exposure to second hand smoke  Surgeon General's Warning:  Quitting smoking now greatly reduces serious risk to your health. Obesity, smoking, and sedentary lifestyle greatly increases your risk for illness    A healthy diet, regular physical exercise & weight monitoring are important for maintaining a healthy lifestyle    You may be retaining fluid if you have a history of heart failure or if you experience any of the following symptoms:  Weight gain of 3 pounds or more overnight or 5 pounds in a week, increased swelling in our hands or feet or shortness of breath while lying flat in bed. Please call your doctor as soon as you notice any of these symptoms; do not wait until your next office visit.     Recognize signs and symptoms of STROKE:    F-face looks uneven    A-arms unable to move or move unevenly    S-speech slurred or non-existent    T-time-call 911 as soon as signs and symptoms begin-DO NOT go       Back to bed or wait to see if you get better-TIME IS BRAIN. Warning Signs of HEART ATTACK     Call 911 if you have these symptoms:   Chest discomfort. Most heart attacks involve discomfort in the center of the chest that lasts more than a few minutes, or that goes away and comes back. It can feel like uncomfortable pressure, squeezing, fullness, or pain.  Discomfort in other areas of the upper body. Symptoms can include pain or discomfort in one or both arms, the back, neck, jaw, or stomach.  Shortness of breath with or without chest discomfort.  Other signs may include breaking out in a cold sweat, nausea, or lightheadedness. Don't wait more than five minutes to call 911 - MINUTES MATTER! Fast action can save your life. Calling 911 is almost always the fastest way to get lifesaving treatment. Emergency Medical Services staff can begin treatment when they arrive -- up to an hour sooner than if someone gets to the hospital by car. The discharge information has been reviewed with the patient. The patient verbalized understanding. Discharge medications reviewed with the patient and appropriate educational materials and side effects teaching were provided. Patient armband removed and given to patient to take home. Patient was informed of the privacy risks if armband lost or stolen.

## 2018-07-30 NOTE — PROGRESS NOTES
met with Mc Gaines, who is a 79 y.o.,female to assist in the completion of a [address] 15. Patients Primary Language is: [place]. The reason the Patient was admitted to is:   Patient Active Problem List    Diagnosis Date Noted    Advance directive in chart 06/28/2016    Dyslipidemia     Rheumatic heart disease     Atrial fibrillation     Arthritis 01/06/2016    Glaucoma 01/15/2014        The  provided the following Interventions:  Chart reviewed. Initiated a relationship of care and support. Explored mental status and with the patient's Nurse (*) who confirmed that the Patient was not on any medications that would effect the patients ability to, nor would the patient's mental status effect the ability to execute an Advance Medical Directive. Questioned patient as to date, location, and name. Provided education on the Bon[address] 15. Offered assurance of continued prayers on patients behalf. The following outcomes were achieved:  Patient was able to state date, location, and name. Patient executed a [address] 15, completing Section I:    Appointment and [place] Monk of [employer]  Patient completed Section II: My Health Care Instruction  Patient completed Section III: Anatomical Gifts. Patient expressed gratitude for pastoral care visit. Assessment:  There are no further spiritual or Hinduism issues which require Spiritual Care Services interventions at this time. Plan:  Chaplains will continue to follow and will provide pastoral care on an as needed/requested basis.  recommends bedside caregivers page  on duty if patient shows signs of acute spiritual or emotional distress.

## 2018-07-30 NOTE — ACP (ADVANCE CARE PLANNING)
met with Stacey Cabral, who is a 79 y.o.,female to assist in the completion of a [address] 15. Patients Primary Language is: [place]. The reason the Patient was admitted to is:   Patient Active Problem List    Diagnosis Date Noted    Advance directive in chart 06/28/2016    Dyslipidemia     Rheumatic heart disease     Atrial fibrillation     Arthritis 01/06/2016    Glaucoma 01/15/2014        The  provided the following Interventions:  Chart reviewed. Initiated a relationship of care and support. Patient's boyfriend Radha Cabral was also present. Explored mental status and with the patient's Nurse Ronn Hernadez RN who confirmed that the Patient was not on any medications that would effect the patients ability to, nor would the patient's mental status effect the ability to execute an Advance Medical Directive. Questioned patient as to date, location, and name. Provided education on the Bonaröd 15. Offered assurance of prayer on patients behalf. Placed a copy of the executed Bon[address] 15 in the patients paper chart and returned the original to the patient. The following outcomes were achieved:  Patient was able to state date, location, and name. Patient executed a [address] 15, completing Section I:    Appointment and Mavis Ojeda of [employer]  Patient completed Section II: My Health Care Instruction  Patient completed Section III: Anatomical Gifts. Patient expressed gratitude for pastoral care visit. Assessment:  There are no further spiritual or Tenriism issues which require Spiritual Care Services interventions at this time. Plan:  Chaplains will continue to follow and will provide pastoral care on an as needed/requested basis.    recommends bedside caregivers page  on duty if patient shows signs of acute spiritual or emotional distress.

## 2018-07-30 NOTE — IP AVS SNAPSHOT
303 [facility] 
 
 
 [address] Patient: Tomas Lizama MRN: [id number] ODT:3/04/2439 About your hospitalization You were admitted on:  July 30, 2018 You last received care in the:  [address] [place] [address] You were discharged on:  July 30, 2018 Why you were hospitalized Your primary diagnosis was:  Not on File Follow-up Information Follow up With Details Comments Contact [place]   [address] [facility] 400 [facility] [place] [address] 
[phone] Your Scheduled Appointments Tuesday August 21, 2018 10:45 AM EDT Follow Up with Mihai Munguia MD  
Cardio Specialist at [facility]) [address] [facility] [id number]  
[phone] Wednesday September 12, 2018  2:20 PM EDT CARELINK with [address] [place] [address] [facility] [place] ([facility]) Akbar  Tien [address]  
[phone] Discharge Orders None A check henry indicates which time of day the medication should be taken. My Medications ASK your doctor about these medications Instructions Each Dose to Equal  
 Morning Noon Evening Bedtime  
 acetaminophen-codeine 300-15 mg Tab Commonly known as:  TYLENOL #2 Your last dose was: Your next dose is: Take 1 Tab by mouth every four (4) hours as needed for Pain. Max Daily Amount: 6 Tabs. 1 Tab  
    
   
   
   
  
 aspirin delayed-release 81 mg tablet Your last dose was: Your next dose is: Take 2 Tabs by mouth daily. take 1 tablet by mouth once daily 162 mg  
    
   
   
   
  
 ezetimibe 10 mg tablet Commonly known as:  Loulou Plant Your last dose was: Your next dose is: Take 1 Tab by mouth daily.   
 10 mg  
    
   
   
   
  
 ferrous sulfate 325 mg (65 mg iron) tablet Your last dose was: Your next dose is:    
   
   
 take 1 tablet by mouth three times a day with meals  
     
   
   
   
  
 mirabegron ER 25 mg ER tablet Commonly known as:  MYRBETRIQ Your last dose was: Your next dose is: Take 1 Tab by mouth daily for 90 days. 25 mg  
    
   
   
   
  
 omeprazole 20 mg capsule Commonly known as:  PRILOSEC Your last dose was: Your next dose is:    
   
   
 take 1 capsule by mouth twice a day  
     
   
   
   
  
 prednisoLONE acetate 1 % ophthalmic suspension Commonly known as:  PRED FORTE Your last dose was: Your next dose is:    
   
   
 Administer 1 Drop to both eyes four (4) times daily. 1 Drop  
    
   
   
   
  
 prenatal vit-calcium-iron-fa 27 mg iron- 1 mg Tab Commonly known as:  PRENATAL PLUS (CALCIUM CARB) Your last dose was: Your next dose is: TAKE 1 TABLET BY MOUTH DAILY PROBIOTIC 4X 10-15 mg Tbec Generic drug:  B.infantis-B.ani-B.long-B.bifi Your last dose was: Your next dose is: Take  by mouth.  
     
   
   
   
  
 triamcinolone acetonide 0.1 % ointment Commonly known as:  KENALOG Your last dose was: Your next dose is:    
   
   
 Apply  to affected area two (2) times a day. use thin layer Opioid Education Prescription Opioids: What You Need to Know: 
 
 
Impairment of memory for up to 24 hours, sleepiness, slurred speech, dizziness, visual disturbance such as blurred vision or difficulty focusing, confusion, euphoria (pain relief), chills, ears feeling blocked or dreaming. It is recommended that you do not drive or operate equipment for at least 24 hours. You should not drink any alcoholic beverages for at least 24 hours. Do not take any other muscle relaxers, sedatives, hypnotics, or mood altering medications for 24 hours unless ordered by your physician who is aware that you received this medication. Call your physician for any questions or problems. He/She may contact this Radiology Department for further information. Jake Stevens RN Myelogram: About This Test 
What is it? A myelogram uses X-rays and a special dye to make pictures of bones and nerves of the spine (spinal canal). The spinal canal holds the spinal cord, the spinal nerve roots, and the fluid-filled space between the bones in your spine. Why is this test done? A myelogram is done to check for: · The cause of arm or leg numbness, weakness, or pain. · Narrowing of the spinal canal (spinal stenosis). · A tumor or infection causing problems with the spinal cord or nerve roots. · A spinal disc that has ruptured (herniated disc). · Inflammation of the membrane that covers the brain and spinal cord. · Problems with the blood vessels to the spine. How can you prepare for the test? 
Your doctor will tell you if you need to change how much you eat and drink before the myelogram. You may be asked to increase the amount of water you drink before the test. Follow the instructions your doctor gives you about eating and drinking. Before a myelogram, tell your doctor if: 
· You are allergic to any medicines, contrast material, or iodine dye.  
· You have had bleeding problems, or you take a blood thinner, such as aspirin, clopidogrel (Plavix), or warfarin (Coumadin), or you take over-the-counter medicines, such as ibuprofen (Advil, Motrin) or naproxen (Aleve). Your doctor will tell you when you should stop taking these medicines several days before your procedure. Make sure that you understand exactly what he or she wants you to do. · You have had kidney problems. · You have diabetes, especially if you take metformin (Glucophage, for example). · You are or might be pregnant. Ask someone to take you home and stay with you after the test. 
What happens during the test? 
The dye is put into your spinal canal with a thin needle. This is called a lumbar puncture. The dye moves through the space so the nerve roots and spinal cord can be seen more clearly. After the dye is put in, you will lie still while the X-ray pictures are taken. How does it feel? You will feel a quick sting from a small needle that has medicine to numb the skin on your back. You will also feel some pressure as the long, thin spinal needle is put into your spinal canal. You may feel a quick sharp pain down your buttock or leg when the needle is moved in your spine. The dye may make you feel warm and flushed and have a metallic taste in your mouth. What else should you know about the test? 
In rare cases, the hole made by the needle in the sac around the spine does not close normally. This can allow spinal fluid to leak out. This leak may need to be repaired through a procedure called an epidural blood patch. To do the patch, your doctor injects some of your own blood to cover the hole. How long does the test take? · A myelogram usually takes 30 minutes to 1 hour.  
What happens after the test? 
· You will probably be able to go home 30 minutes to 2 hours after the test. 
· You may need to lie in bed with your head raised for 4 to 24 hours after the test. To prevent seizures, which are a rare side effect, do not bend over or lie down with your head lower than your body. Keeping your head higher than your body after a myelogram also may help prevent or reduce other side effects, such as headache, nausea, or vomiting. · Avoid strenuous activity, such as running or heavy lifting, for at least 1 day after your myelogram. 
· Drink plenty of water after the myelogram. Your doctor will give you instructions on taking your regular medicines. When should you call for help? Call 911 anytime you think you may need emergency care. For example, call if: 
· You have a seizure. Call your doctor now or seek immediate medical care if: 
· You have any increase in pain, weakness, or numbness in your legs. · You have a severe headache or stiff neck, or your eyes become very sensitive to light. · You have a headache that lasts longer than 24 hours. · You have problems urinating or having a bowel movement. · You have a fever. Follow-up care is a key part of your treatment and safety. Be sure to make and go to all appointments, and call your doctor if you are having problems. It's also a good idea to keep a list of the medicines you take. Ask your doctor when you can expect to have your test results. Where can you learn more? Go to http://diogenes-lee.info/. Enter J551 in the search box to learn more about \"Myelogram: About This Test.\" Current as of: October 9, 2017 Content Version: 11.7 © 3518-0881 [employer]. Care instructions adapted under license by [employer] (which disclaims liability or warranty for this information). If you have questions about a medical condition or this instruction, always ask your healthcare professional. [place] [address] any warranty or liability for your use of this information. DISCHARGE SUMMARY from Nurse PATIENT INSTRUCTIONS: 
 
After general anesthesia or intravenous sedation, for 24 hours or while taking prescription Narcotics: · Limit your activities · Do not drive and operate hazardous machinery · Do not make important personal or business decisions · Do  not drink alcoholic beverages · If you have not urinated within 8 hours after discharge, please contact your surgeon on call. Report the following to your surgeon: 
· Excessive pain, swelling, redness or odor of or around the surgical area · Temperature over 100.5 · Nausea and vomiting lasting longer than 4 hours or if unable to take medications · Any signs of decreased circulation or nerve impairment to extremity: change in color, persistent  numbness, tingling, coldness or increase pain · Any questions What to do at Home: 
Recommended activity: Activity as tolerated and no driving for today These are general instructions for a healthy lifestyle: No smoking/ No tobacco products/ Avoid exposure to second hand smoke Surgeon General's Warning:  Quitting smoking now greatly reduces serious risk to your health. Obesity, smoking, and sedentary lifestyle greatly increases your risk for illness A healthy diet, regular physical exercise & weight monitoring are important for maintaining a healthy lifestyle You may be retaining fluid if you have a history of heart failure or if you experience any of the following symptoms:  Weight gain of 3 pounds or more overnight or 5 pounds in a week, increased swelling in our hands or feet or shortness of breath while lying flat in bed. Please call your doctor as soon as you notice any of these symptoms; do not wait until your next office visit. Recognize signs and symptoms of STROKE: 
 
F-face looks uneven A-arms unable to move or move unevenly S-speech slurred or non-existent T-time-call 911 as soon as signs and symptoms begin-DO NOT go Back to bed or wait to see if you get better-TIME IS BRAIN. Warning Signs of HEART ATTACK Call 911 if you have these symptoms: ? Chest discomfort. Most heart attacks involve discomfort in the center of the chest that lasts more than a few minutes, or that goes away and comes back. It can feel like uncomfortable pressure, squeezing, fullness, or pain. ? Discomfort in other areas of the upper body. Symptoms can include pain or discomfort in one or both arms, the back, neck, jaw, or stomach. ? Shortness of breath with or without chest discomfort. ? Other signs may include breaking out in a cold sweat, nausea, or lightheadedness. Don't wait more than five minutes to call [address]! Fast action can save your life. Calling 911 is almost always the fastest way to get lifesaving treatment. Emergency Medical Services staff can begin treatment when they arrive  up to an hour sooner than if someone gets to the hospital by car. The discharge information has been reviewed with the patient. The patient verbalized understanding. Discharge medications reviewed with the patient and appropriate educational materials and side effects teaching were provided. Patient armband removed and given to patient to take home. Patient was informed of the privacy risks if armband lost or stolen. Introducing hospitals & HEALTH SERVICES! Dear Terrance Burnette: Thank you for requesting a Nebo account. Our records indicate that you already have an active Nebo account. You can access your account anytime at [email or web address] Did you know that you can access your hospital and ER discharge instructions at any time in Nebo? You can also review all of your test results from your hospital stay or ER visit. Additional Information If you have questions, please visit the Frequently Asked Questions section of the Nebo website at [email or web address] Remember, Nebo is NOT to be used for urgent needs. For medical emergencies, dial 911. Now available from your iPhone and Android! Introducing Juan Carlos Chavarria As a [employer] Your [employer] [facility] patient, I wanted to make you aware of our electronic visit tool called Juan Carlos Chavarria. [email or web address] allows you to connect within minutes with a medical provider 24 hours a day, seven days a week via a mobile device or tablet or logging into a secure website from your computer. You can access Juan Carlos Chavarria from anywhere in the [place]. A virtual visit might be right for you when you have a simple condition and feel like you just dont want to get out of bed, or cant get away from work for an appointment, when your regular [facility] provider is not available (evenings, weekends or holidays), or when youre out of town and need minor care. Electronic visits cost only $49 and if the [facility] provider determines a prescription is needed to treat your condition, one can be electronically transmitted to a nearby pharmacy*. Please take a moment to enroll today if you have not already done so. The enrollment process is free and takes just a few minutes. To enroll, please download the [email or web address] loc to your tablet or phone, or visit [email or web address] ([email or web address] to enroll on your computer. And, as an [address] [place] [address] patient with a [employer] account, the results of your visits will be scanned into your electronic medical record and your primary care provider will be able to view the scanned results. We urge you to continue to see your regular [employer] [facility] provider for your ongoing medical care. And while your primary care provider may not be the one available when you seek a Juan Carlos Chavarria virtual visit, the peace of mind you get from getting a real diagnosis real time can be priceless. For more information on Juan Carlos Chavarria, view our Frequently Asked Questions (FAQs) at [email or web address] ([email or web address]. Sincerely, 
 
Quynh Sullivan MD 
Chief Medical Officer [place] [place] *:  certain medications cannot be prescribed via Juan Carlos Chavarria Unresulted Labs-Please follow up with your PCP about these lab tests Order Current Status XR MYELO CERVICAL In process Providers Seen During Your Hospitalization Provider Specialty Primary office phone Gume Valderrama MD Radiology [phone] Your Primary Care Physician (PCP) Primary Care Physician Office Phone Office Fax Prashanth Jain [phone] You are allergic to the following Allergen Reactions Contrast Dye (Iodine) Itching Swelling Iodinated Contrast- Oral And Iv Dye Itching Swelling Seafood Itching Swelling LOBSTER ONLY Statins-Hmg-Coa Reductase Inhibitors Myalgia Other (comments) Myalgia Sulfa (Sulfonamide Antibiotics) Itching Swelling Recent Documentation Height Weight BMI OB Status Smoking Status 1.638 m 76.8 kg 28.61 kg/m2 Postmenopausal Former Smoker Emergency Contacts Name Discharge Info Relation Home Work Mobile [facility] HOSPITAL DISCHARGE CAREGIVER [3] Friend [5] [phone] Noam Medrano [5] [phone] Patient Belongings The following personal items are in your possession at time of discharge: 
  Dental Appliances: None  Visual Aid: None      Home Medications: None   Jewelry: None  Clothing: Pants, Footwear, Shirt, Undergarments    Other Valuables: Mirta Paez Please provide this summary of care documentation to your next provider. Signatures-by signing, you are acknowledging that this After Visit Summary has been reviewed with you and you have received a copy. Patient Signature:  ____________________________________________________________ Date:  ____________________________________________________________  
  
Robert Goncalves Provider Signature:  ____________________________________________________________ Date:  ____________________________________________________________

## 2018-08-21 ENCOUNTER — OFFICE VISIT (OUTPATIENT)
Dept: CARDIOLOGY CLINIC | Age: 68
End: 2018-08-21

## 2018-08-21 VITALS
BODY MASS INDEX: 27.66 KG/M2 | SYSTOLIC BLOOD PRESSURE: 117 MMHG | HEART RATE: 90 BPM | OXYGEN SATURATION: 96 % | WEIGHT: 166 LBS | DIASTOLIC BLOOD PRESSURE: 85 MMHG | HEIGHT: 65 IN

## 2018-08-21 DIAGNOSIS — R13.19 ESOPHAGEAL DYSPHAGIA: ICD-10-CM

## 2018-08-21 DIAGNOSIS — K25.4 GASTROINTESTINAL HEMORRHAGE ASSOCIATED WITH GASTRIC ULCER: ICD-10-CM

## 2018-08-21 DIAGNOSIS — I48.91 ATRIAL FIBRILLATION, UNSPECIFIED TYPE (HCC): Chronic | ICD-10-CM

## 2018-08-21 DIAGNOSIS — Z00.00 ROUTINE GENERAL MEDICAL EXAMINATION AT A HEALTH CARE FACILITY: ICD-10-CM

## 2018-08-21 DIAGNOSIS — I09.9 RHEUMATIC HEART DISEASE: ICD-10-CM

## 2018-08-21 DIAGNOSIS — E78.5 DYSLIPIDEMIA: Chronic | ICD-10-CM

## 2018-08-21 DIAGNOSIS — M19.90 ARTHRITIS: ICD-10-CM

## 2018-08-21 DIAGNOSIS — I35.0 AORTIC VALVE STENOSIS, UNSPECIFIED ETIOLOGY: Chronic | ICD-10-CM

## 2018-08-21 DIAGNOSIS — L30.9 ECZEMA, UNSPECIFIED TYPE: ICD-10-CM

## 2018-08-21 RX ORDER — EZETIMIBE 10 MG/1
10 TABLET ORAL DAILY
Qty: 90 TAB | Refills: 3 | Status: SHIPPED | OUTPATIENT
Start: 2018-08-21 | End: 2019-03-27 | Stop reason: SDUPTHER

## 2018-08-21 NOTE — PROGRESS NOTES
1. Have you been to the ER, urgent care clinic since your last visit? Hospitalized since your last visit? No    2. Have you seen or consulted any other health care providers outside of the [address] [place] [address] since your last visit? Include any pap smears or colon screening.  No

## 2018-08-21 NOTE — MR AVS SNAPSHOT
[address] [place] [phone] 
[phone] Patient: Mc Gaines MRN: [id number] SSN:[id number] Visit Information Date & Time Provider Department Dept. Phone Encounter #  
 8/21/2018 10:45 AM Betsy Astudillo MD Cardio Specialist at [facility] [phone] [id number] Your Appointments 9/12/2018  2:20 PM  
CARELINK with Pacer [facility] ([address]) Appt Note: 3 month after June check [facility] [address] [address] [address] [phone] Luis Faust  
  
    
 12/12/2018  2:20 PM  
CARELINK with Pacer  [facility] ([address]) Appt Note: 3 month check after September [facility] [address] [address] [address] [phone] Upcoming Health Maintenance Date Due Influenza Age 5 to Adult 8/1/2018 BREAST CANCER SCRN MAMMOGRAM 9/26/2018 Pneumococcal 65+ Low/Medium Risk (2 of 2 - PPSV23) 9/21/2018 MEDICARE YEARLY EXAM 3/9/2019 GLAUCOMA SCREENING Q2Y 8/8/2019 DTaP/Tdap/Td series (2 - Td) 6/28/2026 COLONOSCOPY 1/23/2027 Allergies as of 8/21/2018  Review Complete On: 7/30/2018 By: Miranda Root Severity Noted Reaction Type Reactions Contrast Dye [Iodine]  10/08/2014    Itching, Swelling Iodinated Contrast- Oral And Iv Dye  12/13/2016    Itching, Swelling Seafood  10/08/2014    Itching, Swelling LOBSTER ONLY Statins-hmg-coa Reductase Inhibitors  10/05/2011    Myalgia, Other (comments) Myalgia Sulfa (Sulfonamide Antibiotics)    Itching, Swelling Current Immunizations  Reviewed on 9/15/2014 Name Date Influenza High Dose Vaccine PF 9/21/2017 11:44 AM  
 Influenza Vaccine 10/5/2016, 9/15/2014, 10/17/2013 Influenza Vaccine Cheri Tien) 9/3/2015 Influenza Vaccine Split 10/5/2011 Influenza Vaccine Whole 9/20/2012 Pneumococcal Conjugate (PCV-13) 9/21/2017 11:45 AM  
 TD Vaccine 8/11/2011 ZZZ-RETIRED (DO NOT USE) Pneumococcal Vaccine (Unspecified Type) 9/20/2012, 10/5/2011 Zoster Vaccine, Live 9/3/2015 Not reviewed this visit You Were Diagnosed With   
  
 Codes Comments Atrial fibrillation, unspecified type ([facility].)     ICD-10-CM: I48.91 
ICD-9-CM: 427.31 Gastrointestinal hemorrhage associated with gastric ulcer     ICD-10-CM: K25.4 ICD-9-CM: 531.40 Rheumatic heart disease     ICD-10-CM: I09.9 ICD-9-CM: 398.90 Routine general medical examination at a health care facility     ICD-10-CM: Z00.00 ICD-9-CM: V70.0 Dyslipidemia     ICD-10-CM: E78.5 ICD-9-CM: 272.4 Arthritis     ICD-10-CM: M19.90 ICD-9-CM: 716.90 Eczema, unspecified type     ICD-10-CM: L30.9 ICD-9-CM: 692.9 Aortic valve stenosis, unspecified etiology     ICD-10-CM: I35.0 ICD-9-CM: 424.1 Esophageal dysphagia     ICD-10-CM: R13.10 ICD-9-CM: 787.20 Vitals BP Pulse Height(growth percentile) Weight(growth percentile) SpO2 BMI  
 117/85 90 5' 4.5\" (1.638 m) 166 lb (75.3 kg) 96% 28.05 kg/m2 OB Status Smoking Status Postmenopausal Former Smoker Vitals History BMI and BSA Data Body Mass Index Body Surface Area 28.05 kg/m 2 1.85 m 2 Preferred Pharmacy Pharmacy Name Phone [place] [phone] [address] [phone] Your Updated Medication List  
  
   
This list is accurate as of 8/21/18 11:11 AM.  Always use your most recent med list.  
  
  
  
  
 acetaminophen-codeine 300-15 mg Tab Commonly known as:  TYLENOL #2 Take 1 Tab by mouth every four (4) hours as needed for Pain. Max Daily Amount: 6 Tabs. aspirin delayed-release 81 mg tablet Take 2 Tabs by mouth daily. take 1 tablet by mouth once daily  
  
 ezetimibe 10 mg tablet Commonly known as:  Jonnathan Lundy Take 1 Tab by mouth daily. ferrous sulfate 325 mg (65 mg iron) tablet  
take 1 tablet by mouth three times a day with meals  
  
 mirabegron ER 25 mg ER tablet Commonly known as:  MYRBETRIQ Take 1 Tab by mouth daily for 90 days. omeprazole 20 mg capsule Commonly known as:  PRILOSEC  
take 1 capsule by mouth twice a day  
  
 prednisoLONE acetate 1 % ophthalmic suspension Commonly known as:  PRED FORTE Administer 1 Drop to both eyes four (4) times daily. prenatal vit-calcium-iron-fa 27 mg iron- 1 mg Tab Commonly known as:  PRENATAL PLUS (CALCIUM CARB) TAKE 1 TABLET BY MOUTH DAILY PROBIOTIC 4X 10-15 mg Tbec Generic drug:  B.infantis-B.ani-B.long-B.bifi Take  by mouth.  
  
 triamcinolone acetonide 0.1 % ointment Commonly known as:  KENALOG Apply  to affected area two (2) times a day. use thin layer Patient Instructions F/U in 9 months Introducing [facility] SERVICES! Dear Shama Rodriguez: Thank you for requesting a [employer] account. Our records indicate that you already have an active [employer] account. You can access your account anytime at [email or web address] Systems Did you know that you can access your hospital and ER discharge instructions at any time in [employer]? You can also review all of your test results from your hospital stay or ER visit. Additional Information If you have questions, please visit the Frequently Asked Questions section of the [employer] website at [email or web address] Remember, [employer] Technology is NOT to be used for urgent needs. For medical emergencies, dial 911. Now available from your iPhone and Android! Please provide this summary of care documentation to your next provider. Your primary care clinician is listed as [address]. If you have any questions after today's visit, please call [phone].

## 2018-08-27 ENCOUNTER — OFFICE VISIT (OUTPATIENT)
Dept: INTERNAL MEDICINE CLINIC | Age: 68
End: 2018-08-27

## 2018-08-27 ENCOUNTER — HOSPITAL ENCOUNTER (OUTPATIENT)
Dept: LAB | Age: 68
Discharge: HOME | End: 2018-08-27
Payer: MEDICARE

## 2018-08-27 VITALS
HEIGHT: 65 IN | RESPIRATION RATE: 16 BRPM | BODY MASS INDEX: 27.66 KG/M2 | DIASTOLIC BLOOD PRESSURE: 86 MMHG | OXYGEN SATURATION: 97 % | HEART RATE: 94 BPM | WEIGHT: 166 LBS | TEMPERATURE: 97.9 F | SYSTOLIC BLOOD PRESSURE: 125 MMHG

## 2018-08-27 DIAGNOSIS — N30.91 CYSTITIS WITH HEMATURIA: ICD-10-CM

## 2018-08-27 DIAGNOSIS — R35.0 URINARY FREQUENCY: Primary | ICD-10-CM

## 2018-08-27 DIAGNOSIS — R35.0 URINARY FREQUENCY: ICD-10-CM

## 2018-08-27 LAB
BILIRUB UR QL STRIP: NEGATIVE
GLUCOSE UR-MCNC: NEGATIVE MG/DL
KETONES P FAST UR STRIP-MCNC: NEGATIVE MG/DL
PH UR STRIP: 7 [PH] (ref 4.6–8)
PROT UR QL STRIP: NORMAL
SP GR UR STRIP: 1.01 (ref 1–1.03)
UA UROBILINOGEN AMB POC: NORMAL (ref 0.2–1)
URINALYSIS CLARITY POC: NORMAL
URINALYSIS COLOR POC: NORMAL
URINE BLOOD POC: NORMAL
URINE LEUKOCYTES POC: NORMAL
URINE NITRITES POC: NEGATIVE

## 2018-08-27 PROCEDURE — 87077 CULTURE AEROBIC IDENTIFY: CPT | Performed by: NURSE PRACTITIONER

## 2018-08-27 PROCEDURE — 87086 URINE CULTURE/COLONY COUNT: CPT | Performed by: NURSE PRACTITIONER

## 2018-08-27 PROCEDURE — 87186 SC STD MICRODIL/AGAR DIL: CPT | Performed by: NURSE PRACTITIONER

## 2018-08-27 RX ORDER — CIPROFLOXACIN 250 MG/1
250 TABLET, FILM COATED ORAL 2 TIMES DAILY
Qty: 20 TAB | Refills: 0 | Status: SHIPPED | OUTPATIENT
Start: 2018-08-27 | End: 2018-08-27

## 2018-08-27 RX ORDER — CIPROFLOXACIN 250 MG/1
250 TABLET, FILM COATED ORAL 2 TIMES DAILY
Qty: 20 TAB | Refills: 0 | Status: SHIPPED | OUTPATIENT
Start: 2018-08-27 | End: 2018-08-29 | Stop reason: ALTCHOICE

## 2018-08-27 NOTE — PROGRESS NOTES
ROOM # 2  Identified pt with two pt identifiers(name and ). Reviewed record in preparation for visit and have obtained necessary documentation. Chief Complaint   Patient presents with    Urinary Frequency      Roque Josue preferred language for health care discussion is english/other. Is the patient using any DME equipment during OV? NO    Roque Josue is due for:  Health Maintenance Due   Topic    Influenza Age 5 to Adult     BREAST CANCER SCRN MAMMOGRAM      Health Maintenance reviewed and discussed per provider  Please order/place referral if appropriate. Advance Directive:  1. Do you have an advance directive in place? Patient Reply: yes    2. If not, would you like material regarding how to put one in place? No     Coordination of Care:  1. Have you been to the ER, urgent care clinic since your last visit? Hospitalized since your last visit? NO    2. Have you seen or consulted any other health care providers outside of the [address] [place] [address] since your last visit? Include any pap smears or colon screening. NO    Patient is accompanied by self I have received verbal consent from Roque Josue to discuss any/all medical information while they are present in the room.     Learning Assessment:  Learning Assessment 3/8/2018 2016 2016 2014 9/3/2013 10/31/2012   PRIMARY LEARNER Patient Patient Patient Patient Patient Patient   HIGHEST LEVEL OF EDUCATION - PRIMARY LEARNER  - - [id number] OR G[facility] - - -   PRIMARY LANGUAGE ENGLISH ENGLISH ENGLISH ENGLISH ENGLISH ENGLISH   LEARNER PREFERENCE PRIMARY VIDEOS DEMONSTRATION DEMONSTRATION DEMONSTRATION DEMONSTRATION DEMONSTRATION     - - - READING - -   ANSWERED BY patient Patient patient  patient patient patient   RELATIONSHIP SELF SELF SELF SELF SELF SELF     Depression Screening:  PHQ over the last two weeks 2018 3/8/2018 2018 2017 2017 3/28/2017 2017   PHQ Not Done - Patient Decline - - - - - Little interest or pleasure in doing things Not at all Not at all Several days Not at all Not at all Several days Not at all   Feeling down, depressed, irritable, or hopeless Not at all Not at all Several days Not at all Not at all More than half the days Not at all   Total Score PHQ 2 0 0 2 0 0 3 0   Trouble falling or staying asleep, or sleeping too much - - - - - - -   Feeling tired or having little energy - - - - - - -   Poor appetite, weight loss, or overeating - - - - - - -   Feeling bad about yourself - or that you are a failure or have let yourself or your family down - - - - - - -   Trouble concentrating on things such as school, work, reading, or watching TV - - - - - - -   Moving or speaking so slowly that other people could have noticed; or the opposite being so fidgety that others notice - - - - - - -   Thoughts of being better off dead, or hurting yourself in some way - - - - - - -   PHQ 9 Score - - - - - - -   How difficult have these problems made it for you to do your work, take care of your home and get along with others - - - - - - -     Abuse Screening:  Abuse Screening Questionnaire 3/8/2018 2/13/2018 3/28/2017 6/12/2014   Do you ever feel afraid of your partner? N N N N   Are you in a relationship with someone who physically or mentally threatens you? N N N N   Is it safe for you to go home? Nelda Resendez     Fall Risk  Fall Risk Assessment, last 12 mths 4/30/2018 3/8/2018 2/13/2018 9/21/2017 5/30/2017 3/28/2017 2/28/2017   Able to walk? Yes Yes Yes Yes Yes Yes Yes   Fall in past 12 months?  No No No No No No No

## 2018-08-27 NOTE — PROGRESS NOTES
Patient of Dr Tushar Maldonado presents with urinary frequency,dysuria, hematuria x 1 day. States she has a hx of recurrent UTI with the last one three weeks ago. She denies any back/flank/abd pain, dizziness,NVD/constipation, vaginal discharge/pain,SOb,CP.

## 2018-08-27 NOTE — MR AVS SNAPSHOT
[address] [place] [address] 
 
 
 [address] [facility] [address] 
[phone] Patient: Elmer Lay MRN: [id number] SIM:[id number] Visit Information Date & Time Provider Department Dept. Phone Encounter #  
 8/27/2018 11:15 AM Jane Coulter NP [employer] [phone] [id number] Follow-up Instructions Return if symptoms worsen or fail to improve. Your Appointments 9/12/2018  2:20 PM  
CARELINK with Pacer  [facility] ([facility]) Appt Note: 3 month after June check [place] [place] [id number] [phone] Smith Christianne  
  
    
 12/12/2018  2:20 PM  
[facility] ([facility]) Appt Note: 3 month check after September [place] [place] [id number] [phone] Upcoming Health Maintenance Date Due Influenza Age 5 to Adult 8/1/2018 BREAST CANCER SCRN MAMMOGRAM 9/26/2018 Pneumococcal 65+ Low/Medium Risk (2 of 2 - PPSV23) 9/21/2018 MEDICARE YEARLY EXAM 3/9/2019 GLAUCOMA SCREENING Q2Y 8/8/2019 DTaP/Tdap/Td series (2 - Td) 6/28/2026 COLONOSCOPY 1/23/2027 Allergies as of 8/27/2018  Review Complete On: 8/27/2018 By: Supa Ramos Severity Noted Reaction Type Reactions Contrast Dye [Iodine]  10/08/2014    Itching, Swelling Iodinated Contrast- Oral And Iv Dye  12/13/2016    Itching, Swelling Seafood  10/08/2014    Itching, Swelling LOBSTER ONLY Statins-hmg-coa Reductase Inhibitors  10/05/2011    Myalgia, Other (comments) Myalgia Sulfa (Sulfonamide Antibiotics)    Itching, Swelling Current Immunizations  Reviewed on 9/15/2014 Name Date  Influenza High Dose Vaccine PF 9/21/2017 11:44 AM  
 Influenza Vaccine 10/5/2016, 9/15/2014, 10/17/2013 Influenza Vaccine Cheri Tien) 9/3/2015 Influenza Vaccine Split 10/5/2011 Influenza Vaccine Whole 9/20/2012 Pneumococcal Conjugate (PCV-13) 9/21/2017 11:45 AM  
 TD Vaccine 8/11/2011 ZZZ-RETIRED (DO NOT USE) Pneumococcal Vaccine (Unspecified Type) 9/20/2012, 10/5/2011 Zoster Vaccine, Live 9/3/2015 Not reviewed this visit You Were Diagnosed With   
  
 Codes Comments Urinary frequency    -  Primary ICD-10-CM: R35.0 ICD-9-CM: 788.41 Cystitis with hematuria     ICD-10-CM: N30.91 
ICD-9-CM: 595.9 Vitals BP Pulse Temp Resp Height(growth percentile) Weight(growth percentile) 125/86 (BP 1 Location: Right arm, BP Patient Position: Sitting) 94 97.9 °F (36.6 °C) (Oral) 16 5' 4.5\" (1.638 m) 166 lb (75.3 kg) SpO2 BMI OB Status Smoking Status 97% 28.05 kg/m2 Postmenopausal Former Smoker BMI and BSA Data Body Mass Index Body Surface Area 28.05 kg/m 2 1.85 m 2 Preferred Pharmacy Pharmacy Name Phone [id number] [facility] [phone] Your Updated Medication List  
  
   
This list is accurate as of 8/27/18 11:41 AM.  Always use your most recent med list.  
  
  
  
  
 acetaminophen-codeine 300-15 mg Tab Commonly known as:  TYLENOL #2 Take 1 Tab by mouth every four (4) hours as needed for Pain. Max Daily Amount: 6 Tabs. aspirin delayed-release 81 mg tablet Take 2 Tabs by mouth daily. take 1 tablet by mouth once daily  
  
 ciprofloxacin HCl 250 mg tablet Commonly known as:  CIPRO Take 1 Tab by mouth two (2) times a day.  
  
 ezetimibe 10 mg tablet Commonly known as:  Camelia Billow Take 1 Tab by mouth daily. ferrous sulfate 325 mg (65 mg iron) tablet  
take 1 tablet by mouth three times a day with meals  
  
 mirabegron ER 25 mg ER tablet Commonly known as:  MYRBETRIQ Take 1 Tab by mouth daily for 90 days. omeprazole 20 mg capsule Commonly known as:  PRILOSEC  
take 1 capsule by mouth twice a day  
  
 prednisoLONE acetate 1 % ophthalmic suspension Commonly known as:  PRED FORTE Administer 1 Drop to both eyes four (4) times daily. prenatal vit-calcium-iron-fa 27 mg iron- 1 mg Tab Commonly known as:  PRENATAL PLUS (CALCIUM CARB) TAKE 1 TABLET BY MOUTH DAILY PROBIOTIC 4X 10-15 mg Tbec Generic drug:  B.infantis-B.ani-B.long-B.bifi Take  by mouth.  
  
 triamcinolone acetonide 0.1 % ointment Commonly known as:  KENALOG Apply  to affected area two (2) times a day. use thin layer Prescriptions Sent to Pharmacy Refills  
 ciprofloxacin HCl (CIPRO) 250 mg tablet 0 Sig: Take 1 Tab by mouth two (2) times a day. Class: Normal  
 Pharmacy: [facility] [address] [place]  #: [phone] Route: Oral  
  
We Performed the Following AMB POC URINALYSIS DIP STICK AUTO W/O MICRO [78907 CPT(R)] Follow-up Instructions Return if symptoms worsen or fail to improve. To-Do List   
 08/27/2018 Microbiology:  CULTURE, URINE Patient Instructions Frequent Urination: Care Instructions Your Care Instructions An urge to urinate frequently but usually passing only small amounts of urine is a common symptom of urinary problems, such as urinary tract infections. The bladder may become inflamed. This can cause the urge to urinate. You may try to urinate more often than usual to try to soothe that urge. Frequent urination also may be caused by sexually transmitted infections (STIs) or kidney stones. Or it may happen when something irritates the tube that carries urine from the bladder to the outside of the body (urethra). It may also be a sign of diabetes. The cause may be hard to find. You may need tests. Follow-up care is a key part of your treatment and safety.  Be sure to make and go to all appointments, and call your doctor if you are having problems. It's also a good idea to know your test results and keep a list of the medicines you take. How can you care for yourself at home? · Drink extra water for the next day or two. This will help make the urine less concentrated. (If you have kidney, heart, or liver disease and have to limit fluids, talk with your doctor before you increase the amount of fluids you drink.) · Avoid drinks that are carbonated or have caffeine. They can irritate the bladder. For women: · Urinate right after you have sex. · After you go to the bathroom, wipe from front to back. · Avoid douches, bubble baths, and feminine hygiene sprays. And avoid other feminine hygiene products that have deodorants. When should you call for help? Call your doctor now or seek immediate medical care if: 
  · You have new symptoms, such as fever, nausea, or vomiting.  
  · You have new or worse symptoms of a urinary problem. For example: ¨ You have blood or pus in your urine. ¨ You have chills or body aches. ¨ It hurts to urinate. ¨ You have groin or belly pain. ¨ You have pain in your back just below your rib cage (the flank area).  
 Watch closely for changes in your health, and be sure to contact your doctor if you feel thirstier than usual. 
Where can you learn more? Go to http://diogenes-lee.info/. Enter 661 2712 in the search box to learn more about \"Frequent Urination: Care Instructions. \" Current as of: May 12, 2017 Content Version: 11.7 © 6922-4230 Healthwise, Incorporated. Care instructions adapted under license by [employer] (which disclaims liability or warranty for this information). If you have questions about a medical condition or this instruction, always ask your healthcare professional. [address] any warranty or liability for your use of this information. Urinary Tract Infection in Women: Care Instructions Your Care Instructions A urinary tract infection, or UTI, is a general term for an infection anywhere between the kidneys and the urethra (where urine comes out). Most UTIs are bladder infections. They often cause pain or burning when you urinate. UTIs are caused by bacteria and can be cured with antibiotics. Be sure to complete your treatment so that the infection goes away. Follow-up care is a key part of your treatment and safety. Be sure to make and go to all appointments, and call your doctor if you are having problems. It's also a good idea to know your test results and keep a list of the medicines you take. How can you care for yourself at home? · Take your antibiotics as directed. Do not stop taking them just because you feel better. You need to take the full course of antibiotics. · Drink extra water and other fluids for the next day or two. This may help wash out the bacteria that are causing the infection. (If you have kidney, heart, or liver disease and have to limit fluids, talk with your doctor before you increase your fluid intake.) · Avoid drinks that are carbonated or have caffeine. They can irritate the bladder. · Urinate often. Try to empty your bladder each time. · To relieve pain, take a hot bath or lay a heating pad set on low over your lower belly or genital area. Never go to sleep with a heating pad in place. To prevent UTIs · Drink plenty of water each day. This helps you urinate often, which clears bacteria from your system. (If you have kidney, heart, or liver disease and have to limit fluids, talk with your doctor before you increase your fluid intake.) · Urinate when you need to. · Urinate right after you have sex. · Change sanitary pads often. · Avoid douches, bubble baths, feminine hygiene sprays, and other feminine hygiene products that have deodorants. · After going to the bathroom, wipe from front to back. When should you call for help? Call your doctor now or seek immediate medical care if: 
  · Symptoms such as fever, chills, nausea, or vomiting get worse or appear for the first time.  
  · You have new pain in your back just below your rib cage. This is called flank pain.  
  · There is new blood or pus in your urine.  
  · You have any problems with your antibiotic medicine.  
 Watch closely for changes in your health, and be sure to contact your doctor if: 
  · You are not getting better after taking an antibiotic for 2 days.  
  · Your symptoms go away but then come back. Where can you learn more? Go to http://diogenes-lee.info/. Enter W685 in the search box to learn more about \"Urinary Tract Infection in Women: Care Instructions. \" Current as of: May 12, 2017 Content Version: 11.7 © 4031-3566 [employer]. Care instructions adapted under license by [employer] (which disclaims liability or warranty for this information). If you have questions about a medical condition or this instruction, always ask your healthcare professional. [address] any warranty or liability for your use of this information. Introducing hospitals & HEALTH SERVICES! Dear Juve Saleh: Thank you for requesting a Yassets account. Our records indicate that you already have an active [employer]assets account. You can access your account anytime at [email or web address] Did you know that you can access your hospital and ER discharge instructions at any time in Yassets? You can also review all of your test results from your hospital stay or ER visit. Additional Information If you have questions, please visit the Frequently Asked Questions section of the Yassets website at [email or web address] Remember, Yassets is NOT to be used for urgent needs. For medical emergencies, dial 911. Now available from your iPhone and Android! Please provide this summary of care documentation to your next provider. Your primary care clinician is listed as [id number] [facility]. If you have any questions after today's visit, please call [phone].

## 2018-08-27 NOTE — PROGRESS NOTES
HISTORY OF PRESENT ILLNESS  Carline Albrecht is a 79 y.o. female. Patient of Dr Taylor Guillen presents with urinary frequency,dysuria, hematuria x 1 day. States she has a hx of recurrent UTI with the last one three weeks ago. She denies any back/flank/abd pain, dizziness,NVD/constipation, vaginal discharge/pain,SOb,CP. Urinary Frequency    The history is provided by the patient. This is a new problem. The current episode started yesterday. There has been no fever. Associated symptoms include frequency and hematuria. Pertinent negatives include no sweats, no nausea, no vomiting, no discharge, no flank pain, no vaginal discharge and no back pain. She has tried nothing for the symptoms. Review of Systems   Cardiovascular: Negative for chest pain. [address] maker to left upper chest, with surgical scare to mid sternum. Gastrointestinal: Negative for nausea and vomiting. Genitourinary: Positive for frequency and hematuria. Negative for flank pain and vaginal discharge. Musculoskeletal: Negative for back pain. Physical Exam   Constitutional: She is oriented to person, place, and time. She appears well-developed and well-nourished. /86 (BP 1 Location: Right arm, BP Patient Position: Sitting)  Pulse 94  Temp 97.9 °F (36.6 °C) (Oral)   Resp 16  Ht 5' 4.5\" (1.638 m)  Wt 166 lb (75.3 kg)  SpO2 97%  BMI 28.05 kg/m2     HENT:   Head: Normocephalic and atraumatic. Eyes: Conjunctivae and EOM are normal. Pupils are equal, round, and reactive to light. Neck: Normal range of motion. Cardiovascular: Normal rate. Pulmonary/Chest: Effort normal and breath sounds normal.   Abdominal: Soft. Bowel sounds are normal.   Musculoskeletal: Normal range of motion. Neurological: She is alert and oriented to person, place, and time. Skin: Skin is warm and dry. Psychiatric: She has a normal mood and affect. Her behavior is normal. Thought content normal.   Vitals reviewed.       ASSESSMENT and PLAN ICD-10-CM ICD-9-CM    1. Urinary frequency R35.0 788.41 AMB POC URINALYSIS DIP STICK AUTO W/O MICRO      CULTURE, URINE      ciprofloxacin HCl (CIPRO) 250 mg tablet      DISCONTINUED: ciprofloxacin HCl (CIPRO) 250 mg tablet   2. Cystitis with hematuria N30.91 595.9 CULTURE, URINE      ciprofloxacin HCl (CIPRO) 250 mg tablet      DISCONTINUED: ciprofloxacin HCl (CIPRO) 250 mg tablet     Encounter Diagnoses   Name Primary?  Urinary frequency Yes    Cystitis with hematuria      Orders Placed This Encounter    CULTURE, URINE    AMB POC URINALYSIS DIP STICK AUTO W/O MICRO    DISCONTD: ciprofloxacin HCl (CIPRO) 250 mg tablet    ciprofloxacin HCl (CIPRO) 250 mg tablet     Orders Placed This Encounter    CULTURE, URINE     Standing Status:   Future     Standing Expiration Date:   8/28/2019    AMB POC URINALYSIS DIP STICK AUTO W/O MICRO    DISCONTD: ciprofloxacin HCl (CIPRO) 250 mg tablet     Sig: Take 1 Tab by mouth two (2) times a day. Dispense:  20 Tab     Refill:  0    ciprofloxacin HCl (CIPRO) 250 mg tablet     Sig: Take 1 Tab by mouth two (2) times a day. Dispense:  20 Tab     Refill:  0     Orders Placed This Encounter    CULTURE, URINE    AMB POC URINALYSIS DIP STICK AUTO W/O MICRO    DISCONTD: ciprofloxacin HCl (CIPRO) 250 mg tablet    ciprofloxacin HCl (CIPRO) 250 mg tablet     Diagnoses and all orders for this visit:    1. Urinary frequency  -     AMB POC URINALYSIS DIP STICK AUTO W/O MICRO  -     CULTURE, URINE; Future  -     ciprofloxacin HCl (CIPRO) 250 mg tablet; Take 1 Tab by mouth two (2) times a day. 2. Cystitis with hematuria  -     CULTURE, URINE; Future  -     ciprofloxacin HCl (CIPRO) 250 mg tablet; Take 1 Tab by mouth two (2) times a day. Follow-up Disposition:  Return if symptoms worsen or fail to improve.   current treatment plan is effective, no change in therapy

## 2018-08-27 NOTE — PATIENT INSTRUCTIONS
Frequent Urination: Care Instructions  Your Care Instructions  An urge to urinate frequently but usually passing only small amounts of urine is a common symptom of urinary problems, such as urinary tract infections. The bladder may become inflamed. This can cause the urge to urinate. You may try to urinate more often than usual to try to soothe that urge. Frequent urination also may be caused by sexually transmitted infections (STIs) or kidney stones. Or it may happen when something irritates the tube that carries urine from the bladder to the outside of the body (urethra). It may also be a sign of diabetes. The cause may be hard to find. You may need tests. Follow-up care is a key part of your treatment and safety. Be sure to make and go to all appointments, and call your doctor if you are having problems. It's also a good idea to know your test results and keep a list of the medicines you take. How can you care for yourself at home? · Drink extra water for the next day or two. This will help make the urine less concentrated. (If you have kidney, heart, or liver disease and have to limit fluids, talk with your doctor before you increase the amount of fluids you drink.)  · Avoid drinks that are carbonated or have caffeine. They can irritate the bladder. For women:  · Urinate right after you have sex. · After you go to the bathroom, wipe from front to back. · Avoid douches, bubble baths, and feminine hygiene sprays. And avoid other feminine hygiene products that have deodorants. When should you call for help? Call your doctor now or seek immediate medical care if:    · You have new symptoms, such as fever, nausea, or vomiting.     · You have new or worse symptoms of a urinary problem. For example:  ¨ You have blood or pus in your urine. ¨ You have chills or body aches. ¨ It hurts to urinate. ¨ You have groin or belly pain. ¨ You have pain in your back just below your rib cage (the flank area).  Watch closely for changes in your health, and be sure to contact your doctor if you feel thirstier than usual.  Where can you learn more? Go to http://diogenes-lee.info/. Enter 653 0800 in the search box to learn more about \"Frequent Urination: Care Instructions. \"  Current as of: May 12, 2017  Content Version: 11.7  © 8415-8744 [employer]. Care instructions adapted under license by [employer] (which disclaims liability or warranty for this information). If you have questions about a medical condition or this instruction, always ask your healthcare professional. [place] [address] any warranty or liability for your use of this information. Urinary Tract Infection in Women: Care Instructions  Your Care Instructions    A urinary tract infection, or UTI, is a general term for an infection anywhere between the kidneys and the urethra (where urine comes out). Most UTIs are bladder infections. They often cause pain or burning when you urinate. UTIs are caused by bacteria and can be cured with antibiotics. Be sure to complete your treatment so that the infection goes away. Follow-up care is a key part of your treatment and safety. Be sure to make and go to all appointments, and call your doctor if you are having problems. It's also a good idea to know your test results and keep a list of the medicines you take. How can you care for yourself at home? · Take your antibiotics as directed. Do not stop taking them just because you feel better. You need to take the full course of antibiotics. · Drink extra water and other fluids for the next day or two. This may help wash out the bacteria that are causing the infection. (If you have kidney, heart, or liver disease and have to limit fluids, talk with your doctor before you increase your fluid intake.)  · Avoid drinks that are carbonated or have caffeine. They can irritate the bladder. · Urinate often.  Try to empty your bladder each time. · To relieve pain, take a hot bath or lay a heating pad set on low over your lower belly or genital area. Never go to sleep with a heating pad in place. To prevent UTIs  · Drink plenty of water each day. This helps you urinate often, which clears bacteria from your system. (If you have kidney, heart, or liver disease and have to limit fluids, talk with your doctor before you increase your fluid intake.)  · Urinate when you need to. · Urinate right after you have sex. · Change sanitary pads often. · Avoid douches, bubble baths, feminine hygiene sprays, and other feminine hygiene products that have deodorants. · After going to the bathroom, wipe from front to back. When should you call for help? Call your doctor now or seek immediate medical care if:    · Symptoms such as fever, chills, nausea, or vomiting get worse or appear for the first time.     · You have new pain in your back just below your rib cage. This is called flank pain.     · There is new blood or pus in your urine.     · You have any problems with your antibiotic medicine.    Watch closely for changes in your health, and be sure to contact your doctor if:    · You are not getting better after taking an antibiotic for 2 days.     · Your symptoms go away but then come back. Where can you learn more? Go to http://diogenes-lee.info/. Enter Y312 in the search box to learn more about \"Urinary Tract Infection in Women: Care Instructions. \"  Current as of: May 12, 2017  Content Version: 11.7  © 1176-7646 [employer]. Care instructions adapted under license by [employer] (which disclaims liability or warranty for this information). If you have questions about a medical condition or this instruction, always ask your healthcare professional. [address] any warranty or liability for your use of this information.

## 2018-08-27 NOTE — ACP (ADVANCE CARE PLANNING)
Advance Directive:  1. Do you have an advance directive in place? Patient Reply: yes    2. If not, would you like material regarding how to put one in place?   No

## 2018-08-29 ENCOUNTER — TELEPHONE (OUTPATIENT)
Dept: INTERNAL MEDICINE CLINIC | Age: 68
End: 2018-08-29

## 2018-08-29 ENCOUNTER — HOSPITAL ENCOUNTER (OUTPATIENT)
Dept: LAB | Age: 68
Discharge: HOME | End: 2018-08-29
Payer: MEDICARE

## 2018-08-29 ENCOUNTER — OFFICE VISIT (OUTPATIENT)
Dept: INTERNAL MEDICINE CLINIC | Age: 68
End: 2018-08-29

## 2018-08-29 VITALS
OXYGEN SATURATION: 98 % | RESPIRATION RATE: 18 BRPM | TEMPERATURE: 96.1 F | SYSTOLIC BLOOD PRESSURE: 107 MMHG | BODY MASS INDEX: 27.82 KG/M2 | HEIGHT: 65 IN | WEIGHT: 167 LBS | HEART RATE: 83 BPM | DIASTOLIC BLOOD PRESSURE: 79 MMHG

## 2018-08-29 DIAGNOSIS — Z20.2 STD EXPOSURE: ICD-10-CM

## 2018-08-29 DIAGNOSIS — N89.8 VAGINAL DISCHARGE: Primary | ICD-10-CM

## 2018-08-29 DIAGNOSIS — N89.8 VAGINAL DISCHARGE: ICD-10-CM

## 2018-08-29 DIAGNOSIS — T36.95XA ANTIBIOTIC-INDUCED YEAST INFECTION: ICD-10-CM

## 2018-08-29 DIAGNOSIS — M54.50 BILATERAL LOW BACK PAIN WITHOUT SCIATICA, UNSPECIFIED CHRONICITY: ICD-10-CM

## 2018-08-29 DIAGNOSIS — B37.9 ANTIBIOTIC-INDUCED YEAST INFECTION: ICD-10-CM

## 2018-08-29 LAB
BACTERIA SPEC CULT: ABNORMAL
SERVICE CMNT-IMP: ABNORMAL

## 2018-08-29 PROCEDURE — 87798 DETECT AGENT NOS DNA AMP: CPT | Performed by: NURSE PRACTITIONER

## 2018-08-29 RX ORDER — FLUCONAZOLE 150 MG/1
150 TABLET ORAL DAILY
Qty: 1 TAB | Refills: 0 | Status: SHIPPED | OUTPATIENT
Start: 2018-09-07 | End: 2018-09-08

## 2018-08-29 NOTE — MR AVS SNAPSHOT
[address] [facility] 
 
 
 [address] [facility] [address] [id number] 
[phone] Patient: Hu Culver MRN: [id number]  Visit Information Date & Time Provider Department Dept. Phone Encounter #  
 2018  9:45 AM Sara Medrano NP [place] [phone] [id number] Follow-up Instructions Return if symptoms worsen or fail to improve. Your Appointments 2018  2:20 PM  
CARELINK with Pacer  [facility] ([address]) Appt Note: 3 month after  check -[facility] [place] [address]  
[phone] [place]  
  
    
 2018  2:20 PM  
CARELINK with Pacer  [facility] ([address]) Appt Note: 3 month check after September -[facility] [id number] [phone] Upcoming Health Maintenance Date Due Influenza Age 5 to Adult 2018 BREAST CANCER SCRN MAMMOGRAM 2018 Pneumococcal 65+ Low/Medium Risk (2 of 2 - PPSV23) 2018 MEDICARE YEARLY EXAM 3/9/2019 GLAUCOMA SCREENING Q2Y 2019 DTaP/Tdap/Td series (2 - Td) 2026 COLONOSCOPY 2027 Allergies as of 2018  Review Complete On: 2018 By: Ramon Gillespie Severity Noted Reaction Type Reactions Contrast Dye [Iodine]  10/08/2014    Itching, Swelling Iodinated Contrast- Oral And Iv Dye  2016    Itching, Swelling Seafood  10/08/2014    Itching, Swelling LOBSTER ONLY Statins-hmg-coa Reductase Inhibitors  10/05/2011    Myalgia, Other (comments) Myalgia Sulfa (Sulfonamide Antibiotics)    Itching, Swelling Current Immunizations  Reviewed on 9/15/2014 Name Date  Influenza High Dose Vaccine PF 2017 11:44 AM  
 Influenza Vaccine 10/5/2016, 9/15/2014, 10/17/2013 Influenza Vaccine Lashellpreston Jackson) 9/3/2015 Influenza Vaccine Split 10/5/2011 Influenza Vaccine Whole 9/20/2012 Pneumococcal Conjugate (PCV-13) 9/21/2017 11:45 AM  
 TD Vaccine 8/11/2011 ZZZ-RETIRED (DO NOT USE) Pneumococcal Vaccine (Unspecified Type) 9/20/2012, 10/5/2011 Zoster Vaccine, Live 9/3/2015 Not reviewed this visit You Were Diagnosed With   
  
 Codes Comments Vaginal discharge    -  Primary ICD-10-CM: N89.8 ICD-9-CM: 623.5 Bilateral low back pain without sciatica, unspecified chronicity     ICD-10-CM: M54.5 ICD-9-CM: 724.2 Antibiotic-induced yeast infection     ICD-10-CM: B37.9, T36.95XA ICD-9-CM: 112.9, E930.9 STD exposure     ICD-10-CM: Z20.2 ICD-9-CM: V01.6 Vitals BP Pulse Temp Resp Height(growth percentile) Weight(growth percentile) 107/79 (BP 1 Location: Right arm, BP Patient Position: Sitting) 83 96.1 °F (35.6 °C) (Oral) 18 5' 4.5\" (1.638 m) 167 lb (75.8 kg) SpO2 BMI OB Status Smoking Status 98% 28.22 kg/m2 Postmenopausal Former Smoker BMI and BSA Data Body Mass Index Body Surface Area  
 28.22 kg/m 2 1.86 m 2 Preferred Pharmacy Pharmacy Name Phone [phone] [facility] [phone] Your Updated Medication List  
  
   
This list is accurate as of 8/29/18 10:36 AM.  Always use your most recent med list.  
  
  
  
  
 acetaminophen-codeine 300-15 mg Tab Commonly known as:  TYLENOL #2 Take 1 Tab by mouth every four (4) hours as needed for Pain. Max Daily Amount: 6 Tabs. aspirin delayed-release 81 mg tablet Take 2 Tabs by mouth daily. take 1 tablet by mouth once daily  
  
 ezetimibe 10 mg tablet Commonly known as:  Dalila Fleet Take 1 Tab by mouth daily. ferrous sulfate 325 mg (65 mg iron) tablet  
take 1 tablet by mouth three times a day with meals  
  
 fluconazole 150 mg tablet Commonly known as:  DIFLUCAN Take 1 Tab by mouth daily for 1 day. FDA advises cautious prescribing of oral fluconazole in pregnancy. Start taking on:  9/7/2018  
  
 mirabegron ER 25 mg ER tablet Commonly known as:  MYRBETRIQ Take 1 Tab by mouth daily for 90 days. omeprazole 20 mg capsule Commonly known as:  PRILOSEC  
take 1 capsule by mouth twice a day  
  
 prednisoLONE acetate 1 % ophthalmic suspension Commonly known as:  PRED FORTE Administer 1 Drop to both eyes four (4) times daily. prenatal vit-calcium-iron-fa 27 mg iron- 1 mg Tab Commonly known as:  PRENATAL PLUS (CALCIUM CARB) TAKE 1 TABLET BY MOUTH DAILY PROBIOTIC 4X 10-15 mg Tbec Generic drug:  B.infantis-B.ani-B.long-B.bifi Take  by mouth.  
  
 triamcinolone acetonide 0.1 % ointment Commonly known as:  KENALOG Apply  to affected area two (2) times a day. use thin layer Prescriptions Sent to Pharmacy Refills  
 fluconazole (DIFLUCAN) 150 mg tablet 0 Starting on: 9/7/2018 Sig: Take 1 Tab by mouth daily for 1 day. FDA advises cautious prescribing of oral fluconazole in pregnancy. Class: Normal  
 Pharmacy: [facility] [address] [place]  #: [phone] Route: Oral  
  
Follow-up Instructions Return if symptoms worsen or fail to improve. To-Do List   
 08/29/2018 Pathology:  CHLAMYDIA/GC AMPLIFICATON THINPREP   
  
 08/29/2018 Lab:  NUSWAB VAGINITIS PLUS Patient Instructions Vaginal Yeast Infection: Care Instructions Your Care Instructions A vaginal yeast infection is caused by too many yeast cells in the vagina. This is common in women of all ages. Itching, vaginal discharge and irritation, and other symptoms can bother you. But yeast infections don't often cause other health problems. Some medicines can increase your risk of getting a yeast infection.  These include antibiotics, birth control pills, hormones, and steroids. You may also be more likely to get a yeast infection if you are pregnant, have diabetes, douche, or wear tight clothes. With treatment, most yeast infections get better in 2 to 3 days. Follow-up care is a key part of your treatment and safety. Be sure to make and go to all appointments, and call your doctor if you are having problems. It's also a good idea to know your test results and keep a list of the medicines you take. How can you care for yourself at home? · Take your medicines exactly as prescribed. Call your doctor if you think you are having a problem with your medicine. · Ask your doctor about over-the-counter (OTC) medicines for yeast infections. They may cost less than prescription medicines. If you use an OTC treatment, read and follow all instructions on the label. · Do not use tampons while using a vaginal cream or suppository. The tampons can absorb the medicine. Use pads instead. · Wear loose cotton clothing. Do not wear nylon or other fabric that holds body heat and moisture close to the skin. · Try sleeping without underwear. · Do not scratch. Relieve itching with a cold pack or a cool bath. · Do not wash your vaginal area more than once a day. Use plain water or a mild, unscented soap. Air-dry the vaginal area. · Change out of wet swimsuits after swimming. · Do not have sex until you have finished your treatment. · Do not douche. When should you call for help? Call your doctor now or seek immediate medical care if: 
  · You have unexpected vaginal bleeding.  
  · You have new or increased pain in your vagina or pelvis.  
 Watch closely for changes in your health, and be sure to contact your doctor if: 
  · You have a fever.  
  · You are not getting better after 2 days.  
  · Your symptoms come back after you finish your medicines. Where can you learn more? Go to http://diogenes-lee.info/. Enter V177 in the search box to learn more about \"Vaginal Yeast Infection: Care Instructions. \" Current as of: October 6, 2017 Content Version: 11.7 © 2023-7299 [employer]. Care instructions adapted under license by [employer] (which disclaims liability or warranty for this information). If you have questions about a medical condition or this instruction, always ask your healthcare professional. [address] any warranty or liability for your use of this information. Candidiasis: Care Instructions Your Care Instructions Candidiasis (say \"coe-zso-UU-uh-rakesh\") is a yeast infection. Yeast normally lives in your body. But it can cause problems if your body's defenses don't work as they should. Some medicines can increase your chance of getting a yeast infection. These include antibiotics, steroids, and cancer drugs. And some diseases like AIDS and diabetes can make you more likely to get yeast infections. There are different types of yeast infections. Randol Sabal is a yeast infection in the mouth. It usually occurs in people with weak immune systems. It causes white patches inside the mouth and throat. Yeast infections of the skin usually occur in skin folds where the skin stays moist. They cause red, oozing patches on your skin. Babies can get these infections under the diaper. People who often wear gloves can get them on their hands. Many women get vaginal yeast infections. They are most common when women take antibiotics. These infections can cause the vagina to itch and burn. They also cause white discharge that looks like cottage cheese. In rare cases, yeast infects the blood. This can cause serious disease. This kind of infection is treated with medicine given through a needle into a vein (IV). After you start treatment, a yeast infection usually goes away quickly. But if your immune system is weak, the infection may come back.  Tell your doctor if you get yeast infections often. Follow-up care is a key part of your treatment and safety. Be sure to make and go to all appointments, and call your doctor if you are having problems. It's also a good idea to know your test results and keep a list of the medicines you take. How can you care for yourself at home? · Take your medicines exactly as prescribed. Call your doctor if you think you are having a problem with your medicine. · Use antibiotics only as directed by your doctor. · Eat yogurt with live cultures. It has bacteria called lactobacillus. It may help prevent some types of yeast infections. · Keep your skin clean and dry. Put powder on moist places. · If you are using a cream or suppository to treat a vaginal yeast infection, don't use condoms or a diaphragm. Use a different type of birth control. · Eat a healthy diet and get regular exercise. This will help keep your immune system strong. When should you call for help? Watch closely for changes in your health, and be sure to contact your doctor if: 
  · You do not get better as expected. Where can you learn more? Go to http://diogenes-lee.info/. Enter Y626 in the search box to learn more about \"Candidiasis: Care Instructions. \" Current as of: October 6, 2017 Content Version: 11.7 © 1594-6328 [employer]. Care instructions adapted under license by [employer] (which disclaims liability or warranty for this information). If you have questions about a medical condition or this instruction, always ask your healthcare professional. [place] [address] any warranty or liability for your use of this information. Introducing [facility]! Dear MONIQUE ANDERS [facility]: Thank you for requesting a SynapticMash account. Our records indicate that you already have an active SynapticMash account. You can access your account anytime at [email or web address] Did you know that you can access your hospital and ER discharge instructions at any time in RadPad? You can also review all of your test results from your hospital stay or ER visit. Additional Information If you have questions, please visit the Frequently Asked Questions section of the RadPad website at [email or web address] Remember, RadPad is NOT to be used for urgent needs. For medical emergencies, dial 911. Now available from your iPhone and Android! Please provide this summary of care documentation to your next provider. Your primary care clinician is listed as [phone] [facility]. If you have any questions after today's visit, please call [phone].

## 2018-08-29 NOTE — PROGRESS NOTES
HISTORY OF PRESENT ILLNESS  Roque Josue is a 79 y.o. female. Patient presents for vaginal discharge and low back pain x 3 days. Patient is on Cipro for a UTI,discahrge likely candidiasis. Patient initially states discharge is non odorous, then states she does not know, states she is sexually active and would like to be examined and tested for STD,requesting a pelvic exam.Patient had mention during last visit that she has chronic back pain but states this back back feels different. States she only has a male sexual partner but she lópez not trust him. She denies urinary/bladder incontinence, vaginal pain,bleeding, abd pain, fever,chills, NVD,dizziness,SOB,CP. Vaginal Discharge    The history is provided by the patient. This is a new problem. The discharge was white. Pertinent negatives include no anorexia, no diaphoresis, no fever, no abdominal swelling, no abdominal pain, no constipation, no diarrhea, no vomiting, no dyspareunia, no dysuria, no frequency, no genital burning, no genital itching, no genital lesions, no perineal pain and no perineal odor. LOW BACK PAIN   The history is provided by the patient. This is a chronic problem. Pertinent negatives include no abdominal pain. Review of Systems   Constitutional: Negative for diaphoresis and fever. Gastrointestinal: Negative for abdominal pain, anorexia, constipation, diarrhea and vomiting. Genitourinary: Positive for vaginal discharge. Negative for dyspareunia, dysuria and frequency. Musculoskeletal: Positive for back pain. Chronic lower back pain. Physical Exam    ASSESSMENT and PLAN    ICD-10-CM ICD-9-CM    1. Vaginal discharge N89.8 623.5 CHLAMYDIA/GC AMPLIFICATON THINPREP      NUSWAB VAGINITIS PLUS   2. Bilateral low back pain without sciatica, unspecified chronicity M54.5 724.2    3. Antibiotic-induced yeast infection B37.9 112.9 fluconazole (DIFLUCAN) 150 mg tablet    T36.95XA E930.9    4.  STD exposure Z20.2 V01.6 CHLAMYDIA/GC AMPLIFICATON THINPREP      NUSWAB VAGINITIS PLUS     Encounter Diagnoses   Name Primary?  Vaginal discharge Yes    Bilateral low back pain without sciatica, unspecified chronicity     Antibiotic-induced yeast infection     STD exposure      Orders Placed This Encounter    NUSWAB VAGINITIS PLUS    fluconazole (DIFLUCAN) 150 mg tablet    CHLAMYDIA/GC AMPLIFICATON THINPREP     Orders Placed This Encounter    NUSWAB VAGINITIS PLUS     Standing Status:   Future     Standing Expiration Date:   8/30/2019    fluconazole (DIFLUCAN) 150 mg tablet     Sig: Take 1 Tab by mouth daily for 1 day. FDA advises cautious prescribing of oral fluconazole in pregnancy. Dispense:  1 Tab     Refill:  0    CHLAMYDIA/GC AMPLIFICATON THINPREP     Standing Status:   Future     Standing Expiration Date:   8/30/2019     Orders Placed This Encounter    NUSWAB VAGINITIS PLUS    fluconazole (DIFLUCAN) 150 mg tablet    CHLAMYDIA/GC AMPLIFICATON THINPREP     Diagnoses and all orders for this visit:    1. Vaginal discharge  -     CHLAMYDIA/GC AMPLIFICATON THINPREP; Future  -     NUSWAB VAGINITIS PLUS; Future    2. Bilateral low back pain without sciatica, unspecified chronicity    3. Antibiotic-induced yeast infection  -     fluconazole (DIFLUCAN) 150 mg tablet; Take 1 Tab by mouth daily for 1 day. FDA advises cautious prescribing of oral fluconazole in pregnancy. 4. STD exposure  -     CHLAMYDIA/GC AMPLIFICATON THINPREP; Future  -     NUSWAB VAGINITIS PLUS; Future      Follow-up Disposition:  Return if symptoms worsen or fail to improve. the following changes in treatment are made: Cont Cipro for UTI, take diflucan after completion of abx treatment, will perform pelvic exam and treat with results accordingly, f/u with PCP as discussed. NP performed pelvic exam with nurse at bedside. Specimens collected. Patient tolerated well. Female Genitalia: normal, no lesions; urethra, nontender.    Vagina: healthy pink mucosa without any lesions, scant whitish non odorous vaginal discharge present  Cervix: normal appearance, no lesions or bleeding; scant whitish non odorous vaginal discharge present.

## 2018-08-29 NOTE — TELEPHONE ENCOUNTER
Attempted to contact pt at  number, no answer. Lvm for pt to return call to office at [phone] . Will continue to try to contact pt.

## 2018-08-29 NOTE — PROGRESS NOTES
ROOM # 1  Identified pt with two pt identifiers(name and ). Reviewed record in preparation for visit and have obtained necessary documentation. Chief Complaint   Patient presents with    Vaginal Discharge    LOW BACK PAIN      Mc Gaines preferred language for health care discussion is english/other. Is the patient using any DME equipment during OV? NO    Mc Gaines is due for:  Health Maintenance Due   Topic    Influenza Age 5 to Adult     BREAST CANCER SCRN MAMMOGRAM      Health Maintenance reviewed and discussed per provider  Please order/place referral if appropriate. Advance Directive:  1. Do you have an advance directive in place? Patient Reply: NO    2. If not, would you like material regarding how to put one in place? NO    Coordination of Care:  1. Have you been to the ER, urgent care clinic since your last visit? Hospitalized since your last visit? NO    2. Have you seen or consulted any other health care providers outside of the [address] [place] [address] since your last visit? Include any pap smears or colon screening. NO    Patient is accompanied by self I have received verbal consent from Mc Liana to discuss any/all medical information while they are present in the room.     Learning Assessment:  Learning Assessment 3/8/2018 2016 2016 2014 9/3/2013 10/31/2012   PRIMARY LEARNER Patient Patient Patient Patient Patient Patient   HIGHEST LEVEL OF EDUCATION - PRIMARY LEARNER  - - GRADUATED HIGH SCHOOL OR GED - - -   PRIMARY LANGUAGE ENGLISH ENGLISH ENGLISH ENGLISH ENGLISH ENGLISH   LEARNER PREFERENCE PRIMARY VIDEOS DEMONSTRATION DEMONSTRATION DEMONSTRATION DEMONSTRATION DEMONSTRATION     - - - READING - -   ANSWERED BY patient Patient patient  patient patient patient   RELATIONSHIP SELF SELF SELF SELF SELF SELF     Depression Screening:  PHQ over the last two weeks 2018 3/8/2018 2018 2017 2017 3/28/2017 2017   PHQ Not Done - Patient Decline - - - - -   Little interest or pleasure in doing things Not at all Not at all Several days Not at all Not at all Several days Not at all   Feeling down, depressed, irritable, or hopeless Not at all Not at all Several days Not at all Not at all More than half the days Not at all   Total Score PHQ 2 0 0 2 0 0 3 0   Trouble falling or staying asleep, or sleeping too much - - - - - - -   Feeling tired or having little energy - - - - - - -   Poor appetite, weight loss, or overeating - - - - - - -   Feeling bad about yourself - or that you are a failure or have let yourself or your family down - - - - - - -   Trouble concentrating on things such as school, work, reading, or watching TV - - - - - - -   Moving or speaking so slowly that other people could have noticed; or the opposite being so fidgety that others notice - - - - - - -   Thoughts of being better off dead, or hurting yourself in some way - - - - - - -   PHQ 9 Score - - - - - - -   How difficult have these problems made it for you to do your work, take care of your home and get along with others - - - - - - -     Abuse Screening:  Abuse Screening Questionnaire 3/8/2018 2/13/2018 3/28/2017 6/12/2014   Do you ever feel afraid of your partner? N N N N   Are you in a relationship with someone who physically or mentally threatens you? N N N N   Is it safe for you to go home? Daryle Junk     Fall Risk  Fall Risk Assessment, last 12 mths 4/30/2018 3/8/2018 2/13/2018 9/21/2017 5/30/2017 3/28/2017 2/28/2017   Able to walk? Yes Yes Yes Yes Yes Yes Yes   Fall in past 12 months?  No No No No No No No

## 2018-08-29 NOTE — TELEPHONE ENCOUNTER
----- Message from Sulaiman Kendrick NP sent at 8/29/2018  9:24 AM EDT -----  Please inform patient that that her urine culture was positive for a bacteria that is susceptible to the cipro she was prescribed. F/u with PCP as needed.

## 2018-08-29 NOTE — PROGRESS NOTES
Patient presents for vaginal discharge and low back pain x 3 days. Patient is on Cipro for a UTI,discahrge likely candidiasis. Patient initially states discharge is non odorous, then states she does not know, states she is sexually active and would like to be examined and tested for STD,requesting a pelvic exam.Patient had mention during last visit that she has chronic back pain but states this back back feels different. States she only has a male sexual partner but she lópez not trust him. She denies urinary/bladder incontinence, vaginal pain,bleeding, abd pain, fever,chills, NVD,dizziness,SOB,CP.

## 2018-08-29 NOTE — PATIENT INSTRUCTIONS
Vaginal Yeast Infection: Care Instructions  Your Care Instructions    A vaginal yeast infection is caused by too many yeast cells in the vagina. This is common in women of all ages. Itching, vaginal discharge and irritation, and other symptoms can bother you. But yeast infections don't often cause other health problems. Some medicines can increase your risk of getting a yeast infection. These include antibiotics, birth control pills, hormones, and steroids. You may also be more likely to get a yeast infection if you are pregnant, have diabetes, douche, or wear tight clothes. With treatment, most yeast infections get better in 2 to 3 days. Follow-up care is a key part of your treatment and safety. Be sure to make and go to all appointments, and call your doctor if you are having problems. It's also a good idea to know your test results and keep a list of the medicines you take. How can you care for yourself at home? · Take your medicines exactly as prescribed. Call your doctor if you think you are having a problem with your medicine. · Ask your doctor about over-the-counter (OTC) medicines for yeast infections. They may cost less than prescription medicines. If you use an OTC treatment, read and follow all instructions on the label. · Do not use tampons while using a vaginal cream or suppository. The tampons can absorb the medicine. Use pads instead. · Wear loose cotton clothing. Do not wear nylon or other fabric that holds body heat and moisture close to the skin. · Try sleeping without underwear. · Do not scratch. Relieve itching with a cold pack or a cool bath. · Do not wash your vaginal area more than once a day. Use plain water or a mild, unscented soap. Air-dry the vaginal area. · Change out of wet swimsuits after swimming. · Do not have sex until you have finished your treatment. · Do not douche. When should you call for help?   Call your doctor now or seek immediate medical care if:    · You have unexpected vaginal bleeding.     · You have new or increased pain in your vagina or pelvis.    Watch closely for changes in your health, and be sure to contact your doctor if:    · You have a fever.     · You are not getting better after 2 days.     · Your symptoms come back after you finish your medicines. Where can you learn more? Go to http://diogenes-lee.info/. Enter F226 in the search box to learn more about \"Vaginal Yeast Infection: Care Instructions. \"  Current as of: October 6, 2017  Content Version: 11.7  © 1996-0825 [employer]. Care instructions adapted under license by [employer] (which disclaims liability or warranty for this information). If you have questions about a medical condition or this instruction, always ask your healthcare professional. [address] any warranty or liability for your use of this information. Candidiasis: Care Instructions  Your Care Instructions  Candidiasis (say \"kob-knb-CT-uh-rakesh\") is a yeast infection. Yeast normally lives in your body. But it can cause problems if your body's defenses don't work as they should. Some medicines can increase your chance of getting a yeast infection. These include antibiotics, steroids, and cancer drugs. And some diseases like AIDS and diabetes can make you more likely to get yeast infections. There are different types of yeast infections. Demaris Deems is a yeast infection in the mouth. It usually occurs in people with weak immune systems. It causes white patches inside the mouth and throat. Yeast infections of the skin usually occur in skin folds where the skin stays moist. They cause red, oozing patches on your skin. Babies can get these infections under the diaper. People who often wear gloves can get them on their hands. Many women get vaginal yeast infections. They are most common when women take antibiotics.  These infections can cause the vagina to itch and burn. They also cause white discharge that looks like cottage cheese. In rare cases, yeast infects the blood. This can cause serious disease. This kind of infection is treated with medicine given through a needle into a vein (IV). After you start treatment, a yeast infection usually goes away quickly. But if your immune system is weak, the infection may come back. Tell your doctor if you get yeast infections often. Follow-up care is a key part of your treatment and safety. Be sure to make and go to all appointments, and call your doctor if you are having problems. It's also a good idea to know your test results and keep a list of the medicines you take. How can you care for yourself at home? · Take your medicines exactly as prescribed. Call your doctor if you think you are having a problem with your medicine. · Use antibiotics only as directed by your doctor. · Eat yogurt with live cultures. It has bacteria called lactobacillus. It may help prevent some types of yeast infections. · Keep your skin clean and dry. Put powder on moist places. · If you are using a cream or suppository to treat a vaginal yeast infection, don't use condoms or a diaphragm. Use a different type of birth control. · Eat a healthy diet and get regular exercise. This will help keep your immune system strong. When should you call for help? Watch closely for changes in your health, and be sure to contact your doctor if:    · You do not get better as expected. Where can you learn more? Go to http://diogenes-lee.info/. Enter D015 in the search box to learn more about \"Candidiasis: Care Instructions. \"  Current as of: October 6, 2017  Content Version: 11.7  © 8426-1571 [employer]. Care instructions adapted under license by [employer] (which disclaims liability or warranty for this information).  If you have questions about a medical condition or this instruction, always ask your healthcare professional. [address] any warranty or liability for your use of this information.

## 2018-08-29 NOTE — PROGRESS NOTES
Please inform patient that that her urine culture was positive for a bacteria that is susceptible to the cipro she was prescribed. F/u with PCP as needed.

## 2018-09-01 LAB
A VAGINAE DNA VAG QL NAA+PROBE: ABNORMAL SCORE
BVAB2 DNA VAG QL NAA+PROBE: ABNORMAL SCORE
C ALBICANS DNA VAG QL NAA+PROBE: POSITIVE
C GLABRATA DNA VAG QL NAA+PROBE: NEGATIVE
C TRACH RRNA SPEC QL NAA+PROBE: NEGATIVE
MEGA1 DNA VAG QL NAA+PROBE: ABNORMAL SCORE
N GONORRHOEA RRNA SPEC QL NAA+PROBE: NEGATIVE
SPECIMEN SOURCE: ABNORMAL
T VAGINALIS RRNA SPEC QL NAA+PROBE: NEGATIVE

## 2018-09-04 ENCOUNTER — TELEPHONE (OUTPATIENT)
Dept: INTERNAL MEDICINE CLINIC | Age: 68
End: 2018-09-04

## 2018-09-04 NOTE — TELEPHONE ENCOUNTER
----- Message from Kaye Andrews NP sent at 9/4/2018  7:54 AM EDT -----  Please inform patient that her vaginal examination was negative for any STD but was positive for yeast infection,she soul take the prescribed Diflucan as indicated,after completion of Cipro.

## 2018-09-04 NOTE — LETTER
9/5/2018 2:07 PM 
 
Ms. Madyson Castro [address] [facility] [address] [id number] Dear Seema Power: 
 
I hope this letter finds you well. I am a Licensed Practical Nurse with [facility] and I have attempted to contact you by phone, but was unsuccessful. Your good health is important to us. As always, our goal is to be your partner in life-long wellness. Please contact our office at your earliest convenience. If you have any questions, please do not hesitate to give us a call at the number listed above. Sincerely, Gm Lo LPN

## 2018-09-04 NOTE — TELEPHONE ENCOUNTER
Attempted to reach patient regarding labs. No answer; left message for pt to return call to the office at [phone]. Will continue to try to contact patient.

## 2018-09-05 ENCOUNTER — TELEPHONE (OUTPATIENT)
Dept: FAMILY MEDICINE CLINIC | Age: 68
End: 2018-09-05

## 2018-09-05 NOTE — TELEPHONE ENCOUNTER
Patient called and requested an appointment for SOB x (a while). Pt thinks she has COPD or asthma. Advised patient that if symptoms become worst and she cannot breath, to call 911 or schedule a same day appointment. Pt verbalized understanding and appt scheduled for 9/12/2018 9:30 AM. This encounter will be closed.

## 2018-09-05 NOTE — TELEPHONE ENCOUNTER
Edward Knott LPN        1:93 AM   Note      ----- Message from Zachery Berumen NP sent at 9/4/2018  7:54 AM EDT -----  Please inform patient that her vaginal examination was negative for any STD but was positive for yeast infection,she soul take the prescribed Diflucan as indicated,after completion of Cipro.           Spoke with patient and 2 patient identifiers was confirmed. Patient was given results below and verbalized understanding . Patient has no questions/concerns  at this time.

## 2018-09-05 NOTE — TELEPHONE ENCOUNTER
Attempted to contact pt at  number, no answer. Lvm for pt to return call to office at [phone]. I have been unable to reach this patient by phone. A letter is being sent to the last known home address. Encounter will be closed.

## 2018-09-06 ENCOUNTER — HOSPITAL ENCOUNTER (OUTPATIENT)
Dept: LAB | Age: 68
Discharge: HOME | End: 2018-09-06
Payer: MEDICARE

## 2018-09-06 ENCOUNTER — OFFICE VISIT (OUTPATIENT)
Dept: FAMILY MEDICINE CLINIC | Age: 68
End: 2018-09-06

## 2018-09-06 VITALS
SYSTOLIC BLOOD PRESSURE: 113 MMHG | HEIGHT: 64 IN | BODY MASS INDEX: 28.54 KG/M2 | HEART RATE: 85 BPM | DIASTOLIC BLOOD PRESSURE: 84 MMHG | TEMPERATURE: 96.7 F | OXYGEN SATURATION: 96 % | WEIGHT: 167.2 LBS | RESPIRATION RATE: 19 BRPM

## 2018-09-06 DIAGNOSIS — R06.02 SHORTNESS OF BREATH: ICD-10-CM

## 2018-09-06 DIAGNOSIS — R06.02 SHORTNESS OF BREATH: Primary | ICD-10-CM

## 2018-09-06 LAB
ALBUMIN SERPL-MCNC: 4.1 G/DL (ref 3.4–5)
ALBUMIN/GLOB SERPL: 1.1 {RATIO} (ref 0.8–1.7)
ALP SERPL-CCNC: 79 U/L (ref 45–117)
ALT SERPL-CCNC: 32 U/L (ref 13–56)
ANION GAP SERPL CALC-SCNC: 7 MMOL/L (ref 3–18)
APPEARANCE UR: CLEAR
AST SERPL-CCNC: 23 U/L (ref 15–37)
BACTERIA URNS QL MICRO: NEGATIVE /HPF
BASOPHILS # BLD: 0 K/UL (ref 0–0.1)
BASOPHILS NFR BLD: 0 % (ref 0–2)
BILIRUB SERPL-MCNC: 0.5 MG/DL (ref 0.2–1)
BILIRUB UR QL: NEGATIVE
BUN SERPL-MCNC: 14 MG/DL (ref 7–18)
BUN/CREAT SERPL: 14 (ref 12–20)
CALCIUM SERPL-MCNC: 9.7 MG/DL (ref 8.5–10.1)
CHLORIDE SERPL-SCNC: 105 MMOL/L (ref 100–108)
CO2 SERPL-SCNC: 32 MMOL/L (ref 21–32)
COLOR UR: ABNORMAL
CREAT SERPL-MCNC: 1 MG/DL (ref 0.6–1.3)
DIFFERENTIAL METHOD BLD: ABNORMAL
EOSINOPHIL # BLD: 0.2 K/UL (ref 0–0.4)
EOSINOPHIL NFR BLD: 3 % (ref 0–5)
EPITH CASTS URNS QL MICRO: NORMAL /LPF (ref 0–5)
ERYTHROCYTE [DISTWIDTH] IN BLOOD BY AUTOMATED COUNT: 15.4 % (ref 11.6–14.5)
ERYTHROCYTE [SEDIMENTATION RATE] IN BLOOD: 13 MM/HR (ref 0–30)
GLOBULIN SER CALC-MCNC: 3.6 G/DL (ref 2–4)
GLUCOSE SERPL-MCNC: 95 MG/DL (ref 74–99)
GLUCOSE UR STRIP.AUTO-MCNC: NEGATIVE MG/DL
HCT VFR BLD AUTO: 44.4 % (ref 35–45)
HGB BLD-MCNC: 14.2 G/DL (ref 12–16)
HGB UR QL STRIP: NEGATIVE
KETONES UR QL STRIP.AUTO: NEGATIVE MG/DL
LEUKOCYTE ESTERASE UR QL STRIP.AUTO: ABNORMAL
LYMPHOCYTES # BLD: 1.7 K/UL (ref 0.9–3.6)
LYMPHOCYTES NFR BLD: 30 % (ref 21–52)
MCH RBC QN AUTO: 29.9 PG (ref 24–34)
MCHC RBC AUTO-ENTMCNC: 32 G/DL (ref 31–37)
MCV RBC AUTO: 93.5 FL (ref 74–97)
MONOCYTES # BLD: 0.4 K/UL (ref 0.05–1.2)
MONOCYTES NFR BLD: 6 % (ref 3–10)
NEUTS SEG # BLD: 3.6 K/UL (ref 1.8–8)
NEUTS SEG NFR BLD: 61 % (ref 40–73)
NITRITE UR QL STRIP.AUTO: NEGATIVE
PH UR STRIP: 6.5 [PH] (ref 5–8)
PLATELET # BLD AUTO: 189 K/UL (ref 135–420)
PMV BLD AUTO: 12.9 FL (ref 9.2–11.8)
POTASSIUM SERPL-SCNC: 3.6 MMOL/L (ref 3.5–5.5)
PROT SERPL-MCNC: 7.7 G/DL (ref 6.4–8.2)
PROT UR STRIP-MCNC: ABNORMAL MG/DL
RBC # BLD AUTO: 4.75 M/UL (ref 4.2–5.3)
RBC #/AREA URNS HPF: 0 /HPF (ref 0–5)
SODIUM SERPL-SCNC: 144 MMOL/L (ref 136–145)
SP GR UR REFRACTOMETRY: 1.02 (ref 1–1.03)
UROBILINOGEN UR QL STRIP.AUTO: 0.2 EU/DL (ref 0.2–1)
WBC # BLD AUTO: 5.8 K/UL (ref 4.6–13.2)
WBC URNS QL MICRO: NORMAL /HPF (ref 0–4)

## 2018-09-06 PROCEDURE — 85652 RBC SED RATE AUTOMATED: CPT | Performed by: FAMILY MEDICINE

## 2018-09-06 PROCEDURE — 81001 URINALYSIS AUTO W/SCOPE: CPT | Performed by: FAMILY MEDICINE

## 2018-09-06 PROCEDURE — 36415 COLL VENOUS BLD VENIPUNCTURE: CPT | Performed by: FAMILY MEDICINE

## 2018-09-06 PROCEDURE — 80053 COMPREHEN METABOLIC PANEL: CPT | Performed by: FAMILY MEDICINE

## 2018-09-06 PROCEDURE — 85025 COMPLETE CBC W/AUTO DIFF WBC: CPT | Performed by: FAMILY MEDICINE

## 2018-09-06 NOTE — PROGRESS NOTES
Room #      SUBJECTIVE:    Timmy Bledsoe is a 79 y.o. female who presents today for follow up for SOB    1. Have you been to the ER, urgent care clinic since your last visit? Hospitalized since your last visit? NO    2. Have you seen or consulted any other health care providers outside of the [address] [place] [address] since your last visit? Include any pap smears or colon screening. NO  When :  Reason:    Health Maintenance reviewed Yes    Health Maintenance Due   Topic Date Due    Influenza Age 5 to Adult  08/01/2018    BREAST CANCER SCRN MAMMOGRAM  09/26/2018

## 2018-09-06 NOTE — MR AVS SNAPSHOT
[address] [place] [id number] 
[phone] Patient: Hu Culver MRN: [id number] ZHS:[id number] Visit Information Date & Time Provider Department Dept. Phone Encounter #  
 9/6/2018 12:30 PM Brigid Bland [phone] [id number] Follow-up Instructions Return in about 4 days (around 9/10/2018) for rov, labs today. Your Appointments 9/12/2018  2:20 PM  
[facility] ([address]) Appt Note: 3 month after June check -[facility] [place] [id number] [phone] Luis Londonca  
  
    
 12/12/2018  2:20 PM  
[facility] ([address]) Appt Note: 3 month check after September -[facility] [place] [phone] [phone] Upcoming Health Maintenance Date Due Influenza Age 5 to Adult 8/1/2018 BREAST CANCER SCRN MAMMOGRAM 9/26/2018 Pneumococcal 65+ Low/Medium Risk (2 of 2 - PPSV23) 9/21/2018 MEDICARE YEARLY EXAM 3/9/2019 GLAUCOMA SCREENING Q2Y 8/8/2019 DTaP/Tdap/Td series (2 - Td) 6/28/2026 COLONOSCOPY 1/23/2027 Allergies as of 9/6/2018  Review Complete On: 9/6/2018 By: Rose Siu, DO Severity Noted Reaction Type Reactions Contrast Dye [Iodine]  10/08/2014    Itching, Swelling Iodinated Contrast- Oral And Iv Dye  12/13/2016    Itching, Swelling Seafood  10/08/2014    Itching, Swelling LOBSTER ONLY Statins-hmg-coa Reductase Inhibitors  10/05/2011    Myalgia, Other (comments) Myalgia Sulfa (Sulfonamide Antibiotics)    Itching, Swelling Current Immunizations  Reviewed on 9/15/2014 Name Date  Influenza High Dose Vaccine PF 9/21/2017 11:44 AM  
 Influenza Vaccine 10/5/2016, 9/15/2014, 10/17/2013 Influenza Vaccine Roselynn Brain) 9/3/2015 Influenza Vaccine Split 10/5/2011 Influenza Vaccine Whole 9/20/2012 Pneumococcal Conjugate (PCV-13) 9/21/2017 11:45 AM  
 TD Vaccine 8/11/2011 ZZZ-RETIRED (DO NOT USE) Pneumococcal Vaccine (Unspecified Type) 9/20/2012, 10/5/2011 Zoster Vaccine, Live 9/3/2015 Not reviewed this visit You Were Diagnosed With   
  
 Codes Comments Shortness of breath    -  Primary ICD-10-CM: R06.02 
ICD-9-CM: 786.05 Vitals BP Pulse Temp Resp Height(growth percentile) Weight(growth percentile) 113/84 (BP 1 Location: Right arm, BP Patient Position: Sitting) 85 96.7 °F (35.9 °C) (Oral) 19 5' 4.49\" (1.638 m) 167 lb 3.2 oz (75.8 kg) SpO2 BMI OB Status Smoking Status 96% 28.27 kg/m2 Postmenopausal Former Smoker BMI and BSA Data Body Mass Index Body Surface Area  
 28.27 kg/m 2 1.86 m 2 Preferred Pharmacy Pharmacy Name Phone [phone] [facility] [phone] Your Updated Medication List  
  
   
This list is accurate as of 9/6/18  1:35 PM.  Always use your most recent med list.  
  
  
  
  
 acetaminophen-codeine 300-15 mg Tab Commonly known as:  TYLENOL #2 Take 1 Tab by mouth every four (4) hours as needed for Pain. Max Daily Amount: 6 Tabs. aspirin delayed-release 81 mg tablet Take 2 Tabs by mouth daily. take 1 tablet by mouth once daily  
  
 ezetimibe 10 mg tablet Commonly known as:  Aroldo Duane Take 1 Tab by mouth daily. ferrous sulfate 325 mg (65 mg iron) tablet  
take 1 tablet by mouth three times a day with meals  
  
 fluconazole 150 mg tablet Commonly known as:  DIFLUCAN Take 1 Tab by mouth daily for 1 day. FDA advises cautious prescribing of oral fluconazole in pregnancy. Start taking on:  9/7/2018  
  
 mirabegron ER 25 mg ER tablet Commonly known as:  MYRBETRIQ  
 Take 1 Tab by mouth daily for 90 days. omeprazole 20 mg capsule Commonly known as:  PRILOSEC  
take 1 capsule by mouth twice a day  
  
 prednisoLONE acetate 1 % ophthalmic suspension Commonly known as:  PRED FORTE Administer 1 Drop to both eyes four (4) times daily. prenatal vit-calcium-iron-fa 27 mg iron- 1 mg Tab Commonly known as:  PRENATAL PLUS (CALCIUM CARB) TAKE 1 TABLET BY MOUTH DAILY PROBIOTIC 4X 10-15 mg Tbec Generic drug:  B.infantis-B.ani-B.long-B.bifi Take  by mouth.  
  
 triamcinolone acetonide 0.1 % ointment Commonly known as:  KENALOG Apply  to affected area two (2) times a day. use thin layer We Performed the Following AMB POC EKG ROUTINE W/ 12 LEADS, INTER & REP [58297 CPT(R)] AMB POC SPIROMETRY W/O BRONCHODILATOR [55832 CPT(R)] Follow-up Instructions Return in about 4 days (around 9/10/2018) for rov, labs today. To-Do List   
 09/06/2018 Lab:  CBC WITH AUTOMATED DIFF   
  
 09/06/2018 Lab:  METABOLIC PANEL, COMPREHENSIVE   
  
 09/06/2018 Lab:  SED RATE (ESR) 09/06/2018 Lab:  URINALYSIS W/ RFLX MICROSCOPIC Introducing [facility]! Dear Regina Friend: Thank you for requesting a [employer] account. Our records indicate that you already have an active [employer] account. You can access your account anytime at [email or web address]. [employer] Did you know that you can access your hospital and ER discharge instructions at any time in [employer]? You can also review all of your test results from your hospital stay or ER visit. Additional Information If you have questions, please visit the Frequently Asked Questions section of the [employer] website at [email or web address]/. Remember, [employer] is NOT to be used for urgent needs. For medical emergencies, dial 911. Now available from your iPhone and Android! Please provide this summary of care documentation to your next provider. Your primary care clinician is listed as [id number] [facility]. If you have any questions after today's visit, please call [phone].

## 2018-09-06 NOTE — PROGRESS NOTES
Carline Albrecht is a 79 y.o.  female and presents with    Chief Complaint   Patient presents with    Breathing Problem           Subjective:  Onset several weeks ago of worsening dyspnea  Constant  No ass uri symptoms  Recently seen by ranjith Villalba etio found         Additional Concerns:          Patient Active Problem List    Diagnosis Date Noted    Urinary frequency 10/26/2016    Advance directive in chart 06/28/2016    Dyslipidemia     Rheumatic heart disease     Atrial fibrillation     Arthritis 01/06/2016    Glaucoma 01/15/2014     Current Outpatient Prescriptions   Medication Sig Dispense Refill    [START ON 9/7/2018] fluconazole (DIFLUCAN) 150 mg tablet Take 1 Tab by mouth daily for 1 day. FDA advises cautious prescribing of oral fluconazole in pregnancy. 1 Tab 0    ezetimibe (ZETIA) 10 mg tablet Take 1 Tab by mouth daily. 90 Tab 3    prednisoLONE acetate (PRED FORTE) 1 % ophthalmic suspension Administer 1 Drop to both eyes four (4) times daily.  B.infantis-B.ani-B.long-B.bifi (PROBIOTIC 4X) 10-15 mg TbEC Take  by mouth.  omeprazole (PRILOSEC) 20 mg capsule take 1 capsule by mouth twice a day 180 Cap 4    prenatal vit-calcium-iron-fa (PRENATAL PLUS, CALCIUM CARB,) 27 mg iron- 1 mg tab TAKE 1 TABLET BY MOUTH DAILY 100 Tab 12    mirabegron ER (MYRBETRIQ) 25 mg ER tablet Take 1 Tab by mouth daily for 90 days. 90 Tab 3    ferrous sulfate 325 mg (65 mg iron) tablet take 1 tablet by mouth three times a day with meals 100 Tab 10    aspirin delayed-release 81 mg tablet Take 2 Tabs by mouth daily. take 1 tablet by mouth once daily 180 Tab 3    triamcinolone acetonide (KENALOG) 0.1 % ointment Apply  to affected area two (2) times a day. use thin layer 30 g 12    acetaminophen-codeine (TYLENOL #2) 300-15 mg tab Take 1 Tab by mouth every four (4) hours as needed for Pain. Max Daily Amount: 6 Tabs.  30 Tab 5     Allergies   Allergen Reactions    Contrast Dye [Iodine] Itching and Swelling    Iodinated Contrast- Oral And Iv Dye Itching and Swelling    Seafood Itching and Swelling     LOBSTER ONLY    Statins-Hmg-Coa Reductase Inhibitors Myalgia and Other (comments)     Myalgia    Sulfa (Sulfonamide Antibiotics) Itching and Swelling     Past Medical History:   Diagnosis Date    Advance directive in chart     6/28/2016    Amaurosis fugax     Aortic stenosis     mean gradient  26.5 mmHg (CATH 4/13), 32 mmHg (ECHO 9/15)    Aortic valve replacement     21mm MAGNA EASE pericardial  2/16    Arthritis     Atrial appendage resection     2/16      Atrial fibrillation     CHADS score 2  (-CHF, -HTN, -AGE, -DM +AMAUROSIS FUGAX)    Atrial fibrillation ablation     surgical left sided cryoablation  2/16    Cataract     OS    Cervical dysplasia     2009   post leep and cone    COPD     mild  2/16    Duodenal AVM     argon plasma coagulation 2/16    Dyslipidemia     Fibroids     Glaucoma     Lung nodule     3/19/2012   stable    Mitral stenosis     mean gradient  6 mmHg (CATH 4/13), 7.6 mmHg (MANUEL 10/15)    Mitral valve replacement     25 mm MAGNA EASE pericardial  2/16    Pacemaker     dual chamber MEDTRONIC 2/16    Post-op ventricular asystole     02/16    Remote tobacco     Sleep apnea     no CPAP    Thyroid nodule     3/20/2013   multiple    TIA (transient ischemic attack)      Past Surgical History:   Procedure Laterality Date    COLONOSCOPY N/A 1/23/2017    COLONOSCOPY performed [facility] Consuelo Bustamante MD at [facility] ENDOSCOPY    HX AFIB ABLATION      2/16  surgical cryoablation    HX AORTIC VALVE REPLACEMENT      2/16    HX BREAST BIOPSY Right 10/13/2014    Right breast needle [facility]OC biopsy performed by Sanam Oh MD at [address] HX BREAST LUMPECTOMY      Right    HX BUNIONECTOMY      left    HX GYN      VAINIII, VELASQUEZ 1, keratinous labial cyst    HX GYN      2012  Cold knife conization of cervix    HX LEEP PROCEDURE      2010    HX MITRAL VALVE REPLACEMENT          HX OTHER SURGICAL      lump left neck, benign    HX OTHER SURGICAL      multiple breast aspirations    HX PACEMAKER      I&D OF VULVA/PERINEUM ABSCESS  3/21/2017          Family History   Problem Relation Age of Onset    Cancer Mother      oral,    Rachel Ceballos Alzheimer Father         Rachel Ceballos Breast Cancer Maternal Aunt      unsure age   Ornorma Ceballos Colon Cancer Maternal Grandfather      Social History   Substance Use Topics    Smoking status: Former Smoker     Years: 37.00     Quit date: 2004    Smokeless tobacco: Never Used      Comment: stop around     Alcohol use No       ROS       All other systems reviewed and are negative. Objective:  Vitals:    18 1240   BP: 113/84   Pulse: 85   Resp: 19   Temp: 96.7 °F (35.9 °C)   TempSrc: Oral   SpO2: 96%   Weight: 167 lb 3.2 oz (75.8 kg)   Height: 5' 4.49\" (1.638 m)   PainSc:   0 - No pain                 alert, well appearing, and in no distress, oriented to person, place, and time and normal appearing weight  Chest - clear to auscultation, no wheezes, rales or rhonchi, symmetric air entry  Heart - normal rate, regular rhythm, normal S1, S2, no murmurs, rubs, clicks or gallops  Abdomen - soft, nontender, nondistended, no masses or organomegaly        LABS     TESTS  ekg  Paced at 70    Spirometry normal  Assessment/Plan:    Dyspnea  Etio? ? Check labs   F?u  3 or 4 days       Lab review: orders written for new lab studies as appropriate; see orders    Diagnoses and all orders for this visit:    1. Shortness of breath  -     AMB POC SPIROMETRY W/O BRONCHODILATOR  -     AMB POC EKG ROUTINE W/ 12 LEADS, INTER & REP  -     CBC WITH AUTOMATED DIFF; Future  -     METABOLIC PANEL, COMPREHENSIVE; Future  -     URINALYSIS W/ RFLX MICROSCOPIC; Future  -     SED RATE (ESR); Future          I have discussed the diagnosis with the patient and the intended plan as seen in the above orders.   The patient has received an after-visit summary and questions were answered concerning future plans. I have discussed medication side effects and warnings with the patient as well. I have reviewed the plan of care with the patient, accepted their input and they are in agreement with the treatment goals. Follow-up Disposition:  Return in about 4 days (around 9/10/2018) for rov, labs today.

## 2018-09-12 ENCOUNTER — OFFICE VISIT (OUTPATIENT)
Dept: CARDIOLOGY CLINIC | Age: 68
End: 2018-09-12

## 2018-09-12 DIAGNOSIS — I48.91 ATRIAL FIBRILLATION, UNSPECIFIED TYPE (HCC): Primary | Chronic | ICD-10-CM

## 2018-09-12 DIAGNOSIS — Z95.0 CARDIAC PACEMAKER IN SITU: ICD-10-CM

## 2018-09-17 ENCOUNTER — OFFICE VISIT (OUTPATIENT)
Dept: FAMILY MEDICINE CLINIC | Age: 68
End: 2018-09-17

## 2018-09-17 VITALS
HEART RATE: 95 BPM | SYSTOLIC BLOOD PRESSURE: 108 MMHG | WEIGHT: 168.2 LBS | BODY MASS INDEX: 28.71 KG/M2 | HEIGHT: 64 IN | OXYGEN SATURATION: 96 % | DIASTOLIC BLOOD PRESSURE: 79 MMHG | TEMPERATURE: 96.7 F | RESPIRATION RATE: 19 BRPM

## 2018-09-17 DIAGNOSIS — I48.91 ATRIAL FIBRILLATION, UNSPECIFIED TYPE (HCC): Chronic | ICD-10-CM

## 2018-09-17 DIAGNOSIS — M19.90 ARTHRITIS: ICD-10-CM

## 2018-09-17 DIAGNOSIS — R06.00 DYSPNEA, UNSPECIFIED TYPE: Primary | ICD-10-CM

## 2018-09-17 DIAGNOSIS — I09.9 RHEUMATIC HEART DISEASE: ICD-10-CM

## 2018-09-17 NOTE — PROGRESS NOTES
Neil Alexis is a 79 y.o.  female and presents with    Chief Complaint   Patient presents with    Breathing Problem     Pt here for f/u of chronic dyspnea. This is not intermittant. No associated uri symptoms  Has recently seen ajay. GI,  [facility]. And jonas pulm  Nothing found wrong         Subjective: Additional Concerns:          Patient Active Problem List    Diagnosis Date Noted    Urinary frequency 10/26/2016    Advance directive in chart 06/28/2016    Dyslipidemia     Rheumatic heart disease     Atrial fibrillation     Arthritis 01/06/2016    Glaucoma 01/15/2014     Current Outpatient Prescriptions   Medication Sig Dispense Refill    ezetimibe (ZETIA) 10 mg tablet Take 1 Tab by mouth daily. 90 Tab 3    prednisoLONE acetate (PRED FORTE) 1 % ophthalmic suspension Administer 1 Drop to both eyes four (4) times daily.  B.infantis-B.ani-B.long-B.bifi (PROBIOTIC 4X) 10-15 mg TbEC Take  by mouth.  omeprazole (PRILOSEC) 20 mg capsule take 1 capsule by mouth twice a day 180 Cap 4    prenatal vit-calcium-iron-fa (PRENATAL PLUS, CALCIUM CARB,) 27 mg iron- 1 mg tab TAKE 1 TABLET BY MOUTH DAILY 100 Tab 12    mirabegron ER (MYRBETRIQ) 25 mg ER tablet Take 1 Tab by mouth daily for 90 days. 90 Tab 3    ferrous sulfate 325 mg (65 mg iron) tablet take 1 tablet by mouth three times a day with meals 100 Tab 10    aspirin delayed-release 81 mg tablet Take 2 Tabs by mouth daily. take 1 tablet by mouth once daily 180 Tab 3    triamcinolone acetonide (KENALOG) 0.1 % ointment Apply  to affected area two (2) times a day.  use thin layer 30 g 12     Allergies   Allergen Reactions    Contrast Dye [Iodine] Itching and Swelling    Iodinated Contrast- Oral And Iv Dye Itching and Swelling    Seafood Itching and Swelling     LOBSTER ONLY    Statins-Hmg-Coa Reductase Inhibitors Myalgia and Other (comments)     Myalgia    Sulfa (Sulfonamide Antibiotics) Itching and Swelling     Past Medical History:   Diagnosis Date    Advance directive in chart     6/28/2016    Amaurosis fugax     Aortic stenosis     mean gradient  26.5 mmHg (CATH 4/13), 32 mmHg (ECHO 9/15)    Aortic valve replacement     21mm MAGNA EASE pericardial  2/16    Arthritis     Atrial appendage resection     2/16      Atrial fibrillation     CHADS score 2  (-CHF, -HTN, -AGE, -DM +AMAUROSIS FUGAX)    Atrial fibrillation ablation     surgical left sided cryoablation  2/16    Cataract     OS    Cervical dysplasia     2009   post leep and cone    COPD     mild  2/16    Duodenal AVM     argon plasma coagulation 2/16    Dyslipidemia     Fibroids     Glaucoma     Lung nodule     3/19/2012   stable    Mitral stenosis     mean gradient  6 mmHg (CATH 4/13), 7.6 mmHg (MANUEL 10/15)    Mitral valve replacement     25 mm MAGNA EASE pericardial  2/16    Pacemaker     dual chamber MEDTRONIC 2/16    Post-op ventricular asystole     02/16    Remote tobacco     Sleep apnea     no CPAP    Thyroid nodule     3/20/2013   multiple    TIA (transient ischemic attack)      Past Surgical History:   Procedure Laterality Date    COLONOSCOPY N/A 1/23/2017    COL[facility]COPY performed [facility] Sabine Francisco MD at [facility] ENDOSCOPY    HX AFIB ABLATION      2/16  surgical cryoablation    HX AORTIC VALVE REPLACEMENT      2/16    HX BREAST BIOPSY Right 10/13/2014    Right breast needle [facility]OC biopsy performed by Cristiano Rodriguez MD at [address] [place] [address] [employer] [facility] HX BREAST LUMPECTOMY      Right    HX BUNIONECTOMY      left    HX GYN      VAINIII, VELASQUEZ 1, keratinous labial cyst    HX GYN      2012  Cold knife conization of cervix    HX LEEP PROCEDURE      2010    HX MITRAL VALVE REPLACEMENT      2/16    HX OTHER SURGICAL      lump left neck, benign    HX OTHER SURGICAL      multiple breast aspirations    HX PACEMAKER      I&D OF VULVA/PERINEUM ABSCESS  3/21/2017          Family History   Problem Relation Age of Onset    Cancer Mother oral,    [employer] Alzheimer Father         [employer] Breast Cancer Maternal Aunt      unsure age   [employer] Colon Cancer Maternal Grandfather      Social History   Substance Use Topics    Smoking status: Former Smoker     Years: 37.00     Quit date: 2004    Smokeless tobacco: Never Used      Comment: stop around     Alcohol use No       ROS       All other systems reviewed and are negative. Objective:  Vitals:    18 1000   BP: 108/79   Pulse: 95   Resp: 19   Temp: 96.7 °F (35.9 °C)   TempSrc: Oral   SpO2: 96%   Weight: 168 lb 3.2 oz (76.3 kg)   Height: 5' 4\" (1.626 m)   PainSc:   0 - No pain                 alert, well appearing, and in no distress, oriented to person, place, and time and normal appearing weight  Neck - supple, no significant adenopathy  Lymphatics - no palpable lymphadenopathy, no hepatosplenomegaly  Chest - clear to auscultation, no wheezes, rales or rhonchi, symmetric air entry  Heart - normal rate, regular rhythm, normal S1, S2, no murmurs, rubs, clicks or gallops  Abdomen - soft, nontender, nondistended, no masses or organomegaly        LABS   Component      Latest Ref Rng & Units 2018           1:42 PM  1:42 PM  1:42 PM  1:42 PM   WBC      4.6 - 13.2 K/uL       RBC      4.20 - 5.30 M/uL       HGB      12.0 - 16.0 g/dL       HCT      35.0 - 45.0 %       MCV      74.0 - 97.0 FL       MCH      24.0 - 34.0 PG       MCHC      31.0 - 37.0 g/dL       RDW      11.6 - 14.5 %       PLATELET      962 - 007 K/uL       MPV      9.2 - 11.8 FL       NEUTROPHILS      40 - 73 %       LYMPHOCYTES      21 - 52 %       MONOCYTES      3 - 10 %       EOSINOPHILS      0 - 5 %       BASOPHILS      0 - 2 %       ABS. NEUTROPHILS      1.8 - 8.0 K/UL       ABS. LYMPHOCYTES      0.9 - 3.6 K/UL       ABS. MONOCYTES      0.05 - 1.2 K/UL       ABS. EOSINOPHILS      0.0 - 0.4 K/UL       ABS.  BASOPHILS      0.0 - 0.1 K/UL       DF             Sodium      136 - 145 mmol/L  144 Potassium      3.5 - 5.5 mmol/L  3.6     Chloride      100 - 108 mmol/L  105     CO2      21 - 32 mmol/L  32     Anion gap      3.0 - 18 mmol/L  7     Glucose      74 - 99 mg/dL  95     BUN      7.0 - 18 MG/DL  14     Creatinine      0.6 - 1.3 MG/DL  1.00     BUN/Creatinine ratio      12 - 20    14     GFR est AA      >60 ml/min/1.73m2  >60     GFR est non-AA      >60 ml/min/1.73m2  55 (L)     Calcium      8.5 - 10.1 MG/DL  9.7     Bilirubin, total      0.2 - 1.0 MG/DL  0.5     ALT (SGPT)      13 - 56 U/L  32     AST      15 - 37 U/L  23     Alk. phosphatase      45 - 117 U/L  79     Protein, total      6.4 - 8.2 g/dL  7.7     Albumin      3.4 - 5.0 g/dL  4.1     Globulin      2.0 - 4.0 g/dL  3.6     A-G Ratio      0.8 - 1.7    1.1     Color          DARK YELLOW   Appearance          CLEAR   Specific gravity      1.005 - 1.030      1.021   pH (UA)      5.0 - 8.0      6.5   Protein      NEG mg/dL    TRACE (A)   Glucose      NEG mg/dL    NEGATIVE   Ketone      NEG mg/dL    NEGATIVE   Bilirubin      NEG      NEGATIVE   Blood      NEG      NEGATIVE   Urobilinogen      0.2 - 1.0 EU/dL    0.2   Nitrites      NEG      NEGATIVE   Leukocyte Esterase      NEG      TRACE (A)   WBC      0 - 4 /hpf   2 to 3    RBC      0 - 5 /hpf   0    Epithelial cells      0 - 5 /lpf   FEW    Bacteria      NEG /hpf   NEGATIVE    Sed rate, automated      0 - 30 mm/hr 13        Component      Latest Ref Rng & Units 9/6/2018           1:42 PM   WBC      4.6 - 13.2 K/uL 5.8   RBC      4.20 - 5.30 M/uL 4.75   HGB      12.0 - 16.0 g/dL 14.2   HCT      35.0 - 45.0 % 44.4   MCV      74.0 - 97.0 FL 93.5   MCH      24.0 - 34.0 PG 29.9   MCHC      31.0 - 37.0 g/dL 32.0   RDW      11.6 - 14.5 % 15.4 (H)   PLATELET      186 - 843 K/uL 189   MPV      9.2 - 11.8 FL 12.9 (H)   NEUTROPHILS      40 - 73 % 61   LYMPHOCYTES      21 - 52 % 30   MONOCYTES      3 - 10 % 6   EOSINOPHILS      0 - 5 % 3   BASOPHILS      0 - 2 % 0   ABS.  NEUTROPHILS      1.8 - 8.0 K/UL 3.6   ABS. LYMPHOCYTES      0.9 - 3.6 K/UL 1.7   ABS. MONOCYTES      0.05 - 1.2 K/UL 0.4   ABS. EOSINOPHILS      0.0 - 0.4 K/UL 0.2   ABS. BASOPHILS      0.0 - 0.1 K/UL 0.0   DF       AUTOMATED   Sodium      136 - 145 mmol/L    Potassium      3.5 - 5.5 mmol/L    Chloride      100 - 108 mmol/L    CO2      21 - 32 mmol/L    Anion gap      3.0 - 18 mmol/L    Glucose      74 - 99 mg/dL    BUN      7.0 - 18 MG/DL    Creatinine      0.6 - 1.3 MG/DL    BUN/Creatinine ratio      12 - 20      GFR est AA      >60 ml/min/1.73m2    GFR est non-AA      >60 ml/min/1.73m2    Calcium      8.5 - 10.1 MG/DL    Bilirubin, total      0.2 - 1.0 MG/DL    ALT (SGPT)      13 - 56 U/L    AST      15 - 37 U/L    Alk. phosphatase      45 - 117 U/L    Protein, total      6.4 - 8.2 g/dL    Albumin      3.4 - 5.0 g/dL    Globulin      2.0 - 4.0 g/dL    A-G Ratio      0.8 - 1.7      Color          Appearance          Specific gravity      1.005 - 1.030      pH (UA)      5.0 - 8.0      Protein      NEG mg/dL    Glucose      NEG mg/dL    Ketone      NEG mg/dL    Bilirubin      NEG      Blood      NEG      Urobilinogen      0.2 - 1.0 EU/dL    Nitrites      NEG      Leukocyte Esterase      NEG      WBC      0 - 4 /hpf    RBC      0 - 5 /hpf    Epithelial cells      0 - 5 /lpf    Bacteria      NEG /hpf    Sed rate, automated      0 - 30 mm/hr    TESTS      Assessment/Plan:    Constant subjective dyspnea  Will ask Dr El Winn to take another look as it has been several months since she has been seen  F/u here 3 mo      Lab review: labs are reviewed, up to date and normal    Diagnoses and all orders for this visit:    1. Dyspnea, unspecified type    2. Atrial fibrillation, unspecified type ([facility] [address]    3. Arthritis    4. Rheumatic heart disease          I have discussed the diagnosis with the patient and the intended plan as seen in the above orders. The patient has received an after-visit summary and questions were answered concerning future plans.   I have discussed medication side effects and warnings with the patient as well. I have reviewed the plan of care with the patient, accepted their input and they are in agreement with the treatment goals. Follow-up Disposition:  Return in about 3 months (around 12/17/2018) for EOV.

## 2018-09-17 NOTE — MR AVS SNAPSHOT
[id number] [facility] [address] [facility] [address] [id number] 
[phone] Patient: Maria Luisa Tobar MRN: [id number] TQD:[id number]/76/0243 Visit Information Date & Time Provider Department Dept. Phone Encounter #  
 9/17/2018 10:00 [place] [address] [phone] [id number] Follow-up Instructions Return in about 3 months (around 12/17/2018) for EOV. Routing History Follow-up and Disposition History Your Appointments 12/12/2018  2:20 PM  
CARELINK with Abbey Soto [facility] [address] [facility] ([facility]) Appt Note: 3 month check after September -[facility] [address] [address] [address] [phone] [address] [facility] [address]  [address] Upcoming Health Maintenance Date Due Influenza Age 5 to Adult 8/1/2018 BREAST CANCER SCRN MAMMOGRAM 9/26/2018 Pneumococcal 65+ Low/Medium Risk (2 of 2 - PPSV23) 9/21/2018 MEDICARE YEARLY EXAM 3/9/2019 GLAUCOMA SCREENING Q2Y 8/8/2019 DTaP/Tdap/Td series (2 - Td) 6/28/2026 COLONOSCOPY 1/23/2027 Allergies as of 9/17/2018  Review Complete On: 9/17/2018 By: Talon Jesus LPN Severity Noted Reaction Type Reactions Contrast Dye [Iodine]  10/08/2014    Itching, Swelling Iodinated Contrast- Oral And Iv Dye  12/13/2016    Itching, Swelling Seafood  10/08/2014    Itching, Swelling LOBSTER ONLY Statins-hmg-coa Reductase Inhibitors  10/05/2011    Myalgia, Other (comments) Myalgia Sulfa (Sulfonamide Antibiotics)    Itching, Swelling Current Immunizations  Reviewed on 9/15/2014 Name Date Influenza High Dose Vaccine PF 9/21/2017 11:44 AM  
 Influenza Vaccine 10/5/2016, 9/15/2014, 10/17/2013 Influenza Vaccine Elena Vilmaerger) 9/3/2015 Influenza Vaccine Split 10/5/2011 Influenza Vaccine Whole 9/20/2012 Pneumococcal Conjugate (PCV-13) 9/21/2017 11:45 AM  
 TD Vaccine 8/11/2011 ZZZ-RETIRED (DO NOT USE) Pneumococcal Vaccine (Unspecified Type) 9/20/2012, 10/5/2011 Zoster Vaccine, Live 9/3/2015 Not reviewed this visit You Were Diagnosed With   
  
 Codes Comments Dyspnea, unspecified type    -  Primary ICD-10-CM: R06.00 
ICD-9-CM: 786.09 [facility]     ICD-10-CM: I48.91 
ICD-9-CM: 427.31 Arthritis     ICD-10-CM: M19.90 ICD-9-CM: 716.90 Rheumatic heart disease     ICD-10-CM: I09.9 ICD-9-CM: 398.90 Vitals BP Pulse Temp Resp Height(growth percentile) Weight(growth percentile) 108/79 (BP 1 Location: Right arm, BP Patient Position: Sitting) 95 96.7 °F (35.9 °C) (Oral) 19 5' 4\" (1.626 m) 168 lb 3.2 oz (76.3 kg) SpO2 BMI OB Status Smoking Status 96% 28.87 kg/m2 Postmenopausal Former Smoker BMI and BSA Data Body Mass Index Body Surface Area  
 28.87 kg/m 2 1.86 m 2 Preferred Pharmacy Pharmacy Name Phone [address] [facility] [address] [place] [address] [phone] Your Updated Medication List  
  
   
This list is accurate as of 9/17/18 10:43 AM.  Always use your most recent med list.  
  
  
  
  
 aspirin delayed-release 81 mg tablet Take 2 Tabs by mouth daily. take 1 tablet by mouth once daily  
  
 ezetimibe 10 mg tablet Commonly known as:  Josr Lamprey Take 1 Tab by mouth daily. ferrous sulfate 325 mg (65 mg iron) tablet  
take 1 tablet by mouth three times a day with meals  
  
 mirabegron ER 25 mg ER tablet Commonly known as:  MYRBETRIQ Take 1 Tab by mouth daily for 90 days. omeprazole 20 mg capsule Commonly known as:  PRILOSEC  
take 1 capsule by mouth twice a day  
  
 prednisoLONE acetate 1 % ophthalmic suspension Commonly known as:  PRED FORTE Administer 1 Drop to both eyes four (4) times daily. prenatal vit-calcium-iron-fa 27 mg iron- 1 mg Tab Commonly known as:  PRENATAL PLUS (CALCIUM CARB) TAKE 1 TABLET BY MOUTH DAILY PROBIOTIC 4X 10-15 mg Tbec Generic drug:  B.infantis-B.ani-B.long-B.bifi Take  by mouth.  
  
 triamcinolone acetonide 0.1 % ointment Commonly known as:  KENALOG Apply  to affected area two (2) times a day. use thin layer Follow-up Instructions Return in about 3 months (around 12/17/2018) for EOV. Introducing [facility] SERVICES! Dear Shama Rodriguez: Thank you for requesting a [employer] account. Our records indicate that you already have an active [employer] account. You can access your account anytime at [email or web address] Did you know that you can access your hospital and ER discharge instructions at any time in [employer]? You can also review all of your test results from your hospital stay or ER visit. Additional Information If you have questions, please visit the Frequently Asked Questions section of the [employer] website at [email or web address] Remember, [employer] is NOT to be used for urgent needs. For medical emergencies, dial 911. Now available from your iPhone and Android! Please provide this summary of care documentation to your next provider. Your primary care clinician is listed as [id number] [facility]. If you have any questions after today's visit, please call [phone].

## 2018-09-25 NOTE — PROGRESS NOTES
I have personally seen and evaluated the device findings. Interrogation reviewed and I agree with assessment.     Dillon Craig

## 2018-10-15 ENCOUNTER — TELEPHONE (OUTPATIENT)
Dept: CARDIOLOGY CLINIC | Age: 68
End: 2018-10-15

## 2018-10-15 ENCOUNTER — OFFICE VISIT (OUTPATIENT)
Dept: FAMILY MEDICINE CLINIC | Age: 68
End: 2018-10-15

## 2018-10-15 VITALS
HEIGHT: 64 IN | DIASTOLIC BLOOD PRESSURE: 81 MMHG | TEMPERATURE: 95.6 F | OXYGEN SATURATION: 100 % | BODY MASS INDEX: 28.65 KG/M2 | WEIGHT: 167.8 LBS | RESPIRATION RATE: 20 BRPM | HEART RATE: 72 BPM | SYSTOLIC BLOOD PRESSURE: 120 MMHG

## 2018-10-15 DIAGNOSIS — I48.91 ATRIAL FIBRILLATION, UNSPECIFIED TYPE (HCC): Chronic | ICD-10-CM

## 2018-10-15 DIAGNOSIS — Z23 ENCOUNTER FOR IMMUNIZATION: ICD-10-CM

## 2018-10-15 DIAGNOSIS — R07.9 CHEST PAIN, UNSPECIFIED TYPE: ICD-10-CM

## 2018-10-15 DIAGNOSIS — M19.90 ARTHRITIS: Primary | ICD-10-CM

## 2018-10-15 RX ORDER — ACETAMINOPHEN AND CODEINE PHOSPHATE 300; 30 MG/1; MG/1
1 TABLET ORAL
Qty: 20 TAB | Refills: 0 | Status: SHIPPED | OUTPATIENT
Start: 2018-10-15 | End: 2018-12-17 | Stop reason: SDUPTHER

## 2018-10-15 NOTE — TELEPHONE ENCOUNTER
Patient came in office today, she just came from Dr. Ralph Coe office and states he told her to come make an appt with Dr. Laura Jones because she has been having intermittent chest pain since Friday. She says it comes and goes and she hurts when she sleeps on her left side. An EKG was done at Dr. Ralph Coe office. Patient was advised to go to ER if actively having chest pain, but she states she just wants to make an appt. Advised will send message to Dr. Laura Jones to let him know. She was again advised to go to ER but declined.

## 2018-10-15 NOTE — MR AVS SNAPSHOT
[facility] 
 
 
 [address] [facility] [id number] [phone] Patient: Geronimo Hernandez MRN: [id number] Visit Information Date & Time Provider Department Dept. Phone Encounter #  
 10/15/2018 11:00 AM Brigid Bland [phone] [id number] Follow-up Instructions Return in about 2 weeks (around 10/29/2018) for EOV. Follow-up and Disposition History Your Appointments 12/12/2018  2:20 PM  
CARELINK with Abbey Soto [facility] ([facility] Appt Note: 3 month check after September -[facility] [address] [address] [address] [phone] [address] Upcoming Health Maintenance Date Due Shingrix Vaccine Age 50> (1 of 2) 1/24/2002 Influenza Age 5 to Adult 8/1/2018 BREAST CANCER SCRN MAMMOGRAM 9/26/2018 MEDICARE YEARLY EXAM 3/9/2019 GLAUCOMA SCREENING Q2Y 8/8/2019 Pneumococcal 65+ Low/Medium Risk (2 of 2 - PPSV23) 8/1/2020 DTaP/Tdap/Td series (2 - Td) 6/28/2026 COLONOSCOPY 1/23/2027 Allergies as of 10/15/2018  Review Complete On: 10/15/2018 By: Lilia Lo., DO Severity Noted Reaction Type Reactions Contrast Dye [Iodine]  10/08/2014    Itching, Swelling Iodinated Contrast- Oral And Iv Dye  12/13/2016    Itching, Swelling Seafood  10/08/2014    Itching, Swelling LOBSTER ONLY Statins-hmg-coa Reductase Inhibitors  10/05/2011    Myalgia, Other (comments) Myalgia Sulfa (Sulfonamide Antibiotics)    Itching, Swelling Current Immunizations  Reviewed on 10/15/2018 Name Date Influenza High Dose Vaccine PF 9/21/2017 11:44 AM  
 Influenza Vaccine 10/5/2016, 9/15/2014, 10/17/2013 Influenza Vaccine [place]) 9/3/2015 Influenza Vaccine (Tri) Adjuvanted 10/15/2018 Influenza Vaccine Split 10/5/2011 Influenza Vaccine Whole 9/20/2012 Pneumococcal Conjugate (PCV-13) 9/21/2017 11:45 AM  
 TD Vaccine 8/11/2011 ZZZ-RETIRED (DO NOT USE) Pneumococcal Vaccine (Unspecified Type) 9/20/2012, 10/5/2011 Zoster Vaccine, Live 9/3/2015 Reviewed by Loulou Haley LPN on 97/96/8968 at 11:19 AM  
You Were Diagnosed With   
  
 Codes Comments Arthritis    -  Primary ICD-10-CM: M19.90 ICD-9-CM: 716.90 Encounter for immunization     ICD-10-CM: X64 ICD-9-CM: V03.89 Atrial fibrillation, unspecified type ([facility] 75.)     ICD-10-CM: I48.91 
ICD-9-CM: 427.31 Chest pain, unspecified type     ICD-10-CM: R07.9 ICD-9-CM: 786.50 Vitals BP Pulse Temp Resp Height(growth percentile) Weight(growth percentile) 120/81 (BP 1 Location: Right arm, BP Patient Position: Sitting) 72 95.6 °F (35.3 °C) (Oral) 20 5' 4\" (1.626 m) 167 lb 12.8 oz (76.1 kg) SpO2 BMI OB Status Smoking Status 100% 28.8 kg/m2 Postmenopausal Former Smoker Vitals History BMI and BSA Data Body Mass Index Body Surface Area  
 28.8 kg/m 2 1.85 m 2 [facility] [address] [phone] Your Updated Medication List  
  
   
This list is accurate as of 10/15/18 11:36 AM.  Always use your most recent med list.  
  
  
  
  
 acetaminophen-codeine 300-30 mg per tablet Commonly known as:  TYLENOL-CODEINE #3 Take 1 Tab by mouth every six (6) hours as needed for Pain. Max Daily Amount: 4 Tabs. aspirin delayed-release 81 mg tablet Take 2 Tabs by mouth daily. take 1 tablet by mouth once daily  
  
 ezetimibe 10 mg tablet Commonly known as:  Karel Guzman Take 1 Tab by mouth daily. ferrous sulfate 325 mg (65 mg iron) tablet  
take 1 tablet by mouth three times a day with meals MYRBETRIQ 50 mg ER tablet Generic drug:  mirabegron ER Take 50 mg by mouth daily. omeprazole 20 mg capsule Commonly known as:  PRILOSEC  
take 1 capsule by mouth twice a day  
  
 prenatal vit-calcium-iron-fa 27 mg iron- 1 mg Tab Commonly known as:  PRENATAL PLUS (CALCIUM CARB) TAKE 1 TABLET BY MOUTH DAILY  
  
 triamcinolone acetonide 0.1 % ointment Commonly known as:  KENALOG Apply  to affected area two (2) times a day. use thin layer Prescriptions Printed Refills  
 acetaminophen-codeine (TYLENOL-CODEINE #3) 300-30 mg per tablet 0 Sig: Take 1 Tab by mouth every six (6) hours as needed for Pain. Max Daily Amount: 4 Tabs. Class: Print Route: Oral  
  
We Performed the Following ADMIN INFLUENZA VIRUS VAC [ HCPCS] AMB POC EKG ROUTINE W/ 12 LEADS, INTER & REP [35723 CPT(R)] INFLUENZA VACCINE INACTIVATED (IIV), SUBUNIT, ADJUVANTED, IM [id number] CPT(R)] Follow-up Instructions Return in about 2 weeks (around 10/29/2018) for EOV. Introducing [facility]! Dear Vladislav Blevins: Thank you for requesting a [employer] account. Our records indicate that you already have an active [employer] account. You can access your account anytime at [email or web address] Did you know that you can access your hospital and ER discharge instructions at any time in [employer]? You can also review all of your test results from your hospital stay or ER visit. Additional Information If you have questions, please visit the Frequently Asked Questions section of the [employer] website at [email or web address] Remember, [employer] is NOT to be used for urgent needs. For medical emergencies, dial 911. Now available from your iPhone and Android! Please provide this summary of care documentation to your next provider. Your primary care clinician is listed as [address]. If you have any questions after today's visit, please call [phone].

## 2018-10-15 NOTE — PROGRESS NOTES
Maricel Ferraro is a 79 y.o.  female and presents with    Chief Complaint   Patient presents with    Palpitations    Chest Pain    Knee Pain       Pt made appt today for knee pain but oh bye the way is having chest pain and palpitations . This has been coming and going for a few weeks. Seen by [place] 3 mo ago. Has bilat kneek pain. This is a chronic problem. Subjective: Additional Concerns:          Patient Active Problem List    Diagnosis Date Noted    Urinary frequency 10/26/2016    Advance directive in chart 06/28/2016    Dyslipidemia     Rheumatic heart disease     Atrial fibrillation     Arthritis 01/06/2016    Glaucoma 01/15/2014     Current Outpatient Prescriptions   Medication Sig Dispense Refill    mirabegron ER (MYRBETRIQ) 50 mg ER tablet Take 50 mg by mouth daily.  acetaminophen-codeine (TYLENOL-CODEINE #3) 300-30 mg per tablet Take 1 Tab by mouth every six (6) hours as needed for Pain. Max Daily Amount: 4 Tabs. 20 Tab 0    ezetimibe (ZETIA) 10 mg tablet Take 1 Tab by mouth daily. 90 Tab 3    omeprazole (PRILOSEC) 20 mg capsule take 1 capsule by mouth twice a day 180 Cap 4    prenatal vit-calcium-iron-fa (PRENATAL PLUS, CALCIUM CARB,) 27 mg iron- 1 mg tab TAKE 1 TABLET BY MOUTH DAILY 100 Tab 12    ferrous sulfate 325 mg (65 mg iron) tablet take 1 tablet by mouth three times a day with meals 100 Tab 10    aspirin delayed-release 81 mg tablet Take 2 Tabs by mouth daily. take 1 tablet by mouth once daily 180 Tab 3    triamcinolone acetonide (KENALOG) 0.1 % ointment Apply  to affected area two (2) times a day.  use thin layer 30 g 12     Allergies   Allergen Reactions    Contrast Dye [Iodine] Itching and Swelling    Iodinated Contrast- Oral And Iv Dye Itching and Swelling    Seafood Itching and Swelling     LOBSTER ONLY    Statins-Hmg-Coa Reductase Inhibitors Myalgia and Other (comments)     Myalgia    Sulfa (Sulfonamide Antibiotics) Itching and Swelling     Past Medical History:   Diagnosis Date    Advance directive in chart     6/28/2016    Amaurosis fugax     Aortic stenosis     mean gradient  26.5 mmHg (CATH 4/13), 32 mmHg (ECHO 9/15)    Aortic valve replacement     21mm MAGNA EASE pericardial  2/16    Arthritis     Atrial appendage resection     2/16      Atrial fibrillation     CHADS score 2  (-CHF, -HTN, -AGE, -DM +AMAUROSIS FUGAX)    Atrial fibrillation ablation     surgical left sided cryoablation  2/16    Cataract     OS    Cervical dysplasia     2009   post leep and cone    COPD     mild  2/16    Duodenal AVM     argon plasma coagulation 2/16    Dyslipidemia     Fibroids     Glaucoma     Lung nodule     3/19/2012   stable    Mitral stenosis     mean gradient  6 mmHg (CATH 4/13), 7.6 mmHg (MANUEL 10/15)    Mitral valve replacement     25 mm MAGNA EASE pericardial  2/16    Pacemaker     dual chamber MEDTRONIC 2/16    Post-op ventricular asystole     02/16    Remote tobacco     Sleep apnea     no CPAP    Thyroid nodule     3/20/2013   multiple    TIA (transient ischemic attack)      Past Surgical History:   Procedure Laterality Date    COLONOSCOPY N/A 1/23/2017    COLON[facility] performed [facility] Renny Mills [facility] at [facility] ENDOSCOPY    HX AFIB ABLATION      2/16  surgical cryoablation    HX AORTIC VALVE REPLACEMENT      2/16    HX BREAST BIOPSY Right 10/13/2014    Right breast needle [facility]OC biopsy performed by Araceli Rivas MD at [address] [place] [address] [address] HX BREAST LUMPECTOMY      Right    HX BUNIONECTOMY      left    HX GYN      VAINIII, VELASQUEZ 1, keratinous labial cyst    HX GYN      2012  Cold knife conization of cervix    HX LEEP PROCEDURE      2010    HX MITRAL VALVE REPLACEMENT      2/16    HX OTHER SURGICAL      lump left neck, benign    HX OTHER SURGICAL      multiple breast aspirations    HX PACEMAKER      I&D OF VULVA/PERINEUM ABSCESS  3/21/2017          Family History   Problem Relation Age of Onset    Cancer Mother oral,    [facility] Alzheimer Father         [facility] Breast Cancer Maternal Aunt      unsure age   [facility] Colon Cancer Maternal Grandfather      Social History   Substance Use Topics    Smoking status: Former Smoker     Years: 37.00     Quit date: 2004    Smokeless tobacco: Never Used      Comment: stop around     Alcohol use No       ROS       All other systems reviewed and are negative. Objective:  Vitals:    10/15/18 1052   BP: 120/81   Pulse: 72   Resp: 20   Temp: 95.6 °F (35.3 °C)   TempSrc: Oral   SpO2: 100%   Weight: 167 lb 12.8 oz (76.1 kg)   Height: 5' 4\" (1.626 m)   PainSc:   7   PainLoc: Chest                 alert, well appearing, and in no distress, oriented to person, place, and time and normal appearing weight  Chest - clear to auscultation, no wheezes, rales or rhonchi, symmetric air entry  Heart - normal rate, regular rhythm, normal S1, S2, no murmurs, rubs, clicks or gallops  Abdomen - soft, nontender, nondistended, no masses or organomegaly  Knees supple no instability no crepetence         LABS     TESTS  eklg paced no ischemia     Assessment/Plan:    chset pains and palpitations will refer back to [place]     Knee pain  Chronic arthrisi will give a few T 3 and f/u 2 wk      Lab review:     Diagnoses and all orders for this visit:    1. Arthritis  -     acetaminophen-codeine (TYLENOL-CODEINE #3) 300-30 mg per tablet; Take 1 Tab by mouth every six (6) hours as needed for Pain. Max Daily Amount: 4 Tabs. 2. Encounter for immunization  -     Influenza Vaccine Inactivated (IIV)(FLUAD), Subunit, Adjuvanted, IM, (03319)  -     Administration fee () for Medicare insured patients    3. Atrial fibrillation, unspecified type (HCC)  -     AMB POC EKG ROUTINE W/ 12 LEADS, INTER & REP    4. Chest pain, unspecified type          I have discussed the diagnosis with the patient and the intended plan as seen in the above orders.   The patient has received an after-visit summary and questions were answered concerning future plans. I have discussed medication side effects and warnings with the patient as well. I have reviewed the plan of care with the patient, accepted their input and they are in agreement with the treatment goals. Follow-up Disposition:  Return in about 2 weeks (around 10/29/2018) for EOV.

## 2018-10-15 NOTE — PROGRESS NOTES
Room #      SUBJECTIVE:    Korey Reagan is a 79 y.o. female who presents today for c/o pain in chest. Patient reports that it feels as if its a tugging pain and that she did not sleep at all last night because of the pain    1. Have you been to the ER, urgent care clinic since your last visit? Hospitalized since your last visit? NO    2. Have you seen or consulted any other health care providers outside of the [address] [place] [address] since your last visit? Include any pap smears or colon screening. NO  When :  Reason:    Health Maintenance reviewed Yes    Health Maintenance Due   Topic Date Due    Shingrix Vaccine Age 49> (1 of 2) 01/24/2002    Influenza Age 5 to Adult  08/01/2018    BREAST CANCER SCRN MAMMOGRAM  09/26/2018                    Korey Reagan is a 79 y.o. female who presents for routine immunizations. She denies any symptoms , reactions or allergies that would exclude them from being immunized today. Risks and adverse reactions were discussed and the VIS was given to them. All questions were addressed. She was observed for 20 min post injection. There were no reactions observed.     Nimo Johnson LPN

## 2018-11-06 ENCOUNTER — OFFICE VISIT (OUTPATIENT)
Dept: CARDIOLOGY CLINIC | Age: 68
End: 2018-11-06

## 2018-11-06 VITALS
OXYGEN SATURATION: 96 % | HEART RATE: 88 BPM | SYSTOLIC BLOOD PRESSURE: 116 MMHG | DIASTOLIC BLOOD PRESSURE: 85 MMHG | WEIGHT: 170 LBS | HEIGHT: 64 IN | BODY MASS INDEX: 29.02 KG/M2

## 2018-11-06 DIAGNOSIS — E78.5 DYSLIPIDEMIA: Chronic | ICD-10-CM

## 2018-11-06 DIAGNOSIS — R07.9 CHEST PAIN, UNSPECIFIED TYPE: Primary | ICD-10-CM

## 2018-11-06 NOTE — PATIENT INSTRUCTIONS
Javy Kamila Lynnette will call to schedule your testing within 24 hours. If you do not hear from her, then please call her directly at [phone]. Lipid( fasting)-Labs drawn in [address] [place] [address] [address]. You will have to register in the main lobby before your labs can be drawn. Their hours of operation are Monday through Friday 7am-5:30pm and Saturday 7am-1pm. If you have and questions regarding directions please contact them at [phone].

## 2018-11-06 NOTE — PROGRESS NOTES
[facility]    HPI:   Ms. Sun Seen is a 79 y.o. woman with dyslipidemia. She does not have obstructive atherosclerotic disease as evaluated by cardiac catheterization in  November 2015. Her anatomy is as follows:    HEMODYNAMICS:  LM - normal  LAD - normal  D1 - normal  Cx - normal  OM1 - normal  OM2 - normal  LPDA - patent  RCA - normal    LEFT:  RA - 6 mmHg  RV - 44/6 mmHg  PA - 38/14 mmHg  Wedge - 16 mmHg  CO / CI (DAVIAN) - 4.15 / 2.27    She has a history of rheumatic heart disease complicated by moderate to severe aortic stenosis and moderate mitral stenosis. She had aortic and mitral valve replacements in February of 2016 with the following: Aortic valve - 21 mm MAGNA EASE pericardial bioprosthesis  Mitral valve -  25 mm MAGNA EASE pericardial bioprosthesis    She has paroxysmal atrial fibrillation / flutter complicated by an embolic event manifesting as amaurosis fugax. She had surgical left sided cryoablation and atrial appendage resection in February of 2016. This occurred at the time of her aortic and mitral valve replacement. Her surgical course was notable for postoperative ventricular asystole requiring permanent pacemaker implantation in 2016. Ms. Sun Seen is here today for follow up appointment. She was seen by PCP last month. She had some complaint of chest pain. She said she had 4-5 episodes of chest pain at different times, which is a sharp pain in the left side of the chest lasting less than a minute or two. There is no radiation, no associated dyspnea, nausea, diaphoresis with this pain. It resolves itself. There is no exertional component. It was severe enough to get her attention. There was no palpitations, presyncope or syncope. She denies any other complaint.     Past Medical History:   Diagnosis Date    Advance directive in chart     6/28/2016    Amaurosis fugax     Aortic stenosis     mean gradient  26.5 mmHg (CATH 4/13), 32 mmHg (ECHO 9/15)    Aortic valve replacement     21mm MAGNA EASE pericardial  2/16    Arthritis     Atrial appendage resection     2/16      Atrial fibrillation     CHADS score 2  (-CHF, -HTN, -AGE, -DM +AMAUROSIS FUGAX)    Atrial fibrillation ablation     surgical left sided cryoablation  2/16    Cataract     OS    Cervical dysplasia     2009   post leep and cone    COPD     mild  2/16    Duodenal AVM     argon plasma coagulation 2/16    Dyslipidemia     Fibroids     Glaucoma     Lung nodule     3/19/2012   stable    Mitral stenosis     mean gradient  6 mmHg (CATH 4/13), 7.6 mmHg (MANUEL 10/15)    Mitral valve replacement     25 mm MAGNA EASE pericardial  2/16    Pacemaker     dual chamber MEDTRONIC 2/16    Post-op ventricular asystole     02/16    Remote tobacco     Sleep apnea     no CPAP    Thyroid nodule     3/20/2013   multiple    TIA (transient ischemic attack)        Past Surgical History:   Procedure Laterality Date    HX AFIB ABLATION      2/16  surgical cryoablation    HX AORTIC VALVE REPLACEMENT      2/16    HX BREAST LUMPECTOMY      Right    HX BUNIONECTOMY      left    HX GYN      VAINIII, VELASQUEZ 1, keratinous labial cyst    HX GYN      2012  Cold knife conization of cervix    HX LEEP PROCEDURE      2010    HX MITRAL VALVE REPLACEMENT      2/16    HX OTHER SURGICAL      lump left neck, benign    HX OTHER SURGICAL      multiple breast aspirations    HX PACEMAKER      I&D OF VULVA/PERINEUM ABSCESS  3/21/2017            Current Outpatient Medications   Medication Sig    mirabegron ER (MYRBETRIQ) 50 mg ER tablet Take 50 mg by mouth daily.  acetaminophen-codeine (TYLENOL-CODEINE #3) 300-30 mg per tablet Take 1 Tab by mouth every six (6) hours as needed for Pain. Max Daily Amount: 4 Tabs.  ezetimibe (ZETIA) 10 mg tablet Take 1 Tab by mouth daily.     omeprazole (PRILOSEC) 20 mg capsule take 1 capsule by mouth twice a day    prenatal vit-calcium-iron-fa (PRENATAL PLUS, CALCIUM CARB,) 27 mg iron- 1 mg tab TAKE 1 TABLET BY MOUTH DAILY    ferrous sulfate 325 mg (65 mg iron) tablet take 1 tablet by mouth three times a day with meals    aspirin delayed-release 81 mg tablet Take 2 Tabs by mouth daily. take 1 tablet by mouth once daily    triamcinolone acetonide (KENALOG) 0.1 % ointment Apply  to affected area two (2) times a day. use thin layer     No current facility-administered medications for this visit. Allergies   Allergen Reactions    Contrast Dye [Iodine] Itching and Swelling    Iodinated Contrast- Oral And Iv Dye Itching and Swelling    Seafood Itching and Swelling     LOBSTER ONLY    Statins-Hmg-Coa Reductase Inhibitors Myalgia and Other (comments)     Myalgia    Sulfa (Sulfonamide Antibiotics) Itching and Swelling       Family History:   Non contributory for premature coronary disease in first degree relatives (female <65, male <55). Social History:   She  reports that she quit smoking about 14 years ago. She quit after 37.00 years of use. she has never used smokeless tobacco.  She  reports that she does not drink alcohol. Physical:   Vitals:   BP: 116/85  HR: 88  Wt: 170 lb (77.1 kg)    Exam:   General: Middle aged woman, no complaints.     Head/Neck: No JVD    No bruits. No edema  Lungs:  No respiratory distress. Clear with good air movement. Chest:  Keloid scar  Heart:  Regular rate and rhythm. Soft systolic murmur. No rubs or gallops. Abdomen: Bowel sounds present  Extremities: Intact pulses.     No edema.         Review of Data:   LABS:   Lab Results   Component Value Date/Time    WBC 5.8 09/06/2018 01:42 PM    Hemoglobin (POC) 9.8 02/22/2016 02:39 PM    Hemoglobin, POC 8.8 (L) 02/10/2016 11:18 AM    HGB 14.2 09/06/2018 01:42 PM    Hematocrit, POC 26 (L) 02/10/2016 11:18 AM    HCT 44.4 09/06/2018 01:42 PM    PLATELET 501 68/13/6891 01:42 PM    MCV 93.5 09/06/2018 01:42 PM     Lab Results   Component Value Date/Time    Sodium 144 09/06/2018 01:42 PM    Potassium 3.6 09/06/2018 01:42 PM    Chloride 105 09/06/2018 01:42 PM    CO2 32 09/06/2018 01:42 PM    Anion gap 7 09/06/2018 01:42 PM    Glucose 95 09/06/2018 01:42 PM    BUN 14 09/06/2018 01:42 PM    Creatinine 1.00 09/06/2018 01:42 PM    BUN/Creatinine ratio 14 09/06/2018 01:42 PM    GFR est AA >60 09/06/2018 01:42 PM    GFR est non-AA 55 (L) 09/06/2018 01:42 PM    Calcium 9.7 09/06/2018 01:42 PM    Bilirubin, total 0.5 09/06/2018 01:42 PM    AST (SGOT) 23 09/06/2018 01:42 PM    Alk. phosphatase 79 09/06/2018 01:42 PM    Protein, total 7.7 09/06/2018 01:42 PM    Albumin 4.1 09/06/2018 01:42 PM    Globulin 3.6 09/06/2018 01:42 PM    A-G Ratio 1.1 09/06/2018 01:42 PM    ALT (SGPT) 32 09/06/2018 01:42 PM     Lab Results   Component Value Date/Time    Cholesterol, total 246 (H) 03/07/2017 02:24 PM    HDL Cholesterol 55 03/07/2017 02:24 PM    LDL, calculated 160.2 (H) 03/07/2017 02:24 PM    VLDL, calculated 30.8 03/07/2017 02:24 PM    Triglyceride 154 (H) 03/07/2017 02:24 PM    CHOL/HDL Ratio 4.5 03/07/2017 02:24 PM     Lab Results   Component Value Date/Time    Hemoglobin A1c 6.3 (H) 12/05/2017 09:49 AM     Lab Results   Component Value Date/Time    TSH 1.400 12/05/2017 09:49 AM    Triiodothyronine (T3), free 3.0 05/09/2017 10:04 AM    T4, Free 0.99 12/05/2017 09:49 AM     10 YEAR CVD RISK:   9.1 %    Age    62 yo    Race    AA     Gender   Female    Total cholesterol  272 mg/dL    HDL    71 mg/dL    SBP    134 mmHg    BP meds   No    Diabetes   No    Tobacco   No    EKG: August 2016:  100% AV paced 80 bpm.  (09/17) Sinus rhythm. Intermittent Atrial paced beats. TRANSESOPHAGEAL ECHOCARDIOGRAM: October 2015:  Left ventricle:  Size was normal. Systolic function was vigorous. There were no regional wall motion abnormalities. Wall thickness was normal.  Right ventricle: The size as normal.  Left atrium:  The atrium was dilated. Left atrial appendage:  No thrombus was identified.  There was no spontaneous echo contrast (\"smoke\") in the appendage. Atrial septum:  The septum bows from left to right, consistent with increased left atrial pressure. There was no evidence of intracardiac shunt by peripherally-administered agitated saline contrast.    Mitral valve: The posterior leaflet was thickened and calcific throughout. Excursion was restricted. The anterior leaflet was thickened at the distal third with minimal calcification. Leaflet excursion was restricted primarily at the distal margin. Hemodynamics were obtained when at least three consecutive sinus beats were captured. A mean gradient across the valve was measured at 7.6 mmHg. Pressure half time was measured at 153 ms with a calculated valve area of 1.4 cm. There was mild, multi-jet regurgitation. The subvalvular apparatus was not significantly involved. The findings were consistent with moderate mitral stenosis. Aortic valve: The valve was trileaflet. Leaflets were thickened and nodularly calcific. Leaflet excursion was restricted. Valve area was planimetered between 0.9 and 1.0 cm. There was mild aortic regurgitation. There was mild to moderate stenosis. ECHO: 09/17  Left ventricle: Systolic function was normal. Ejection fraction was estimated   in the range of 55 % to 60 %. There were no regional wall motion abnormalities. The study was not technically sufficient to allow evaluation of LV diastolic function. Right ventricle: The ventricle was dilated. Systolic function was normal. A   pacing wire was present. Left atrium: The atrium was dilated. Atrial septum: There was no evidence of intracardiac shunt by   peripherally-administered agitated saline contrast.  Right atrium: The atrium was mildly dilated. Mitral valve: A bioprosthesis was present. It exhibited normal function. There was no evidence for stenosis. There was mild regurgitation. Mean transmitral gradient was 4 mmHg. Aortic valve: A bioprosthesis was present. It exhibited normal function.    Valve mean gradient was 10 mmHg. Tricuspid valve: There was moderate regurgitation. STRESS TEST (EST, PHARM, NUC, ECHO etc): March 2011:  1. NORMAL SCAN. 2. NORMAL GATED ACQUISITION WITH AN EJECTION FRACTION OF 75%. CATHETERIZATION: November 2015:  HEMODYNAMICS:  LM - normal  LAD - normal  D1 - normal  Cx - normal  OM1 - normal  OM2 - normal  LPDA - patent  RCA - normal    LEFT:  RA - 6 mmHg  RV - 44/6 mmHg  PA - 38/14 mmHg  Wedge - 16 mmHg  CO / CI (DAVIAN) - 4.15 / 2.27      Impression / Plan:     Dyslipidemia    Rheumatic heart disease    Atrial fibrillation    Post-op ventricular asystole       Rheumatic heart disease:    Ms. Allison Gonzalez has rheumatic heart disease, which was complicated by moderate to severe aortic stenosis and moderate mitral stenosis. She has undergone bioprosthetic aortic and mitral valve replacement in February, 2016. Last Echocardiogram was performed in 09/17 and valve function appeared to be ok as mentioned above. Continue  mg daily    Hyperlipidemia:  Ms. Allison Gonzalez has significant hyperlipidemia. Currently she is only on monotherapy with Zetia. Her last LDL was 170 in 2017. Will repeat lipid profile. She had tried Atorvastatin and simvastatin in past but had to stop it because of significant myalgia. Also think she tried other statins as well, does not remember the name. She has significant hyperlipidemia, not at goal despite being on Zetia. Has declined Staci Cheese in the past, despite discussions regarding higher 10-year risk for ASCVD. Has refused PCSK-9 inihitor again today despite detail discussion. However, if LDL still not at goal, she is open to discussion again on next visit. Atrial fibrillation: This is paroxysmal in nature. 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  She has been in sinus rhythm on exam today. She had a permanent pacemaker paced with complete heart block at the time of her valvular surgery.   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 In addition, she has history of vaginal bleed reportedly due to fibroids in past.  She also had left sided surgical cryoablation and left atrial appendage resection, so she is only on  mg daily. Continue same. Pacemaker:  Ms. Ivone Wagner had postoperative ventricular asystole requiring pacemaker implantation in July, 2016. Currently she has no symptoms and issues with pacemaker. Chest pain:  Mix features  Will proceed with Nuclear stress test for risk stratification  On ASA, zetia. BP is 115/85 mm Hg. Discussed about cath in 2015 today and was normal.     Other than the above, or unless any additional issues arise, I have asked that she follow up in 2 weeks.

## 2018-11-06 NOTE — PROGRESS NOTES
1. Have you been to the ER, urgent care clinic since your last visit? Hospitalized since your last visit? No    2. Have you seen or consulted any other health care providers outside of the [address] [place] [address] since your last visit? Include any pap smears or colon screening.  No

## 2018-11-26 ENCOUNTER — HOSPITAL ENCOUNTER (OUTPATIENT)
Dept: NON INVASIVE DIAGNOSTICS | Age: 68
Discharge: HOME | End: 2018-11-26
Attending: INTERNAL MEDICINE
Payer: MEDICARE

## 2018-11-26 ENCOUNTER — HOSPITAL ENCOUNTER (OUTPATIENT)
Dept: NUCLEAR MEDICINE | Age: 68
Discharge: HOME | End: 2018-11-26
Attending: INTERNAL MEDICINE
Payer: MEDICARE

## 2018-11-26 VITALS
WEIGHT: 177 LBS | SYSTOLIC BLOOD PRESSURE: 117 MMHG | BODY MASS INDEX: 30.22 KG/M2 | DIASTOLIC BLOOD PRESSURE: 77 MMHG | HEIGHT: 64 IN

## 2018-11-26 DIAGNOSIS — R07.9 CHEST PAIN, UNSPECIFIED TYPE: ICD-10-CM

## 2018-11-26 LAB
STRESS ANGINA INDEX: 0
STRESS BASELINE HR: 71 BPM
STRESS ESTIMATED WORKLOAD: 6.1 METS
STRESS EXERCISE DUR MIN: NORMAL
STRESS PEAK DIAS BP: 71 MMHG
STRESS PEAK SYS BP: 129 MMHG
STRESS PERCENT HR ACHIEVED: 108 %
STRESS POST PEAK HR: 142 BPM
STRESS RATE PRESSURE PRODUCT: NORMAL BPM*MMHG
STRESS TARGET HR: 131 BPM

## 2018-11-26 PROCEDURE — 93017 CV STRESS TEST TRACING ONLY: CPT

## 2018-11-26 PROCEDURE — A9500 TC99M SESTAMIBI: HCPCS

## 2018-12-04 ENCOUNTER — OFFICE VISIT (OUTPATIENT)
Dept: OBGYN CLINIC | Age: 68
End: 2018-12-04

## 2018-12-04 VITALS
SYSTOLIC BLOOD PRESSURE: 123 MMHG | HEART RATE: 84 BPM | RESPIRATION RATE: 18 BRPM | HEIGHT: 64 IN | BODY MASS INDEX: 29.53 KG/M2 | DIASTOLIC BLOOD PRESSURE: 77 MMHG | OXYGEN SATURATION: 100 % | WEIGHT: 173 LBS

## 2018-12-04 DIAGNOSIS — N89.8 VAGINAL DISCHARGE: ICD-10-CM

## 2018-12-04 DIAGNOSIS — R35.0 URINE FREQUENCY: Primary | ICD-10-CM

## 2018-12-04 LAB
BILIRUB UR QL STRIP: NEGATIVE
GLUCOSE UR-MCNC: NEGATIVE MG/DL
KETONES P FAST UR STRIP-MCNC: NORMAL MG/DL
PH UR STRIP: 5.5 [PH] (ref 4.6–8)
PROT UR QL STRIP: NORMAL
SP GR UR STRIP: 1.02 (ref 1–1.03)
UA UROBILINOGEN AMB POC: NORMAL (ref 0.2–1)
URINALYSIS CLARITY POC: CLEAR
URINALYSIS COLOR POC: YELLOW
URINE BLOOD POC: NEGATIVE
URINE LEUKOCYTES POC: NEGATIVE
URINE NITRITES POC: NEGATIVE
WET MOUNT POCT, WMPOCT: NORMAL

## 2018-12-04 NOTE — PROGRESS NOTES
Subjective:      Patient complains of continued urinary frequency and urgency. She was started on Myrbetrique at her last visit and states that her symptoms improved temporarily, but states that she now feels the need to urinate every 10 minutes. She denies incontinence, but says that she always stays close to a restroom. She also complains of a slight vaginal discharge with some vaginal itching. Current Outpatient Medications   Medication Sig Dispense Refill    mirabegron ER (MYRBETRIQ) 50 mg ER tablet Take 50 mg by mouth daily.  acetaminophen-codeine (TYLENOL-CODEINE #3) 300-30 mg per tablet Take 1 Tab by mouth every six (6) hours as needed for Pain. Max Daily Amount: 4 Tabs. 20 Tab 0    ezetimibe (ZETIA) 10 mg tablet Take 1 Tab by mouth daily. 90 Tab 3    omeprazole (PRILOSEC) 20 mg capsule take 1 capsule by mouth twice a day 180 Cap 4    prenatal vit-calcium-iron-fa (PRENATAL PLUS, CALCIUM CARB,) 27 mg iron- 1 mg tab TAKE 1 TABLET BY MOUTH DAILY 100 Tab 12    ferrous sulfate 325 mg (65 mg iron) tablet take 1 tablet by mouth three times a day with meals 100 Tab 10    aspirin delayed-release 81 mg tablet Take 2 Tabs by mouth daily. take 1 tablet by mouth once daily 180 Tab 3    triamcinolone acetonide (KENALOG) 0.1 % ointment Apply  to affected area two (2) times a day.  use thin layer 30 g 12     Allergies   Allergen Reactions    Contrast Dye [Iodine] Itching and Swelling    Iodinated Contrast- Oral And Iv Dye Itching and Swelling    Seafood Itching and Swelling     LOBSTER ONLY    Statins-Hmg-Coa Reductase Inhibitors Myalgia and Other (comments)     Myalgia    Sulfa (Sulfonamide Antibiotics) Itching and Swelling     Past Medical History:   Diagnosis Date    Advance directive in chart     6/28/2016    Amaurosis fugax     Aortic stenosis     mean gradient  26.5 mmHg (CATH 4/13), 32 mmHg (ECHO 9/15)    Aortic valve replacement     21mm MAGNA EASE pericardial  2/16    Arthritis     Atrial appendage resection           Atrial fibrillation     CHADS score 2  (-CHF, -HTN, -AGE, -DM +AMAUROSIS FUGAX)    Atrial fibrillation ablation     surgical left sided cryoablation      Cataract     OS    Cervical dysplasia        post leep and cone    COPD     mild      Duodenal AVM     argon plasma coagulation     Dyslipidemia     Fibroids     Glaucoma     Lung nodule     3/19/2012   stable    Mitral stenosis     mean gradient  6 mmHg (CATH ), 7.6 mmHg (MANUEL 10/15)    Mitral valve replacement     25 mm MAGNA EASE pericardial      Pacemaker     dual chamber MEDTRONIC     Post-op ventricular asystole         Remote tobacco     Sleep apnea     no CPAP    Thyroid nodule     3/20/2013   multiple    TIA (transient ischemic attack)      Past Surgical History:   Procedure Laterality Date    COLONOSCOPY N/A 2017    COLONOSCOPY performed [facility] Lenny Martinez MD at [facility] ENDOSCOPY    HX AFIB ABLATION        surgical cryoablation    HX AORTIC VALVE REPLACEMENT          HX BREAST BIOPSY Right 10/13/2014    Right breast needle [facility]OC biopsy performed by Benita Patel MD at [facility] HX BREAST LUMPECTOMY      Right    HX BUNIONECTOMY      left    HX GYN      VAINIII, VELASQUEZ 1, keratinous labial cyst    HX GYN      2012  Cold knife conization of cervix    HX LEEP PROCEDURE          HX MITRAL VALVE REPLACEMENT          HX OTHER SURGICAL      lump left neck, benign    HX OTHER SURGICAL      multiple breast aspirations    HX PACEMAKER      I&D OF VULVA/PERINEUM ABSCESS  3/21/2017          Family History   Problem Relation Age of Onset    Cancer Mother         oral,    [place] Alzheimer Father            [place] Breast Cancer Maternal Aunt         unsure age   [place] Colon Cancer Maternal Grandfather      Social History     Tobacco Use    Smoking status: Former Smoker     Years: 37.00     Last attempt to quit: 2004     Years since quitting: 14.8    Smokeless tobacco: Never Used    Tobacco comment: stop around 2004   Substance Use Topics    Alcohol use: No      Review of Systems    A comprehensive review of systems was negative except for that written in the HPI. Objective:       Visit Vitals  /77   Pulse 84   Resp 18   Ht 5' 4\" (1.626 m)   Wt 173 lb (78.5 kg)   SpO2 100%   BMI 29.70 kg/m²     General appearance: alert, cooperative, no distress, appears stated age  Pelvic: External genitalia normal, Vagina normal without discharge, cervix normal in appearance, no CMT, grade II cystocele and rectocele    Wet prep: no clue cells, no yeast, no trich, minimal WBCs  UA: SG 1.020, 1+ protein, trace ketones        Assessment/Plan:     Urinary frequency and urgency, no evidence of infection. Referral to urology ordered. Vaginal discharge, no evidence of infection. Patient reassured. Follow up prn. Plan of care discussed. Patient expressed understanding.

## 2018-12-12 ENCOUNTER — OFFICE VISIT (OUTPATIENT)
Dept: CARDIOLOGY CLINIC | Age: 68
End: 2018-12-12

## 2018-12-12 DIAGNOSIS — Z95.0 CARDIAC PACEMAKER IN SITU: ICD-10-CM

## 2018-12-12 DIAGNOSIS — I48.91 ATRIAL FIBRILLATION, UNSPECIFIED TYPE (HCC): Primary | Chronic | ICD-10-CM

## 2018-12-17 ENCOUNTER — OFFICE VISIT (OUTPATIENT)
Dept: FAMILY MEDICINE CLINIC | Age: 68
End: 2018-12-17

## 2018-12-17 VITALS
WEIGHT: 174 LBS | DIASTOLIC BLOOD PRESSURE: 86 MMHG | HEIGHT: 64 IN | RESPIRATION RATE: 19 BRPM | HEART RATE: 89 BPM | TEMPERATURE: 97.9 F | SYSTOLIC BLOOD PRESSURE: 122 MMHG | OXYGEN SATURATION: 99 % | BODY MASS INDEX: 29.71 KG/M2

## 2018-12-17 DIAGNOSIS — M19.90 ARTHRITIS: ICD-10-CM

## 2018-12-17 RX ORDER — ACETAMINOPHEN AND CODEINE PHOSPHATE 300; 30 MG/1; MG/1
1 TABLET ORAL
Qty: 20 TAB | Refills: 0 | Status: SHIPPED | OUTPATIENT
Start: 2018-12-17 | End: 2019-03-27 | Stop reason: SDUPTHER

## 2018-12-17 NOTE — PROGRESS NOTES
Gary Feldman is a 79 y.o.  female and presents with    Chief Complaint   Patient presents with    Ganglion Cyst    Knee Pain           Subjective:  1. She has lump left 4th digit      2. Left knee pain  Chronic       Additional Concerns:          Patient Active Problem List    Diagnosis Date Noted    Urinary frequency 10/26/2016    Advance directive in chart 06/28/2016    Dyslipidemia     Rheumatic heart disease     Atrial fibrillation     Arthritis 01/06/2016    Glaucoma 01/15/2014     Current Outpatient Medications   Medication Sig Dispense Refill    acetaminophen-codeine (TYLENOL-CODEINE #3) 300-30 mg per tablet Take 1 Tab by mouth every six (6) hours as needed for Pain. Max Daily Amount: 4 Tabs. 20 Tab 0    mirabegron ER (MYRBETRIQ) 50 mg ER tablet Take 50 mg by mouth daily.  ezetimibe (ZETIA) 10 mg tablet Take 1 Tab by mouth daily. 90 Tab 3    omeprazole (PRILOSEC) 20 mg capsule take 1 capsule by mouth twice a day 180 Cap 4    prenatal vit-calcium-iron-fa (PRENATAL PLUS, CALCIUM CARB,) 27 mg iron- 1 mg tab TAKE 1 TABLET BY MOUTH DAILY 100 Tab 12    ferrous sulfate 325 mg (65 mg iron) tablet take 1 tablet by mouth three times a day with meals 100 Tab 10    aspirin delayed-release 81 mg tablet Take 2 Tabs by mouth daily. take 1 tablet by mouth once daily 180 Tab 3    triamcinolone acetonide (KENALOG) 0.1 % ointment Apply  to affected area two (2) times a day.  use thin layer 30 g 12     Allergies   Allergen Reactions    Contrast Dye [Iodine] Itching and Swelling    Iodinated Contrast- Oral And Iv Dye Itching and Swelling    Seafood Itching and Swelling     LOBSTER ONLY    Statins-Hmg-Coa Reductase Inhibitors Myalgia and Other (comments)     Myalgia    Sulfa (Sulfonamide Antibiotics) Itching and Swelling     Past Medical History:   Diagnosis Date    Advance directive in chart     6/28/2016    Amaurosis fugax     Aortic stenosis     mean gradient  26.5 mmHg (CATH ), 32 mmHg (ECHO 9/15)    Aortic valve replacement     21mm MAGNA EASE pericardial      Arthritis     Atrial appendage resection           Atrial fibrillation     CHADS score 2  (-CHF, -HTN, -AGE, -DM +AMAUROSIS FUGAX)    Atrial fibrillation ablation     surgical left sided cryoablation      Cataract     OS    Cervical dysplasia        post leep and cone    COPD     mild      Duodenal AVM     argon plasma coagulation     Dyslipidemia     Fibroids     Glaucoma     Lung nodule     3/19/2012   stable    Mitral stenosis     mean gradient  6 mmHg (CATH ), 7.6 mmHg (MANUEL 10/15)    Mitral valve replacement     25 mm MAGNA EASE pericardial      Pacemaker     dual chamber MEDTRONIC     Post-op ventricular asystole         Remote tobacco     Sleep apnea     no CPAP    Thyroid nodule     3/20/2013   multiple    TIA (transient ischemic attack)      Past Surgical History:   Procedure Laterality Date    COLONOSCOPY N/A 2017    COL[facility] at [facility] ENDOSCOPY    HX AFIB ABLATION        surgical cryoablation    HX AORTIC VALVE REPLACEMENT          HX BREAST BIOPSY Right 10/13/2014    Right breast needle [facility]OC biopsy performed by Krissy Evangelista MD at [address] HX BREAST LUMPECTOMY      Right    HX BUNIONECTOMY      left    HX GYN      VAINIII, VELASQUEZ 1, keratinous labial cyst    HX GYN      2012  Cold knife conization of cervix    HX LEEP PROCEDURE          HX MITRAL VALVE REPLACEMENT          HX OTHER SURGICAL      lump left neck, benign    HX OTHER SURGICAL      multiple breast aspirations    HX PACEMAKER      I&D OF VULVA/PERINEUM ABSCESS  3/21/2017          Family History   Problem Relation Age of Onset    Cancer Mother         oral,    [place] Alzheimer Father            [place] Breast Cancer Maternal Aunt         unsure age   [place] Colon Cancer Maternal Grandfather      Social History Tobacco Use    Smoking status: Former Smoker     Years: 37.00     Last attempt to quit: 2004     Years since quittin.8    Smokeless tobacco: Never Used    Tobacco comment: stop around    Substance Use Topics    Alcohol use: No       ROS       All other systems reviewed and are negative. Objective:  Vitals:    18 0954   BP: 122/86   Pulse: 89   Resp: 19   Temp: 97.9 °F (36.6 °C)   TempSrc: Oral   SpO2: 99%   Weight: 174 lb (78.9 kg)   Height: 5' 4\" (1.626 m)   PainSc:   7   PainLoc: Knee                 alert, well appearing, and in no distress and oriented to person, place, and time  Hand with tiny ganglion cyst  --   Knee with mild arthris and bakers cyst        LABS     TESTS      Assessment/Plan:    Ganglion cyst no tx at this time    Bakers cyst and arthritsi of knee will refill her t 3 ( has used less than 20 in 3 months ) also get ct of knee and f/u 1 mo    Lab review: labs are reviewed, up to date and normal    Diagnoses and all orders for this visit:    1. Arthritis  -     CT KNEE LT WO CONT; Future  -     acetaminophen-codeine (TYLENOL-CODEINE #3) 300-30 mg per tablet; Take 1 Tab by mouth every six (6) hours as needed for Pain. Max Daily Amount: 4 Tabs. I have discussed the diagnosis with the patient and the intended plan as seen in the above orders. The patient has received an after-visit summary and questions were answered concerning future plans. I have discussed medication side effects and warnings with the patient as well. I have reviewed the plan of care with the patient, accepted their input and they are in agreement with the treatment goals.       Follow-up Disposition: Not on File

## 2018-12-17 NOTE — PROGRESS NOTES
Room #      SUBJECTIVE:    Yudelka Worrell is a 79 y.o. female who presents today for c/o cyst on hand and knee pain  1. Have you been to the ER, urgent care clinic since your last visit? Hospitalized since your last visit? NO    2. Have you seen or consulted any other health care providers outside of the [address] [place] [address] since your last visit? Include any pap smears or colon screening. NO  When :  Reason:    Health Maintenance reviewed Yes    Health Maintenance Due   Topic Date Due    Shingrix Vaccine Age 49> (1 of 2) 01/24/2002    BREAST CANCER SCRN MAMMOGRAM  09/26/2018

## 2018-12-27 ENCOUNTER — TELEPHONE (OUTPATIENT)
Dept: FAMILY MEDICINE CLINIC | Age: 68
End: 2018-12-27

## 2018-12-27 DIAGNOSIS — M23.92 INTERNAL DERANGEMENT OF LEFT KNEE: Primary | ICD-10-CM

## 2018-12-27 NOTE — PROGRESS NOTES
I have personally seen and evaluated the device findings. Interrogation reviewed and I agree with assessment.     Kamron Vazquez

## 2018-12-27 NOTE — TELEPHONE ENCOUNTER
Solo Lind, nurse from [facility] through [facility] stated that someone from Dr. Sean Holt office called [facility] wanting to change order for CT scan to MRI. He stated they need the correct CPT code, needs to know is it an MRI w/ contrast/ without contrast or with or without contrast. Their hours of operations are 7am-7pm central mon-fri. Tracking number [id number].  Please assist.

## 2018-12-28 ENCOUNTER — OFFICE VISIT (OUTPATIENT)
Dept: INTERNAL MEDICINE CLINIC | Age: 68
End: 2018-12-28

## 2018-12-28 VITALS
HEART RATE: 84 BPM | HEIGHT: 64 IN | BODY MASS INDEX: 29.3 KG/M2 | DIASTOLIC BLOOD PRESSURE: 77 MMHG | TEMPERATURE: 97.6 F | OXYGEN SATURATION: 97 % | WEIGHT: 171.6 LBS | SYSTOLIC BLOOD PRESSURE: 111 MMHG | RESPIRATION RATE: 17 BRPM

## 2018-12-28 DIAGNOSIS — R35.0 URINARY FREQUENCY: Primary | ICD-10-CM

## 2018-12-28 DIAGNOSIS — R80.9 PROTEINURIA, UNSPECIFIED TYPE: ICD-10-CM

## 2018-12-28 LAB
BILIRUB UR QL STRIP: NEGATIVE
GLUCOSE UR-MCNC: NEGATIVE MG/DL
KETONES P FAST UR STRIP-MCNC: NEGATIVE MG/DL
PH UR STRIP: 5.5 [PH] (ref 4.6–8)
PROT UR QL STRIP: NORMAL
SP GR UR STRIP: 1.03 (ref 1–1.03)
UA UROBILINOGEN AMB POC: NORMAL (ref 0.2–1)
URINALYSIS CLARITY POC: CLEAR
URINALYSIS COLOR POC: NORMAL
URINE BLOOD POC: NEGATIVE
URINE LEUKOCYTES POC: NEGATIVE
URINE NITRITES POC: NEGATIVE

## 2018-12-28 RX ORDER — CEFUROXIME AXETIL 500 MG/1
500 TABLET ORAL 2 TIMES DAILY
Qty: 10 TAB | Refills: 0 | Status: SHIPPED | OUTPATIENT
Start: 2018-12-28 | End: 2019-01-02

## 2018-12-28 NOTE — PROGRESS NOTES
HISTORY OF PRESENT ILLNESS  Lea Bence is a 79 y.o. female. Urinary Frequency    The history is provided by the patient. This is a new problem. The current episode started yesterday. The problem occurs every urination. The problem has been gradually worsening. The quality of the pain is described as burning and aching. The pain is at a severity of 4/10. The pain is moderate. There has been no fever. Associated symptoms include chills, frequency, hesitancy, urgency, flank pain, abdominal pain and back pain. Pertinent negatives include no nausea, no vomiting, no discharge, no hematuria and no vaginal discharge. She has tried nothing for the symptoms. The treatment provided no relief. Her past medical history is significant for recurrent UTIs. Review of Systems   Constitutional: Positive for chills. Negative for fever and malaise/fatigue. Respiratory: Negative for shortness of breath. Cardiovascular: Negative for chest pain and palpitations. Gastrointestinal: Positive for abdominal pain. Negative for blood in stool, constipation, diarrhea, heartburn, nausea and vomiting. Genitourinary: Positive for dysuria, flank pain, frequency, hesitancy and urgency. Negative for hematuria and vaginal discharge. Musculoskeletal: Positive for back pain. Negative for myalgias. Neurological: Negative for dizziness and headaches. Psychiatric/Behavioral: The patient is not nervous/anxious. Physical Exam   Constitutional: She is oriented to person, place, and time. She appears well-developed and well-nourished. HENT:   Head: Normocephalic and atraumatic. Cardiovascular: Regular rhythm. Pulmonary/Chest: Breath sounds normal.   Abdominal: Bowel sounds are normal. She exhibits no distension. There is no tenderness. Neurological: She is alert and oriented to person, place, and time. Skin: Skin is dry. Psychiatric: She has a normal mood and affect.    Nursing note and vitals reviewed. ASSESSMENT and PLAN    ICD-10-CM ICD-9-CM    1. Urinary frequency R35.0 788.41 CBC WITH AUTOMATED DIFF      METABOLIC PANEL, COMPREHENSIVE      AMB POC URINALYSIS DIP STICK AUTO W/ MICRO      CULTURE, URINE      cefUROXime (CEFTIN) 500 mg tablet      CULTURE, URINE   2.  Proteinuria, unspecified type R80.9 791.0 CBC WITH AUTOMATED DIFF      METABOLIC PANEL, COMPREHENSIVE      CULTURE, URINE      CULTURE, URINE

## 2018-12-28 NOTE — PATIENT INSTRUCTIONS
Frequent Urination: Care Instructions  Your Care Instructions  An urge to urinate frequently but usually passing only small amounts of urine is a common symptom of urinary problems, such as urinary tract infections. The bladder may become inflamed. This can cause the urge to urinate. You may try to urinate more often than usual to try to soothe that urge. Frequent urination also may be caused by sexually transmitted infections (STIs) or kidney stones. Or it may happen when something irritates the tube that carries urine from the bladder to the outside of the body (urethra). It may also be a sign of diabetes. The cause may be hard to find. You may need tests. Follow-up care is a key part of your treatment and safety. Be sure to make and go to all appointments, and call your doctor if you are having problems. It's also a good idea to know your test results and keep a list of the medicines you take. How can you care for yourself at home? · Drink extra water for the next day or two. This will help make the urine less concentrated. (If you have kidney, heart, or liver disease and have to limit fluids, talk with your doctor before you increase the amount of fluids you drink.)  · Avoid drinks that are carbonated or have caffeine. They can irritate the bladder. For women:  · Urinate right after you have sex. · After you go to the bathroom, wipe from front to back. · Avoid douches, bubble baths, and feminine hygiene sprays. And avoid other feminine hygiene products that have deodorants. When should you call for help? Call your doctor now or seek immediate medical care if:    · You have new symptoms, such as fever, nausea, or vomiting.     · You have new or worse symptoms of a urinary problem. For example:  ? You have blood or pus in your urine. ? You have chills or body aches. ? It hurts to urinate. ? You have groin or belly pain. ? You have pain in your back just below your rib cage (the flank area).  Watch closely for changes in your health, and be sure to contact your doctor if you feel thirstier than usual.  Where can you learn more? Go to http://diogenes-lee.info/. Enter 656 [id number] in the search box to learn more about \"Frequent Urination: Care Instructions. \"  Current as of: March 21, 2018  Content Version: 11.8  © 5206-4060 [employer]. Care instructions adapted under license by [employer] (which disclaims liability or warranty for this information). If you have questions about a medical condition or this instruction, always ask your healthcare professional. [place] [address] any warranty or liability for your use of this information.

## 2018-12-28 NOTE — PROGRESS NOTES
ROOM # 9  Identified pt with two pt identifiers(name and ). Reviewed record in preparation for visit and have obtained necessary documentation. Chief Complaint   Patient presents with    Urinary Frequency      Brandee Anaheim preferred language for health care discussion is english/other. Is the patient using any DME equipment during OV? NO    Brandee Anaheim is due for:  Health Maintenance Due   Topic    Shingrix Vaccine Age 50> (1 of 2)   [facility] BREAST CANCER SCRN MAMMOGRAM      Health Maintenance reviewed and discussed per provider  Please order/place referral if appropriate. Advance Directive:  1. Do you have an advance directive in place? Patient Reply: NO    2. If not, would you like material regarding how to put one in place? NO    Coordination of Care:  1. Have you been to the ER, urgent care clinic since your last visit? Hospitalized since your last visit? NO    2. Have you seen or consulted any other health care providers outside of the [address] [place] [address] since your last visit? Include any pap smears or colon screening. NO    Patient is accompanied by self I have received verbal consent from Brandeeyane LafleurAnaheim to discuss any/all medical information while they are present in the room.     Learning Assessment:  Learning Assessment 3/8/2018 2016 2016 2014 9/3/2013 10/31/2012   PRIMARY LEARNER Patient Patient Patient Patient Patient Patient   HIGHEST LEVEL OF EDUCATION - PRIMARY LEARNER  - - GRADUATED HIGH SCHOOL OR GED - - -   PRIMARY LANGUAGE ENGLISH ENGLISH ENGLISH ENGLISH ENGLISH ENGLISH   LEARNER PREFERENCE PRIMARY VIDEOS DEMONSTRATION DEMONSTRATION DEMONSTRATION DEMONSTRATION DEMONSTRATION     - - - READING - -   ANSWERED BY patient Patient patient  patient patient patient   RELATIONSHIP SELF SELF SELF SELF SELF SELF     Depression Screening:  PHQ over the last two weeks 2018 3/8/2018 2018 2017 2017 3/28/2017 2017   PHQ Not Done - Patient Decline - - - - -   Little interest or pleasure in doing things Not at all Not at all Several days Not at all Not at all Several days Not at all   Feeling down, depressed, irritable, or hopeless Not at all Not at all Several days Not at all Not at all More than half the days Not at all   Total Score PHQ 2 0 0 2 0 0 3 0   Trouble falling or staying asleep, or sleeping too much - - - - - - -   Feeling tired or having little energy - - - - - - -   Poor appetite, weight loss, or overeating - - - - - - -   Feeling bad about yourself - or that you are a failure or have let yourself or your family down - - - - - - -   Trouble concentrating on things such as school, work, reading, or watching TV - - - - - - -   Moving or speaking so slowly that other people could have noticed; or the opposite being so fidgety that others notice - - - - - - -   Thoughts of being better off dead, or hurting yourself in some way - - - - - - -   PHQ 9 Score - - - - - - -   How difficult have these problems made it for you to do your work, take care of your home and get along with others - - - - - - -     Abuse Screening:  Abuse Screening Questionnaire 3/8/2018 2/13/2018 3/28/2017 6/12/2014   Do you ever feel afraid of your partner? N N N N   Are you in a relationship with someone who physically or mentally threatens you? N N N N   Is it safe for you to go home? Jaden Hernandez     Fall Risk  Fall Risk Assessment, last 12 mths 4/30/2018 3/8/2018 2/13/2018 9/21/2017 5/30/2017 3/28/2017 2/28/2017   Able to walk? Yes Yes Yes Yes Yes Yes Yes   Fall in past 12 months?  No No No No No No No

## 2018-12-30 LAB
ALBUMIN SERPL-MCNC: 4.6 G/DL (ref 3.6–4.8)
ALBUMIN/GLOB SERPL: 1.8 {RATIO} (ref 1.2–2.2)
ALP SERPL-CCNC: 68 IU/L (ref 39–117)
ALT SERPL-CCNC: 24 IU/L (ref 0–32)
AST SERPL-CCNC: 24 IU/L (ref 0–40)
BACTERIA UR CULT: NORMAL
BASOPHILS # BLD AUTO: 0 X10E3/UL (ref 0–0.2)
BASOPHILS NFR BLD AUTO: 1 %
BILIRUB SERPL-MCNC: 0.4 MG/DL (ref 0–1.2)
BUN SERPL-MCNC: 13 MG/DL (ref 8–27)
BUN/CREAT SERPL: 13 (ref 12–28)
CALCIUM SERPL-MCNC: 10 MG/DL (ref 8.7–10.3)
CHLORIDE SERPL-SCNC: 98 MMOL/L (ref 96–106)
CO2 SERPL-SCNC: 20 MMOL/L (ref 20–29)
CREAT SERPL-MCNC: 1.01 MG/DL (ref 0.57–1)
EOSINOPHIL # BLD AUTO: 0.4 X10E3/UL (ref 0–0.4)
EOSINOPHIL NFR BLD AUTO: 7 %
ERYTHROCYTE [DISTWIDTH] IN BLOOD BY AUTOMATED COUNT: 14.6 % (ref 12.3–15.4)
GLOBULIN SER CALC-MCNC: 2.6 G/DL (ref 1.5–4.5)
GLUCOSE SERPL-MCNC: 96 MG/DL (ref 65–99)
HCT VFR BLD AUTO: 42.8 % (ref 34–46.6)
HGB BLD-MCNC: 14.2 G/DL (ref 11.1–15.9)
IMM GRANULOCYTES # BLD: 0 X10E3/UL (ref 0–0.1)
IMM GRANULOCYTES NFR BLD: 0 %
LYMPHOCYTES # BLD AUTO: 1.3 X10E3/UL (ref 0.7–3.1)
LYMPHOCYTES NFR BLD AUTO: 24 %
MCH RBC QN AUTO: 29.3 PG (ref 26.6–33)
MCHC RBC AUTO-ENTMCNC: 33.2 G/DL (ref 31.5–35.7)
MCV RBC AUTO: 88 FL (ref 79–97)
MONOCYTES # BLD AUTO: 0.3 X10E3/UL (ref 0.1–0.9)
MONOCYTES NFR BLD AUTO: 5 %
NEUTROPHILS # BLD AUTO: 3.5 X10E3/UL (ref 1.4–7)
NEUTROPHILS NFR BLD AUTO: 63 %
PLATELET # BLD AUTO: 201 X10E3/UL (ref 150–379)
POTASSIUM SERPL-SCNC: 3.8 MMOL/L (ref 3.5–5.2)
PROT SERPL-MCNC: 7.2 G/DL (ref 6–8.5)
RBC # BLD AUTO: 4.84 X10E6/UL (ref 3.77–5.28)
SODIUM SERPL-SCNC: 140 MMOL/L (ref 134–144)
WBC # BLD AUTO: 5.6 X10E3/UL (ref 3.4–10.8)

## 2018-12-31 LAB — BACTERIA UR CULT: NORMAL

## 2018-12-31 NOTE — TELEPHONE ENCOUNTER
Received a drake form [phone] Number for Exam Scheduling. This procedure has been requested by ordering physician: Tamra Vanegas for the patient. Confirmation Date:  Approved Dec 31 2018- Jan 30 2019  Member ID Number: [id number]  [employer] Number: [id number]  Procedure: 95997 MRI JNT OF LWR OVIDIO RIVERS DYE-Left. Diagnosis: M19.90 Unspecified osteoarthritis, unspecified site    Called Elayne the procedure coordinator for STEPHANIE Vanegas. Relayed information from received [employer] Confirmation Approval. All questions answered no other concerns at this time. No other action required from nurse at this time. Elayne states she will call and schedule the patient.

## 2019-01-11 ENCOUNTER — HOSPITAL ENCOUNTER (OUTPATIENT)
Dept: CT IMAGING | Age: 69
Discharge: HOME | End: 2019-01-11
Attending: FAMILY MEDICINE
Payer: MEDICARE

## 2019-01-11 DIAGNOSIS — M19.90 ARTHRITIS: ICD-10-CM

## 2019-01-11 PROCEDURE — 73700 CT LOWER EXTREMITY W/O DYE: CPT

## 2019-01-28 ENCOUNTER — OFFICE VISIT (OUTPATIENT)
Dept: FAMILY MEDICINE CLINIC | Age: 69
End: 2019-01-28

## 2019-01-28 VITALS
DIASTOLIC BLOOD PRESSURE: 79 MMHG | HEART RATE: 80 BPM | OXYGEN SATURATION: 97 % | BODY MASS INDEX: 29.5 KG/M2 | RESPIRATION RATE: 19 BRPM | WEIGHT: 172.8 LBS | SYSTOLIC BLOOD PRESSURE: 109 MMHG | HEIGHT: 64 IN | TEMPERATURE: 97.5 F

## 2019-01-28 DIAGNOSIS — I48.91 ATRIAL FIBRILLATION, UNSPECIFIED TYPE (HCC): Primary | Chronic | ICD-10-CM

## 2019-01-28 DIAGNOSIS — M19.90 ARTHRITIS: ICD-10-CM

## 2019-01-28 DIAGNOSIS — L60.0 INGROWN NAIL: ICD-10-CM

## 2019-01-28 DIAGNOSIS — E78.5 DYSLIPIDEMIA: Chronic | ICD-10-CM

## 2019-01-28 DIAGNOSIS — L98.9 SKIN LESION: ICD-10-CM

## 2019-01-28 DIAGNOSIS — I09.9 RHEUMATIC HEART DISEASE: ICD-10-CM

## 2019-01-28 DIAGNOSIS — R35.0 URINARY FREQUENCY: ICD-10-CM

## 2019-01-28 NOTE — PROGRESS NOTES
Room #      SUBJECTIVE:    Brock Domínguez is a 81 y.o. female who presents today for c/o fall     1. Have you been to the ER, urgent care clinic since your last visit? Hospitalized since your last visit? NO    2. Have you seen or consulted any other health care providers outside of the [address] [place] [address] since your last visit? Include any pap smears or colon screening. NO  When :  Reason:    Health Maintenance reviewed Yes    Health Maintenance Due   Topic Date Due    Shingrix Vaccine Age 49> (1 of 2) 01/24/2002    BREAST CANCER SCRN MAMMOGRAM  09/26/2018

## 2019-01-28 NOTE — PROGRESS NOTES
Delvis Palacios is a 81 y.o.  female and presents with    Chief Complaint   Patient presents with    Fall    Knee Pain    Ganglion Cyst    Irregular Heart Beat    Other     rheumatic heart dz    Arthritis    Cholesterol Problem    Other     ingrown nails    Referral / Consult           Subjective:  1. Has tiny ganglion cyst on left 4th finger  2. Has chronic left knee pain here for f/u  3. Has bilat ingrown nails   4. Wants referral to derm for spots on her legs    Cardiovascular Review:  The patient has hypertension, hyperlipidemia, Atrial Fibrillation and rheumatic heart dz  . Diet and Lifestyle: generally follows a low fat low cholesterol diet  Home BP Monitoring: is not measured at home. Pertinent ROS: taking medications as instructed, no medication side effects noted, no TIA's, no chest pain on exertion, no dyspnea on exertion, no swelling of ankles. Osteoarthritis and Chronic Pain:  Patient has osteoarthritis, primarily affecting the diffuse. Symptoms onset: problem is longstanding. Rheumatological ROS: increasing significant pain in diffuse. Response to treatment plan: stable. Additional Concerns:          Patient Active Problem List    Diagnosis Date Noted    Urinary frequency 10/26/2016    Advance directive in chart 06/28/2016    Dyslipidemia     Rheumatic heart disease     Atrial fibrillation     Arthritis 01/06/2016    Glaucoma 01/15/2014     Current Outpatient Medications   Medication Sig Dispense Refill    acetaminophen-codeine (TYLENOL-CODEINE #3) 300-30 mg per tablet Take 1 Tab by mouth every six (6) hours as needed for Pain. Max Daily Amount: 4 Tabs. 20 Tab 0    mirabegron ER (MYRBETRIQ) 50 mg ER tablet Take 50 mg by mouth daily.  ezetimibe (ZETIA) 10 mg tablet Take 1 Tab by mouth daily.  90 Tab 3    omeprazole (PRILOSEC) 20 mg capsule take 1 capsule by mouth twice a day 180 Cap 4    prenatal vit-calcium-iron-fa (PRENATAL PLUS, CALCIUM CARB,) 27 mg iron- 1 mg tab TAKE 1 TABLET BY MOUTH DAILY 100 Tab 12    ferrous sulfate 325 mg (65 mg iron) tablet take 1 tablet by mouth three times a day with meals 100 Tab 10    aspirin delayed-release 81 mg tablet Take 2 Tabs by mouth daily. take 1 tablet by mouth once daily 180 Tab 3    triamcinolone acetonide (KENALOG) 0.1 % ointment Apply  to affected area two (2) times a day.  use thin layer 30 g 12     Allergies   Allergen Reactions    Contrast Dye [Iodine] Itching and Swelling    Iodinated Contrast- Oral And Iv Dye Itching and Swelling    Seafood Itching and Swelling     LOBSTER ONLY    Statins-Hmg-Coa Reductase Inhibitors Myalgia and Other (comments)     Myalgia    Sulfa (Sulfonamide Antibiotics) Itching and Swelling     Past Medical History:   Diagnosis Date    Advance directive in chart     6/28/2016    Amaurosis fugax     Aortic stenosis     mean gradient  26.5 mmHg (CATH 4/13), 32 mmHg (ECHO 9/15)    Aortic valve replacement     21mm MAGNA EASE pericardial  2/16    Arthritis     Atrial appendage resection     2/16      Atrial fibrillation     CHADS score 2  (-CHF, -HTN, -AGE, -DM +AMAUROSIS FUGAX)    Atrial fibrillation ablation     surgical left sided cryoablation  2/16    Cataract     OS    Cervical dysplasia     2009   post leep and cone    COPD     mild  2/16    Duodenal AVM     argon plasma coagulation 2/16    Dyslipidemia     Fibroids     Glaucoma     Lung nodule     3/19/2012   stable    Mitral stenosis     mean gradient  6 mmHg (CATH 4/13), 7.6 mmHg (MANUEL 10/15)    Mitral valve replacement     25 mm MAGNA EASE pericardial  2/16    Pacemaker     dual chamber MEDTRONIC 2/16    Post-op ventricular asystole     02/16    Remote tobacco     Sleep apnea     no CPAP    Thyroid nodule     3/20/2013   multiple    TIA (transient ischemic attack)      Past Surgical History:   Procedure Laterality Date    COLONOSCOPY N/A 1/23/2017    COLONOSCOPY performed [facility] Lisbet Vizcarra MD at [facility] ENDOSCOPY    HX AFIB ABLATION        surgical cryoablation    HX AORTIC VALVE REPLACEMENT          HX BREAST BIOPSY Right 10/13/2014    Right breast needle [facility]OC biopsy performed by Dajuan Mi MD at [address] HX BREAST LUMPECTOMY      Right    HX BUNIONECTOMY      left    HX GYN      VAINIII, VELASQUEZ 1, keratinous labial cyst    HX GYN      2012  Cold knife conization of cervix    HX LEEP PROCEDURE          HX MITRAL VALVE REPLACEMENT          HX OTHER SURGICAL      lump left neck, benign    HX OTHER SURGICAL      multiple breast aspirations    HX PACEMAKER      I&D OF VULVA/PERINEUM ABSCESS  3/21/2017          Family History   Problem Relation Age of Onset    Cancer Mother         oral,    Johanna Alzheimer Father            Johanna Breast Cancer Maternal Aunt         unsure age   Johanna Colon Cancer Maternal Grandfather      Social History     Tobacco Use    Smoking status: Former Smoker     Years: 37.00     Last attempt to quit: 2004     Years since quittin.9    Smokeless tobacco: Never Used    Tobacco comment: stop around    Substance Use Topics    Alcohol use: No       ROS       All other systems reviewed and are negative. Objective:  Vitals:    19 1016   BP: 109/79   Pulse: 80   Resp: 19   Temp: 97.5 °F (36.4 °C)   TempSrc: Oral   SpO2: 97%   Weight: 172 lb 12.8 oz (78.4 kg)   Height: 5' 4\" (1.626 m)   PainSc:   8   PainLoc: Generalized                 alert, well appearing, and in no distress and oriented to person, place, and time  Chest - clear to auscultation, no wheezes, rales or rhonchi, symmetric air entry  Heart - normal rate, regular rhythm, normal S1, S2, no murmurs, rubs, clicks or gallops  Abdomen - soft, nontender, nondistended, no masses or organomegaly        LABS     TESTS  Examination: CT left knee without contrast.     HISTORY: 79year-old patient with left knee pain, joint effusion.   Osteoarthritis.     TECHNIQUE: CT imaging of the left knee was performed in the axial plane  utilizing both bone and soft tissue reconstruction algorithms. Additional  coronal and sagittal reformatted images were performed by the technologist and  are included in interpretation.     One or more dose reduction techniques were used on this CT: automated exposure  control, adjustment of the mAs and/or kVp according to patient size, and  iterative reconstruction techniques. The specific techniques used on this CT  exam have been documented in the patient's electronic medical record. Digital  Imaging and Communications in Medicine (DICOM) format image data are available  to nonaffiliated external healthcare facilities or entities on a secure, media  free, reciprocally searchable basis with patient authorization for at least a  12-month period after this study. .     COMPARISON: Left knee radiographs performed 9/3/2015.     FINDINGS:     Medially, mild joint space narrowing is present with without significant  osteophyte formation.     Laterally, additional mild joint space narrowing noted with minor osteophyte  formation.     Anteriorly, the extensor mechanism is intact. Quadriceps and patellar tendons  are normal in CT appearance. Patellofemoral articulation appears within normal  limits, without significant joint space narrowing or degenerative spur  formation.     CT appearance of the anterior and posterior cruciate ligaments is within normal  limits.     No fracture or suspicious osseous lesion.     No evidence of knee joint effusion or significant Baker's cyst cyst fluid  accumulation.     Muscle bulk of the imaged distal thigh and proximal leg is within normal limits.     IMPRESSION  IMPRESSION:     1. Mild lateral tibiofemoral predominant degenerative change involving the left  knee. No acute osseous abnormality. No significant joint effusion. Assessment/Plan:    1.  Ganglion cyst no tx needed at this time  2. arthits  diffiuse and specifically knee -- doesn't need surg at this time. 3. Derm referral done  4. Ingrown nails pod referral done  5. atrialf fib, rheumatic heart dz stable  6. choelsterol stable        Lab review:     Diagnoses and all orders for this visit:    1. Atrial fibrillation, unspecified type ([address]    2. Urinary frequency    3. Rheumatic heart disease    4. Dyslipidemia    5. Arthritis    6. Ingrown nail  -     REFERRAL TO PODIATRY    7. Skin lesion  -     REFERRAL TO DERMATOLOGY          I have discussed the diagnosis with the patient and the intended plan as seen in the above orders. The patient has received an after-visit summary and questions were answered concerning future plans. I have discussed medication side effects and warnings with the patient as well. I have reviewed the plan of care with the patient, accepted their input and they are in agreement with the treatment goals. Follow-up Disposition:  Return in about 2 months (around 3/28/2019) for MWE, physical, EKG next visit, labs at next visit. More than 1/2 of this 45 minute visit was spent in counselling and coordination of care, as described above.

## 2019-02-07 ENCOUNTER — OFFICE VISIT (OUTPATIENT)
Dept: CARDIOLOGY CLINIC | Age: 69
End: 2019-02-07

## 2019-02-07 VITALS
HEIGHT: 64 IN | HEART RATE: 88 BPM | WEIGHT: 173 LBS | OXYGEN SATURATION: 100 % | DIASTOLIC BLOOD PRESSURE: 85 MMHG | SYSTOLIC BLOOD PRESSURE: 110 MMHG | BODY MASS INDEX: 29.53 KG/M2

## 2019-02-07 DIAGNOSIS — I73.9 CLAUDICATION (HCC): Primary | ICD-10-CM

## 2019-02-07 NOTE — PROGRESS NOTES
[facility]    HPI:   Ms. Gely Jean-Baptiste is a 81 y.o. woman with dyslipidemia. She does not have obstructive atherosclerotic disease as evaluated by cardiac catheterization in  November 2015. Her anatomy is as follows:    HEMODYNAMICS:  LM - normal  LAD - normal  D1 - normal  Cx - normal  OM1 - normal  OM2 - normal  LPDA - patent  RCA - normal    LEFT:  RA - 6 mmHg  RV - 44/6 mmHg  PA - 38/14 mmHg  Wedge - 16 mmHg  CO / CI (DAVIAN) - 4.15 / 2.27    She has a history of rheumatic heart disease complicated by moderate to severe aortic stenosis and moderate mitral stenosis. She had aortic and mitral valve replacements in February of 2016 with the following: Aortic valve - 21 mm MAGNA EASE pericardial bioprosthesis  Mitral valve -  25 mm MAGNA EASE pericardial bioprosthesis    She has paroxysmal atrial fibrillation / flutter complicated by an embolic event manifesting as amaurosis fugax. She had surgical left sided cryoablation and atrial appendage resection in February of 2016. This occurred at the time of her aortic and mitral valve replacement. Her surgical course was notable for postoperative ventricular asystole requiring permanent pacemaker implantation in 2016. Ms. Gely Jean-Baptiste is here today for follow up appointment. She was seen here three months ago and a nuclear stress test was ordered for chest pain. The test with low risk, without infarct or ischemia. She has not had any additional episodes of chest pain. She complains of bilateral great toes \"turning blue. \" This is not associated with cold, but she has noticed it over the last three months. She does believe that her hands turn pale in the cold. She does not have any LE wound, edema or erythema. It is mildly painful when it occurs. She does have some bilateral calf cramping, she is uncertain of the duration. She has not had any dyspnea, palpitations, orthopnea or PND.     Past Medical History:   Diagnosis Date    Advance directive in chart 6/28/2016    Amaurosis fugax     Aortic stenosis     mean gradient  26.5 mmHg (CATH 4/13), 32 mmHg (ECHO 9/15)    Aortic valve replacement     21mm MAGNA EASE pericardial  2/16    Arthritis     Atrial appendage resection     2/16      Atrial fibrillation     CHADS score 2  (-CHF, -HTN, -AGE, -DM +AMAUROSIS FUGAX)    Atrial fibrillation ablation     surgical left sided cryoablation  2/16    Cataract     OS    Cervical dysplasia     2009   post leep and cone    COPD     mild  2/16    Duodenal AVM     argon plasma coagulation 2/16    Dyslipidemia     Fibroids     Glaucoma     Lung nodule     3/19/2012   stable    Mitral stenosis     mean gradient  6 mmHg (CATH 4/13), 7.6 mmHg (MANUEL 10/15)    Mitral valve replacement     25 mm MAGNA EASE pericardial  2/16    Pacemaker     dual chamber MEDTRONIC 2/16    Post-op ventricular asystole     02/16    Remote tobacco     Sleep apnea     no CPAP    Thyroid nodule     3/20/2013   multiple    TIA (transient ischemic attack)        Past Surgical History:   Procedure Laterality Date    COLONOSCOPY N/A 1/23/2017    COLONOSCOPY performed [facility] Lucina Sotelo MD at [facility] ENDOSCOPY    HX AFIB ABLATION      2/16  surgical cryoablation    HX AORTIC VALVE REPLACEMENT      2/16    HX BREAST BIOPSY Right 10/13/2014    Right breast needle [facility]OC biopsy performed by Maribell Quintanilla [facility] at [facility] [address] HX BREAST LUMPECTOMY      Right    HX BUNIONECTOMY      left    HX GYN      VAINIII, VELASQUEZ 1, keratinous labial cyst    HX GYN      2012  Cold knife conization of cervix    HX LEEP PROCEDURE      2010    HX MITRAL VALVE REPLACEMENT      2/16    HX OTHER SURGICAL      lump left neck, benign    HX OTHER SURGICAL      multiple breast aspirations    HX PACEMAKER      I&D OF VULVA/PERINEUM ABSCESS  3/21/2017            Current Outpatient Medications   Medication Sig    acetaminophen-codeine (TYLENOL-CODEINE #3) 300-30 mg per tablet Take 1 Tab by mouth every six (6) hours as needed for Pain. Max Daily Amount: 4 Tabs.  mirabegron ER (MYRBETRIQ) 50 mg ER tablet Take 50 mg by mouth daily.  ezetimibe (ZETIA) 10 mg tablet Take 1 Tab by mouth daily.  omeprazole (PRILOSEC) 20 mg capsule take 1 capsule by mouth twice a day    prenatal vit-calcium-iron-fa (PRENATAL PLUS, CALCIUM CARB,) 27 mg iron- 1 mg tab TAKE 1 TABLET BY MOUTH DAILY    ferrous sulfate 325 mg (65 mg iron) tablet take 1 tablet by mouth three times a day with meals    aspirin delayed-release 81 mg tablet Take 2 Tabs by mouth daily. take 1 tablet by mouth once daily    triamcinolone acetonide (KENALOG) 0.1 % ointment Apply  to affected area two (2) times a day. use thin layer     No current facility-administered medications for this visit. Allergies   Allergen Reactions    Contrast Dye [Iodine] Itching and Swelling    Iodinated Contrast- Oral And Iv Dye Itching and Swelling    Seafood Itching and Swelling     LOBSTER ONLY    Statins-Hmg-Coa Reductase Inhibitors Myalgia and Other (comments)     Myalgia    Sulfa (Sulfonamide Antibiotics) Itching and Swelling       Family History:   Non contributory for premature coronary disease in first degree relatives (female <65, male <55). Social History:   She  reports that she quit smoking about 15 years ago. She quit after 37.00 years of use. she has never used smokeless tobacco.  She  reports that she does not drink alcohol. Physical:   Vitals:   BP: 110/85  HR: 88  Wt: 173 lb (78.5 kg)    Exam:   General: Middle aged woman, no complaints.     Head/Neck: No JVD    No bruits. No edema  Lungs:  No respiratory distress. Clear with good air movement. Chest:  Keloid scar  Heart:  Regular rate and rhythm. Soft systolic murmur. No rubs or gallops. Abdomen: Bowel sounds present  Extremities: Intact pulses.     No edema.  Bilateral LE's distal pulses intact (dorsalis pedis and posterior tibial), no LE edema wounds, no erythema      Review of Data: LABS:   Lab Results   Component Value Date/Time    WBC 5.6 12/28/2018 10:45 AM    Hemoglobin (POC) 9.8 02/22/2016 02:39 PM    Hemoglobin, POC 8.8 (L) 02/10/2016 11:18 AM    HGB 14.2 12/28/2018 10:45 AM    Hematocrit, POC 26 (L) 02/10/2016 11:18 AM    HCT 42.8 12/28/2018 10:45 AM    PLATELET 971 44/57/4253 10:45 AM    MCV 88 12/28/2018 10:45 AM     Lab Results   Component Value Date/Time    Sodium 140 12/28/2018 10:45 AM    Potassium 3.8 12/28/2018 10:45 AM    Chloride 98 12/28/2018 10:45 AM    CO2 20 12/28/2018 10:45 AM    Anion gap 7 09/06/2018 01:42 PM    Glucose 96 12/28/2018 10:45 AM    BUN 13 12/28/2018 10:45 AM    Creatinine 1.01 (H) 12/28/2018 10:45 AM    BUN/Creatinine ratio 13 12/28/2018 10:45 AM    GFR est AA 67 12/28/2018 10:45 AM    GFR est non-AA 58 (L) 12/28/2018 10:45 AM    Calcium 10.0 12/28/2018 10:45 AM    Bilirubin, total 0.4 12/28/2018 10:45 AM    AST (SGOT) 24 12/28/2018 10:45 AM    Alk.  phosphatase 68 12/28/2018 10:45 AM    Protein, total 7.2 12/28/2018 10:45 AM    Albumin 4.6 12/28/2018 10:45 AM    Globulin 3.6 09/06/2018 01:42 PM    A-G Ratio 1.8 12/28/2018 10:45 AM    ALT (SGPT) 24 12/28/2018 10:45 AM     Lab Results   Component Value Date/Time    Cholesterol, total 246 (H) 03/07/2017 02:24 PM    HDL Cholesterol 55 03/07/2017 02:24 PM    LDL, calculated 160.2 (H) 03/07/2017 02:24 PM    VLDL, calculated 30.8 03/07/2017 02:24 PM    Triglyceride 154 (H) 03/07/2017 02:24 PM    CHOL/HDL Ratio 4.5 03/07/2017 02:24 PM     Lab Results   Component Value Date/Time    Hemoglobin A1c 6.3 (H) 12/05/2017 09:49 AM     Lab Results   Component Value Date/Time    TSH 1.400 12/05/2017 09:49 AM    Triiodothyronine (T3), free 3.0 05/09/2017 10:04 AM    T4, Free 0.99 12/05/2017 09:49 AM     10 YEAR CVD RISK:   9.1 %    Age    64 yo    Race    AA     Gender   Female    Total cholesterol  272 mg/dL    HDL    71 mg/dL    SBP    134 mmHg    BP meds   No    Diabetes   No    Tobacco   No    EKG: August 2016:  100% AV paced 80 bpm.  (09/17) Sinus rhythm. Intermittent Atrial paced beats. TRANSESOPHAGEAL ECHOCARDIOGRAM: October 2015:  Left ventricle:  Size was normal. Systolic function was vigorous. There were no regional wall motion abnormalities. Wall thickness was normal.  Right ventricle: The size as normal.  Left atrium:  The atrium was dilated. Left atrial appendage:  No thrombus was identified. There was no spontaneous echo contrast (\"smoke\") in the appendage. Atrial septum:  The septum bows from left to right, consistent with increased left atrial pressure. There was no evidence of intracardiac shunt by peripherally-administered agitated saline contrast.    Mitral valve: The posterior leaflet was thickened and calcific throughout. Excursion was restricted. The anterior leaflet was thickened at the distal third with minimal calcification. Leaflet excursion was restricted primarily at the distal margin. Hemodynamics were obtained when at least three consecutive sinus beats were captured. A mean gradient across the valve was measured at 7.6 mmHg. Pressure half time was measured at 153 ms with a calculated valve area of 1.4 cm. There was mild, multi-jet regurgitation. The subvalvular apparatus was not significantly involved. The findings were consistent with moderate mitral stenosis. Aortic valve: The valve was trileaflet. Leaflets were thickened and nodularly calcific. Leaflet excursion was restricted. Valve area was planimetered between 0.9 and 1.0 cm. There was mild aortic regurgitation. There was mild to moderate stenosis. ECHO: 09/17  Left ventricle: Systolic function was normal. Ejection fraction was estimated   in the range of 55 % to 60 %. There were no regional wall motion abnormalities. The study was not technically sufficient to allow evaluation of LV diastolic function. Right ventricle: The ventricle was dilated. Systolic function was normal. A   pacing wire was present.   Left atrium: The atrium was dilated. Atrial septum: There was no evidence of intracardiac shunt by   peripherally-administered agitated saline contrast.  Right atrium: The atrium was mildly dilated. Mitral valve: A bioprosthesis was present. It exhibited normal function. There was no evidence for stenosis. There was mild regurgitation. Mean transmitral gradient was 4 mmHg. Aortic valve: A bioprosthesis was present. It exhibited normal function. Valve mean gradient was 10 mmHg. Tricuspid valve: There was moderate regurgitation. STRESS TEST (EST, PHARM, NUC, ECHO etc): March 2011:  1. NORMAL SCAN. 2. NORMAL GATED ACQUISITION WITH AN EJECTION FRACTION OF 75%. CATHETERIZATION: November 2015:  HEMODYNAMICS:  LM - normal  LAD - normal  D1 - normal  Cx - normal  OM1 - normal  OM2 - normal  LPDA - patent  RCA - normal    LEFT:  RA - 6 mmHg  RV - 44/6 mmHg  PA - 38/14 mmHg  Wedge - 16 mmHg  CO / CI (DAVIAN) - 4.15 / 2.27      Impression / Plan:     Dyslipidemia    Rheumatic heart disease    Atrial fibrillation    Post-op ventricular asystole       Rheumatic heart disease:    Ms. Savanna Mahoney has rheumatic heart disease, which was complicated by moderate to severe aortic stenosis and moderate mitral stenosis. She has undergone bioprosthetic aortic and mitral valve replacement in February, 2016. Last Echocardiogram was performed in 09/17 and valve function appeared to be ok as mentioned above. Continue  mg daily    Hyperlipidemia:    Currently she is only on monotherapy with Zetia. She had tried Atorvastatin and simvastatin in past but had to stop it because of significant myalgia. Also thinks she tried other statins as well, does not remember the name. She has significant hyperlipidemia, not at goal despite being on Zetia. She has a last LDL on file of 160 in March 2017. She states she forgot to get it checked after last visit.  Advised patient to obtain fasting lipid panel, discussed PCSK9 inhibitors and long term cardiovascular risk. She is not interested in taking this medication. Atrial fibrillation: This is paroxysmal in nature. 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  She has been in sinus rhythm on exam today. She had a permanent pacemaker paced with complete heart block at the time of her valvular surgery. 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 In addition, she has history of vaginal bleed reportedly due to fibroids in past.  She also had left sided surgical cryoablation and left atrial appendage resection, so she is only on  mg daily. Continue same. Pacemaker:  Ms. Kulwant Durand had postoperative ventricular asystole requiring pacemaker implantation in July, 2016. Currently she has no symptoms and issues with pacemaker. Continue with routine device check. Chest pain:  No recurrence. Nuclear stress test was low risk, no ischemia. Bilateral lower calf cramping/digit discoloration:   Bilateral LE arterial PVL's with LYLA ordered. Distal pulses intact bilaterally and no wounds visualized. Other than the above, or unless any additional issues arise, she should follow up in 3 months.      Evelin Garcia PA-C

## 2019-02-07 NOTE — PATIENT INSTRUCTIONS
Merryl Kawasaki will call to schedule your testing within 24-48 hours. If you do not hear from her, then please call her directly at [phone].     All testing/lab work is completed in [address] [place] [address] 150 at [facility] lab work - no appt required

## 2019-02-13 ENCOUNTER — OFFICE VISIT (OUTPATIENT)
Dept: INTERNAL MEDICINE CLINIC | Age: 69
End: 2019-02-13

## 2019-02-13 VITALS
WEIGHT: 173 LBS | SYSTOLIC BLOOD PRESSURE: 116 MMHG | HEIGHT: 64 IN | TEMPERATURE: 97.9 F | RESPIRATION RATE: 18 BRPM | DIASTOLIC BLOOD PRESSURE: 88 MMHG | HEART RATE: 76 BPM | BODY MASS INDEX: 29.53 KG/M2 | OXYGEN SATURATION: 97 %

## 2019-02-13 DIAGNOSIS — N89.8 VAGINAL DISCHARGE: Primary | ICD-10-CM

## 2019-02-13 DIAGNOSIS — R33.9 URINARY RETENTION: ICD-10-CM

## 2019-02-13 DIAGNOSIS — Z20.2 STD EXPOSURE: ICD-10-CM

## 2019-02-13 NOTE — PATIENT INSTRUCTIONS
Exposure to Sexually Transmitted Infections: Care Instructions  Your Care Instructions  Sexually transmitted infections (STIs) are those diseases spread by sexual contact. There are at least 20 different STIs, including chlamydia, gonorrhea, syphilis, and human immunodeficiency virus (HIV), which causes AIDS. Bacteria-caused STIs can be treated and cured. STIs caused by viruses, such as HIV, can be treated but not cured. Some STIs can reduce a woman's chances of getting pregnant in the future. STIs are spread during sexual contact, such as vaginal intercourse and oral or anal sex. Follow-up care is a key part of your treatment and safety. Be sure to make and go to all appointments, and call your doctor if you are having problems. It's also a good idea to know your test results and keep a list of the medicines you take. How can you care for yourself at home? · Your doctor may have given you a shot of antibiotics. If your doctor prescribed antibiotic pills, take them as directed. Do not stop taking them just because you feel better. You need to take the full course of antibiotics. · Do not have sexual contact while you have symptoms of an STI or are being treated for an STI. · Tell your sex partner (or partners) that he or she will need treatment. · If you are a woman, do not douche. Douching changes the normal balance of bacteria in the vagina and may spread an infection up into your reproductive organs. To prevent exposure to STIs in the future  · Use latex condoms every time you have sex. Use them from the beginning to the end of sexual contact. · Talk to your partner before you have sex. Find out if he or she has or is at risk for any STI. Keep in mind that a person may be able to spread an STI even if he or she does not have symptoms. · Do not have sex if you are being treated for an STI. · Do not have sex with anyone who has symptoms of an STI, such as sores on the genitals or mouth.   · Having one sex partner (who does not have STIs and does not have sex with anyone else) is a good way to avoid STIs. When should you call for help? Call your doctor now or seek immediate medical care if:    · You have new pain in your belly or pelvis.     · You have symptoms of a urinary tract infection. These may include:  ? Pain or burning when you urinate. ? A frequent need to urinate without being able to pass much urine. ? Pain in the flank, which is just below the rib cage and above the waist on either side of the back. ? Blood in your urine. ? A fever.     · You have new or worsening pain or swelling in the scrotum.    Watch closely for changes in your health, and be sure to contact your doctor if:    · You have unusual vaginal bleeding.     · You have a discharge from the vagina or penis.     · You have any new symptoms, such as sores, bumps, rashes, blisters, or warts.     · You have itching, tingling, pain, or burning in the genital or anal area.     · You think you may have an STI. Where can you learn more? Go to http://diogenes-lee.info/. Enter I814 in the search box to learn more about \"Exposure to Sexually Transmitted Infections: Care Instructions. \"  Current as of: September 11, 2018  Content Version: 11.9  © 6279-1765 [employer]. Care instructions adapted under license by [employer] (which disclaims liability or warranty for this information). If you have questions about a medical condition or this instruction, always ask your healthcare professional. [address] any warranty or liability for your use of this information.

## 2019-02-13 NOTE — PROGRESS NOTES
ROOM #   1  Sendy Ignacio presents today for   Chief Complaint   Patient presents with    Vaginal Discharge     after sex on 2/10/19. with abdomial pain brown discharge       Sendy Ignacio preferred language for health care discussion is english/other.     Is someone accompanying this pt? no    Is the patient using any DME equipment during OV? no    Depression Screening:  3 most recent PHQ Screens 4/30/2018 3/8/2018 2/13/2018 9/21/2017 5/30/2017 3/28/2017 2/28/2017   PHQ Not Done - Patient Decline - - - - -   Little interest or pleasure in doing things Not at all Not at all Several days Not at all Not at all Several days Not at all   Feeling down, depressed, irritable, or hopeless Not at all Not at all Several days Not at all Not at all More than half the days Not at all   Total Score PHQ 2 0 0 2 0 0 3 0   Trouble falling or staying asleep, or sleeping too much - - - - - - -   Feeling tired or having little energy - - - - - - -   Poor appetite, weight loss, or overeating - - - - - - -   Feeling bad about yourself - or that you are a failure or have let yourself or your family down - - - - - - -   Trouble concentrating on things such as school, work, reading, or watching TV - - - - - - -   Moving or speaking so slowly that other people could have noticed; or the opposite being so fidgety that others notice - - - - - - -   Thoughts of being better off dead, or hurting yourself in some way - - - - - - -   PHQ 9 Score - - - - - - -   How difficult have these problems made it for you to do your work, take care of your home and get along with others - - - - - - -       Learning Assessment:  Learning Assessment 3/8/2018 9/7/2016 5/5/2016 6/12/2014 9/3/2013 10/31/2012   PRIMARY LEARNER Patient Patient Patient Patient Patient Patient   HIGHEST LEVEL OF EDUCATION - PRIMARY LEARNER  - - GRADUATED HIGH SCHOOL OR GED - - -   PRIMARY LANGUAGE ENGLISH ENGLISH ENGLISH ENGLISH ENGLISH ENGLISH   LEARNER PREFERENCE PRIMARY VIDEOS DEMONSTRATION DEMONSTRATION DEMONSTRATION DEMONSTRATION DEMONSTRATION     - - - READING - -   ANSWERED BY patient Patient patient  patient patient patient   RELATIONSHIP SELF SELF SELF SELF SELF SELF       Abuse Screening:  Abuse Screening Questionnaire 3/8/2018 2/13/2018 3/28/2017 6/12/2014   Do you ever feel afraid of your partner? N N N N   Are you in a relationship with someone who physically or mentally threatens you? N N N N   Is it safe for you to go home? Marizol Saavedra       Fall Risk  Fall Risk Assessment, last 12 mths 2/13/2019 2/7/2019 4/30/2018 3/8/2018 2/13/2018 9/21/2017 5/30/2017   Able to walk? No Yes Yes Yes Yes Yes Yes   Fall in past 12 months? - No No No No No No       Health Maintenance reviewed and discussed per provider. Yes    Blaze Umana is due for   Health Maintenance Due   Topic Date Due    Shingrix Vaccine Age 49> (1 of 2) 01/24/2002    BREAST CANCER SCRN MAMMOGRAM  09/26/2018         Please order/place referral if appropriate. Advance Directive:  1. Do you have an advance directive in place? Patient Reply: no    2. If not, would you like material regarding how to put one in place? Patient Reply: no    Coordination of Care:  1. Have you been to the ER, urgent care clinic since your last visit? Hospitalized since your last visit? no    2. Have you seen or consulted any other health care providers outside of the [address] [place] [address] since your last visit? Include any pap smears or colon screening.  no

## 2019-02-13 NOTE — PROGRESS NOTES
HISTORY OF PRESENT ILLNESS  Rebecca Redd is a 81 y.o. female. Patient presents with yellowish vaginal discharge and lower abd pain after a sexual intercourse x 3 days, not sure if discharge is malodorous, she has had BV and yeast infection before, not sure if this feels like BV or yeast. Sex was unprotected,with a single male partner,not sure if her partner has nay STD,she would like to be tested for STD. Review of Systems   Constitutional: Negative. HENT: Negative. Eyes: Negative. Respiratory: Negative. Cardiovascular: Negative. Genitourinary: Negative. Musculoskeletal: Negative. Skin: Negative. Neurological: Negative. Psychiatric/Behavioral: Negative. Physical Exam   Constitutional: She is oriented to person, place, and time. She appears well-developed. HENT:   Head: Normocephalic. Eyes: Pupils are equal, round, and reactive to light. Neck: Normal range of motion. Cardiovascular: Normal rate. Pulmonary/Chest: Effort normal and breath sounds normal.   Abdominal: Soft. Bowel sounds are normal.   Genitourinary:   Genitourinary Comments: Yellowish vaginal discharge   Neurological: She is alert and oriented to person, place, and time. GCS eye subscore is 4. GCS verbal subscore is 5. GCS motor subscore is 6. Skin: Skin is warm. Psychiatric: She has a normal mood and affect. Her speech is normal and behavior is normal. Judgment and thought content normal. Cognition and memory are normal.       ASSESSMENT and PLAN    ICD-10-CM ICD-9-CM    1. Vaginal discharge N89.8 623.5 NUSWAB VAGINITIS PLUS      CHLAMYDIA/NEISSERIA AMPLIFICATION      CHLAMYDIA/NEISSERIA AMPLIFICATION      NUSWAB VAGINITIS PLUS   2. STD exposure Z20.2 V01.6 NUSWAB VAGINITIS PLUS      CHLAMYDIA/NEISSERIA AMPLIFICATION      CHLAMYDIA/NEISSERIA AMPLIFICATION      NUSWAB VAGINITIS PLUS   3. Urinary retention R33.9 788.20 CULTURE, URINE      CULTURE, URINE     Encounter Diagnoses   Name Primary?  Vaginal discharge Yes    STD exposure     Urinary retention      Orders Placed This Encounter    CULTURE, URINE    NUSWAB VAGINITIS PLUS    CHLAMYDIA/NEISSERIA AMPLIFICATION ([facility] Only)     Orders Placed This Encounter    CULTURE, URINE     Standing Status:   Future     Number of Occurrences:   1     Standing Expiration Date:   2/14/2020    NUSWAB VAGINITIS PLUS     Standing Status:   Future     Number of Occurrences:   1     Standing Expiration Date:   2/14/2020    CHLAMYDIA/NEISSERIA AMPLIFICATION ([facility] Only)     Standing Status:   Future     Number of Occurrences:   1     Standing Expiration Date:   2/14/2020     Order Specific Question:   Specimen type/source     Answer:   Urine [[id number]]     Orders Placed This Encounter    CULTURE, URINE    NUSWAB VAGINITIS PLUS    CHLAMYDIA/NEISSERIA AMPLIFICATION ([facility] Only)     Diagnoses and all orders for this visit:    1. Vaginal discharge  -     NUSWAB VAGINITIS PLUS; Future  -     CHLAMYDIA/NEISSERIA AMPLIFICATION; Future    2. STD exposure  -     NUSWAB VAGINITIS PLUS; Future  -     CHLAMYDIA/NEISSERIA AMPLIFICATION; Future    3. Urinary retention  -     CULTURE, URINE; Future    NP performed pelvic exam with nurse at bedside. Specimens collected. Patient tolerated well. Female Genitalia: normal, no lesions; urethra, nontender. Vagina: healthy pink mucosa without any lesions, with moderate yellowish abnormal discharge.      Follow-up Disposition: Not on File

## 2019-02-13 NOTE — PROGRESS NOTES
Patient presents with yellowish vaginal discharge and lower abd pain after a sexual intercourse x 3 days, not sure if discharge is malodorous, she has had BV and yeast infection before, not sure if this feels like BV or yeast. Sex was unprotected,with a single male partner,not sure if her partner has nay STD,she would like to be tested for STD.

## 2019-02-16 LAB
A VAGINAE DNA VAG QL NAA+PROBE: NORMAL SCORE
BACTERIA UR CULT: NORMAL
BVAB2 DNA VAG QL NAA+PROBE: NORMAL SCORE
C ALBICANS DNA VAG QL NAA+PROBE: NEGATIVE
C GLABRATA DNA VAG QL NAA+PROBE: NEGATIVE
C TRACH RRNA SPEC QL NAA+PROBE: NEGATIVE
MEGA1 DNA VAG QL NAA+PROBE: NORMAL SCORE
N GONORRHOEA RRNA SPEC QL NAA+PROBE: NEGATIVE
T VAGINALIS RRNA SPEC QL NAA+PROBE: NEGATIVE

## 2019-02-18 NOTE — PROGRESS NOTES
Please inform patient that her STD check was negative, her urine was contaminated from poor collection. If she is still having urinary symptoms she may need to stop by and provide a clean catch urine sample.

## 2019-02-18 NOTE — PROGRESS NOTES
Attempted to contact pt at  number, no answer. Lvm for pt to return call to office at [phone]. Will continue to try to contact pt.

## 2019-02-19 NOTE — PROGRESS NOTES
Patient called in . 2 patient identifiers confirmed.  Patient verbalizes understanding of lab results and stated she is not having urinary symptoms at this time

## 2019-03-04 ENCOUNTER — HOSPITAL ENCOUNTER (OUTPATIENT)
Dept: VASCULAR SURGERY | Age: 69
Discharge: HOME | End: 2019-03-04
Attending: PHYSICIAN ASSISTANT
Payer: MEDICARE

## 2019-03-04 DIAGNOSIS — I73.9 CLAUDICATION (HCC): ICD-10-CM

## 2019-03-04 LAB
LEFT ABI: 1.04
LEFT ANTERIOR TIBIAL: 119 MMHG
LEFT ARM BP: 113 MMHG
LEFT ATA BP LEVEL: NORMAL
LEFT POSTERIOR TIBIAL: 112 MMHG
LEFT TBI: 0.66
LEFT TOE PRESSURE: 75 MMHG
RIGHT ABI: 1.03
RIGHT ANTERIOR TIBIAL: 115 MMHG
RIGHT ARM BP: 114 MMHG
RIGHT ATA BP LEVEL: NORMAL
RIGHT POSTERIOR TIBIAL: 117 MMHG
RIGHT TBI: 0.49
RIGHT TOE PRESSURE: 56 MMHG

## 2019-03-04 PROCEDURE — 93922 UPR/L XTREMITY ART 2 LEVELS: CPT

## 2019-03-05 ENCOUNTER — TELEPHONE (OUTPATIENT)
Dept: CARDIOLOGY CLINIC | Age: 69
End: 2019-03-05

## 2019-03-05 NOTE — TELEPHONE ENCOUNTER
Contacted pt at [facility] number. Two patient Identifiers confirmed. Advised pt per NP Mayi Miller results below. Pt verbalized understanding and requested a copy in the mail. Results mailed to address on file.

## 2019-03-05 NOTE — TELEPHONE ENCOUNTER
----- Message from Radha carrasco.  Kulwant Stephens PA-C sent at 3/5/2019  1:27 PM EST -----  Duplex study of lower extremities is normal, please advise

## 2019-03-13 ENCOUNTER — TELEPHONE (OUTPATIENT)
Dept: FAMILY MEDICINE CLINIC | Age: 69
End: 2019-03-13

## 2019-03-13 NOTE — TELEPHONE ENCOUNTER
Dr. Galo Neighbours from cardiology suggested that the patient takes a diabetes test due to her feet turning blue and numbness. Patient is requesting a phone call back if this could be ordered for her to take. Please advise, thank you.

## 2019-03-14 NOTE — TELEPHONE ENCOUNTER
Returned call to patient she stated that she has a Renal Ultra sound on 3/20/2019 and then an appointment with Dr. Derek Hannon on 3/27/2019 patient stated that she will keep those scheduled appointments as she does not drive and that it will be more convenient for her. No other concerns at this time.  Closing encounter

## 2019-03-20 ENCOUNTER — OFFICE VISIT (OUTPATIENT)
Dept: FAMILY MEDICINE CLINIC | Age: 69
End: 2019-03-20

## 2019-03-20 ENCOUNTER — HOSPITAL ENCOUNTER (OUTPATIENT)
Dept: ULTRASOUND IMAGING | Age: 69
Discharge: HOME | End: 2019-03-20
Attending: NURSE PRACTITIONER
Payer: MEDICARE

## 2019-03-20 VITALS
HEIGHT: 64 IN | TEMPERATURE: 97.6 F | OXYGEN SATURATION: 100 % | WEIGHT: 175.6 LBS | DIASTOLIC BLOOD PRESSURE: 87 MMHG | SYSTOLIC BLOOD PRESSURE: 123 MMHG | HEART RATE: 60 BPM | BODY MASS INDEX: 29.98 KG/M2 | RESPIRATION RATE: 19 BRPM

## 2019-03-20 DIAGNOSIS — R73.9 BLOOD GLUCOSE ELEVATED: Primary | ICD-10-CM

## 2019-03-20 DIAGNOSIS — N13.30 HYDRONEPHROSIS: ICD-10-CM

## 2019-03-20 PROCEDURE — 76775 US EXAM ABDO BACK WALL LIM: CPT

## 2019-03-20 NOTE — PROGRESS NOTES
Room #      SUBJECTIVE:    Mukesh Ash is a 81 y.o. female who presents today for diabetes check    1. Have you been to the ER, urgent care clinic since your last visit? Hospitalized since your last visit? NO    2. Have you seen or consulted any other health care providers outside of the [address] [place] [address] since your last visit? Include any pap smears or colon screening. NO  When :  Reason:    Health Maintenance reviewed Yes    Health Maintenance Due   Topic Date Due    Shingrix Vaccine Age 49> (1 of 2) 01/24/2002    Pneumococcal 65+ years (1 of 2 - PCV13) 01/24/2017    BREAST CANCER SCRN MAMMOGRAM  09/26/2018    MEDICARE YEARLY EXAM  03/09/2019

## 2019-03-20 NOTE — PROGRESS NOTES
Gume Crowley is a 81 y.o.  female and presents with    Chief Complaint   Patient presents with    Diabetes           Subjective:  She went to podiatrist juana hidalgo who stated that her feet were numb and blue and he wanted her to get a diabetes test    Also seen by Kamilah Diaz who is a np for [facility] cardiologist and she wanted alana to get diabetges test    Also on her way to get a renal ultrasound orderd by urogynecologist . Apparently she hasn't seen the doctor by has seen a KINDRA motta. Additional Concerns:          Patient Active Problem List    Diagnosis Date Noted    Urinary frequency 10/26/2016    Advance directive in chart 06/28/2016    Dyslipidemia     Rheumatic heart disease     Atrial fibrillation     Arthritis 01/06/2016    Glaucoma 01/15/2014     Current Outpatient Medications   Medication Sig Dispense Refill    acetaminophen-codeine (TYLENOL-CODEINE #3) 300-30 mg per tablet Take 1 Tab by mouth every six (6) hours as needed for Pain. Max Daily Amount: 4 Tabs. 20 Tab 0    mirabegron ER (MYRBETRIQ) 50 mg ER tablet Take 50 mg by mouth daily.  ezetimibe (ZETIA) 10 mg tablet Take 1 Tab by mouth daily. 90 Tab 3    omeprazole (PRILOSEC) 20 mg capsule take 1 capsule by mouth twice a day 180 Cap 4    prenatal vit-calcium-iron-fa (PRENATAL PLUS, CALCIUM CARB,) 27 mg iron- 1 mg tab TAKE 1 TABLET BY MOUTH DAILY 100 Tab 12    ferrous sulfate 325 mg (65 mg iron) tablet take 1 tablet by mouth three times a day with meals 100 Tab 10    aspirin delayed-release 81 mg tablet Take 2 Tabs by mouth daily. take 1 tablet by mouth once daily 180 Tab 3    triamcinolone acetonide (KENALOG) 0.1 % ointment Apply  to affected area two (2) times a day.  use thin layer 30 g 12     Allergies   Allergen Reactions    Contrast Dye [Iodine] Itching and Swelling    Iodinated Contrast- Oral And Iv Dye Itching and Swelling    Seafood Itching and Swelling     LOBSTER ONLY    Statins-Hmg-Coa Reductase Inhibitors Myalgia and Other (comments)     Myalgia    Sulfa (Sulfonamide Antibiotics) Itching and Swelling     Past Medical History:   Diagnosis Date    Advance directive in chart     6/28/2016    Amaurosis fugax     Aortic stenosis     mean gradient  26.5 mmHg (CATH 4/13), 32 mmHg (ECHO 9/15)    Aortic valve replacement     21mm MAGNA EASE pericardial  2/16    Arthritis     Atrial appendage resection     2/16      Atrial fibrillation     CHADS score 2  (-CHF, -HTN, -AGE, -DM +AMAUROSIS FUGAX)    Atrial fibrillation ablation     surgical left sided cryoablation  2/16    Cataract     OS    Cervical dysplasia     2009   post leep and cone    COPD     mild  2/16    Duodenal AVM     argon plasma coagulation 2/16    Dyslipidemia     Fibroids     Glaucoma     Lung nodule     3/19/2012   stable    Mitral stenosis     mean gradient  6 mmHg (CATH 4/13), 7.6 mmHg (MANUEL 10/15)    Mitral valve replacement     25 mm MAGNA EASE pericardial  2/16    Pacemaker     dual chamber MEDTRONIC 2/16    Post-op ventricular asystole     02/16    Remote tobacco     Sleep apnea     no CPAP    Thyroid nodule     3/20/2013   multiple    TIA (transient ischemic attack)      Past Surgical History:   Procedure Laterality Date    COLONOSCOPY N/A 1/23/2017    COL[facility]COPY performed [facility] at [facility]COPY    HX AFIB ABLATION      2/16  surgical cryoablation    HX AORTIC VALVE REPLACEMENT      2/16    HX BREAST BIOPSY Right 10/13/2014    Right breast needle [facility]OC biopsy performed by Lynn [facility] at [facility] HX BREAST LUMPECTOMY      Right    HX BUNIONECTOMY      left    HX GYN      VAINIII, VELASQUEZ 1, keratinous labial cyst    HX GYN      2012  Cold knife conization of cervix    HX LEEP PROCEDURE      2010    HX MITRAL VALVE REPLACEMENT      2/16    HX OTHER SURGICAL      lump left neck, benign    HX OTHER SURGICAL      multiple breast aspirations    HX PACEMAKER  I&D OF VULVA/PERINEUM ABSCESS  3/21/2017          Family History   Problem Relation Age of Onset    Cancer Mother         oral,    [address] [facility] Alzheimer Father            [address] [facility] Breast Cancer Maternal Aunt         unsure age   [address] [facility] Colon Cancer Maternal Grandfather      Social History     Tobacco Use    Smoking status: Former Smoker     Years: 37.00     Last attempt to quit: 2004     Years since quitting: 15.1    Smokeless tobacco: Never Used    Tobacco comment: stop around    Substance Use Topics    Alcohol use: No       ROS       All other systems reviewed and are negative. Objective:  Vitals:    19 1006   BP: 123/87   Pulse: 60   Resp: 19   Temp: 97.6 °F (36.4 °C)   TempSrc: Oral   SpO2: 100%   Weight: 175 lb 9.6 oz (79.7 kg)   Height: 5' 4\" (1.626 m)   PainSc:   9   PainLoc: Foot                 alert, well appearing, and in no distress and oriented to person, place, and time          LABS   aic  6.0    Random glucose is 97   TESTS      Assessment/Plan:    She is not a diabetic. Lab review: labs are reviewed, up to date and normal    Diagnoses and all orders for this visit:    1. Blood glucose elevated  -     AMB POC HEMOGLOBIN A1C  -     AMB POC GLUCOSE BLOOD, BY GLUCOSE MONITORING DEVICE          I have discussed the diagnosis with the patient and the intended plan as seen in the above orders. The patient has received an after-visit summary and questions were answered concerning future plans. I have discussed medication side effects and warnings with the patient as well. I have reviewed the plan of care with the patient, accepted their input and they are in agreement with the treatment goals.      Follow-up Disposition: Not on File

## 2019-03-23 DIAGNOSIS — Z00.00 ROUTINE GENERAL MEDICAL EXAMINATION AT A HEALTH CARE FACILITY: ICD-10-CM

## 2019-03-23 DIAGNOSIS — E78.5 DYSLIPIDEMIA: Chronic | ICD-10-CM

## 2019-03-23 DIAGNOSIS — I35.0 AORTIC VALVE STENOSIS, UNSPECIFIED ETIOLOGY: Chronic | ICD-10-CM

## 2019-03-23 DIAGNOSIS — I48.91 ATRIAL FIBRILLATION, UNSPECIFIED TYPE (HCC): Chronic | ICD-10-CM

## 2019-03-23 DIAGNOSIS — L30.9 ECZEMA, UNSPECIFIED TYPE: ICD-10-CM

## 2019-03-23 DIAGNOSIS — I09.9 RHEUMATIC HEART DISEASE: ICD-10-CM

## 2019-03-23 DIAGNOSIS — M19.90 ARTHRITIS: ICD-10-CM

## 2019-03-23 DIAGNOSIS — R13.19 ESOPHAGEAL DYSPHAGIA: ICD-10-CM

## 2019-03-23 DIAGNOSIS — K25.4 GASTROINTESTINAL HEMORRHAGE ASSOCIATED WITH GASTRIC ULCER: ICD-10-CM

## 2019-03-23 RX ORDER — ASPIRIN 81 MG/1
TABLET ORAL
Qty: 180 TAB | Refills: 3 | Status: SHIPPED | OUTPATIENT
Start: 2019-03-23 | End: 2019-03-27 | Stop reason: SDUPTHER

## 2019-03-26 LAB
GLUCOSE POC: 97 MG/DL
HBA1C MFR BLD HPLC: 6 %

## 2019-03-27 ENCOUNTER — OFFICE VISIT (OUTPATIENT)
Dept: FAMILY MEDICINE CLINIC | Age: 69
End: 2019-03-27

## 2019-03-27 ENCOUNTER — CLINICAL SUPPORT (OUTPATIENT)
Dept: CARDIOLOGY CLINIC | Age: 69
End: 2019-03-27

## 2019-03-27 VITALS
DIASTOLIC BLOOD PRESSURE: 79 MMHG | HEART RATE: 87 BPM | WEIGHT: 178.4 LBS | TEMPERATURE: 97.4 F | HEIGHT: 64 IN | RESPIRATION RATE: 20 BRPM | BODY MASS INDEX: 30.46 KG/M2 | SYSTOLIC BLOOD PRESSURE: 118 MMHG | OXYGEN SATURATION: 99 %

## 2019-03-27 DIAGNOSIS — Z95.0 PACEMAKER: ICD-10-CM

## 2019-03-27 DIAGNOSIS — I48.91 ATRIAL FIBRILLATION, UNSPECIFIED TYPE (HCC): Chronic | ICD-10-CM

## 2019-03-27 DIAGNOSIS — Z00.00 ROUTINE GENERAL MEDICAL EXAMINATION AT A HEALTH CARE FACILITY: Primary | ICD-10-CM

## 2019-03-27 DIAGNOSIS — L30.9 ECZEMA, UNSPECIFIED TYPE: ICD-10-CM

## 2019-03-27 DIAGNOSIS — Z23 ENCOUNTER FOR IMMUNIZATION: ICD-10-CM

## 2019-03-27 DIAGNOSIS — K25.4 GASTROINTESTINAL HEMORRHAGE ASSOCIATED WITH GASTRIC ULCER: ICD-10-CM

## 2019-03-27 DIAGNOSIS — E78.5 DYSLIPIDEMIA: ICD-10-CM

## 2019-03-27 DIAGNOSIS — I48.0 PAROXYSMAL ATRIAL FIBRILLATION (HCC): Chronic | ICD-10-CM

## 2019-03-27 DIAGNOSIS — I48.91 ATRIAL FIBRILLATION, UNSPECIFIED TYPE (HCC): Primary | Chronic | ICD-10-CM

## 2019-03-27 DIAGNOSIS — I35.0 AORTIC VALVE STENOSIS, UNSPECIFIED ETIOLOGY: Chronic | ICD-10-CM

## 2019-03-27 DIAGNOSIS — M81.0 AGE-RELATED OSTEOPOROSIS WITHOUT CURRENT PATHOLOGICAL FRACTURE: ICD-10-CM

## 2019-03-27 DIAGNOSIS — G89.4 CHRONIC PAIN SYNDROME: ICD-10-CM

## 2019-03-27 DIAGNOSIS — I09.9 RHEUMATIC HEART DISEASE: ICD-10-CM

## 2019-03-27 DIAGNOSIS — M19.90 ARTHRITIS: ICD-10-CM

## 2019-03-27 DIAGNOSIS — I67.1 BRAIN ANEURYSM: ICD-10-CM

## 2019-03-27 DIAGNOSIS — R13.19 ESOPHAGEAL DYSPHAGIA: ICD-10-CM

## 2019-03-27 RX ORDER — ACETAMINOPHEN AND CODEINE PHOSPHATE 300; 30 MG/1; MG/1
1 TABLET ORAL
Qty: 20 TAB | Refills: 3 | Status: SHIPPED | OUTPATIENT
Start: 2019-03-27 | End: 2019-07-29 | Stop reason: SDUPTHER

## 2019-03-27 RX ORDER — LANOLIN ALCOHOL/MO/W.PET/CERES
CREAM (GRAM) TOPICAL
Qty: 100 TAB | Refills: 10 | Status: SHIPPED | OUTPATIENT
Start: 2019-03-27 | End: 2019-04-09 | Stop reason: SDUPTHER

## 2019-03-27 RX ORDER — EZETIMIBE 10 MG/1
10 TABLET ORAL DAILY
Qty: 90 TAB | Refills: 3 | Status: SHIPPED | OUTPATIENT
Start: 2019-03-27 | End: 2019-07-29 | Stop reason: SDUPTHER

## 2019-03-27 RX ORDER — ASPIRIN 81 MG/1
TABLET ORAL
Qty: 180 TAB | Refills: 3 | Status: SHIPPED | OUTPATIENT
Start: 2019-03-27 | End: 2021-04-26 | Stop reason: SDUPTHER

## 2019-03-27 RX ORDER — OMEPRAZOLE 20 MG/1
CAPSULE, DELAYED RELEASE ORAL
Qty: 180 CAP | Refills: 4 | Status: SHIPPED | OUTPATIENT
Start: 2019-03-27 | End: 2019-07-29 | Stop reason: SDUPTHER

## 2019-03-27 RX ORDER — ACETAMINOPHEN AND CODEINE PHOSPHATE 300; 30 MG/1; MG/1
1 TABLET ORAL
Qty: 20 TAB | Refills: 3 | Status: SHIPPED | OUTPATIENT
Start: 2019-03-27 | End: 2019-03-27 | Stop reason: SDUPTHER

## 2019-03-27 NOTE — PROGRESS NOTES
Room # SUBJECTIVE: 
 
Tian Wyatt is a 81 y.o. female who presents today for complete physical with EKG. Patient c/o pain to  Hip and toe where surgery area was done 1. Have you been to the ER, urgent care clinic since your last visit? Hospitalized since your last visit? NO 
 
2. Have you seen or consulted any other health care providers outside of the [address] [place] [address] since your last visit? Include any pap smears or colon screening. NO When : 
Reason: 
 
Health Maintenance reviewed Yes Health Maintenance Due Topic Date Due  Shingrix Vaccine Age 50> (1 of 2) 01/24/2002  Pneumococcal 65+ years (2 of 2 - PPSV23) 09/21/2018  BREAST CANCER SCRN MAMMOGRAM  09/26/2018  MEDICARE YEARLY EXAM  03/09/2019

## 2019-03-27 NOTE — PROGRESS NOTES
THE SUBSEQUENT MEDICARE ANNUAL WELLNESS VISIT PROGRESS NOTES This is a Subsequent Medicare Annual Wellness Visit providing Personalized Prevention Plan Services (PPPS) (Performed 12 months after initial AWV and PPPS ) I have reviewed the patient's medical history in detail and updated the computerized patient record. Ephraim Phelps is a 81 y.o.  female and presents for an subsequent annual wellness exam  
 
Patient Active Problem List  
 Diagnosis Date Noted  Urinary frequency 10/26/2016  Advance directive in chart 06/28/2016  Dyslipidemia  Rheumatic heart disease  Atrial fibrillation  Arthritis 01/06/2016  Glaucoma 01/15/2014 Current Outpatient Medications Medication Sig Dispense Refill  aspirin delayed-release 81 mg tablet take 2 tablets by mouth once daily 180 Tab 3  
 acetaminophen-codeine (TYLENOL-CODEINE #3) 300-30 mg per tablet Take 1 Tab by mouth every six (6) hours as needed for Pain. Max Daily Amount: 4 Tabs. 20 Tab 0  
 mirabegron ER (MYRBETRIQ) 50 mg ER tablet Take 50 mg by mouth daily.  ezetimibe (ZETIA) 10 mg tablet Take 1 Tab by mouth daily. 90 Tab 3  
 omeprazole (PRILOSEC) 20 mg capsule take 1 capsule by mouth twice a day 180 Cap 4  prenatal vit-calcium-iron-fa (PRENATAL PLUS, CALCIUM CARB,) 27 mg iron- 1 mg tab TAKE 1 TABLET BY MOUTH DAILY 100 Tab 12  
 ferrous sulfate 325 mg (65 mg iron) tablet take 1 tablet by mouth three times a day with meals 100 Tab 10 Allergies Allergen Reactions  Contrast Dye [Iodine] Itching and Swelling  Iodinated Contrast- Oral And Iv Dye Itching and Swelling  Seafood Itching and Swelling LOBSTER ONLY  
 Statins-Hmg-Coa Reductase Inhibitors Myalgia and Other (comments) Myalgia  Sulfa (Sulfonamide Antibiotics) Itching and Swelling Past Medical History:  
Diagnosis Date  Advance directive in chart 6/28/2016  Amaurosis fugax  Aortic stenosis mean gradient  26.5 mmHg (CATH ), 32 mmHg (ECHO 9/15)  Aortic valve replacement 21mm MAGNA EASE pericardial    Arthritis  Atrial appendage resection   Atrial fibrillation CHADS score 2  (-CHF, -HTN, -AGE, -DM +AMAUROSIS FUGAX)  Atrial fibrillation ablation   
 surgical left sided cryoablation    Cataract OS  Cervical dysplasia   
 2009   post leep and cone  COPD   
 mild    Duodenal AVM   
 argon plasma coagulation   Dyslipidemia  Fibroids  Glaucoma  Lung nodule 3/19/2012   stable  Mitral stenosis   
 mean gradient  6 mmHg (CATH ), 7.6 mmHg (MANUEL 10/15)  Mitral valve replacement 25 mm MAGNA EASE pericardial    Pacemaker   
 dual chamber MEDTRONIC   Post-op ventricular asystole   Remote tobacco   
 Sleep apnea   
 no CPAP  Thyroid nodule 3/20/2013   multiple  TIA (transient ischemic attack) Past Surgical History:  
Procedure Laterality Date  COLONOSCOPY N/A 2017 COLONOSCOPY performed [facility] Reese Gore MD at [facility] ENDOSCOPY  
 HX AFIB ABLATION    
   surgical cryoablation  HX AORTIC VALVE REPLACEMENT    
   HX BREAST BIOPSY Right 10/13/2014 Right breast needle [facility]OC biopsy performed by Frantz Madrid MD at [facility] HX BREAST LUMPECTOMY Right  HX BUNIONECTOMY    
 left  HX GYN    
 VAINIII, VELASQUEZ 1, keratinous labial cyst  
 HX GYN    
 2012  Cold knife conization of cervix  HX LEEP PROCEDURE    
   HX MITRAL VALVE REPLACEMENT    
   HX OTHER SURGICAL    
 lump left neck, benign  HX OTHER SURGICAL    
 multiple breast aspirations  HX PACEMAKER    
 I&D OF VULVA/PERINEUM ABSCESS  3/21/2017 Family History Problem Relation Age of Onset  Cancer Mother   
     oral,   Alzheimer Father   
       Breast Cancer Maternal Aunt   
     unsure age  Colon Cancer Maternal Grandfather Social History Tobacco Use  Smoking status: Former Smoker Years: 37.00 Last attempt to quit: 2/8/2004 Years since quitting: 15.1  Smokeless tobacco: Never Used  Tobacco comment: stop around 2004 Substance Use Topics  Alcohol use: No  
 
 
 
ROS All other systems reviewed and are negative. History Past Medical History:  
Diagnosis Date  Advance directive in chart 6/28/2016  Amaurosis fugax  Aortic stenosis   
 mean gradient  26.5 mmHg (CATH 4/13), 32 mmHg (ECHO 9/15)  Aortic valve replacement 21mm MAGNA EASE pericardial  2/16  Arthritis  Atrial appendage resection 2/16  Atrial fibrillation CHADS score 2  (-CHF, -HTN, -AGE, -DM +AMAUROSIS FUGAX)  Atrial fibrillation ablation   
 surgical left sided cryoablation  2/16  Cataract OS  Cervical dysplasia   
 2009   post leep and cone  COPD   
 mild  2/16  Duodenal AVM   
 argon plasma coagulation 2/16  Dyslipidemia  Fibroids  Glaucoma  Lung nodule 3/19/2012   stable  Mitral stenosis   
 mean gradient  6 mmHg (CATH 4/13), 7.6 mmHg (MANUEL 10/15)  Mitral valve replacement 25 mm MAGNA EASE pericardial  2/16  Pacemaker   
 dual chamber MEDTRONIC 2/16  Post-op ventricular asystole 02/16  Remote tobacco   
 Sleep apnea   
 no CPAP  Thyroid nodule 3/20/2013   multiple  TIA (transient ischemic attack) Past Surgical History:  
Procedure Laterality Date  COLONOSCOPY N/A 1/23/2017 COLONOSCOPY performed [facility] Parul Zhu MD at [facility] ENDOSCOPY  
 HX AFIB ABLATION    
 2/16  surgical cryoablation  HX AORTIC VALVE REPLACEMENT    
 2/16  HX BREAST BIOPSY Right 10/13/2014 Right breast needle [facility]OC biopsy performed by Mary Oliver MD at [address] HX BREAST LUMPECTOMY Right  HX BUNIONECTOMY    
 left  HX GYN    
 VAINIII, VELASQUEZ 1, keratinous labial cyst  
  HX GYN    
 2012  Cold knife conization of cervix  HX LEEP PROCEDURE    
   HX MITRAL VALVE REPLACEMENT    
   HX OTHER SURGICAL    
 lump left neck, benign  HX OTHER SURGICAL    
 multiple breast aspirations  HX PACEMAKER    
 I&D OF VULVA/PERINEUM ABSCESS  3/21/2017 Current Outpatient Medications Medication Sig Dispense Refill  aspirin delayed-release 81 mg tablet take 2 tablets by mouth once daily 180 Tab 3  
 acetaminophen-codeine (TYLENOL-CODEINE #3) 300-30 mg per tablet Take 1 Tab by mouth every six (6) hours as needed for Pain. Max Daily Amount: 4 Tabs. 20 Tab 0  
 mirabegron ER (MYRBETRIQ) 50 mg ER tablet Take 50 mg by mouth daily.  ezetimibe (ZETIA) 10 mg tablet Take 1 Tab by mouth daily. 90 Tab 3  
 omeprazole (PRILOSEC) 20 mg capsule take 1 capsule by mouth twice a day 180 Cap 4  prenatal vit-calcium-iron-fa (PRENATAL PLUS, CALCIUM CARB,) 27 mg iron- 1 mg tab TAKE 1 TABLET BY MOUTH DAILY 100 Tab 12  
 ferrous sulfate 325 mg (65 mg iron) tablet take 1 tablet by mouth three times a day with meals 100 Tab 10 Allergies Allergen Reactions  Contrast Dye [Iodine] Itching and Swelling  Iodinated Contrast- Oral And Iv Dye Itching and Swelling  Seafood Itching and Swelling LOBSTER ONLY  
 Statins-Hmg-Coa Reductase Inhibitors Myalgia and Other (comments) Myalgia  Sulfa (Sulfonamide Antibiotics) Itching and Swelling Family History Problem Relation Age of Onset  Cancer Mother   
     oral,   Alzheimer Father   
       Breast Cancer Maternal Aunt   
     unsure age  Colon Cancer Maternal Grandfather Social History Tobacco Use  Smoking status: Former Smoker Years: 37.00 Last attempt to quit: 2004 Years since quitting: 15.1  Smokeless tobacco: Never Used  Tobacco comment: stop around  Substance Use Topics  Alcohol use: No  
 
Patient Active Problem List  
 Diagnosis Code  Glaucoma H40.9  Arthritis M19.90  Dyslipidemia E78.5  Rheumatic heart disease I09.9  Atrial fibrillation I48.91  
 Advance directive in chart Z78.9  
 Urinary frequency R35.0 Health Maintenance History Immunizations reviewed, dtap utd  , pneumovax utd , flu utd , zoster needs [id number] ordered today Colonoscopy: utd , AAA screening  Na  (male over 72 smoker) Chest CT : na , Eye exam: utd Mammo utd Dexascan ordered Health Care Directive or Living Will: yes Depression Risk Factor Screening:  
  
Patient Health Questionnaire (PHQ-2) Over the last 2 weeks, how often have you been bothered by any of the following problems? · Little interest or pleasure in doing things? · Not at all. [0] · Feeling down, depressed, or hopeless? · Not at all. [0] Total Score: 0/6 PHQ-2 Assessment Scoring: A score of 2 or more requires further screening with the PHQ-9 Alcohol Risk Factor Screening:  
 
Women: On any occasion during the past 3 months, have you had more than 3 drinks containing alcohol? Do you average more than 7 drinks per week? Men: On any occasion during the past 3 months, have you had more than 4 drinks containing alcohol? Do you average more than 14 drinks per week? Functional Ability and Level of Safety:  
 
Hearing Loss Hearing is good. Activities of Daily Living Self-care. Requires assistance with: no ADLs Fall Risk No fall risk factors Abuse Screen None Examination Physical Examination Vitals:  
 03/27/19 4646 BP: 118/79 Pulse: 87 Resp: 20 Temp: 97.4 °F (36.3 °C) TempSrc: Oral  
SpO2: 99% Weight: 178 lb 6.4 oz (80.9 kg) Height: 5' 4\" (1.626 m) PainSc:  10 - Worst pain ever PainLoc: Toe Body mass index is 30.62 kg/m². Evaluation of Cognitive Function: 
Mood/affect:akppropriate Appearance: well groomed Family member/caregiver input: karen  
 
 alert, well appearing, and in no distress, oriented to person, place, and time and normal appearing weight Patient Care Team: 
Elizabeth Mosqueda.,  as PCP - [facility]) Mckenna Duckworth MD (Pulmonary Disease) Foreign Boyce MD (Obstetrics & Gynecology) Svetlana Marx MD (Ophthalmology) Savannah Trent MD (Colon and Rectal Surgery) Lorenzo Kerns MD (Cardiology) Cynthia Gonzalez MD (Gastroenterology) Sundeep Kessler MD (Surgery) Rupa Camara MD (Orthopedic Surgery) Veto Clark MD (Internal Medicine) Advice/Referrals/Counseling/Plan:  
Education and counseling provided: 
Are appropriate based on today's review and evaluation Include in education list (weight loss, physical activity, smoking cessation, fall prevention, and nutrition) current treatment plan is effective, no change in therapy. I have discussed the diagnosis with the patient and the intended plan as seen in the above orders. The patient has received an after-visit summary and questions were answered concerning future plans. I have discussed medication side effects and warnings with the patient as well. I have reviewed the plan of care with the patient, accepted their input and they are in agreement with the treatment goals. Follow-up and Dispositions · Return in about 6 months (around 9/27/2019) for EOV, . 
  
 
 
_____________________________________________________________ Problem Assessment 
 
for treatment of  
Chief Complaint Patient presents with  Irregular Heart Beat  Other  
  rheumatic heart dz  Cholesterol Problem  Arthritis SUBJECTIVE Cardiovascular Review: 
The patient has hyperlipidemia, Atrial Fibrillation and rheumatic heart dz Larraine Piles Diet and Lifestyle: generally follows a low fat low cholesterol diet Home BP Monitoring: is not measured at home.  
Pertinent ROS: taking medications as instructed, no medication side effects noted, no TIA's, no chest pain on exertion, no dyspnea on exertion, no swelling of ankles. Osteoarthritis and Chronic Pain: 
Patient has osteoarthritis, primarily affecting the diffuse. Symptoms onset: problem is longstanding. Rheumatological ROS: using narcotic analgesics intermittently. Response to treatment plan: stable. Additional Concerns:   
 
 
Visit Vitals /79 (BP 1 Location: Right arm, BP Patient Position: Sitting) Pulse 87 Temp 97.4 °F (36.3 °C) (Oral) Resp 20 Ht 5' 4\" (1.626 m) Wt 178 lb 6.4 oz (80.9 kg) SpO2 99% BMI 30.62 kg/m² General:  Alert, cooperative, no distress, appears stated age. Head:  Normocephalic, without obvious abnormality, atraumatic. Eyes:  Conjunctivae/corneas clear. PERRL, EOMs intact. Fundi benign. Ears:  Normal TMs and external ear canals both ears. Nose: Nares normal. Septum midline. Mucosa normal. No drainage or sinus tenderness. Throat: Lips, mucosa, and tongue normal. Teeth and gums normal.  
Neck: Supple, symmetrical, trachea midline, no adenopathy, thyroid: no enlargement/tenderness/nodules, no carotid bruit and no JVD. Back:   Symmetric, no curvature. ROM normal. No CVA tenderness. Lungs:   Clear to auscultation bilaterally. Chest wall:  No tenderness or deformity. Heart:  Regular rate and rhythm, S1, S2 normal, no murmur, click, rub or gallop. Breast Exam:  No tenderness, masses, or nipple abnormality. Abdomen:   Soft, non-tender. Bowel sounds normal. No masses,  No organomegaly. Genitalia:  Normal female without lesion, discharge or tenderness. Rectal:  Normal tone,  no masses or tenderness Guaiac negative stool. Extremities: Extremities normal, atraumatic, no cyanosis or edema. Pulses: 2+ and symmetric all extremities. Skin: Skin color, texture, turgor normal. No rashes or lesions.   
Lymph nodes: Cervical, supraclavicular, and axillary nodes normal.  
 Neurologic: CNII-XII intact. Normal strength, sensation and reflexes throughout. LABS poending TESTS 
 
ekg  Paced Assessment/Plan: Hyperlipidemia - control uncertain, unable to trake statins -- currently on zetia Rheumatid heart dz Notes she hasmn't been on coumadin for 3 years. wes Morales RTC today to follow up on chronic pain diagnosis. We discussed her osteoarthritis that is affecting her diffuse . Significant changes since last visit: none. She is  able to do her normal daily activities. She reports the following adverse side effects: none. Least pain over the last week has been 2/10. Worst pain over the last week has been 3/10. Opioid Risk Tool Reviewed: YES Aberrant behaviors: None. Urine Drug Screen: due - order placed. Controlled substance agreement on file: YES.  reviewed:yes Pill count is consistent with her prescription: yes Concomitant use of a benzodiazepine: no 
 
 
 
  
 
Also,  abstinence syndrome was reviewed and discussed with her today NO Diagnoses and all orders for this visit: 1. Routine general medical examination at a health care facility 2. Paroxysmal atrial fibrillation (HCC) -     AMB POC EKG ROUTINE W/ 12 LEADS, INTER & REP 3. Rheumatic heart disease 4. Dyslipidemia 5. Arthritis Lab review: orders written for new lab studies as appropriate; see orders

## 2019-03-28 LAB
1,25(OH)2D3 SERPL-MCNC: 46.3 PG/ML (ref 19.9–79.3)
ALBUMIN SERPL-MCNC: 4.2 G/DL (ref 3.6–4.8)
ALBUMIN/GLOB SERPL: 1.6 {RATIO} (ref 1.2–2.2)
ALP SERPL-CCNC: 67 IU/L (ref 39–117)
ALT SERPL-CCNC: 23 IU/L (ref 0–32)
APPEARANCE UR: CLEAR
AST SERPL-CCNC: 26 IU/L (ref 0–40)
BASOPHILS # BLD AUTO: 0 X10E3/UL (ref 0–0.2)
BASOPHILS NFR BLD AUTO: 0 %
BILIRUB SERPL-MCNC: 0.4 MG/DL (ref 0–1.2)
BILIRUB UR QL STRIP: NEGATIVE
BUN SERPL-MCNC: 11 MG/DL (ref 8–27)
BUN/CREAT SERPL: 13 (ref 12–28)
CALCIUM SERPL-MCNC: 9.7 MG/DL (ref 8.7–10.3)
CHLORIDE SERPL-SCNC: 103 MMOL/L (ref 96–106)
CHOLEST SERPL-MCNC: 249 MG/DL (ref 100–199)
CO2 SERPL-SCNC: 23 MMOL/L (ref 20–29)
COLOR UR: YELLOW
CREAT SERPL-MCNC: 0.88 MG/DL (ref 0.57–1)
EOSINOPHIL # BLD AUTO: 0.2 X10E3/UL (ref 0–0.4)
EOSINOPHIL NFR BLD AUTO: 3 %
ERYTHROCYTE [DISTWIDTH] IN BLOOD BY AUTOMATED COUNT: 15 % (ref 12.3–15.4)
GLOBULIN SER CALC-MCNC: 2.7 G/DL (ref 1.5–4.5)
GLUCOSE SERPL-MCNC: 96 MG/DL (ref 65–99)
GLUCOSE UR QL: NEGATIVE
HCT VFR BLD AUTO: 41.7 % (ref 34–46.6)
HDLC SERPL-MCNC: 63 MG/DL
HGB BLD-MCNC: 13.6 G/DL (ref 11.1–15.9)
HGB UR QL STRIP: NEGATIVE
IMM GRANULOCYTES # BLD AUTO: 0 X10E3/UL (ref 0–0.1)
IMM GRANULOCYTES NFR BLD AUTO: 0 %
INTERPRETATION, 910389: NORMAL
KETONES UR QL STRIP: NEGATIVE
LDLC SERPL CALC-MCNC: 157 MG/DL (ref 0–99)
LEUKOCYTE ESTERASE UR QL STRIP: NEGATIVE
LYMPHOCYTES # BLD AUTO: 1.6 X10E3/UL (ref 0.7–3.1)
LYMPHOCYTES NFR BLD AUTO: 30 %
MCH RBC QN AUTO: 29.8 PG (ref 26.6–33)
MCHC RBC AUTO-ENTMCNC: 32.6 G/DL (ref 31.5–35.7)
MCV RBC AUTO: 91 FL (ref 79–97)
MICRO URNS: NORMAL
MONOCYTES # BLD AUTO: 0.2 X10E3/UL (ref 0.1–0.9)
MONOCYTES NFR BLD AUTO: 4 %
NEUTROPHILS # BLD AUTO: 3.4 X10E3/UL (ref 1.4–7)
NEUTROPHILS NFR BLD AUTO: 63 %
NITRITE UR QL STRIP: NEGATIVE
PH UR STRIP: 6.5 [PH] (ref 5–7.5)
PLATELET # BLD AUTO: 190 X10E3/UL (ref 150–379)
POTASSIUM SERPL-SCNC: 4.6 MMOL/L (ref 3.5–5.2)
PROT SERPL-MCNC: 6.9 G/DL (ref 6–8.5)
PROT UR QL STRIP: NEGATIVE
RBC # BLD AUTO: 4.56 X10E6/UL (ref 3.77–5.28)
SODIUM SERPL-SCNC: 144 MMOL/L (ref 134–144)
SP GR UR: 1.02 (ref 1–1.03)
TRIGL SERPL-MCNC: 143 MG/DL (ref 0–149)
TSH SERPL DL<=0.005 MIU/L-ACNC: 1.21 UIU/ML (ref 0.45–4.5)
UROBILINOGEN UR STRIP-MCNC: 0.2 MG/DL (ref 0.2–1)
VLDLC SERPL CALC-MCNC: 29 MG/DL (ref 5–40)
WBC # BLD AUTO: 5.5 X10E3/UL (ref 3.4–10.8)

## 2019-04-02 LAB
DRUGS UR: NORMAL
SPECIMEN STATUS REPORT, ROLRST: NORMAL

## 2019-04-09 RX ORDER — LANOLIN ALCOHOL/MO/W.PET/CERES
CREAM (GRAM) TOPICAL
Qty: 100 TAB | Refills: 9 | Status: SHIPPED | OUTPATIENT
Start: 2019-04-09 | End: 2019-07-29 | Stop reason: SDUPTHER

## 2019-05-06 ENCOUNTER — HOSPITAL ENCOUNTER (OUTPATIENT)
Dept: LAB | Age: 69
Discharge: HOME | End: 2019-05-06
Payer: MEDICARE

## 2019-05-06 ENCOUNTER — HOSPITAL ENCOUNTER (OUTPATIENT)
Dept: GENERAL RADIOLOGY | Age: 69
Discharge: HOME | End: 2019-05-06
Attending: FAMILY MEDICINE
Payer: MEDICARE

## 2019-05-06 DIAGNOSIS — E78.00 PURE HYPERCHOLESTEROLEMIA: ICD-10-CM

## 2019-05-06 DIAGNOSIS — Z23 ENCOUNTER FOR IMMUNIZATION: ICD-10-CM

## 2019-05-06 DIAGNOSIS — I09.9 RHEUMATIC HEART DISEASE: ICD-10-CM

## 2019-05-06 DIAGNOSIS — E78.5 DYSLIPIDEMIA: ICD-10-CM

## 2019-05-06 DIAGNOSIS — M81.0 AGE-RELATED OSTEOPOROSIS WITHOUT CURRENT PATHOLOGICAL FRACTURE: ICD-10-CM

## 2019-05-06 DIAGNOSIS — Z00.00 ROUTINE GENERAL MEDICAL EXAMINATION AT A HEALTH CARE FACILITY: ICD-10-CM

## 2019-05-06 DIAGNOSIS — I48.0 PAROXYSMAL ATRIAL FIBRILLATION (HCC): Chronic | ICD-10-CM

## 2019-05-06 DIAGNOSIS — E78.00 PURE HYPERCHOLESTEROLEMIA: Primary | ICD-10-CM

## 2019-05-06 DIAGNOSIS — M19.90 ARTHRITIS: ICD-10-CM

## 2019-05-06 LAB
CHOLEST SERPL-MCNC: 243 MG/DL
HDLC SERPL-MCNC: 58 MG/DL (ref 40–60)
HDLC SERPL: 4.2 {RATIO} (ref 0–5)
LDLC SERPL CALC-MCNC: 158.6 MG/DL (ref 0–100)
LIPID PROFILE,FLP: ABNORMAL
TRIGL SERPL-MCNC: 132 MG/DL (ref ?–150)
VLDLC SERPL CALC-MCNC: 26.4 MG/DL

## 2019-05-06 PROCEDURE — 77080 DXA BONE DENSITY AXIAL: CPT

## 2019-05-06 PROCEDURE — 36415 COLL VENOUS BLD VENIPUNCTURE: CPT

## 2019-05-06 PROCEDURE — 80061 LIPID PANEL: CPT

## 2019-05-23 ENCOUNTER — OFFICE VISIT (OUTPATIENT)
Dept: CARDIOLOGY CLINIC | Age: 69
End: 2019-05-23

## 2019-05-23 VITALS
SYSTOLIC BLOOD PRESSURE: 118 MMHG | DIASTOLIC BLOOD PRESSURE: 84 MMHG | BODY MASS INDEX: 29.7 KG/M2 | HEART RATE: 67 BPM | OXYGEN SATURATION: 98 % | WEIGHT: 173 LBS

## 2019-05-23 DIAGNOSIS — Z95.2 S/P AVR: Primary | ICD-10-CM

## 2019-05-23 NOTE — PROGRESS NOTES
1. Have you been to the ER, urgent care clinic since your last visit? Hospitalized since your last visit? No     2. Have you seen or consulted any other health care providers outside of the [address] [place] [address] since your last visit? Include any pap smears or colon screening.   No

## 2019-05-23 NOTE — PROGRESS NOTES
[facility]    HPI:   Ms. Bryce Suarez is a 81 y.o. woman with dyslipidemia. She does not have obstructive atherosclerotic disease as evaluated by cardiac catheterization in  November 2015. Her anatomy is as follows:    HEMODYNAMICS:  LM - normal  LAD - normal  D1 - normal  Cx - normal  OM1 - normal  OM2 - normal  LPDA - patent  RCA - normal    LEFT:  RA - 6 mmHg  RV - 44/6 mmHg  PA - 38/14 mmHg  Wedge - 16 mmHg  CO / CI (DAVIAN) - 4.15 / 2.27    She has a history of rheumatic heart disease complicated by moderate to severe aortic stenosis and moderate mitral stenosis. She had aortic and mitral valve replacements in February of 2016 with the following: Aortic valve - 21 mm MAGNA EASE pericardial bioprosthesis  Mitral valve -  25 mm MAGNA EASE pericardial bioprosthesis    She has paroxysmal atrial fibrillation / flutter complicated by an embolic event manifesting as amaurosis fugax. She had surgical left sided cryoablation and atrial appendage resection in February of 2016. This occurred at the time of her aortic and mitral valve replacement. Her surgical course was notable for postoperative ventricular asystole requiring permanent pacemaker implantation in 2016. Ms. Bryce Suarez is here today for follow up appointment. She occasionally has some palpitation and pounding sensation in the chest.  She blames this on smell coming from the neighbors house from probably a little substance. She denies any presyncope or syncope. She denies any chest pain or chest tightness to be concern of angina.   Overall she has no other complaint    Past Medical History:   Diagnosis Date    Advance directive in chart     6/28/2016    Amaurosis fugax     Aortic stenosis     mean gradient  26.5 mmHg (CATH 4/13), 32 mmHg (ECHO 9/15)    Aortic valve replacement     21mm MAGNA EASE pericardial  2/16    Arthritis     Atrial appendage resection     2/16      Atrial fibrillation     CHADS score 2  (-CHF, -HTN, -AGE, -DM +AMAUROSIS FUGAX)    Atrial fibrillation ablation     surgical left sided cryoablation  2/16    Cataract     OS    Cervical dysplasia     2009   post leep and cone    COPD     mild  2/16    Duodenal AVM     argon plasma coagulation 2/16    Dyslipidemia     Fibroids     Glaucoma     Lung nodule     3/19/2012   stable    Mitral stenosis     mean gradient  6 mmHg (CATH 4/13), 7.6 mmHg (MANUEL 10/15)    Mitral valve replacement     25 mm MAGNA EASE pericardial  2/16    Pacemaker     dual chamber MEDTRONIC 2/16    Post-op ventricular asystole     02/16    Remote tobacco     Sleep apnea     no CPAP    Thyroid nodule     3/20/2013   multiple    TIA (transient ischemic attack)        Past Surgical History:   Procedure Laterality Date    COLONOSCOPY N/A 1/23/2017    COLONOSCOPY performed [facility] Consuelo Bust[facility]ante [facility] at [facility] ENDOSCOPY    HX AFIB ABLATION      2/16  surgical cryoablation    HX AORTIC VALVE REPLACEMENT      2/16    HX BREAST BIOPSY Right 10/13/2014    Right breast needle [facility]OC biopsy performed by Charles Light MD at [address] [place] [address] HX BREAST LUMPECTOMY      Right    HX BUNIONECTOMY      left    HX GYN      VAINIII, VELASQUEZ 1, keratinous labial cyst    HX GYN      2012  Cold knife conization of cervix    HX LEEP PROCEDURE      2010    HX MITRAL VALVE REPLACEMENT      2/16    HX OTHER SURGICAL      lump left neck, benign    HX OTHER SURGICAL      multiple breast aspirations    HX PACEMAKER      I&D OF VULVA/PERINEUM ABSCESS  3/21/2017            Current Outpatient Medications   Medication Sig    ferrous sulfate 325 mg (65 mg iron) tablet take 1 tablet by mouth three times a day with food    aspirin delayed-release 81 mg tablet take 2 tablets by mouth once daily    ezetimibe (ZETIA) 10 mg tablet Take 1 Tab by mouth daily.     omeprazole (PRILOSEC) 20 mg capsule take 1 capsule by mouth twice a day    prenatal vit-calcium-iron-fa (PRENATAL PLUS, CALCIUM CARB,) 27 mg iron- 1 mg tab TAKE 1 TABLET BY MOUTH DAILY    mirabegron ER (MYRBETRIQ) 50 mg ER tablet Take 50 mg by mouth daily. No current facility-administered medications for this visit. Allergies   Allergen Reactions    Contrast Dye [Iodine] Itching and Swelling    Iodinated Contrast- Oral And Iv Dye Itching and Swelling    Seafood Itching and Swelling     LOBSTER ONLY    Statins-Hmg-Coa Reductase Inhibitors Myalgia and Other (comments)     Myalgia    Sulfa (Sulfonamide Antibiotics) Itching and Swelling       Family History:   Non contributory for premature coronary disease in first degree relatives (female <65, male <55). Social History:   She  reports that she quit smoking about 15 years ago. She quit after 37.00 years of use. She has never used smokeless tobacco.  She  reports that she does not drink alcohol. Physical:   Vitals:   BP: 118/84  HR: 67  Wt: 173 lb (78.5 kg)    Exam:   General: Middle aged woman, no complaints.     Head/Neck: No JVD    No bruits. No edema  Lungs:  No respiratory distress. Clear with good air movement. Chest:  Keloid scar  Heart:  Regular rate and rhythm. Soft systolic murmur. No rubs or gallops. Abdomen: Bowel sounds present  Extremities: Intact pulses.     No edema.  Bilateral LE's distal pulses intact (dorsalis pedis and posterior tibial), no LE edema wounds, no erythema      Review of Data:   LABS:   Lab Results   Component Value Date/Time    WBC 5.5 03/27/2019 11:01 AM    Hemoglobin (POC) 9.8 02/22/2016 02:39 PM    Hemoglobin, POC 8.8 (L) 02/10/2016 11:18 AM    HGB 13.6 03/27/2019 11:01 AM    Hematocrit, POC 26 (L) 02/10/2016 11:18 AM    HCT 41.7 03/27/2019 11:01 AM    PLATELET 059 61/00/1084 11:01 AM    MCV 91 03/27/2019 11:01 AM     Lab Results   Component Value Date/Time    Sodium 144 03/27/2019 11:01 AM    Potassium 4.6 03/27/2019 11:01 AM    Chloride 103 03/27/2019 11:01 AM    CO2 23 03/27/2019 11:01 AM    Anion gap 7 09/06/2018 01:42 PM    Glucose 96 03/27/2019 11:01 AM    BUN 11 03/27/2019 11:01 AM    Creatinine 0.88 03/27/2019 11:01 AM    BUN/Creatinine ratio 13 03/27/2019 11:01 AM    GFR est AA 79 03/27/2019 11:01 AM    GFR est non-AA 68 03/27/2019 11:01 AM    Calcium 9.7 03/27/2019 11:01 AM    Bilirubin, total 0.4 03/27/2019 11:01 AM    AST (SGOT) 26 03/27/2019 11:01 AM    Alk. phosphatase 67 03/27/2019 11:01 AM    Protein, total 6.9 03/27/2019 11:01 AM    Albumin 4.2 03/27/2019 11:01 AM    Globulin 3.6 09/06/2018 01:42 PM    A-G Ratio 1.6 03/27/2019 11:01 AM    ALT (SGPT) 23 03/27/2019 11:01 AM     Lab Results   Component Value Date/Time    Cholesterol, total 243 (H) 05/06/2019 09:41 AM    HDL Cholesterol 58 05/06/2019 09:41 AM    LDL, calculated 158.6 (H) 05/06/2019 09:41 AM    VLDL, calculated 26.4 05/06/2019 09:41 AM    Triglyceride 132 05/06/2019 09:41 AM    CHOL/HDL Ratio 4.2 05/06/2019 09:41 AM     Lab Results   Component Value Date/Time    Hemoglobin A1c 6.3 (H) 12/05/2017 09:49 AM    Hemoglobin A1c (POC) 6.0 03/26/2019 02:51 PM     Lab Results   Component Value Date/Time    TSH 1.210 03/27/2019 11:01 AM    Triiodothyronine (T3), free 3.0 05/09/2017 10:04 AM    T4, Free 0.99 12/05/2017 09:49 AM     10 YEAR CVD RISK:   9.1 %    Age    64 yo    Race    AA     Gender   Female    Total cholesterol  272 mg/dL    HDL    71 mg/dL    SBP    134 mmHg    BP meds   No    Diabetes   No    Tobacco   No    EKG: August 2016:  100% AV paced 80 bpm.  (09/17) Sinus rhythm. Intermittent Atrial paced beats. TRANSESOPHAGEAL ECHOCARDIOGRAM: October 2015:  Left ventricle:  Size was normal. Systolic function was vigorous. There were no regional wall motion abnormalities. Wall thickness was normal.  Right ventricle: The size as normal.  Left atrium:  The atrium was dilated. Left atrial appendage:  No thrombus was identified. There was no spontaneous echo contrast (\"smoke\") in the appendage.   Atrial septum:  The septum bows from left to right, consistent with increased left atrial pressure. There was no evidence of intracardiac shunt by peripherally-administered agitated saline contrast.    Mitral valve: The posterior leaflet was thickened and calcific throughout. Excursion was restricted. The anterior leaflet was thickened at the distal third with minimal calcification. Leaflet excursion was restricted primarily at the distal margin. Hemodynamics were obtained when at least three consecutive sinus beats were captured. A mean gradient across the valve was measured at 7.6 mmHg. Pressure half time was measured at 153 ms with a calculated valve area of 1.4 cm. There was mild, multi-jet regurgitation. The subvalvular apparatus was not significantly involved. The findings were consistent with moderate mitral stenosis. Aortic valve: The valve was trileaflet. Leaflets were thickened and nodularly calcific. Leaflet excursion was restricted. Valve area was planimetered between 0.9 and 1.0 cm. There was mild aortic regurgitation. There was mild to moderate stenosis. ECHO: 09/17  Left ventricle: Systolic function was normal. Ejection fraction was estimated   in the range of 55 % to 60 %. There were no regional wall motion abnormalities. The study was not technically sufficient to allow evaluation of LV diastolic function. Right ventricle: The ventricle was dilated. Systolic function was normal. A   pacing wire was present. Left atrium: The atrium was dilated. Atrial septum: There was no evidence of intracardiac shunt by   peripherally-administered agitated saline contrast.  Right atrium: The atrium was mildly dilated. Mitral valve: A bioprosthesis was present. It exhibited normal function. There was no evidence for stenosis. There was mild regurgitation. Mean transmitral gradient was 4 mmHg. Aortic valve: A bioprosthesis was present. It exhibited normal function. Valve mean gradient was 10 mmHg. Tricuspid valve: There was moderate regurgitation.     STRESS TEST (EST, PHARM, NUC, ECHO etc): March 2011:  1. NORMAL SCAN. 2. NORMAL GATED ACQUISITION WITH AN EJECTION FRACTION OF 75%. CATHETERIZATION: November 2015:  HEMODYNAMICS:  LM - normal  LAD - normal  D1 - normal  Cx - normal  OM1 - normal  OM2 - normal  LPDA - patent  RCA - normal    LEFT:  RA - 6 mmHg  RV - 44/6 mmHg  PA - 38/14 mmHg  Wedge - 16 mmHg  CO / CI (DAVIAN) - 4.15 / 2.27      Impression / Plan:     Dyslipidemia    Rheumatic heart disease    Atrial fibrillation    Post-op ventricular asystole       Rheumatic heart disease:    Ms. Andree Linn has rheumatic heart disease, which was complicated by moderate to severe aortic stenosis and moderate mitral stenosis. She has undergone bioprosthetic aortic and mitral valve replacement in February, 2016. Last Echocardiogram was performed in 09/17 and valve function appeared to be ok as mentioned above. Continue  mg daily    Hyperlipidemia:    Currently she is only on monotherapy with Zetia. She had tried Atorvastatin and simvastatin in past but had to stop it because of significant myalgia. Also thinks she tried other statins as well, does not remember the name. She has significant hyperlipidemia, not at goal despite being on Zetia. She has a last LDL on file of 160 in March 2017. Repeat lipid profile showed LDL of 158 in 2019. We discussed about PCSK9 inhibitor again. She will think about it and will let us now    Atrial fibrillation: This is paroxysmal in nature. 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  She has been in sinus rhythm on exam today. She had a permanent pacemaker paced with complete heart block at the time of her valvular surgery. 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   In addition, she has history of vaginal bleed reportedly due to fibroids in past.  She also had left sided surgical cryoablation and left atrial appendage resection, so she is only on  mg daily. Continue same. Pacemaker:  Ms. Radha Jones had postoperative ventricular asystole requiring pacemaker implantation in July, 2016. Currently she has no symptoms and issues with pacemaker. Continue with routine device check. Her pacemaker was interrogated in March and again today. Because of her symptoms of pounding sensation in the chest and fluttering, repeat interrogation was performed today. She had total of 2400 PVC in the last 2 months, and significant. There was no atrial fibrillation. I reassured patient    Other than the above, or unless any additional issues arise, she should follow up in 6 months.

## 2019-06-26 ENCOUNTER — OFFICE VISIT (OUTPATIENT)
Dept: CARDIOLOGY CLINIC | Age: 69
End: 2019-06-26

## 2019-06-26 DIAGNOSIS — Z95.0 CARDIAC PACEMAKER IN SITU: ICD-10-CM

## 2019-06-26 DIAGNOSIS — I48.91 ATRIAL FIBRILLATION, UNSPECIFIED TYPE (HCC): Primary | ICD-10-CM

## 2019-07-01 NOTE — PROGRESS NOTES
I have personally seen and evaluated the device findings. Interrogation reviewed and I agree with assessment.     Jules Meadows

## 2019-07-22 ENCOUNTER — HOSPITAL ENCOUNTER (OUTPATIENT)
Dept: LAB | Age: 69
Discharge: HOME | End: 2019-07-22
Payer: MEDICARE

## 2019-07-22 LAB
APPEARANCE UR: CLEAR
BASOPHILS # BLD: 0 K/UL (ref 0–0.1)
BASOPHILS NFR BLD: 1 % (ref 0–2)
BILIRUB UR QL: NEGATIVE
COLOR UR: YELLOW
CRP SERPL-MCNC: <0.3 MG/DL (ref 0–0.3)
DIFFERENTIAL METHOD BLD: ABNORMAL
EOSINOPHIL # BLD: 0.2 K/UL (ref 0–0.4)
EOSINOPHIL NFR BLD: 4 % (ref 0–5)
ERYTHROCYTE [DISTWIDTH] IN BLOOD BY AUTOMATED COUNT: 14.1 % (ref 11.6–14.5)
ERYTHROCYTE [SEDIMENTATION RATE] IN BLOOD: 6 MM/HR (ref 0–30)
GLUCOSE UR STRIP.AUTO-MCNC: NEGATIVE MG/DL
HCT VFR BLD AUTO: 43.1 % (ref 35–45)
HGB BLD-MCNC: 13.7 G/DL (ref 12–16)
HGB UR QL STRIP: NEGATIVE
KETONES UR QL STRIP.AUTO: NEGATIVE MG/DL
LEUKOCYTE ESTERASE UR QL STRIP.AUTO: NEGATIVE
LYMPHOCYTES # BLD: 1.7 K/UL (ref 0.9–3.6)
LYMPHOCYTES NFR BLD: 31 % (ref 21–52)
MCH RBC QN AUTO: 29.5 PG (ref 24–34)
MCHC RBC AUTO-ENTMCNC: 31.8 G/DL (ref 31–37)
MCV RBC AUTO: 92.7 FL (ref 74–97)
MONOCYTES # BLD: 0.3 K/UL (ref 0.05–1.2)
MONOCYTES NFR BLD: 5 % (ref 3–10)
NEUTS SEG # BLD: 3.4 K/UL (ref 1.8–8)
NEUTS SEG NFR BLD: 59 % (ref 40–73)
NITRITE UR QL STRIP.AUTO: NEGATIVE
PH UR STRIP: 5.5 [PH] (ref 5–8)
PLATELET # BLD AUTO: 179 K/UL (ref 135–420)
PMV BLD AUTO: 11.9 FL (ref 9.2–11.8)
PROT UR STRIP-MCNC: NEGATIVE MG/DL
RBC # BLD AUTO: 4.65 M/UL (ref 4.2–5.3)
RHEUMATOID FACT SERPL-ACNC: <10 IU/ML
RPR SER QL: NONREACTIVE
SP GR UR REFRACTOMETRY: 1.01 (ref 1–1.03)
UROBILINOGEN UR QL STRIP.AUTO: 0.2 EU/DL (ref 0.2–1)
WBC # BLD AUTO: 5.6 K/UL (ref 4.6–13.2)

## 2019-07-22 PROCEDURE — 36415 COLL VENOUS BLD VENIPUNCTURE: CPT

## 2019-07-22 PROCEDURE — 86225 DNA ANTIBODY NATIVE: CPT

## 2019-07-22 PROCEDURE — 85549 MURAMIDASE: CPT

## 2019-07-22 PROCEDURE — 86618 LYME DISEASE ANTIBODY: CPT

## 2019-07-22 PROCEDURE — 81003 URINALYSIS AUTO W/O SCOPE: CPT

## 2019-07-22 PROCEDURE — 86780 TREPONEMA PALLIDUM: CPT

## 2019-07-22 PROCEDURE — 86431 RHEUMATOID FACTOR QUANT: CPT

## 2019-07-22 PROCEDURE — 83520 IMMUNOASSAY QUANT NOS NONAB: CPT

## 2019-07-22 PROCEDURE — 86140 C-REACTIVE PROTEIN: CPT

## 2019-07-22 PROCEDURE — 86617 LYME DISEASE ANTIBODY: CPT

## 2019-07-22 PROCEDURE — 85025 COMPLETE CBC W/AUTO DIFF WBC: CPT

## 2019-07-22 PROCEDURE — 82164 ANGIOTENSIN I ENZYME TEST: CPT

## 2019-07-22 PROCEDURE — 86162 COMPLEMENT TOTAL (CH50): CPT

## 2019-07-22 PROCEDURE — 86592 SYPHILIS TEST NON-TREP QUAL: CPT

## 2019-07-22 PROCEDURE — 85652 RBC SED RATE AUTOMATED: CPT

## 2019-07-23 LAB
ACE SERPL-CCNC: 42 U/L (ref 14–82)
C-ANCA TITR SER IF: NORMAL TITER
CENTROMERE B AB SER-ACNC: <0.2 AI (ref 0–0.9)
CHROMATIN AB SERPL-ACNC: <0.2 AI (ref 0–0.9)
DSDNA AB SER-ACNC: <1 IU/ML (ref 0–9)
ENA JO1 AB SER-ACNC: <0.2 AI (ref 0–0.9)
ENA RNP AB SER-ACNC: 0.2 AI (ref 0–0.9)
ENA SCL70 AB SER-ACNC: <0.2 AI (ref 0–0.9)
ENA SM AB SER-ACNC: <0.2 AI (ref 0–0.9)
ENA SS-A AB SER-ACNC: <0.2 AI (ref 0–0.9)
ENA SS-B AB SER-ACNC: <0.2 AI (ref 0–0.9)
MYELOPEROXIDASE AB SER IA-ACNC: <9 U/ML (ref 0–9)
P-ANCA ATYPICAL TITR SER IF: NORMAL TITER
P-ANCA TITR SER IF: NORMAL TITER
PROTEINASE3 AB SER IA-ACNC: <3.5 U/ML (ref 0–3.5)
SEE BELOW, 164869: NORMAL
T PALLIDUM AB SER QL IF: NON REACTIVE

## 2019-07-24 LAB
CH50 SERPL-ACNC: >60 U/ML (ref 42–999999)
LYSOZYME SERPL-MCNC: 4.8 UG/ML (ref 2.5–12.9)

## 2019-07-25 LAB
B BURGDOR IGG PATRN SER IB-IMP: NEGATIVE
B BURGDOR IGG+IGM SER-ACNC: <0.91 ISR (ref 0–0.9)
B BURGDOR IGM PATRN SER IB-IMP: NEGATIVE
B BURGDOR IGM SER IA-ACNC: 1.64 INDEX (ref 0–0.79)
B BURGDOR18KD IGG SER QL IB: PRESENT
B BURGDOR23KD IGG SER QL IB: ABNORMAL
B BURGDOR23KD IGM SER QL IB: ABNORMAL
B BURGDOR28KD IGG SER QL IB: ABNORMAL
B BURGDOR30KD IGG SER QL IB: ABNORMAL
B BURGDOR39KD IGG SER QL IB: ABNORMAL
B BURGDOR39KD IGM SER QL IB: ABNORMAL
B BURGDOR41KD IGG SER QL IB: PRESENT
B BURGDOR41KD IGM SER QL IB: ABNORMAL
B BURGDOR45KD IGG SER QL IB: ABNORMAL
B BURGDOR58KD IGG SER QL IB: ABNORMAL
B BURGDOR66KD IGG SER QL IB: ABNORMAL
B BURGDOR93KD IGG SER QL IB: ABNORMAL

## 2019-07-26 LAB — HLA-B27 QL NAA+PROBE: NEGATIVE

## 2019-07-29 ENCOUNTER — OFFICE VISIT (OUTPATIENT)
Dept: FAMILY MEDICINE CLINIC | Age: 69
End: 2019-07-29

## 2019-07-29 VITALS
HEIGHT: 64 IN | SYSTOLIC BLOOD PRESSURE: 108 MMHG | BODY MASS INDEX: 29.53 KG/M2 | TEMPERATURE: 98 F | RESPIRATION RATE: 17 BRPM | HEART RATE: 78 BPM | OXYGEN SATURATION: 100 % | DIASTOLIC BLOOD PRESSURE: 76 MMHG | WEIGHT: 173 LBS

## 2019-07-29 DIAGNOSIS — I09.9 RHEUMATIC HEART DISEASE: ICD-10-CM

## 2019-07-29 DIAGNOSIS — I48.91 ATRIAL FIBRILLATION, UNSPECIFIED TYPE (HCC): Chronic | ICD-10-CM

## 2019-07-29 DIAGNOSIS — K25.4 GASTROINTESTINAL HEMORRHAGE ASSOCIATED WITH GASTRIC ULCER: ICD-10-CM

## 2019-07-29 DIAGNOSIS — Z23 ENCOUNTER FOR IMMUNIZATION: ICD-10-CM

## 2019-07-29 DIAGNOSIS — L30.9 ECZEMA, UNSPECIFIED TYPE: ICD-10-CM

## 2019-07-29 DIAGNOSIS — E78.5 DYSLIPIDEMIA: ICD-10-CM

## 2019-07-29 DIAGNOSIS — I35.0 AORTIC VALVE STENOSIS, UNSPECIFIED ETIOLOGY: Chronic | ICD-10-CM

## 2019-07-29 DIAGNOSIS — M17.12 PRIMARY OSTEOARTHRITIS OF LEFT KNEE: Primary | ICD-10-CM

## 2019-07-29 DIAGNOSIS — Z23 NEED FOR DIPHTHERIA-TETANUS-PERTUSSIS (TDAP) VACCINE: ICD-10-CM

## 2019-07-29 DIAGNOSIS — Z23 NEED FOR VACCINATION FOR PNEUMOCOCCUS: ICD-10-CM

## 2019-07-29 DIAGNOSIS — R13.19 ESOPHAGEAL DYSPHAGIA: ICD-10-CM

## 2019-07-29 DIAGNOSIS — M19.90 ARTHRITIS: ICD-10-CM

## 2019-07-29 RX ORDER — ACETAMINOPHEN AND CODEINE PHOSPHATE 300; 30 MG/1; MG/1
1 TABLET ORAL
Qty: 20 TAB | Refills: 3 | Status: SHIPPED | OUTPATIENT
Start: 2019-07-29 | End: 2019-08-28

## 2019-07-29 RX ORDER — LANOLIN ALCOHOL/MO/W.PET/CERES
CREAM (GRAM) TOPICAL
Qty: 100 TAB | Refills: 9 | Status: SHIPPED | OUTPATIENT
Start: 2019-07-29 | End: 2019-08-31 | Stop reason: SDUPTHER

## 2019-07-29 RX ORDER — OMEPRAZOLE 20 MG/1
CAPSULE, DELAYED RELEASE ORAL
Qty: 180 CAP | Refills: 4 | Status: SHIPPED | OUTPATIENT
Start: 2019-07-29 | End: 2020-07-16

## 2019-07-29 RX ORDER — TRIAMCINOLONE ACETONIDE 40 MG/ML
40 INJECTION, SUSPENSION INTRA-ARTICULAR; INTRAMUSCULAR ONCE
Qty: 1 ML | Refills: 0
Start: 2019-07-29 | End: 2019-07-29

## 2019-07-29 RX ORDER — EZETIMIBE 10 MG/1
10 TABLET ORAL DAILY
Qty: 90 TAB | Refills: 3 | Status: SHIPPED | OUTPATIENT
Start: 2019-07-29 | End: 2020-04-11

## 2019-07-29 NOTE — PROGRESS NOTES
Benny Joshua is a 81 y.o.  female and presents with    Chief Complaint   Patient presents with    Medication Evaluation    Back Pain    Knee Pain     Left knee pain       Subjective:    Osteoarthritis and Chronic Pain:  Patient has osteoarthritis, primarily affecting the back and knees. Symptoms onset: problem is longstanding. Rheumatological ROS: ongoing significant pain in back and knees which is stable and controlled by PRN meds. Response to treatment plan: gradually worsening. ROS   General ROS: negative for - chills, fatigue or fever  Psychological ROS: negative for - anxiety or depression  Ophthalmic ROS: negative for - blurry vision  ENT ROS: negative for - headaches, nasal congestion or sore throat  Endocrine ROS: negative for - polydipsia/polyuria or temperature intolerance  Respiratory ROS: no cough, shortness of breath, or wheezing  Cardiovascular ROS: no chest pain or dyspnea on exertion  Gastrointestinal ROS: no abdominal pain, change in bowel habits, or black or bloody stools  Genito-Urinary ROS: no dysuria, trouble voiding, or hematuria  Neurological ROS: no TIA or stroke symptoms  Dermatological ROS: negative for - rash or skin lesion changes    All other systems reviewed and are negative.       Objective:  Vitals:    07/29/19 1244   BP: 108/76   Pulse: 78   Resp: 17   Temp: 98 °F (36.7 °C)   TempSrc: Oral   SpO2: 100%   Weight: 173 lb (78.5 kg)   Height: 5' 4\" (1.626 m)   PainSc:   9   PainLoc: Back       alert, well appearing, and in no distress, oriented to person, place, and time and overweight  Mental status - normal mood, behavior, speech, dress, motor activity, and thought processes  Chest - clear to auscultation, no wheezes, rales or rhonchi, symmetric air entry  Heart - normal rate, regular rhythm, normal S1, S2, no murmurs, rubs, clicks or gallops  Back exam - pain with motion noted during exam, tenderness noted lower back  Neurological - cranial nerves II through XII intact antalgic zeenat    [facility]  OFFICE PROCEDURE PROGRESS NOTE        Chart reviewed for the following:   Ángel Ramsay MD, have reviewed the History, Physical and updated the Allergic reactions for [facility] performed immediately prior to start of procedure:   I, Mara Stallworth MD, have performed the following reviews on Iraj Stevens prior to the start of the procedure:            * Patient was identified by name and date of birth   * Agreement on procedure being performed was verified  * Risks and Benefits explained to the patient  * Procedure site verified and marked as necessary  * Patient was positioned for comfort  * Understanding confirmed and consent was signed and verified     Time: .1:33 PM       Date of procedure: 7/29/2019    Procedure performed by:  Mara Stallworth MD    Provider assisted by: Sharmin Blackmon LPN    Patient assisted by: self    How tolerated by patient: tolerated the procedure well with no complications    Comments: none    after obtaining informed consent the left knee was prepped in sterile fashion; ethyl chloride used for topical anesthesia; 3 cc 1:1:1 marcaine 0.5%, lidocaine 1% without epi and kenalog 40 mg/cc injected into  joint; EBL < 1 cc; post procedure pain 0/10      Assessment/Plan:    1. Gastrointestinal hemorrhage associated with gastric ulcer    - prenatal vit-calcium-iron-fa (PRENATAL PLUS, CALCIUM CARB,) 27 mg iron- 1 mg tab; TAKE 1 TABLET BY MOUTH DAILY  Dispense: 100 Tab; Refill: 12  - omeprazole (PRILOSEC) 20 mg capsule; take 1 capsule by mouth twice a day  Dispense: 180 Cap; Refill: 4  - ezetimibe (ZETIA) 10 mg tablet; Take 1 Tab by mouth daily. Dispense: 90 Tab; Refill: 3    2. Rheumatic heart disease    - prenatal vit-calcium-iron-fa (PRENATAL PLUS, CALCIUM CARB,) 27 mg iron- 1 mg tab; TAKE 1 TABLET BY MOUTH DAILY  Dispense: 100 Tab;  Refill: 12  - omeprazole (PRILOSEC) 20 mg capsule; take 1 capsule by mouth twice a day  Dispense: 180 Cap; Refill: 4  - ezetimibe (ZETIA) 10 mg tablet; Take 1 Tab by mouth daily. Dispense: 90 Tab; Refill: 3    3. Atrial fibrillation, unspecified type ([facility]    - prenatal vit-calcium-iron-fa (PRENATAL PLUS, CALCIUM CARB,) 27 mg iron- 1 mg tab; TAKE 1 TABLET BY MOUTH DAILY  Dispense: 100 Tab; Refill: 12  - omeprazole (PRILOSEC) 20 mg capsule; take 1 capsule by mouth twice a day  Dispense: 180 Cap; Refill: 4  - ezetimibe (ZETIA) 10 mg tablet; Take 1 Tab by mouth daily. Dispense: 90 Tab; Refill: 3    4. Dyslipidemia    - prenatal vit-calcium-iron-fa (PRENATAL PLUS, CALCIUM CARB,) 27 mg iron- 1 mg tab; TAKE 1 TABLET BY MOUTH DAILY  Dispense: 100 Tab; Refill: 12  - omeprazole (PRILOSEC) 20 mg capsule; take 1 capsule by mouth twice a day  Dispense: 180 Cap; Refill: 4  - ezetimibe (ZETIA) 10 mg tablet; Take 1 Tab by mouth daily. Dispense: 90 Tab; Refill: 3    5. Arthritis  This is a chronic problem that is stable. Per review of available records and patients , there are not sign of overuse, misuse, diversion, or concerning side effects. Today we reviewed: the risk of overdose, addiction, and dependency proper storage and disposal of medications the goals of treatment (improve functionality, quality of life, and pain)  The following changes were made to the patients current treatment plan: nothing, medications refilled. - prenatal vit-calcium-iron-fa (PRENATAL PLUS, CALCIUM CARB,) 27 mg iron- 1 mg tab; TAKE 1 TABLET BY MOUTH DAILY  Dispense: 100 Tab; Refill: 12  - omeprazole (PRILOSEC) 20 mg capsule; take 1 capsule by mouth twice a day  Dispense: 180 Cap; Refill: 4  - acetaminophen-codeine (TYLENOL-CODEINE #3) 300-30 mg per tablet; Take 1 Tab by mouth every six (6) hours as needed for Pain for up to 30 days. Max Daily Amount: 4 Tabs. Dispense: 20 Tab; Refill: 3  - ezetimibe (ZETIA) 10 mg tablet; Take 1 Tab by mouth daily. Dispense: 90 Tab; Refill: 3    7.  Eczema, unspecified type    - prenatal vit-calcium-iron-fa (PRENATAL PLUS, CALCIUM CARB,) 27 mg iron- 1 mg tab; TAKE 1 TABLET BY MOUTH DAILY  Dispense: 100 Tab; Refill: 12  - omeprazole (PRILOSEC) 20 mg capsule; take 1 capsule by mouth twice a day  Dispense: 180 Cap; Refill: 4  - ezetimibe (ZETIA) 10 mg tablet; Take 1 Tab by mouth daily. Dispense: 90 Tab; Refill: 3    8. Aortic valve stenosis, unspecified etiology    - prenatal vit-calcium-iron-fa (PRENATAL PLUS, CALCIUM CARB,) 27 mg iron- 1 mg tab; TAKE 1 TABLET BY MOUTH DAILY  Dispense: 100 Tab; Refill: 12  - omeprazole (PRILOSEC) 20 mg capsule; take 1 capsule by mouth twice a day  Dispense: 180 Cap; Refill: 4  - ezetimibe (ZETIA) 10 mg tablet; Take 1 Tab by mouth daily. Dispense: 90 Tab; Refill: 3    9. Esophageal dysphagia    - prenatal vit-calcium-iron-fa (PRENATAL PLUS, CALCIUM CARB,) 27 mg iron- 1 mg tab; TAKE 1 TABLET BY MOUTH DAILY  Dispense: 100 Tab; Refill: 12  - omeprazole (PRILOSEC) 20 mg capsule; take 1 capsule by mouth twice a day  Dispense: 180 Cap; Refill: 4  - ezetimibe (ZETIA) 10 mg tablet; Take 1 Tab by mouth daily. Dispense: 90 Tab; Refill: 3    10. Primary osteoarthritis of left knee  Immediate improvement in pain  - TRIAMCINOLONE ACETONIDE INJ  - triamcinolone acetonide (KENALOG) 40 mg/mL injection; 1 mL by Intra artICUlar route once for 1 dose. Dispense: 1 mL; Refill: 0  - DRAIN/INJECT LARGE JOINT/BURSA    11. Need for vaccination for pneumococcus    - pneumococcal 23-valent (PNEUMOVAX 23) 25 mcg/0.5 mL injection; 0.5 mL by IntraMUSCular route once for 1 dose. Dispense: 0.5 mL; Refill: 0  - ADMIN PNEUMOCOCCAL VACCINE    12. Need for diphtheria-tetanus-pertussis (Tdap) vaccine    - TETANUS, DIPHTHERIA TOXOIDS AND ACELLULAR PERTUSSIS VACCINE (TDAP), IN INDIVIDS. >=7, IM    13.  Encounter for immunization    - PNEUMOCOCCAL POLYSACCHARIDE VACCINE, 23-VALENT, ADULT OR IMMUNOSUPPRESSED PT DOSE,  - ADMIN INFLUENZA VIRUS VAC    Lab review: no lab studies available for review at time of visit      I have discussed the diagnosis with the patient and the intended plan as seen in the above orders. The patient has received an after-visit summary and questions were answered concerning future plans. I have discussed medication side effects and warnings with the patient as well. I have reviewed the plan of care with the patient, accepted their input and they are in agreement with the treatment goals.

## 2019-07-29 NOTE — PATIENT INSTRUCTIONS
Knee Arthritis: Exercises  Introduction  Here are some examples of exercises for you to try. The exercises may be suggested for a condition or for rehabilitation. Start each exercise slowly. Ease off the exercises if you start to have pain. You will be told when to start these exercises and which ones will work best for you. How to do the exercises  Knee flexion with heel slide    1. Lie on your back with your knees bent. 2. Slide your heel back by bending your affected knee as far as you can. Then hook your other foot around your ankle to help pull your heel even farther back. 3. Hold for about 6 seconds, then rest for up to 10 seconds. 4. Repeat 8 to 12 times. 5. Switch legs and repeat steps 1 through 4, even if only one knee is sore. Quad sets    1. Sit with your affected leg straight and supported on the floor or a firm bed. Place a small, rolled-up towel under your knee. Your other leg should be bent, with that foot flat on the floor. 2. Tighten the thigh muscles of your affected leg by pressing the back of your knee down into the towel. 3. Hold for about 6 seconds, then rest for up to 10 seconds. 4. Repeat 8 to 12 times. 5. Switch legs and repeat steps 1 through 4, even if only one knee is sore. Straight-leg raises to the front    1. Lie on your back with your good knee bent so that your foot rests flat on the floor. Your affected leg should be straight. Make sure that your low back has a normal curve. You should be able to slip your hand in between the floor and the small of your back, with your palm touching the floor and your back touching the back of your hand. 2. Tighten the thigh muscles in your affected leg by pressing the back of your knee flat down to the floor. Hold your knee straight. 3. Keeping the thigh muscles tight and your leg straight, lift your affected leg up so that your heel is about 12 inches off the floor. Hold for about 6 seconds, then lower slowly.   4. Relax for up to 10 seconds between repetitions. 5. Repeat 8 to 12 times. 6. Switch legs and repeat steps 1 through 5, even if only one knee is sore. Active knee flexion    1. Lie on your stomach with your knees straight. If your kneecap is uncomfortable, roll up a washcloth and put it under your leg just above your kneecap. 2. Lift the foot of your affected leg by bending the knee so that you bring the foot up toward your buttock. If this motion hurts, try it without bending your knee quite as far. This may help you avoid any painful motion. 3. Slowly move your leg up and down. 4. Repeat 8 to 12 times. 5. Switch legs and repeat steps 1 through 4, even if only one knee is sore. Quadriceps stretch (facedown)    1. Lie flat on your stomach, and rest your face on the floor. 2. Wrap a towel or belt strap around the lower part of your affected leg. Then use the towel or belt strap to slowly pull your heel toward your buttock until you feel a stretch. 3. Hold for about 15 to 30 seconds, then relax your leg against the towel or belt strap. 4. Repeat 2 to 4 times. 5. Switch legs and repeat steps 1 through 4, even if only one knee is sore. Stationary exercise bike    1. If you do not have a stationary exercise bike at home, you can find one to ride at your local health club or community center. 2. Adjust the height of the bike seat so that your knee is slightly bent when your leg is extended downward. If your knee hurts when the pedal reaches the top, you can raise the seat so that your knee does not bend as much. 3. Start slowly. At first, try to do 5 to 10 minutes of cycling with little to no resistance. Then increase your time and the resistance bit by bit until you can do 20 to 30 minutes without pain. 4. If you start to have pain, rest your knee until your pain gets back to the level that is normal for you. Or cycle for less time or with less effort.     Follow-up care is a key part of your treatment and safety. Be sure to make and go to all appointments, and call your doctor if you are having problems. It's also a good idea to know your test results and keep a list of the medicines you take. Where can you learn more? Go to http://diogenes-lee.info/. Enter C159 in the search box to learn more about \"Knee Arthritis: Exercises. \"  Current as of: September 20, 2018  Content Version: 12.1  © 2903-9612 [employer]. Care instructions adapted under license by [employer] (which disclaims liability or warranty for this information). If you have questions about a medical condition or this instruction, always ask your healthcare professional. [address] any warranty or liability for your use of this information.

## 2019-07-29 NOTE — PROGRESS NOTES
Jessica Zelaya is a 81 y.o female that is present in the office for a routine appointment for re-establishing patient. Patient complains of left knee pain and back pain. Patient complains of trouble swallowing for years. 1. Have you been to the ER, urgent care clinic since your last visit? Hospitalized since your last visit? No    2. Have you seen or consulted any other health care providers outside of the [address] [place] [address] since your last visit? Include any pap smears or colon screening.  No    Health Maintenance Due   Topic Date Due    Pneumococcal 65+ years (2 of 2 - PPSV23) 09/21/2018    BREAST CANCER SCRN MAMMOGRAM  09/26/2018    Shingrix Vaccine Age 50> (2 of 2) 05/22/2019

## 2019-08-16 ENCOUNTER — TELEPHONE (OUTPATIENT)
Dept: CARDIOLOGY CLINIC | Age: 69
End: 2019-08-16

## 2019-08-16 NOTE — TELEPHONE ENCOUNTER
Incoming from pt. Two patient Identifiers confirmed. Pt staeted she has been having a sticking feeling of something sticking/pinchin to the right of her pacemaker upper left area intermittently x 5 times in the last 3 weeks. Pt stated she ws not doing anything anything strenuous when she gets the sensations. Pt stated she does not drive so would have transportation issues getting to hospital. She also stated \"I'm over getting on the bus. \" Advised pt I would try to consult with Dr Vinnie Ospina and advise once I receive a response.

## 2019-08-16 NOTE — TELEPHONE ENCOUNTER
Contacted pt at [facility] number. Two patient Identifiers confirmed. Advised pt she may need chest xray to confirm no leads have come out of place. Advised an ER visit maybe warranted. Pt stated she has no way to get to ER until after 4. She inquired when Dr Nancy Medrano was to leave hospital. Advised I was not sure of shift but normally around 4-4:30 pm. She stated she would se if she could get someone to take her to ER. Pt stated \" will they know what's going on with me\" Advised pt they would be able see notes from office and provider previous notes. Pt verbalized understanding.

## 2019-08-29 ENCOUNTER — TELEPHONE (OUTPATIENT)
Dept: FAMILY MEDICINE CLINIC | Age: 69
End: 2019-08-29

## 2019-08-29 DIAGNOSIS — K25.4 GASTROINTESTINAL HEMORRHAGE ASSOCIATED WITH GASTRIC ULCER: ICD-10-CM

## 2019-08-29 DIAGNOSIS — E78.5 DYSLIPIDEMIA: ICD-10-CM

## 2019-08-29 DIAGNOSIS — I09.9 RHEUMATIC HEART DISEASE: ICD-10-CM

## 2019-08-29 DIAGNOSIS — I48.91 ATRIAL FIBRILLATION, UNSPECIFIED TYPE (HCC): Chronic | ICD-10-CM

## 2019-08-29 DIAGNOSIS — M19.90 ARTHRITIS: ICD-10-CM

## 2019-08-29 DIAGNOSIS — R13.19 ESOPHAGEAL DYSPHAGIA: ICD-10-CM

## 2019-08-29 DIAGNOSIS — L30.9 ECZEMA, UNSPECIFIED TYPE: ICD-10-CM

## 2019-08-29 DIAGNOSIS — I35.0 AORTIC VALVE STENOSIS, UNSPECIFIED ETIOLOGY: Chronic | ICD-10-CM

## 2019-08-29 NOTE — TELEPHONE ENCOUNTER
Pt called in and was wanting to know if she could please get a refill of her ferrous sulfate and her prenatal's sent to the [address] [place] [address] . She did also say that Dr. Sandy Winter would need to call [phone] to let them know she needs both separate medications since they refused to fill it last time for the medications being too similar. Please advise.

## 2019-08-29 NOTE — TELEPHONE ENCOUNTER
Medication refill request has been sent to Dr. Clover Gómez for approval. Awaiting approval of medication and will contact [phone] for medication clarification.

## 2019-08-29 NOTE — TELEPHONE ENCOUNTER
Please see medication refill request. Please advise. Will contact [phone] @ [phone] to let them know she needs both separate medications. Thank you.

## 2019-08-31 RX ORDER — LANOLIN ALCOHOL/MO/W.PET/CERES
CREAM (GRAM) TOPICAL
Qty: 100 TAB | Refills: 9 | Status: SHIPPED | OUTPATIENT
Start: 2019-08-31

## 2019-09-20 ENCOUNTER — OFFICE VISIT (OUTPATIENT)
Dept: FAMILY MEDICINE CLINIC | Age: 69
End: 2019-09-20

## 2019-09-20 ENCOUNTER — HOSPITAL ENCOUNTER (OUTPATIENT)
Dept: LAB | Age: 69
Discharge: HOME | End: 2019-09-20
Payer: MEDICARE

## 2019-09-20 VITALS
DIASTOLIC BLOOD PRESSURE: 69 MMHG | SYSTOLIC BLOOD PRESSURE: 114 MMHG | WEIGHT: 171.4 LBS | OXYGEN SATURATION: 99 % | BODY MASS INDEX: 29.26 KG/M2 | HEART RATE: 74 BPM | HEIGHT: 64 IN | RESPIRATION RATE: 16 BRPM | TEMPERATURE: 96.8 F

## 2019-09-20 DIAGNOSIS — I09.9 RHEUMATIC HEART DISEASE: ICD-10-CM

## 2019-09-20 DIAGNOSIS — Z23 NEED FOR SHINGLES VACCINE: ICD-10-CM

## 2019-09-20 DIAGNOSIS — R73.01 IMPAIRED FASTING GLUCOSE: Primary | ICD-10-CM

## 2019-09-20 DIAGNOSIS — I35.0 AORTIC VALVE STENOSIS, UNSPECIFIED ETIOLOGY: ICD-10-CM

## 2019-09-20 DIAGNOSIS — R73.01 IMPAIRED FASTING GLUCOSE: ICD-10-CM

## 2019-09-20 DIAGNOSIS — E78.00 HYPERCHOLESTEROLEMIA: ICD-10-CM

## 2019-09-20 DIAGNOSIS — Z95.0 PACEMAKER: ICD-10-CM

## 2019-09-20 DIAGNOSIS — R51.9 FRONTAL HEADACHE: ICD-10-CM

## 2019-09-20 DIAGNOSIS — I48.91 ATRIAL FIBRILLATION, UNSPECIFIED TYPE (HCC): ICD-10-CM

## 2019-09-20 DIAGNOSIS — M17.12 PRIMARY OSTEOARTHRITIS OF LEFT KNEE: ICD-10-CM

## 2019-09-20 LAB
BASOPHILS # BLD: 0 K/UL (ref 0–0.1)
BASOPHILS NFR BLD: 0 % (ref 0–2)
DIFFERENTIAL METHOD BLD: ABNORMAL
EOSINOPHIL # BLD: 0.1 K/UL (ref 0–0.4)
EOSINOPHIL NFR BLD: 2 % (ref 0–5)
ERYTHROCYTE [DISTWIDTH] IN BLOOD BY AUTOMATED COUNT: 14.7 % (ref 11.6–14.5)
EST. AVERAGE GLUCOSE BLD GHB EST-MCNC: 137 MG/DL
HBA1C MFR BLD: 6.4 % (ref 4.2–5.6)
HCT VFR BLD AUTO: 42.2 % (ref 35–45)
HGB BLD-MCNC: 13.5 G/DL (ref 12–16)
LYMPHOCYTES # BLD: 1.9 K/UL (ref 0.9–3.6)
LYMPHOCYTES NFR BLD: 35 % (ref 21–52)
MCH RBC QN AUTO: 29.9 PG (ref 24–34)
MCHC RBC AUTO-ENTMCNC: 32 G/DL (ref 31–37)
MCV RBC AUTO: 93.4 FL (ref 74–97)
MONOCYTES # BLD: 0.2 K/UL (ref 0.05–1.2)
MONOCYTES NFR BLD: 4 % (ref 3–10)
NEUTS SEG # BLD: 3.2 K/UL (ref 1.8–8)
NEUTS SEG NFR BLD: 59 % (ref 40–73)
PLATELET # BLD AUTO: 187 K/UL (ref 135–420)
PMV BLD AUTO: 12.3 FL (ref 9.2–11.8)
RBC # BLD AUTO: 4.52 M/UL (ref 4.2–5.3)
WBC # BLD AUTO: 5.4 K/UL (ref 4.6–13.2)

## 2019-09-20 PROCEDURE — 85025 COMPLETE CBC W/AUTO DIFF WBC: CPT

## 2019-09-20 PROCEDURE — 36415 COLL VENOUS BLD VENIPUNCTURE: CPT

## 2019-09-20 PROCEDURE — 80061 LIPID PANEL: CPT

## 2019-09-20 PROCEDURE — 83036 HEMOGLOBIN GLYCOSYLATED A1C: CPT

## 2019-09-20 RX ORDER — KETOROLAC TROMETHAMINE 4 MG/ML
1 SOLUTION/ DROPS OPHTHALMIC 4 TIMES DAILY
COMMUNITY

## 2019-09-20 RX ORDER — PREDNISOLONE ACETATE 10 MG/ML
1 SUSPENSION/ DROPS OPHTHALMIC 4 TIMES DAILY
COMMUNITY

## 2019-09-20 NOTE — PROGRESS NOTES
Jason Liz  is a 81 y.o female that is present in the office for a routine appointment for chest pain, dizziness, daily headaches, numbness in right foot. Patient request labs. 1. Have you been to the ER, urgent care clinic since your last visit? Hospitalized since your last visit? No    2. Have you seen or consulted any other health care providers outside of the [address] [place] [address] since your last visit? Include any pap smears or colon screening.  No    Health Maintenance Due   Topic Date Due    BREAST CANCER SCRN MAMMOGRAM  09/26/2018    Shingrix Vaccine Age 50> (2 of 2) 05/22/2019    Influenza Age 5 to Adult  08/01/2019

## 2019-09-20 NOTE — PROGRESS NOTES
Abel Fountain is a 81 y.o.  female and presents with    Chief Complaint   Patient presents with    Medication Evaluation    Dizziness    Migraine    Chest Pain (Angina)     \"piercing feeling\" possible problem with pacemaker leads    Numbness     Subjective:  Headache  Patient complains of headache. She does not have a headache at this time. Description of Headaches:  Location of pain: frontal  Radiation of pain?:none  Character of pain:pulsating  Severity of pain: 4  Accompanying symptoms: nausea, sonophobia, photophobia  Prodromal sx?: nausea  Rapidity of onset: gradual  Typical duration of individual headache: a few hours  Are most headaches similar in presentation? yes  Typical precipitants: stress    Temporal Pattern of Headaches:  Started having HAs several months ago  Worst time of day: morning  Awaken from sleep?: no  Seasonal pattern?: no  Clustering of HAs over time? no  Overall pattern since problem began: unchanged    Degree of Functional Impairment: mild    Current Use of Meds to Treat HA:  Abortive meds? acetaminophen  Daily use? no  Prophylactic meds? none    Additional Relevant History:  History of head/neck trauma? yes - h/o aneurysm  History of head/neck surgery? no  Family h/o headache problems? no  Use of meds that might worsen HAs? no  Exposure to carbon monoxide? no  Substance use: alcohol: no, illicit drugs:  no, tobacco:  no    Cardiovascular Review:  The patient has hypertension and hyperlipidemia. Diet and Lifestyle: generally follows a low fat low cholesterol diet, generally follows a low sodium diet, does not rigorously follow a diabetic diet, exercises sporadically, nonsmoker  Home BP Monitoring: is not measured at home. Pertinent ROS: taking medications as instructed, no medication side effects noted, no TIA's, no chest pain on exertion, no dyspnea on exertion, no swelling of ankles.      Osteoarthritis and Chronic Pain:  Patient has osteoarthritis, primarily affecting the back and knees. Symptoms onset: problem is longstanding. Rheumatological ROS: ongoing significant pain in back and knees which is stable and controlled by PRN meds. Response to treatment plan: gradually worsening.      ROS   General ROS: negative for - chills, fatigue or fever  Ophthalmic ROS: negative for - blurry vision  ENT ROS: negative for - headaches, nasal congestion or sore throat  Endocrine ROS: negative for - polydipsia/polyuria or temperature intolerance  Respiratory ROS: no cough, shortness of breath, or wheezing  Cardiovascular ROS: no chest pain or dyspnea on exertion  Gastrointestinal ROS: no abdominal pain, change in bowel habits, or black or bloody stools  Genito-Urinary ROS: no dysuria, trouble voiding, or hematuria  Neurological ROS: no TIA or stroke symptoms  Dermatological ROS: negative for - rash or skin lesion changes     All other systems reviewed and are negative. Objective:  Vitals:    09/20/19 1413   BP: 114/69   Pulse: 74   Resp: 16   Temp: 96.8 °F (36 °C)   TempSrc: Oral   SpO2: 99%   Weight: 171 lb 6.4 oz (77.7 kg)   Height: 5' 4\" (1.626 m)   PainSc:   0 - No pain     alert, well appearing, and in no distress, oriented to person, place, and time and overweight  Mental status - normal mood, behavior, speech, dress, motor activity, and thought processes  Chest - clear to auscultation, no wheezes, rales or rhonchi, symmetric air entry  Heart - normal rate, regular rhythm, normal S1, S2, III/VI MAYLIN right upper sternal border, no rubs, clicks or gallops  Back exam - pain with motion noted during exam, tenderness noted lower back  Neurological - cranial nerves II through XII intact, antalgic gait      Assessment/Plan:    1. Impaired fasting glucose  Assess for worsening condition  - HEMOGLOBIN A1C WITH EAG; Future    2. Pacemaker  Improved energy    3. Rheumatic heart disease    - CBC WITH AUTOMATED DIFF; Future    4.  Atrial fibrillation, unspecified type [facility])  F/u with cardiology    5. Aortic valve stenosis, unspecified etiology  F/u with cardiology    6. Primary osteoarthritis of left knee  Exercises demonstrated; continue pain management    7. Hypercholesterolemia  Continue zetia; pt does not tolerate statin  - LIPID PANEL; Future    8. Frontal headache  H/o brain aneurysm but current symptoms are different. Lab review: no lab studies available for review at time of visit      I have discussed the diagnosis with the patient and the intended plan as seen in the above orders. The patient has received an after-visit summary and questions were answered concerning future plans. I have discussed medication side effects and warnings with the patient as well. I have reviewed the plan of care with the patient, accepted their input and they are in agreement with the treatment goals.

## 2019-09-21 LAB
CHOLEST SERPL-MCNC: 254 MG/DL
HDLC SERPL-MCNC: 53 MG/DL (ref 40–60)
HDLC SERPL: 4.8 {RATIO} (ref 0–5)
LDLC SERPL CALC-MCNC: 156.2 MG/DL (ref 0–100)
LIPID PROFILE,FLP: ABNORMAL
TRIGL SERPL-MCNC: 224 MG/DL (ref ?–150)
VLDLC SERPL CALC-MCNC: 44.8 MG/DL

## 2019-09-25 ENCOUNTER — OFFICE VISIT (OUTPATIENT)
Dept: CARDIOLOGY CLINIC | Age: 69
End: 2019-09-25

## 2019-09-25 DIAGNOSIS — Z95.0 PACEMAKER: ICD-10-CM

## 2019-09-25 DIAGNOSIS — I48.91 ATRIAL FIBRILLATION, UNSPECIFIED TYPE (HCC): Primary | ICD-10-CM

## 2019-10-08 RX ORDER — MIRABEGRON 25 MG/1
TABLET, FILM COATED, EXTENDED RELEASE ORAL
Qty: 90 TAB | Refills: 3 | Status: SHIPPED | OUTPATIENT
Start: 2019-10-08 | End: 2020-08-20 | Stop reason: SDUPTHER

## 2019-10-28 ENCOUNTER — OFFICE VISIT (OUTPATIENT)
Dept: FAMILY MEDICINE CLINIC | Age: 69
End: 2019-10-28

## 2019-10-28 VITALS
BODY MASS INDEX: 29.53 KG/M2 | OXYGEN SATURATION: 99 % | HEART RATE: 90 BPM | RESPIRATION RATE: 16 BRPM | TEMPERATURE: 96.9 F | DIASTOLIC BLOOD PRESSURE: 83 MMHG | SYSTOLIC BLOOD PRESSURE: 118 MMHG | HEIGHT: 64 IN | WEIGHT: 173 LBS

## 2019-10-28 DIAGNOSIS — R51.9 FRONTAL HEADACHE: ICD-10-CM

## 2019-10-28 DIAGNOSIS — R73.01 IMPAIRED FASTING GLUCOSE: ICD-10-CM

## 2019-10-28 DIAGNOSIS — Z95.0 PACEMAKER: ICD-10-CM

## 2019-10-28 DIAGNOSIS — I48.91 ATRIAL FIBRILLATION, UNSPECIFIED TYPE (HCC): ICD-10-CM

## 2019-10-28 DIAGNOSIS — E78.00 HYPERCHOLESTEROLEMIA: ICD-10-CM

## 2019-10-28 DIAGNOSIS — I09.9 RHEUMATIC HEART DISEASE: ICD-10-CM

## 2019-10-28 DIAGNOSIS — R35.0 URINARY FREQUENCY: Primary | ICD-10-CM

## 2019-10-28 LAB
BILIRUB UR QL STRIP: NEGATIVE
GLUCOSE UR-MCNC: NEGATIVE MG/DL
KETONES P FAST UR STRIP-MCNC: NEGATIVE MG/DL
PH UR STRIP: 6 [PH] (ref 4.6–8)
PROT UR QL STRIP: NEGATIVE
SP GR UR STRIP: 1.01 (ref 1–1.03)
UA UROBILINOGEN AMB POC: NORMAL (ref 0.2–1)
URINALYSIS CLARITY POC: CLEAR
URINALYSIS COLOR POC: YELLOW
URINE BLOOD POC: NEGATIVE
URINE LEUKOCYTES POC: NEGATIVE
URINE NITRITES POC: NEGATIVE

## 2019-10-28 RX ORDER — METFORMIN HYDROCHLORIDE 500 MG/1
500 TABLET, EXTENDED RELEASE ORAL
Qty: 90 TAB | Refills: 3 | Status: SHIPPED | OUTPATIENT
Start: 2019-10-28 | End: 2020-01-22 | Stop reason: SDDI

## 2019-10-28 NOTE — PROGRESS NOTES
Negar Nix is a 81 y.o.  female and presents with    Chief Complaint   Patient presents with    Headache    Pain (Chronic)     Subjective:  Ms. Wil Rice presents c/o urinary frequency and nocturia; she goes every 2 hours. She feels like she has a urinary tract infection. She has been evaluated by urology with void velocity and ultrasound. She has been diagnosed with fibroids. Headache  Patient complains of headache. She does not have a headache at this time.      Description of Headaches:  Location of pain: frontal  Radiation of pain?:none  Character of pain:pulsating  Severity of pain: 4  Accompanying symptoms: nausea, sonophobia, photophobia  Prodromal sx?: nausea  Rapidity of onset: gradual  Typical duration of individual headache: a few hours  Are most headaches similar in presentation? yes  Typical precipitants: stress     Temporal Pattern of Headaches:  Started having HAs several months ago  Worst time of day: morning  Awaken from sleep?: no  Seasonal pattern?: no  Clustering of HAs over time? no  Overall pattern since problem began: unchanged     Degree of Functional Impairment: mild     Current Use of Meds to Treat HA:  Abortive meds? acetaminophen  Daily use? no  Prophylactic meds? none     Additional Relevant History:  History of head/neck trauma? yes - h/o aneurysm  History of head/neck surgery? no  Family h/o headache problems? no  Use of meds that might worsen HAs? no  Exposure to carbon monoxide? no  Substance use: alcohol: no, illicit drugs:  no, tobacco:  no     Cardiovascular Review:  The patient has hypertension and hyperlipidemia. Diet and Lifestyle: generally follows a low fat low cholesterol diet, generally follows a low sodium diet, does not rigorously follow a diabetic diet, exercises sporadically, nonsmoker  Home BP Monitoring: is not measured at home.   Pertinent ROS: taking medications as instructed, no medication side effects noted, no TIA's, no chest pain on exertion, no dyspnea on exertion, no swelling of ankles.      Osteoarthritis and Chronic Pain:  Patient has osteoarthritis, primarily affecting the back and knees. Symptoms onset: problem is longstanding. Rheumatological ROS: ongoing significant pain in back and knees which is stable and controlled by PRN meds. Response to treatment plan: gradually worsening.      ROS   General ROS: negative for - chills, fatigue or fever  Ophthalmic ROS: negative for - blurry vision  ENT ROS: negative for - headaches, nasal congestion or sore throat  Endocrine ROS: negative for - polydipsia/polyuria or temperature intolerance  Respiratory ROS: no cough, shortness of breath, or wheezing  Cardiovascular ROS: no chest pain or dyspnea on exertion  Gastrointestinal ROS: no abdominal pain, change in bowel habits, or black or bloody stools  Genito-Urinary ROS: no dysuria, trouble voiding, or hematuria  Neurological ROS: no TIA or stroke symptoms  Dermatological ROS: negative for - rash or skin lesion changes     All other systems reviewed and are negative.     Objective:  Vitals:    10/28/19 0936   BP: 118/83   Pulse: 90   Resp: 16   Temp: 96.9 °F (36.1 °C)   TempSrc: Oral   SpO2: 99%   Weight: 173 lb (78.5 kg)   Height: 5' 4\" (1.626 m)   PainSc:   8   PainLoc: Generalized     alert, well appearing, and in no distress, oriented to person, place, and time and overweight  Mental status - normal mood, behavior, speech, dress, motor activity, and thought processes  Chest - clear to auscultation, no wheezes, rales or rhonchi, symmetric air entry  Heart - normal rate, regular rhythm, normal S1, S2, III/VI MAYLIN right upper sternal border, no rubs, clicks or gallops  Abdomen - soft, nt, nd  Back exam - pain with motion noted during exam, tenderness noted lower back  Neurological - cranial nerves II through XII intact, antalgic gait       LABS   Urinalysis - normal    hgb a1c 6.4    Assessment/Plan:    1.  Urinary frequency  Continue myrbetriq  - AMB POC URINALYSIS DIP STICK AUTO W/ MICRO    2. Rheumatic heart disease  F/u with cardiologist    3. Frontal headache  Discussed alternative treatment; pt declined at this time    4. Pacemaker  F/u with cardiologist    5. Atrial fibrillation, unspecified type [facility])  F/u with cardiologist    6. Hypercholesterolemia  Continue zetia; pt declined injectable treatment    7. Impaired fasting glucose  Start treatment and provided with diet information  - metFORMIN ER (GLUCOPHAGE XR) 500 mg tablet; Take 1 Tab by mouth daily (with dinner). Dispense: 90 Tab; Refill: 3      Lab review: labs reviewed, I note that glycosylated hemoglobin mildly abnormal but acceptable, lipids LDL result does not yet meet goal, HDL normal, triglycerides normal, urinalysis normal      I have discussed the diagnosis with the patient and the intended plan as seen in the above orders. The patient has received an after-visit summary and questions were answered concerning future plans. I have discussed medication side effects and warnings with the patient as well. I have reviewed the plan of care with the patient, accepted their input and they are in agreement with the treatment goals.

## 2019-10-28 NOTE — PROGRESS NOTES
1. Have you been to the ER, urgent care clinic since your last visit? Hospitalized since your last visit? No    2. Have you seen or consulted any other health care providers outside of the [address] [place] [address] since your last visit? Include any pap smears or colon screening. No     Patient presents in office today for routine care. Patient concerns: lab results, left ankle pain, and neck pain.

## 2019-10-28 NOTE — PATIENT INSTRUCTIONS

## 2019-12-18 ENCOUNTER — OFFICE VISIT (OUTPATIENT)
Dept: CARDIOLOGY CLINIC | Age: 69
End: 2019-12-18

## 2019-12-18 DIAGNOSIS — Z95.0 PACEMAKER: ICD-10-CM

## 2019-12-18 DIAGNOSIS — I48.91 ATRIAL FIBRILLATION, UNSPECIFIED TYPE (HCC): Primary | ICD-10-CM

## 2020-01-22 ENCOUNTER — HOSPITAL ENCOUNTER (OUTPATIENT)
Dept: LAB | Age: 70
Discharge: HOME | End: 2020-01-22
Payer: MEDICARE

## 2020-01-22 ENCOUNTER — OFFICE VISIT (OUTPATIENT)
Dept: FAMILY MEDICINE CLINIC | Age: 70
End: 2020-01-22

## 2020-01-22 VITALS
HEIGHT: 64 IN | DIASTOLIC BLOOD PRESSURE: 78 MMHG | RESPIRATION RATE: 16 BRPM | SYSTOLIC BLOOD PRESSURE: 122 MMHG | TEMPERATURE: 96.7 F | OXYGEN SATURATION: 99 % | HEART RATE: 92 BPM | WEIGHT: 173 LBS | BODY MASS INDEX: 29.53 KG/M2

## 2020-01-22 DIAGNOSIS — J33.0 POLYP OF LEFT NASAL CAVITY: Primary | ICD-10-CM

## 2020-01-22 DIAGNOSIS — G89.29 CHRONIC PAIN OF LEFT ANKLE: ICD-10-CM

## 2020-01-22 DIAGNOSIS — J32.0 CHRONIC LEFT MAXILLARY SINUSITIS: ICD-10-CM

## 2020-01-22 DIAGNOSIS — M25.572 CHRONIC PAIN OF LEFT ANKLE: ICD-10-CM

## 2020-01-22 DIAGNOSIS — E11.65 TYPE 2 DIABETES MELLITUS WITH HYPERGLYCEMIA, WITHOUT LONG-TERM CURRENT USE OF INSULIN (HCC): ICD-10-CM

## 2020-01-22 DIAGNOSIS — E78.00 HYPERCHOLESTEROLEMIA: ICD-10-CM

## 2020-01-22 DIAGNOSIS — M17.12 PRIMARY OSTEOARTHRITIS OF LEFT KNEE: ICD-10-CM

## 2020-01-22 DIAGNOSIS — I09.9 RHEUMATIC HEART DISEASE: ICD-10-CM

## 2020-01-22 DIAGNOSIS — R51.9 FRONTAL HEADACHE: ICD-10-CM

## 2020-01-22 LAB
EST. AVERAGE GLUCOSE BLD GHB EST-MCNC: 131 MG/DL
HBA1C MFR BLD: 6.2 % (ref 4.2–5.6)

## 2020-01-22 PROCEDURE — 83036 HEMOGLOBIN GLYCOSYLATED A1C: CPT

## 2020-01-22 PROCEDURE — 36415 COLL VENOUS BLD VENIPUNCTURE: CPT

## 2020-01-22 NOTE — PATIENT INSTRUCTIONS
Foot Arthritis: Exercises Introduction Here are some examples of exercises for you to try. The exercises may be suggested for a condition or for rehabilitation. Start each exercise slowly. Ease off the exercises if you start to have pain. You will be told when to start these exercises and which ones will work best for you. How to do the exercises Calf stretch (knees straight) 1. Place a book on the floor a few inches from a wall or countertop, and put the balls of your feet on it. Your heels should be on the floor. The book needs to be thick enough so that you can feel a gentle stretch in your calf. If you are not steady on your feet, hold on to a chair, counter, or wall while you do this stretch. 2. Keep your knees straight, and lean forward until you feel a stretch in your calf. 3. To get more stretch, add another book or use a thicker book, such as a phone book, a dictionary, or an encyclopedia. 4. Hold the stretch for at least 15 to 30 seconds. 5. Repeat 2 to 4 times. Calf stretch (knees bent) 1. Place a book on the floor a few inches from a wall or countertop, and put the balls of your feet on it. Your heels should be on the floor. The book needs to be thick enough so that you can feel a gentle stretch in your calf. If you are not steady on your feet, hold on to a chair, counter, or wall while you do this stretch. 2. Bend your knees, and lean forward until you feel a stretch in your calf. 3. To get more stretch, add another book or use a thicker book, such as a phone book, a dictionary, or an encyclopedia. 4. Hold the stretch for at least 15 to 30 seconds. 5. Repeat 2 to 4 times. Great toe extension stretch 1. Sit in a chair, and put your affected foot across your other knee. 2. Grasp your heel with one hand and then slowly pull your big toe back with your other hand. Pull your toe back toward your ankle until you feel a stretch along the bottom of your foot. 3. Hold the stretch for at least 15 to 30 seconds. 4. Repeat 2 to 4 times. 5. Switch feet and repeat steps 1 through 4, even if only one foot is sore. Great toe flexion stretch 1. Sit in a chair, and put your affected foot across your other knee. 2. Grasp your heel with one hand and then slowly push your big toe down with your other hand. Push your toe down and away from your ankle until you feel a stretch along the top of your foot. 3. Hold the stretch for at least 15 to 30 seconds. 4. Repeat 2 to 4 times. 5. Switch feet and repeat steps 1 through 4, even if only one foot is sore. Ankle alphabet 1. Sit in a chair with your feet flat on the floor. (You can also do this exercise lying on your back with your affected leg propped up on a pillow). 2. Lift the heel of your sore foot off the floor, and slowly trace the letters of the alphabet. 3. Switch feet and repeat steps 1 through 2, even if only one foot is sore. Resisted ankle dorsiflexion 1. Tie the ends of an exercise band together to form a loop. Attach one end of the loop to a secure object or shut a door on it to hold it in place. (Or you can have someone hold one end of the loop to provide resistance.) 2. While sitting on the floor or in a chair, loop the other end of the band over the top of your affected foot. 3. Keeping your knee and leg straight, slowly flex your foot to pull back on the exercise band, and then slowly relax. 4. Repeat 8 to 12 times. 5. Switch feet and repeat steps 1 through 4, even if only one foot is sore. Towel curl 1. While sitting, place your affected foot on a towel on the floor, and scrunch the towel toward you with your toes. 2. Then use your toes to push the towel away from you. 3. Repeat 8 to 12 times. 4. Switch feet and repeat steps 1 through 3, even if only one foot is sore. Resisted ankle eversion 1. Sit on the floor with your legs straight. 2. Hold both ends of an exercise band and loop the band around the outside of your affected foot. Then press your other foot against the band. 3. Keeping your leg straight, slowly push your affected foot outward against the band and away from your other foot without letting your leg rotate. Then slowly relax. 4. Repeat 8 to 12 times. 5. Switch feet and repeat steps 1 through 4, even if only one foot is sore. Resisted ankle inversion 1. Sit on the floor with your good leg crossed over your other leg. 2. Hold both ends of an exercise band and loop the band around the inside of your affected foot. Then press your other foot against the band. 3. Keeping your legs crossed, slowly push your affected foot against the band so that foot moves away from your other foot. Then slowly relax. 4. Repeat 8 to 12 times. 5. Switch feet and repeat steps 1 through 4, even if only one foot is sore. Follow-up care is a key part of your treatment and safety. Be sure to make and go to all appointments, and call your doctor if you are having problems. It's also a good idea to know your test results and keep a list of the medicines you take. Where can you learn more? Go to http://diogenes-lee.info/. Enter K113 in the search box to learn more about \"Foot Arthritis: Exercises. \" Current as of: June 26, 2019 Content Version: 12.2 © 7230-6451 [employer]. Care instructions adapted under license by [employer] (which disclaims liability or warranty for this information). If you have questions about a medical condition or this instruction, always ask your healthcare professional. [address] any warranty or liability for your use of this information. Ankle: Exercises Introduction Here are some examples of exercises for you to try. The exercises may be suggested for a condition or for rehabilitation.  Start each exercise slowly. Ease off the exercises if you start to have pain. You will be told when to start these exercises and which ones will work best for you. Alphabet exercise \"Alphabet\" exercise 1. Trace the alphabet with your toe. This helps your ankle move in all directions. Side-to-side knee swing exercise 1. Sit in a chair with your foot flat on the floor. 2. Slowly move your knee from side to side while keeping your foot pressed flat. 3. Continue this exercise for 2 to 3 minutes. Towel curl 1. While sitting, place your foot on a towel on the floor and scrunch the towel toward you with your toes. 2. Then use your toes to push the towel away from you. 3. Make this exercise more challenging by placing a weighted object, such as a soup can, on the other end of the towel. Towel stretch 1. Sit with your legs extended and knees straight. 2. Place a towel around your foot just under the toes. 3. Hold each end of the towel in each hand, with your hands above your knees. 4. Pull back with the towel so that your foot stretches toward you. 5. Hold the position for at least 15 to 30 seconds. 6. Repeat 2 to 4 times a session, up to 5 sessions a day. Ankle eversion exercise 1. Start by sitting with your foot flat on the floor and pushing it outward against an immovable object such as the wall or heavy furniture. Hold for about 6 seconds, then relax. Repeat 8 to 12 times. 2. After you feel comfortable with this, try using rubber tubing looped around the outside of your feet for resistance. Push your foot out to the side against the tubing, and then count to 10 as you slowly bring your foot back to the middle. Repeat 8 to 12 times. Isometric opposition exercises 1. While sitting, put your feet together flat on the floor. 2. Press your injured foot inward against your other foot. Hold for about 6 seconds, and relax. Repeat 8 to 12 times. 3. Then place the heel of your other foot on top of the injured one. Push down with the top heel while trying to push up with your injured foot. Hold for about 6 seconds, and relax. Repeat 8 to 12 times. Follow-up care is a key part of your treatment and safety. Be sure to make and go to all appointments, and call your doctor if you are having problems. It's also a good idea to know your test results and keep a list of the medicines you take. Where can you learn more? Go to http://diogenes-lee.info/. Enter C531 in the search box to learn more about \"Ankle: Exercises. \" Current as of: June 26, 2019 Content Version: 12.2 © 7903-1404 [employer]. Care instructions adapted under license by [employer] (which disclaims liability or warranty for this information). If you have questions about a medical condition or this instruction, always ask your healthcare professional. [place] [address] any warranty or liability for your use of this information.

## 2020-01-22 NOTE — PROGRESS NOTES
1. Have you been to the ER, urgent care clinic since your last visit? Hospitalized since your last visit? No    2. Have you seen or consulted any other health care providers outside of the [address] [place] [address] since your last visit? Include any pap smears or colon screening. No     Patient presents in office today for routine care.   Patient concerns: lesion in left nostril, left ankle pain and left knee pain

## 2020-01-22 NOTE — PROGRESS NOTES
Freddy Avila is a 81 y.o.  female and presents with    Chief Complaint   Patient presents with    Foreign Body in Nose    Knee Pain    Ankle Pain     Subjective:  Ms. Gonzalez Winfield c/o lump in her left side nose; it has been present for several weeks and seems to be getting larger; she denies pain associated; she has not used nasal spray; she does not have h/o seasonal allergies; she denies inserting objects in her nose. Diabetes Mellitus:  She has diabetes mellitus. She did not start metformin due to reading side effects; she has attended diabetes education classes and has implemented changes into her diet  Diabetic ROS - medication compliance: compliant all of the time, diabetic diet compliance: compliant most of the time. Lab review: orders written for new lab studies as appropriate; see orders. Cardiovascular Review:  The patient has hypertension and hyperlipidemia. Diet and Lifestyle: generally follows a low fat low cholesterol diet, generally follows a low sodium diet, does not rigorously follow a diabetic diet, exercises sporadically, nonsmoker  Home BP Monitoring: is not measured at home. Pertinent ROS: taking medications as instructed, no medication side effects noted, no TIA's, no chest pain on exertion, no dyspnea on exertion, no swelling of ankles.      Osteoarthritis and Chronic Pain:  Patient has osteoarthritis, primarily affecting the back and knees. Symptoms onset: problem is longstanding. Rheumatological ROS: ongoing significant pain in back and knees which is stable and controlled by PRN meds.    Response to treatment plan: gradually worsening.      ROS   General ROS: negative for - chills, fatigue or fever  Ophthalmic ROS: negative for - blurry vision  ENT ROS: negative for - headaches, nasal congestion or sore throat  Endocrine ROS: negative for - polydipsia/polyuria or temperature intolerance  Respiratory ROS: no cough, shortness of breath, or wheezing  Cardiovascular ROS: no chest pain or dyspnea on exertion  Gastrointestinal ROS: no abdominal pain, change in bowel habits, or black or bloody stools  Genito-Urinary ROS: no dysuria, trouble voiding, or hematuria  Neurological ROS: no TIA or stroke symptoms  Dermatological ROS: negative for - rash or skin lesion changes    Objective:  Vitals:    01/22/20 0934   BP: 122/78   Pulse: 92   Resp: 16   Temp: 96.7 °F (35.9 °C)   TempSrc: Oral   SpO2: 99%   Weight: 173 lb (78.5 kg)   Height: 5' 4\" (1.626 m)   PainSc:   8   PainLoc: Generalized     alert, well appearing, and in no distress, oriented to person, place, and time and overweight  Mental status - anxious  Nose - polyp noted left  Neck - supple, no significant adenopathy  Chest - clear to auscultation, no wheezes, rales or rhonchi, symmetric air entry  Heart - normal rate, regular rhythm, normal S1, S2, no murmurs, rubs, clicks or gallops  msk - knee pain with motion  Neuro - antalgic gait      Assessment/Plan:    1. Polyp of left nasal cavity    - CT MAXILLOFACIAL WO CONT; Future  - REFERRAL TO ENT-OTOLARYNGOLOGY    2. Chronic left maxillary sinusitis  Saline rinse  - CT MAXILLOFACIAL WO CONT; Future  - REFERRAL TO ENT-OTOLARYNGOLOGY    3. Type 2 diabetes mellitus with hyperglycemia, without long-term current use of insulin (HCC)  Goal hgb a1c <7  - HEMOGLOBIN A1C WITH EAG; Future    4. Rheumatic heart disease  F/u with cardiology    5. Hypercholesterolemia  Continue zetia  - LIPID PANEL; Future    6. Primary osteoarthritis of left knee  nsaid as needed    7. Frontal headache  Associated with sinusitis    8. Chronic pain of left ankle  nsaid    Lab review: orders written for new lab studies as appropriate; see orders      I have discussed the diagnosis with the patient and the intended plan as seen in the above orders. The patient has received an after-visit summary and questions were answered concerning future plans.   I have discussed medication side effects and warnings with the patient as well. I have reviewed the plan of care with the patient, accepted their input and they are in agreement with the treatment goals.

## 2020-02-03 ENCOUNTER — HOSPITAL ENCOUNTER (OUTPATIENT)
Dept: CT IMAGING | Age: 70
Discharge: HOME | End: 2020-02-03
Attending: FAMILY MEDICINE
Payer: MEDICARE

## 2020-02-03 DIAGNOSIS — J33.0 POLYP OF LEFT NASAL CAVITY: ICD-10-CM

## 2020-02-03 DIAGNOSIS — J32.0 CHRONIC LEFT MAXILLARY SINUSITIS: ICD-10-CM

## 2020-02-03 PROCEDURE — 70486 CT MAXILLOFACIAL W/O DYE: CPT

## 2020-02-25 ENCOUNTER — APPOINTMENT (OUTPATIENT)
Dept: CT IMAGING | Age: 70
End: 2020-02-25
Attending: EMERGENCY MEDICINE
Payer: MEDICARE

## 2020-02-25 ENCOUNTER — HOSPITAL ENCOUNTER (EMERGENCY)
Age: 70
Discharge: HOME | End: 2020-02-25
Attending: EMERGENCY MEDICINE | Admitting: EMERGENCY MEDICINE
Payer: MEDICARE

## 2020-02-25 VITALS
TEMPERATURE: 97.5 F | DIASTOLIC BLOOD PRESSURE: 78 MMHG | RESPIRATION RATE: 13 BRPM | WEIGHT: 167 LBS | OXYGEN SATURATION: 100 % | SYSTOLIC BLOOD PRESSURE: 132 MMHG | HEIGHT: 64 IN | HEART RATE: 79 BPM | BODY MASS INDEX: 28.51 KG/M2

## 2020-02-25 DIAGNOSIS — R42 DIZZINESS: Primary | ICD-10-CM

## 2020-02-25 DIAGNOSIS — I67.1 BRAIN ANEURYSM: ICD-10-CM

## 2020-02-25 LAB
ALBUMIN SERPL-MCNC: 4 G/DL (ref 3.4–5)
ALBUMIN/GLOB SERPL: 1 {RATIO} (ref 0.8–1.7)
ALP SERPL-CCNC: 75 U/L (ref 45–117)
ALT SERPL-CCNC: 36 U/L (ref 13–56)
ANION GAP SERPL CALC-SCNC: 5 MMOL/L (ref 3–18)
APPEARANCE UR: CLEAR
AST SERPL-CCNC: 26 U/L (ref 10–38)
BASOPHILS # BLD: 0 K/UL (ref 0–0.1)
BASOPHILS NFR BLD: 0 % (ref 0–2)
BILIRUB SERPL-MCNC: 0.5 MG/DL (ref 0.2–1)
BILIRUB UR QL: NEGATIVE
BUN SERPL-MCNC: 14 MG/DL (ref 7–18)
BUN/CREAT SERPL: 15 (ref 12–20)
CALCIUM SERPL-MCNC: 9.5 MG/DL (ref 8.5–10.1)
CHLORIDE SERPL-SCNC: 105 MMOL/L (ref 100–111)
CO2 SERPL-SCNC: 29 MMOL/L (ref 21–32)
COLOR UR: YELLOW
CREAT SERPL-MCNC: 0.95 MG/DL (ref 0.6–1.3)
DIFFERENTIAL METHOD BLD: ABNORMAL
EOSINOPHIL # BLD: 0.2 K/UL (ref 0–0.4)
EOSINOPHIL NFR BLD: 3 % (ref 0–5)
ERYTHROCYTE [DISTWIDTH] IN BLOOD BY AUTOMATED COUNT: 14.1 % (ref 11.6–14.5)
GLOBULIN SER CALC-MCNC: 4.1 G/DL (ref 2–4)
GLUCOSE SERPL-MCNC: 109 MG/DL (ref 74–99)
GLUCOSE UR STRIP.AUTO-MCNC: NEGATIVE MG/DL
HCT VFR BLD AUTO: 46.2 % (ref 35–45)
HGB BLD-MCNC: 15.1 G/DL (ref 12–16)
HGB UR QL STRIP: NEGATIVE
KETONES UR QL STRIP.AUTO: NEGATIVE MG/DL
LEUKOCYTE ESTERASE UR QL STRIP.AUTO: NEGATIVE
LYMPHOCYTES # BLD: 1.6 K/UL (ref 0.9–3.6)
LYMPHOCYTES NFR BLD: 22 % (ref 21–52)
MCH RBC QN AUTO: 30.6 PG (ref 24–34)
MCHC RBC AUTO-ENTMCNC: 32.7 G/DL (ref 31–37)
MCV RBC AUTO: 93.7 FL (ref 74–97)
MONOCYTES # BLD: 0.3 K/UL (ref 0.05–1.2)
MONOCYTES NFR BLD: 4 % (ref 3–10)
NEUTS SEG # BLD: 5.3 K/UL (ref 1.8–8)
NEUTS SEG NFR BLD: 71 % (ref 40–73)
NITRITE UR QL STRIP.AUTO: NEGATIVE
PH UR STRIP: 6 [PH] (ref 5–8)
PLATELET # BLD AUTO: 184 K/UL (ref 135–420)
PMV BLD AUTO: 12.2 FL (ref 9.2–11.8)
POTASSIUM SERPL-SCNC: 3.7 MMOL/L (ref 3.5–5.5)
PROT SERPL-MCNC: 8.1 G/DL (ref 6.4–8.2)
PROT UR STRIP-MCNC: NEGATIVE MG/DL
RBC # BLD AUTO: 4.93 M/UL (ref 4.2–5.3)
SODIUM SERPL-SCNC: 139 MMOL/L (ref 136–145)
SP GR UR REFRACTOMETRY: 1.01 (ref 1–1.03)
TROPONIN I SERPL-MCNC: <0.02 NG/ML (ref 0–0.04)
UROBILINOGEN UR QL STRIP.AUTO: 0.2 EU/DL (ref 0.2–1)
WBC # BLD AUTO: 7.4 K/UL (ref 4.6–13.2)

## 2020-02-25 PROCEDURE — 80053 COMPREHEN METABOLIC PANEL: CPT

## 2020-02-25 PROCEDURE — 70450 CT HEAD/BRAIN W/O DYE: CPT

## 2020-02-25 PROCEDURE — 74011250636 HC RX REV CODE- 250/636: Performed by: EMERGENCY MEDICINE

## 2020-02-25 PROCEDURE — 93005 ELECTROCARDIOGRAM TRACING: CPT

## 2020-02-25 PROCEDURE — 85025 COMPLETE CBC W/AUTO DIFF WBC: CPT

## 2020-02-25 PROCEDURE — 96361 HYDRATE IV INFUSION ADD-ON: CPT

## 2020-02-25 PROCEDURE — 96360 HYDRATION IV INFUSION INIT: CPT

## 2020-02-25 PROCEDURE — 84484 ASSAY OF TROPONIN QUANT: CPT

## 2020-02-25 PROCEDURE — 81003 URINALYSIS AUTO W/O SCOPE: CPT

## 2020-02-25 PROCEDURE — 99283 EMERGENCY DEPT VISIT LOW MDM: CPT

## 2020-02-25 RX ORDER — MECLIZINE HCL 12.5 MG 12.5 MG/1
25 TABLET ORAL
Status: COMPLETED | OUTPATIENT
Start: 2020-02-25 | End: 2020-02-25

## 2020-02-25 RX ORDER — MECLIZINE HCL 12.5 MG 12.5 MG/1
12.5-25 TABLET ORAL
Qty: 30 TAB | Refills: 0 | Status: SHIPPED | OUTPATIENT
Start: 2020-02-25

## 2020-02-25 RX ADMIN — MECLIZINE 25 MG: 12.5 TABLET ORAL at 22:43

## 2020-02-25 RX ADMIN — SODIUM CHLORIDE 1000 ML: 900 INJECTION, SOLUTION INTRAVENOUS at 19:51

## 2020-02-26 DIAGNOSIS — E11.65 TYPE 2 DIABETES MELLITUS WITH HYPERGLYCEMIA, WITHOUT LONG-TERM CURRENT USE OF INSULIN (HCC): Primary | ICD-10-CM

## 2020-02-26 NOTE — ED PROVIDER NOTES
Jordan Rodriguez is a 78 y.o. female with complaints of continued dizziness and off-balance sensation which last several weeks it got worse last night. Symptoms are usually worse at night when she gets to use the restroom. Patient feels like she is leaning to one way. She denies any visual symptoms or difficulty swallowing. She has no numbness or tingling or weakness. Patient has chronic headaches. She denies any fever, chills, sweats. She has no nausea vomiting or diarrhea. Patient has chronic urinary frequency. She has not taken anything for her symptoms. The history is provided by the patient and medical records.         Past Medical History:   Diagnosis Date    Advance directive in chart     6/28/2016    Amaurosis fugax     Aortic stenosis     mean gradient  26.5 mmHg (CATH 4/13), 32 mmHg (ECHO 9/15)    Aortic valve replacement     21mm MAGNA EASE pericardial  2/16    Arthritis     Atrial appendage resection     2/16      Atrial fibrillation     CHADS score 2  (-CHF, -HTN, -AGE, -DM +AMAUROSIS FUGAX)    Atrial fibrillation ablation     surgical left sided cryoablation  2/16    Cataract     OS    Cervical dysplasia     2009   post leep and cone    COPD     mild  2/16    Duodenal AVM     argon plasma coagulation 2/16    Dyslipidemia     Fibroids     Glaucoma     Lung nodule     3/19/2012   stable    Mitral stenosis     mean gradient  6 mmHg (CATH 4/13), 7.6 mmHg (MANUEL 10/15)    Mitral valve replacement     25 mm MAGNA EASE pericardial  2/16    Pacemaker     dual chamber MEDTRONIC 2/16    Post-op ventricular asystole     02/16    Remote tobacco     Sleep apnea     no CPAP    Thyroid nodule     3/20/2013   multiple    TIA (transient ischemic attack)        Past Surgical History:   Procedure Laterality Date    COLONOSCOPY N/A 1/23/2017    COLONOSCOPY performed [facility] Luna Swartz MD at [facility] ENDOSCOPY    HX AFIB ABLATION      2/16  surgical cryoablation    HX AORTIC VALVE REPLACEMENT          HX BREAST BIOPSY Right 10/13/2014    Right breast needle [facility]OC biopsy performed by Delmis Stuart MD at [address] HX BREAST LUMPECTOMY      Right    HX BUNIONECTOMY      left    HX GYN      VAINIII, VELASQUEZ 1, keratinous labial cyst    HX GYN      2012  Cold knife conization of cervix    HX LEEP PROCEDURE          HX MITRAL VALVE REPLACEMENT          HX OTHER SURGICAL      lump left neck, benign    HX OTHER SURGICAL      multiple breast aspirations    HX PACEMAKER      I&D OF VULVA/PERINEUM ABSCESS  3/21/2017              Family History:   Problem Relation Age of Onset    Cancer Mother         oral,     Alzheimer Father            Simin Rodriguez Breast Cancer Maternal Aunt         unsure age   Simin Rodriguez Colon Cancer Maternal Grandfather        Social History     Socioeconomic History    Marital status:      Spouse name: Not on file    Number of children: Not on file    Years of education: Not on file    Highest education level: Not on file   Occupational History    Not on file   Social Needs    Financial resource strain: Not on file    Food insecurity:     Worry: Not on file     Inability: Not on file    Transportation needs:     Medical: Not on file     Non-medical: Not on file   Tobacco Use    Smoking status: Former Smoker     Years: 37.00     Last attempt to quit: 2004     Years since quittin.0    Smokeless tobacco: Never Used    Tobacco comment: stop around    Substance and Sexual Activity    Alcohol use: No    Drug use: No    Sexual activity: Yes     Partners: Male     Birth control/protection: None   Lifestyle    Physical activity:     Days per week: Not on file     Minutes per session: Not on file    Stress: Not on file   Relationships    Social connections:     Talks on phone: Not on file     Gets together: Not on file     Attends [employer] service: Not on file     Active member of club or organization: Not on file     Attends meetings of clubs or organizations: Not on file     Relationship status: Not on file    Intimate partner violence:     Fear of current or ex partner: Not on file     Emotionally abused: Not on file     Physically abused: Not on file     Forced sexual activity: Not on file   Other Topics Concern    Not on file   Social History Narrative    Not on file         ALLERGIES: Contrast dye [iodine]; Iodinated contrast media; Seafood; Statins-hmg-coa reductase inhibitors; and Sulfa (sulfonamide antibiotics)    Review of Systems   Constitutional: Negative for fever. HENT: Negative for sore throat and trouble swallowing. Eyes: Negative for visual disturbance. Respiratory: Negative for cough and shortness of breath. Cardiovascular: Negative for chest pain. Gastrointestinal: Negative for abdominal pain. Genitourinary: Positive for frequency. Negative for difficulty urinating. Musculoskeletal: Positive for gait problem. Skin: Negative for rash. Allergic/Immunologic: Negative for immunocompromised state. Neurological: Positive for dizziness. Negative for seizures, syncope, facial asymmetry, weakness and light-headedness. Psychiatric/Behavioral: Negative for sleep disturbance. Vitals:    02/25/20 1928   BP: 132/78   Pulse: 79   Resp: 13   Temp: 97.5 °F (36.4 °C)   SpO2: 100%   Weight: 75.8 kg (167 lb)   Height: 5' 4\" (1.626 m)            Physical Exam  Vitals signs and nursing note reviewed. Constitutional:       General: She is not in acute distress. Appearance: She is well-developed. She is not ill-appearing, toxic-appearing or diaphoretic. HENT:      Head: Normocephalic and atraumatic. Right Ear: External ear normal.      Left Ear: External ear normal.      Nose: Nose normal. No congestion. Mouth/Throat:      Pharynx: Uvula midline. No oropharyngeal exudate or posterior oropharyngeal erythema. Eyes:      General: No scleral icterus.      Conjunctiva/sclera: Conjunctivae normal. Neck:      Musculoskeletal: Normal range of motion and neck supple. Vascular: No carotid bruit. Cardiovascular:      Rate and Rhythm: Normal rate and regular rhythm. Heart sounds: Normal heart sounds. Pulmonary:      Effort: Pulmonary effort is normal.      Breath sounds: Normal breath sounds. Abdominal:      Palpations: Abdomen is soft. Tenderness: There is no abdominal tenderness. Musculoskeletal:      Right lower leg: No edema. Left lower leg: No edema. Skin:     General: Skin is warm and dry. Capillary Refill: Capillary refill takes less than 2 seconds. Neurological:      General: No focal deficit present. Mental Status: She is alert and oriented to person, place, and time. Cranial Nerves: No cranial nerve deficit (3-12). Sensory: No sensory deficit. Motor: No weakness.       Coordination: Coordination normal.      Gait: Gait abnormal.   Psychiatric:         Behavior: Behavior normal.          MDM       Procedures  Vitals:  Patient Vitals for the past 12 hrs:   Temp Pulse Resp BP SpO2   02/25/20 1928 97.5 °F (36.4 °C) 79 13 132/78 100 %         Medications ordered:   Medications   sodium chloride 0.9 % bolus infusion 1,000 mL (has no administration in time range)   meclizine (ANTIVERT) tablet 25 mg (has no administration in time range)         Lab findings:  Recent Results (from the past 12 hour(s))   EKG, 12 LEAD, INITIAL    Collection Time: 02/25/20  7:28 PM   Result Value Ref Range    Ventricular Rate 81 BPM    Atrial Rate 81 BPM    P-R Interval 152 ms    QRS Duration 84 ms    Q-T Interval 390 ms    QTC Calculation (Bezet) 453 ms    Calculated P Axis 67 degrees    Calculated R Axis 18 degrees    Calculated T Axis 53 degrees    Diagnosis       Atrial-paced rhythm  Nonspecific ST abnormality  Abnormal ECG  When compared with ECG of 26-NOV-2018 10:29,  Electronic atrial pacemaker has replaced Sinus rhythm     CBC WITH AUTOMATED DIFF    Collection Time: 02/25/20  9:16 PM   Result Value Ref Range    WBC 7.4 4.6 - 13.2 K/uL    RBC 4.93 4.20 - 5.30 M/uL    HGB 15.1 12.0 - 16.0 g/dL    HCT 46.2 (H) 35.0 - 45.0 %    MCV 93.7 74.0 - 97.0 FL    MCH 30.6 24.0 - 34.0 PG    MCHC 32.7 31.0 - 37.0 g/dL    RDW 14.1 11.6 - 14.5 %    PLATELET 110 801 - 333 K/uL    MPV 12.2 (H) 9.2 - 11.8 FL    NEUTROPHILS 71 40 - 73 %    LYMPHOCYTES 22 21 - 52 %    MONOCYTES 4 3 - 10 %    EOSINOPHILS 3 0 - 5 %    BASOPHILS 0 0 - 2 %    ABS. NEUTROPHILS 5.3 1.8 - 8.0 K/UL    ABS. LYMPHOCYTES 1.6 0.9 - 3.6 K/UL    ABS. MONOCYTES 0.3 0.05 - 1.2 K/UL    ABS. EOSINOPHILS 0.2 0.0 - 0.4 K/UL    ABS. BASOPHILS 0.0 0.0 - 0.1 K/UL    DF AUTOMATED     METABOLIC PANEL, COMPREHENSIVE    Collection Time: 02/25/20  9:16 PM   Result Value Ref Range    Sodium 139 136 - 145 mmol/L    Potassium 3.7 3.5 - 5.5 mmol/L    Chloride 105 100 - 111 mmol/L    CO2 29 21 - 32 mmol/L    Anion gap 5 3.0 - 18 mmol/L    Glucose 109 (H) 74 - 99 mg/dL    BUN 14 7.0 - 18 MG/DL    Creatinine 0.95 0.6 - 1.3 MG/DL    BUN/Creatinine ratio 15 12 - 20      GFR est AA >60 >60 ml/min/1.73m2    GFR est non-AA 59 (L) >60 ml/min/1.73m2    Calcium 9.5 8.5 - 10.1 MG/DL    Bilirubin, total 0.5 0.2 - 1.0 MG/DL    ALT (SGPT) 36 13 - 56 U/L    AST (SGOT) 26 10 - 38 U/L    Alk.  phosphatase 75 45 - 117 U/L    Protein, total 8.1 6.4 - 8.2 g/dL    Albumin 4.0 3.4 - 5.0 g/dL    Globulin 4.1 (H) 2.0 - 4.0 g/dL    A-G Ratio 1.0 0.8 - 1.7     TROPONIN I    Collection Time: 02/25/20  9:16 PM   Result Value Ref Range    Troponin-I, QT <0.02 0.0 - 0.045 NG/ML   URINALYSIS W/ RFLX MICROSCOPIC    Collection Time: 02/25/20  9:16 PM   Result Value Ref Range    Color YELLOW      Appearance CLEAR      Specific gravity 1.010 1.005 - 1.030      pH (UA) 6.0 5.0 - 8.0      Protein NEGATIVE  NEG mg/dL    Glucose NEGATIVE  NEG mg/dL    Ketone NEGATIVE  NEG mg/dL    Bilirubin NEGATIVE  NEG      Blood NEGATIVE  NEG      Urobilinogen 0.2 0.2 - 1.0 EU/dL    Nitrites NEGATIVE NEG      Leukocyte Esterase NEGATIVE  NEG         EKG interpretation by ED Physician:  Atrial paced rhythm with nonspecific ST changes. No acute T wave changes. Rate 81 QRS 84   No significant change    Cardiac monitor: Paced rhythm with normal rate and no ectopy with normal rhythm. Pulse ox: 100% room air      X-Ray, CT or other radiology findings or impressions:  CT HEAD WO CONT    (Results Pending)   No acute intracranial abnormality. 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 Progress notes, Consult notes or additional Procedure notes:   Do not feel patient requires repeat angiography at this time. She has no new symptoms. Will refer patient to interventional neurology regarding her any    I have discussed with patient and/or family/sig other the results, interpretation of any imaging if performed, suspected diagnosis and treatment plan to include instructions regarding the diagnoses listed to which understanding was expressed with all questions answered      Reevaluation of patient:   stable    Disposition:  Diagnosis:   1. Dizziness    2. Brain aneurysm        Disposition: home      Follow-up Information     Follow up With Specialties Details Why Contact Info    Deedee Marie MD Family Practice, Internal Medicine Schedule an appointment as soon as possible for a visit  [id number] [facility]  [address] [place] [address] [phone]  [phone]      [facility] DEPT Emergency Medicine  If symptoms worsen [address] [place] [address]    Jaleel Vega MD Neurology Call to make a follow up appointment about your an[facility] 18 21               Patient's Medications   Start Taking    MECLIZINE (ANTIVERT) 12.5 MG TABLET    Take 1-2 Tabs by mouth three (3) times daily as needed for Dizziness.    Continue Taking    ASPIRIN DELAYED-RELEASE 81 MG TABLET    take 2 tablets by mouth once daily EZETIMIBE (ZETIA) 10 MG TABLET    Take 1 Tab by mouth daily. FERROUS SULFATE 325 MG (65 MG IRON) TABLET    take 1 tablet by mouth three times a day with food    KETOROLAC (ACULAR LS) 0.4 % DROP    Administer 1 Drop to both eyes four (4) times daily. MYRBETRIQ 25 MG ER TABLET    TAKE 1 TABLET EVERY DAY    OMEPRAZOLE (PRILOSEC) 20 MG CAPSULE    take 1 capsule by mouth twice a day    PREDNISOLONE ACETATE (PRED FORTE) 1 % OPHTHALMIC SUSPENSION    Administer 1 Drop to both eyes four (4) times daily. PRENATAL VIT-CALCIUM-IRON-FA (PRENATAL PLUS, CALCIUM CARB,) 27 MG IRON- 1 MG TAB    TAKE 1 TABLET BY MOUTH DAILY    VIT B CMPLX 3-FA-VIT C-BIOTIN (NEPHRO CANDACE RX) 1- MG-MG-MCG TABLET    Take 1 Tab by mouth daily.    These Medications have changed    No medications on file   Stop Taking    No medications on file

## 2020-02-26 NOTE — ED NOTES
Patient ambulatory to room, denies any dizziness at this time. Patient changed into gown and placed on monitor.

## 2020-02-26 NOTE — DISCHARGE INSTRUCTIONS
Brain Angiogram: Before Your Procedure  What is a brain angiogram?  A brain angiogram (cerebral angiogram) is a test (also called a procedure) that looks for problems with blood vessels and blow flow in the brain. These problems may include a bulge in a blood vessel (aneurysm), a narrowing or blockage of a blood vessel, or bleeding in the brain. The test may be used to check other symptoms, such as unusual headaches, or to check problems found during a different test.  The doctor puts a thin, flexible tube (catheter) into a blood vessel in your groin. Or the doctor may put the catheter in a blood vessel in your arm. During the procedure, the doctor moves the catheter through the blood vessel into your brain. Then he or she injects a dye into the catheter. The dye flows into the blood vessel. The dye makes the blood vessels show up on a video screen. A picture of the blood vessel in the brain can be seen on a video screen. The doctor can look at the screen to see any problems with the blood vessels or blood flow. Follow-up care is a key part of your treatment and safety. Be sure to make and go to all appointments, and call your doctor if you are having problems. It's also a good idea to know your test results and keep a list of the medicines you take. What happens before the procedure?   Preparing for the procedure    · Understand exactly what procedure is planned, along with the risks, benefits, and other options. · Tell your doctors ALL the medicines, vitamins, supplements, and herbal remedies you take. Some of these can increase the risk of bleeding or interact with anesthesia.     · If you take blood thinners, such as warfarin (Coumadin), clopidogrel (Plavix), or aspirin, be sure to talk to your doctor. He or she will tell you if you should stop taking these medicines before your procedure.  Make sure that you understand exactly what your doctor wants you to do.     · Your doctor will tell you which medicines to take or stop before your procedure. You may need to stop taking certain medicines a week or more before the procedure. So talk to your doctor as soon as you can.     · If you have an advance directive, let your doctor know. It may include a living will and a durable power of  for health care. Bring a copy to the hospital. If you don't have one, you may want to prepare one. It lets your doctor and loved ones know your health care wishes. Doctors advise that everyone prepare these papers before any type of surgery or procedure. Procedures can be stressful. This information will help you understand what you can expect. And it will help you safely prepare for your procedure. What happens on the day of the procedure?   At home   · Follow the instructions exactly about when to stop eating and drinking. If you don't, your procedure may be canceled. If your doctor told you to take your medicines on the day of the procedure, take them with only a sip of water.     · Take a bath or shower before you come in for your procedure. Do not apply lotions, perfumes, deodorants, or nail polish.     · Take off all jewelry and piercings. And take out contact lenses, if you wear them.     · Do not shave the surgical site yourself.    At the hospital or surgery center   · Bring a picture ID.     · You will be kept comfortable and safe by your anesthesia provider. You may get medicine that relaxes you or puts you in a light sleep. The area being worked on will be numb.     · After the procedure, pressure will be applied to the area where the catheter was put in your blood vessel. Then the area may be covered with a bandage or a compression device. This will prevent bleeding.     · Nurses will check your heart rate and blood pressure. The nurse also will check the catheter site for bleeding.     · If the catheter was put in your groin, you will need to lie still and keep your leg straight for several hours.  The nurse may put a weighted bag on your leg to help you keep it still.     · If the catheter was put in your arm, you may be able to sit up and get out of bed right away. But you will need to keep your arm still for at least 1 hour.     · You may be able to go home later the same day, or you may need to stay in the hospital overnight.     · You may have a bruise or a small lump where the catheter was put in your groin or arm. This is normal and will go away.     · The procedure will take about 1 to 3 hours. Going home   · Be sure you have someone to drive you home. Anesthesia and pain medicine make it unsafe for you to drive.     · You will be given more specific instructions about recovering from your procedure. They will cover things like diet, wound care, follow-up care, driving, and getting back to your normal routine. When should you call your doctor? · You have questions or concerns.     · You don't understand how to prepare for your procedure.     · You become ill before the procedure (such as fever, flu, or a cold).     · You need to reschedule or have changed your mind about having the procedure. Where can you learn more? Go to http://diogenes-lee.info/. Enter  in the search box to learn more about \"Brain Angiogram: Before Your Procedure. \"  Current as of: September 26, 2018  Content Version: 12.2  © 0325-7042 [employer]. Care instructions adapted under license by [employer] (which disclaims liability or warranty for this information). If you have questions about a medical condition or this instruction, always ask your healthcare professional. [place] [address] any warranty or liability for your use of this information. Patient Education        Dizziness: Care Instructions  Your Care Instructions  Dizziness is the feeling of unsteadiness or fuzziness in your head.  It is different than having vertigo, which is a feeling that the room is spinning or that you are moving or falling. It is also different from lightheadedness, which is the feeling that you are about to faint. It can be hard to know what causes dizziness. Some people feel dizzy when they have migraine headaches. Sometimes bouts of flu can make you feel dizzy. Some medical conditions, such as heart problems or high blood pressure, can make you feel dizzy. Many medicines can cause dizziness, including medicines for high blood pressure, pain, or anxiety. If a medicine causes your symptoms, your doctor may recommend that you stop or change the medicine. If it is a problem with your heart, you may need medicine to help your heart work better. If there is no clear reason for your symptoms, your doctor may suggest watching and waiting for a while to see if the dizziness goes away on its own. Follow-up care is a key part of your treatment and safety. Be sure to make and go to all appointments, and call your doctor if you are having problems. It's also a good idea to know your test results and keep a list of the medicines you take. How can you care for yourself at home? · If your doctor recommends or prescribes medicine, take it exactly as directed. Call your doctor if you think you are having a problem with your medicine. · Do not drive while you feel dizzy. · Try to prevent falls. Steps you can take include:  ? Using nonskid mats, adding grab bars near the tub, and using night-lights. ? Clearing your home so that walkways are free of anything you might trip on.  ? Letting family and friends know that you have been feeling dizzy. This will help them know how to help you. When should you call for help? Call 911 anytime you think you may need emergency care. For example, call if:    · You passed out (lost consciousness).     · You have dizziness along with symptoms of a heart attack. These may include:  ?  Chest pain or pressure, or a strange feeling in the chest.  ? Sweating. ? Shortness of breath. ? Nausea or vomiting. ? Pain, pressure, or a strange feeling in the back, neck, jaw, or upper belly or in one or both shoulders or arms. ? Lightheadedness or sudden weakness. ? A fast or irregular heartbeat.     · You have symptoms of a stroke. These may include:  ? Sudden numbness, tingling, weakness, or loss of movement in your face, arm, or leg, especially on only one side of your body. ? Sudden vision changes. ? Sudden trouble speaking. ? Sudden confusion or trouble understanding simple statements. ? Sudden problems with walking or balance. ? A sudden, severe headache that is different from past headaches.    Call your doctor now or seek immediate medical care if:    · You feel dizzy and have a fever, headache, or ringing in your ears.     · You have new or increased nausea and vomiting.     · Your dizziness does not go away or comes back.    Watch closely for changes in your health, and be sure to contact your doctor if:    · You do not get better as expected. Where can you learn more? Go to http://diogenes-lee.info/. Enter G993 in the search box to learn more about \"Dizziness: Care Instructions. \"  Current as of: June 26, 2019  Content Version: 12.2  © 1422-9524 [employer]. Care instructions adapted under license by [employer] (which disclaims liability or warranty for this information). If you have questions about a medical condition or this instruction, always ask your healthcare professional. [place] [address] any warranty or liability for your use of this information.

## 2020-02-26 NOTE — ED NOTES
I have reviewed discharge instruction and prescriptions with patient. Patient verbalized understanding and has no further questions at this time. Education taught and patient verbalized understanding of education. Teach back method used. IV removed, catheter tip intact on removal.  Armband removed and shredded per patients request.    Patients pain 0/10 at time of discharge. Belongings given to patient. Patient discharged ambulatory with friend to home.

## 2020-02-27 LAB
ATRIAL RATE: 81 BPM
CALCULATED P AXIS, ECG09: 67 DEGREES
CALCULATED R AXIS, ECG10: 18 DEGREES
CALCULATED T AXIS, ECG11: 53 DEGREES
DIAGNOSIS, 93000: NORMAL
P-R INTERVAL, ECG05: 152 MS
Q-T INTERVAL, ECG07: 390 MS
QRS DURATION, ECG06: 84 MS
QTC CALCULATION (BEZET), ECG08: 453 MS
VENTRICULAR RATE, ECG03: 81 BPM

## 2020-03-11 ENCOUNTER — OFFICE VISIT (OUTPATIENT)
Dept: FAMILY MEDICINE CLINIC | Age: 70
End: 2020-03-11

## 2020-03-11 VITALS
BODY MASS INDEX: 29.09 KG/M2 | SYSTOLIC BLOOD PRESSURE: 136 MMHG | OXYGEN SATURATION: 99 % | HEIGHT: 64 IN | RESPIRATION RATE: 18 BRPM | DIASTOLIC BLOOD PRESSURE: 86 MMHG | HEART RATE: 78 BPM | WEIGHT: 170.4 LBS | TEMPERATURE: 96.9 F

## 2020-03-11 DIAGNOSIS — I09.9 RHEUMATIC HEART DISEASE: ICD-10-CM

## 2020-03-11 DIAGNOSIS — Z71.89 ADVANCE CARE PLANNING: ICD-10-CM

## 2020-03-11 DIAGNOSIS — E78.00 HYPERCHOLESTEROLEMIA: ICD-10-CM

## 2020-03-11 DIAGNOSIS — E11.65 TYPE 2 DIABETES MELLITUS WITH HYPERGLYCEMIA, WITHOUT LONG-TERM CURRENT USE OF INSULIN (HCC): ICD-10-CM

## 2020-03-11 DIAGNOSIS — H10.232 SEROUS CONJUNCTIVITIS OF LEFT EYE: ICD-10-CM

## 2020-03-11 DIAGNOSIS — M17.12 PRIMARY OSTEOARTHRITIS OF LEFT KNEE: ICD-10-CM

## 2020-03-11 DIAGNOSIS — Z00.00 MEDICARE ANNUAL WELLNESS VISIT, SUBSEQUENT: Primary | ICD-10-CM

## 2020-03-11 RX ORDER — ACETAMINOPHEN AND CODEINE PHOSPHATE 300; 30 MG/1; MG/1
1 TABLET ORAL
Qty: 20 TAB | Refills: 0 | Status: SHIPPED | OUTPATIENT
Start: 2020-03-11 | End: 2020-09-29 | Stop reason: SDUPTHER

## 2020-03-11 RX ORDER — NETARSUDIL 0.2 MG/ML
SOLUTION/ DROPS OPHTHALMIC; TOPICAL
COMMUNITY
End: 2020-07-15 | Stop reason: ALTCHOICE

## 2020-03-11 RX ORDER — BLOOD-GLUCOSE METER
EACH MISCELLANEOUS
Qty: 1 EACH | Refills: 0 | Status: SHIPPED | OUTPATIENT
Start: 2020-03-11

## 2020-03-11 NOTE — PROGRESS NOTES
Radha Matos presents today for   Chief Complaint   Patient presents with    Complete Physical    Medication Refill    Referral Follow Up       Is someone accompanying this pt? NO    Is the patient using any DME equipment during OV? NO    Depression Screening:  3 most recent PHQ Screens 3/11/2020   PHQ Not Done -   Little interest or pleasure in doing things Not at all   Feeling down, depressed, irritable, or hopeless Not at all   Total Score PHQ 2 0   Trouble falling or staying asleep, or sleeping too much -   Feeling tired or having little energy -   Poor appetite, weight loss, or overeating -   Feeling bad about yourself - or that you are a failure or have let yourself or your family down -   Trouble concentrating on things such as school, work, reading, or watching TV -   Moving or speaking so slowly that other people could have noticed; or the opposite being so fidgety that others notice -   Thoughts of being better off dead, or hurting yourself in some way -   PHQ 9 Score -   How difficult have these problems made it for you to do your work, take care of your home and get along with others -       Learning Assessment:  Learning Assessment 3/27/2019   PRIMARY LEARNER Patient   HIGHEST LEVEL OF EDUCATION - PRIMARY LEARNER  SOME COLLEGE   BARRIERS PRIMARY LEARNER NONE   CO-LEARNER CAREGIVER No   PRIMARY LANGUAGE ENGLISH   LEARNER PREFERENCE PRIMARY READING     -   ANSWERED BY Patient   RELATIONSHIP SELF       Abuse Screening:  Abuse Screening Questionnaire 3/27/2019   Do you ever feel afraid of your partner? N   Are you in a relationship with someone who physically or mentally threatens you? N   Is it safe for you to go home? Y       Fall Risk  Fall Risk Assessment, last 12 mths 3/11/2020   Able to walk? Yes   Fall in past 12 months? No   Fall with injury? -   Number of falls in past 12 months -   Fall Risk Score -       Health Maintenance reviewed and discussed and ordered per Provider.     Health Maintenance Due   Topic Date Due    Foot Exam Q1  01/24/1960    MICROALBUMIN Q1  01/24/1962    Eye Exam Retinal or Dilated  [birth year]24/1960    Medicare Yearly Exam  03/27/2020   . Coordination of Care:  1. Have you been to the ER, urgent care clinic since your last visit? Hospitalized since your last visit? Yes. [facility] see notes     2. Have you seen or consulted any other health care providers outside of the [address] [place] [address] since your last visit? Include any pap smears or colon screening.  NO       Last  Checked n/a  Last UDS Checked n/a   Last Pain contract signed: n/a

## 2020-03-11 NOTE — ACP (ADVANCE CARE PLANNING)
Advance Care Planning (ACP) Provider Note - Comprehensive     Date of ACP Conversation: 03/11/20  Persons included in Conversation:  patient  Length of ACP Conversation in minutes:  16 minutes    Authorized Decision Maker (if patient is incapable of making informed decisions): This person is:  her friend, Odilia Weinberg for ALL Patients with Decision Making Capacity:   Importance of advance care planning, including choosing a healthcare agent to communicate patient's healthcare decisions if patient lost the ability to make decisions, such as after a sudden illness or accident  Understanding of the healthcare agent role was assessed and information provided  Exploration of values, goals, and preferences if recovery is not expected, even with continued medical treatment in the event of: Imminent death  Severe, permanent brain injury  \"In these circumstances, what matters most to you? \"  Care focused more on comfort or quality of life. \"What, if any, treatments would you want to avoid? \" heroic measures  Opportunity offered to explore how cultural, Zoroastrianism, spiritual, or personal beliefs would affect decisions for future care     Review of Existing Advance Directive:  What information were you given about medical decisions to consider before completing your advance directive? power or     For Serious or Chronic Illness:  Understanding of medical condition      Interventions Provided:  Entered DNR order (If yes, complete Durable DNR form)

## 2020-03-11 NOTE — PROGRESS NOTES
(AWV) The Medicare Annual Wellness Exam PROGRESS NOTE    This is a Medicare Annual Wellness Exam (AWV)   I have reviewed the patient's medical history in detail and updated the computerized patient record. Skylar Strickland is a 78 y.o.  female and presents for an annual wellness exam       ROS   General ROS: negative for - chills, fatigue or fever  Ophthalmic ROS: blurry vision on left; excessive tearing; she was seen by ophthalmology and prescribed antibiotic drops reressa   ENT ROS: negative for - headaches, nasal congestion; sore throat X 3 days which is worse at night  Endocrine ROS: negative for - polydipsia/polyuria or temperature intolerance  Respiratory ROS: no cough, shortness of breath, or wheezing  Cardiovascular ROS: no chest pain or dyspnea on exertion  Gastrointestinal ROS: no abdominal pain, change in bowel habits, or black or bloody stools  Genito-Urinary ROS: no dysuria, trouble voiding, or hematuria  Neurological ROS: no TIA or stroke symptoms  Dermatological ROS: negative for - rash or skin lesion changes       All other systems reviewed and are negative.     History     Past Medical History:   Diagnosis Date    Advance directive in chart     6/28/2016    Amaurosis fugax     Aortic stenosis     mean gradient  26.5 mmHg (CATH 4/13), 32 mmHg (ECHO 9/15)    Aortic valve replacement     21mm MAGNA EASE pericardial  2/16    Arthritis     Atrial appendage resection     2/16      Atrial fibrillation     CHADS score 2  (-CHF, -HTN, -AGE, -DM +AMAUROSIS FUGAX)    Atrial fibrillation ablation     surgical left sided cryoablation  2/16    Cataract     OS    Cervical dysplasia     2009   post leep and cone    COPD     mild  2/16    Duodenal AVM     argon plasma coagulation 2/16    Dyslipidemia     Fibroids     Glaucoma     Lung nodule     3/19/2012   stable    Mitral stenosis     mean gradient  6 mmHg (CATH 4/13), 7.6 mmHg (MANUEL 10/15)    Mitral valve replacement     25 mm MAGNA EASE pericardial  2/16    Pacemaker     dual chamber MEDTRONIC 2/16    Post-op ventricular asystole     02/16    Remote tobacco     Sleep apnea     no CPAP    Thyroid nodule     3/20/2013   multiple    TIA (transient ischemic attack)       Past Surgical History:   Procedure Laterality Date    COLONOSCOPY N/A 1/23/2017    COLONOSCOPY performed [facility] Raquel Real MD at [address] HX AFIB ABLATION      2/16  surgical cryoablation    HX AORTIC VALVE REPLACEMENT      2/16    HX BREAST BIOPSY Right 10/13/2014    Right breast needle [facility]OC biopsy performed by Gracia Montoya MD at [address] HX BREAST LUMPECTOMY      Right    HX BUNIONECTOMY      left    HX GYN      VAINIII, VELASQUEZ 1, keratinous labial cyst    HX GYN      2012  Cold knife conization of cervix    HX LEEP PROCEDURE      2010    HX MITRAL VALVE REPLACEMENT      2/16    HX OTHER SURGICAL      lump left neck, benign    HX OTHER SURGICAL      multiple breast aspirations    HX PACEMAKER      I&D OF VULVA/PERINEUM ABSCESS  3/21/2017          Current Outpatient Medications   Medication Sig Dispense Refill    netarsudiL (Rhopressa) 0.02 % drop Apply  to eye.  glucose blood VI test strips (CONTOUR NEXT TEST STRIPS) strip Test blood glucose daily 100 Strip 3    vit B Cmplx 3-FA-Vit C-Biotin (NEPHRO CANDACE RX) 1- mg-mg-mcg tablet Take 1 Tab by mouth daily.  meclizine (ANTIVERT) 12.5 mg tablet Take 1-2 Tabs by mouth three (3) times daily as needed for Dizziness. 30 Tab 0    MYRBETRIQ 25 mg ER tablet TAKE 1 TABLET EVERY DAY 90 Tab 3    prednisoLONE acetate (PRED FORTE) 1 % ophthalmic suspension Administer 1 Drop to both eyes four (4) times daily.  ketorolac (ACULAR LS) 0.4 % drop Administer 1 Drop to both eyes four (4) times daily.       prenatal vit-calcium-iron-fa (PRENATAL PLUS, CALCIUM CARB,) 27 mg iron- 1 mg tab TAKE 1 TABLET BY MOUTH DAILY 100 Tab 12    ferrous sulfate 325 mg (65 mg iron) tablet take 1 tablet by mouth three times a day with food 100 Tab 9    omeprazole (PRILOSEC) 20 mg capsule take 1 capsule by mouth twice a day 180 Cap 4    ezetimibe (ZETIA) 10 mg tablet Take 1 Tab by mouth daily. 90 Tab 3    aspirin delayed-release 81 mg tablet take 2 tablets by mouth once daily 180 Tab 3     Allergies   Allergen Reactions    Contrast Dye [Iodine] Itching and Swelling    Iodinated Contrast Media Itching and Swelling    Seafood Itching and Swelling     LOBSTER ONLY    Statins-Hmg-Coa Reductase Inhibitors Myalgia and Other (comments)     Myalgia    Sulfa (Sulfonamide Antibiotics) Itching and Swelling     Family History   Problem Relation Age of Onset    Cancer Mother         oral,     Alzheimer Father            [address] [facility] Breast Cancer Maternal Aunt         unsure age   [address] [facility] Colon Cancer Maternal Grandfather      Social History     Tobacco Use    Smoking status: Former Smoker     Years: 37.00     Last attempt to quit: 2004     Years since quittin.0    Smokeless tobacco: Never Used    Tobacco comment: stop around    Substance Use Topics    Alcohol use: No     Patient Active Problem List   Diagnosis Code    Glaucoma H40.9    Arthritis M19.90    Dyslipidemia E78.5    Rheumatic heart disease I09.9    Atrial fibrillation I48.91    Advance directive in chart Z78.9    Urinary frequency R35.0       Health Maintenance History  Immunizations reviewed, dtap up to date , pneumovax up to date, flu up to date, zoster due  Colonoscopy: up to date,   Eye exam: up to date  Mammo up to date  Dexascan up to date    Depression Risk Factor Screening:      Patient Health Questionnaire (PHQ-2)   Over the last 2 weeks, how often have you been bothered by any of the following problems? · Little interest or pleasure in doing things? · Not at all. [0]  · Feeling down, depressed, or hopeless? · Several days.  [1]    Total Score: 1/6  PHQ-2 Assessment Scoring:   A score of 2 or more requires further screening with the PHQ-9    Alcohol Risk Factor Screening:     Women: On any occasion during the past 3 months, have you had more than 3 drinks containing alcohol? no   Do you average more than 7 drinks per week? no    Functional Ability and Level of Safety:     Hearing Loss    Hearing is good. Activities of Daily Living   Self-care. Requires assistance with: no ADLs    Fall Risk   No fall risk factors    Abuse Screen   Patient is not abused    Examination   Physical Examination  Vitals:    03/11/20 1222   BP: 136/86   Pulse: 78   Resp: 18   Temp: 96.9 °F (36.1 °C)   TempSrc: Oral   SpO2: 99%   Weight: 170 lb 6.4 oz (77.3 kg)   Height: 5' 4\" (1.626 m)   PainSc:   7   PainLoc: Knee      Body mass index is 29.25 kg/m².      Evaluation of Cognitive Function:  Mood/affect:good mood with appropriate affect  Appearance: well kempt  Family member/caregiver input: n/a    alert, well appearing, and in no distress, oriented to person, place, and time and overweight    Patient Care Team:  Reji Luz MD as PCP - General (Family Practice)  Reji Luz MD as PCP - [facility] Empaneled Provider  Jennifer Rocha MD (Pulmonary Disease)  Luis Antonio Jeff MD (Obstetrics & Gynecology)  Preston Levi MD (Ophthalmology)  Paris Santoyo MD (Inactive) (Colon and Rectal Surgery)  Jacey Abreu MD (Cardiology)  Juan Burden MD (Gastroenterology)  Paula aMcias MD (Surgery)  Leartis Epley, MD (Orthopedic Surgery)  Maryanne Hastings MD (Internal Medicine)    End-of-life planning  Advanced Directive in the case than an injury or illness causes the patient to be unable to make health care decisions    Health Care Directive or Living Will: yes    Advice/Referrals/Counselling/Plan:   Education and counseling provided:  End-of-Life planning (with patient's consent)  Medical nutrition therapy for individuals with diabetes or renal disease  Include in education list (weight loss, physical activity, smoking cessation, fall prevention, and nutrition)    ICD-10-CM ICD-9-CM    1. Medicare annual wellness visit, subsequent Z00.00 V70.0    2. Advance care planning Z71.89 V65.49 ADVANCE CARE PLANNING FIRST 30 MINS   3. Serous conjunctivitis of left eye H10.232 372.01    4. Rheumatic heart disease I09.9 398.90  DIABETES FOOT EXAM   5. Type 2 diabetes mellitus with hyperglycemia, without long-term current use of insulin (HCC) E11.65 250.00 Blood-Glucose Meter (Accu-Chek Holly Plus Meter) misc     790.29    6. Primary osteoarthritis of left knee M17.12 715.16 acetaminophen-codeine (TYLENOL #3) 300-30 mg per tablet   7. Hypercholesterolemia E78.00 272.0    .  Brief written plan, checklist    I have discussed the diagnosis with the patient and the intended plan as seen in the above orders. The patient has received an after-visit summary and questions were answered concerning future plans. I have discussed medication side effects and warnings with the patient as well. I have reviewed the plan of care with the patient, accepted their input and they are in agreement with the treatment goals. ____________________________________________________________    Problem Assessment    for treatment of   Chief Complaint   Patient presents with    Complete Physical    Medication Refill    Referral Follow Up    Cough         SUBJECTIVE    Well Adult Physical   Patient here for a comprehensive physical exam.The patient reports problems - diabetes, hypertension  Do you take any herbs or supplements that were not prescribed by a doctor? no Are you taking calcium supplements? no Are you taking aspirin daily? yes  Diabetes Mellitus:  She has diabetes mellitus. She did not start metformin due to reading side effects; she has attended diabetes education classes and has implemented changes into her diet  Diabetic ROS - medication compliance: compliant all of the time, diabetic diet compliance: compliant most of the time.    Lab review: orders written for new lab studies as appropriate; see orders.      Cardiovascular Review:  The patient has hypertension and hyperlipidemia. Diet and Lifestyle: generally follows a low fat low cholesterol diet, generally follows a low sodium diet, does not rigorously follow a diabetic diet, exercises sporadically, nonsmoker  Home BP Monitoring: is not measured at home. Pertinent ROS: taking medications as instructed, no medication side effects noted, no TIA's, no chest pain on exertion, no dyspnea on exertion, no swelling of ankles.      Osteoarthritis and Chronic Pain:  Patient has osteoarthritis, primarily affecting the back and knees. Symptoms onset: problem is longstanding. Rheumatological ROS: ongoing significant pain in back and knees which is stable and controlled by PRN meds. Response to treatment plan: gradually worsening.     Visit Vitals  /86 (BP 1 Location: Right arm, BP Patient Position: Sitting)   Pulse 78   Temp 96.9 °F (36.1 °C) (Oral)   Resp 18   Ht 5' 4\" (1.626 m)   Wt 170 lb 6.4 oz (77.3 kg)   SpO2 99%   BMI 29.25 kg/m²     General:  Alert, cooperative, no distress, appears stated age. Head:  Normocephalic, without obvious abnormality, atraumatic. Eyes:  Conjunctivae/corneas clear. PERRL, EOMs intact. Left eye excessive tearing with erythematous conjunctiva   Ears:  Normal TMs and external ear canals both ears. Nose: Nares normal. Septum midline. Mucosa normal. No drainage or sinus tenderness. Throat: Lips, mucosa, and tongue normal. Teeth and gums normal.   Neck: Supple, symmetrical, trachea midline, no adenopathy, thyroid: no enlargement/tenderness/nodules, no carotid bruit and no JVD. Back:   Symmetric, no curvature. ROM normal. No CVA tenderness. Lungs:   Clear to auscultation bilaterally. Chest wall:  No tenderness or deformity. Heart:  Regular rate and rhythm, S1, S2 normal, no murmur, click, rub or gallop. Breast Exam:  Not examined   Abdomen:   Soft, non-tender. Bowel sounds normal. No masses,  No organomegaly. Genitalia:  deferred   Rectal:  deferred   Extremities: Extremities normal, atraumatic, no cyanosis or edema. Pulses: 2+ and symmetric all extremities. Skin: Skin color, texture, turgor normal. No rashes or lesions. Lymph nodes: Cervical, supraclavicular, and axillary nodes normal.   Neurologic: CNII-XII intact. Normal strength, sensation and reflexes throughout. Diabetic foot exam:     Left Foot:   Visual Exam: normal    Pulse DP: 2+ (normal)   Filament test: normal sensation       Right Foot:   Visual Exam: normal    Pulse DP: 2+ (normal)   Filament test: normal sensation     LABS   hgb a1c 6.2    Assessment/Plan:    1. Medicare annual wellness visit, subsequent  Reviewed preventive recommendations    2. Advance care planning  See ACP; pt completed DNR  - ADVANCE CARE PLANNING FIRST 30 MINS    3. Serous conjunctivitis of left eye  F/u with ophthalmology    4. Rheumatic heart disease  F/u with cardiology      5. Type 2 diabetes mellitus with hyperglycemia, without long-term current use of insulin ([facility])  Goal hgb a1c <7  - Blood-Glucose Meter (Accu-Chek Holly Plus Meter) misc; Test blood glucose daily  Dispense: 1 Each; Refill: 0  - HM DIABETES FOOT EXAM6. Primary osteoarthritis of left knee    - acetaminophen-codeine (TYLENOL #3) 300-30 mg per tablet; Take 1 Tab by mouth every four (4) hours as needed for Pain for up to 7 days. Max Daily Amount: 6 Tabs. Dispense: 20 Tab; Refill: 0    7.  Hypercholesterolemia  Continue zetia; intolerant of statin and declined repatha      Lab review: labs reviewed, I note that glycosylated hemoglobin normal

## 2020-04-09 DIAGNOSIS — K25.4 GASTROINTESTINAL HEMORRHAGE ASSOCIATED WITH GASTRIC ULCER: ICD-10-CM

## 2020-04-09 DIAGNOSIS — E78.5 DYSLIPIDEMIA: ICD-10-CM

## 2020-04-09 DIAGNOSIS — I35.0 AORTIC VALVE STENOSIS, UNSPECIFIED ETIOLOGY: Chronic | ICD-10-CM

## 2020-04-09 DIAGNOSIS — M19.90 ARTHRITIS: ICD-10-CM

## 2020-04-09 DIAGNOSIS — I09.9 RHEUMATIC HEART DISEASE: ICD-10-CM

## 2020-04-09 DIAGNOSIS — R13.19 ESOPHAGEAL DYSPHAGIA: ICD-10-CM

## 2020-04-09 DIAGNOSIS — I48.91 ATRIAL FIBRILLATION, UNSPECIFIED TYPE (HCC): Chronic | ICD-10-CM

## 2020-04-09 DIAGNOSIS — L30.9 ECZEMA, UNSPECIFIED TYPE: ICD-10-CM

## 2020-04-11 RX ORDER — EZETIMIBE 10 MG/1
TABLET ORAL
Qty: 90 TAB | Refills: 3 | Status: SHIPPED | OUTPATIENT
Start: 2020-04-11 | End: 2020-12-24

## 2020-05-06 NOTE — TELEPHONE ENCOUNTER
Patient returning phone call, please call home number listed Humira Counseling:  I discussed with the patient the risks of adalimumab including but not limited to myelosuppression, immunosuppression, autoimmune hepatitis, demyelinating diseases, lymphoma, and serious infections.  The patient understands that monitoring is required including a PPD at baseline and must alert us or the primary physician if symptoms of infection or other concerning signs are noted.

## 2020-07-15 ENCOUNTER — OFFICE VISIT (OUTPATIENT)
Dept: CARDIOLOGY CLINIC | Age: 70
End: 2020-07-15

## 2020-07-15 VITALS
DIASTOLIC BLOOD PRESSURE: 70 MMHG | HEIGHT: 64 IN | WEIGHT: 166 LBS | OXYGEN SATURATION: 98 % | TEMPERATURE: 97.2 F | HEART RATE: 90 BPM | BODY MASS INDEX: 28.34 KG/M2 | SYSTOLIC BLOOD PRESSURE: 107 MMHG

## 2020-07-15 DIAGNOSIS — I48.91 ATRIAL FIBRILLATION, UNSPECIFIED TYPE (HCC): Primary | ICD-10-CM

## 2020-07-15 DIAGNOSIS — Z01.818 PRE-OP EVALUATION: ICD-10-CM

## 2020-07-15 DIAGNOSIS — Z95.0 PACEMAKER: ICD-10-CM

## 2020-07-15 DIAGNOSIS — Z95.2 S/P AVR: ICD-10-CM

## 2020-07-15 DIAGNOSIS — Z95.0 CARDIAC PACEMAKER IN SITU: ICD-10-CM

## 2020-07-15 NOTE — PROGRESS NOTES
[facility]    HPI:   Ms. Norma Domínguez is a 78 y.o. woman with dyslipidemia. She does not have obstructive atherosclerotic disease as evaluated by cardiac catheterization in  November 2015. Her anatomy is as follows:    HEMODYNAMICS:  LM - normal  LAD - normal  D1 - normal  Cx - normal  OM1 - normal  OM2 - normal  LPDA - patent  RCA - normal    LEFT:  RA - 6 mmHg  RV - 44/6 mmHg  PA - 38/14 mmHg  Wedge - 16 mmHg  CO / CI (DAVIAN) - 4.15 / 2.27    She has a history of rheumatic heart disease complicated by moderate to severe aortic stenosis and moderate mitral stenosis. She had aortic and mitral valve replacements in February of 2016 with the following: Aortic valve - 21 mm MAGNA EASE pericardial bioprosthesis  Mitral valve -  25 mm MAGNA EASE pericardial bioprosthesis    She has paroxysmal atrial fibrillation / flutter complicated by an embolic event manifesting as amaurosis fugax. She had surgical left sided cryoablation and atrial appendage resection in February of 2016. This occurred at the time of her aortic and mitral valve replacement. Her surgical course was notable for postoperative ventricular asystole requiring permanent pacemaker implantation in 2016. Ms. Norma Domínguez is here today for follow up appointment. She recently has been found to have a intracranial aneurysm which will require percutaneous coiling. She is supposed to have that done in the next couple weeks. She is here today and she tells me that she does not have any cardiac symptoms. She denies any symptoms that is concerning for unstable angina or decompensated heart failure. She is using one pillow at night. She denies presyncope or syncope.   She does not report any significant dyspnea overall she has no other complaint    Past Medical History:   Diagnosis Date    Advance directive in chart     6/28/2016    Amaurosis fugax     Aortic stenosis     mean gradient  26.5 mmHg (CATH 4/13), 32 mmHg (ECHO 9/15)    Aortic valve replacement     21mm MAGNA EASE pericardial  2/16    Arthritis     Atrial appendage resection     2/16      Atrial fibrillation     CHADS score 2  (-CHF, -HTN, -AGE, -DM +AMAUROSIS FUGAX)    Atrial fibrillation ablation     surgical left sided cryoablation  2/16    Cataract     OS    Cervical dysplasia     2009   post leep and cone    COPD     mild  2/16    Duodenal AVM     argon plasma coagulation 2/16    Dyslipidemia     Fibroids     Glaucoma     Lung nodule     3/19/2012   stable    Mitral stenosis     mean gradient  6 mmHg (CATH 4/13), 7.6 mmHg (MANUEL 10/15)    Mitral valve replacement     25 mm MAGNA EASE pericardial  2/16    Pacemaker     dual chamber MEDTRONIC 2/16    Post-op ventricular asystole     02/16    Remote tobacco     Sleep apnea     no CPAP    Thyroid nodule     3/20/2013   multiple    TIA (transient ischemic attack)        Past Surgical History:   Procedure Laterality Date    COLONOSCOPY N/A 1/23/2017    COLONOSCOPY performed [facility] Esvin Wilde [facility] at [facility] ENDOSCOPY    HX AFIB ABLATION      2/16  surgical cryoablation    HX AORTIC VALVE REPLACEMENT      2/16    HX BREAST BIOPSY Right 10/13/2014    Right breast needle [facility]OC biopsy performed by Chrissy Barillas MD at [address] [place] [address] [address] HX BREAST LUMPECTOMY      Right    HX BUNIONECTOMY      left    HX GYN      VAINIII, VELASQUEZ 1, keratinous labial cyst    HX GYN      2012  Cold knife conization of cervix    HX LEEP PROCEDURE      2010    HX MITRAL VALVE REPLACEMENT      2/16    HX OTHER SURGICAL      lump left neck, benign    HX OTHER SURGICAL      multiple breast aspirations    HX PACEMAKER      I&D OF VULVA/PERINEUM ABSCESS  3/21/2017            Current Outpatient Medications   Medication Sig    ezetimibe (ZETIA) 10 mg tablet TAKE 1 TABLET EVERY DAY    Blood-Glucose Meter (Accu-Chek Holly Plus Meter) misc Test blood glucose daily    glucose blood VI test strips (CONTOUR NEXT TEST STRIPS) strip Test blood glucose daily    vit B Cmplx 3-FA-Vit C-Biotin (NEPHRO CANDACE RX) 1- mg-mg-mcg tablet Take 1 Tab by mouth daily.  meclizine (ANTIVERT) 12.5 mg tablet Take 1-2 Tabs by mouth three (3) times daily as needed for Dizziness.  MYRBETRIQ 25 mg ER tablet TAKE 1 TABLET EVERY DAY    prednisoLONE acetate (PRED FORTE) 1 % ophthalmic suspension Administer 1 Drop to both eyes four (4) times daily.  ketorolac (ACULAR LS) 0.4 % drop Administer 1 Drop to both eyes four (4) times daily.  prenatal vit-calcium-iron-fa (PRENATAL PLUS, CALCIUM CARB,) 27 mg iron- 1 mg tab TAKE 1 TABLET BY MOUTH DAILY    ferrous sulfate 325 mg (65 mg iron) tablet take 1 tablet by mouth three times a day with food (Patient taking differently: two (2) times a day.)    omeprazole (PRILOSEC) 20 mg capsule take 1 capsule by mouth twice a day    aspirin delayed-release 81 mg tablet take 2 tablets by mouth once daily     No current facility-administered medications for this visit. Allergies   Allergen Reactions    Contrast Dye [Iodine] Itching and Swelling    Iodinated Contrast Media Itching and Swelling    Seafood Itching and Swelling     LOBSTER ONLY    Statins-Hmg-Coa Reductase Inhibitors Myalgia and Other (comments)     Myalgia    Sulfa (Sulfonamide Antibiotics) Itching and Swelling       Family History:   Non contributory for premature coronary disease in first degree relatives (female <65, male <55). Social History:   She  reports that she quit smoking about 16 years ago. She quit after 37.00 years of use. She has never used smokeless tobacco.  She  reports no history of alcohol use. Physical:   Vitals:   BP: 107/70  HR: 90  Wt: 166 lb (75.3 kg)    Exam:   General: Middle aged woman, no complaints.     Head/Neck: No JVD    No bruits. No edema  Lungs:  No respiratory distress. Clear with good air movement. Chest:  Keloid scar  Heart:  Regular rate and rhythm. No significant murmur. no rubs or gallops. Abdomen:  Bowel sounds present  Extremities: Intact pulses. No edema.      Review of Data:   LABS:   Lab Results   Component Value Date/Time    WBC 7.4 02/25/2020 09:16 PM    Hemoglobin (POC) 9.8 02/22/2016 02:39 PM    Hemoglobin, POC 8.8 (L) 02/10/2016 11:18 AM    HGB 15.1 02/25/2020 09:16 PM    Hematocrit, POC 26 (L) 02/10/2016 11:18 AM    HCT 46.2 (H) 02/25/2020 09:16 PM    PLATELET 435 23/11/9463 09:16 PM    MCV 93.7 02/25/2020 09:16 PM     Lab Results   Component Value Date/Time    Sodium 139 02/25/2020 09:16 PM    Potassium 3.7 02/25/2020 09:16 PM    Chloride 105 02/25/2020 09:16 PM    CO2 29 02/25/2020 09:16 PM    Anion gap 5 02/25/2020 09:16 PM    Glucose 109 (H) 02/25/2020 09:16 PM    BUN 14 02/25/2020 09:16 PM    Creatinine 0.95 02/25/2020 09:16 PM    BUN/Creatinine ratio 15 02/25/2020 09:16 PM    GFR est AA >60 02/25/2020 09:16 PM    GFR est non-AA 59 (L) 02/25/2020 09:16 PM    Calcium 9.5 02/25/2020 09:16 PM    Bilirubin, total 0.5 02/25/2020 09:16 PM    Alk.  phosphatase 75 02/25/2020 09:16 PM    Protein, total 8.1 02/25/2020 09:16 PM    Albumin 4.0 02/25/2020 09:16 PM    Globulin 4.1 (H) 02/25/2020 09:16 PM    A-G Ratio 1.0 02/25/2020 09:16 PM    ALT (SGPT) 36 02/25/2020 09:16 PM     Lab Results   Component Value Date/Time    Cholesterol, total 254 (H) 09/20/2019 03:02 PM    HDL Cholesterol 53 09/20/2019 03:02 PM    LDL, calculated 156.2 (H) 09/20/2019 03:02 PM    VLDL, calculated 44.8 09/20/2019 03:02 PM    Triglyceride 224 (H) 09/20/2019 03:02 PM    CHOL/HDL Ratio 4.8 09/20/2019 03:02 PM     Lab Results   Component Value Date/Time    Hemoglobin A1c 6.2 (H) 01/22/2020 10:53 AM    Hemoglobin A1c (POC) 6.0 03/26/2019 02:51 PM     Lab Results   Component Value Date/Time    TSH 1.210 03/27/2019 11:01 AM    Triiodothyronine (T3), free 3.0 05/09/2017 10:04 AM    T4, Free 0.99 12/05/2017 09:49 AM     10 YEAR CVD RISK:   9.1 %    Age    62 yo    Race    AA     Gender   Female    Total cholesterol  272 mg/dL    HDL    71 mg/dL    SBP    134 mmHg    BP meds   No    Diabetes   No    Tobacco   No    EKG: August 2016:  100% AV paced 80 bpm.  (09/17) Sinus rhythm. Intermittent Atrial paced beats. TRANSESOPHAGEAL ECHOCARDIOGRAM: October 2015:  Left ventricle:  Size was normal. Systolic function was vigorous. There were no regional wall motion abnormalities. Wall thickness was normal.  Right ventricle: The size as normal.  Left atrium:  The atrium was dilated. Left atrial appendage:  No thrombus was identified. There was no spontaneous echo contrast (\"smoke\") in the appendage. Atrial septum:  The septum bows from left to right, consistent with increased left atrial pressure. There was no evidence of intracardiac shunt by peripherally-administered agitated saline contrast.    Mitral valve: The posterior leaflet was thickened and calcific throughout. Excursion was restricted. The anterior leaflet was thickened at the distal third with minimal calcification. Leaflet excursion was restricted primarily at the distal margin. Hemodynamics were obtained when at least three consecutive sinus beats were captured. A mean gradient across the valve was measured at 7.6 mmHg. Pressure half time was measured at 153 ms with a calculated valve area of 1.4 cm. There was mild, multi-jet regurgitation. The subvalvular apparatus was not significantly involved. The findings were consistent with moderate mitral stenosis. Aortic valve: The valve was trileaflet. Leaflets were thickened and nodularly calcific. Leaflet excursion was restricted. Valve area was planimetered between 0.9 and 1.0 cm. There was mild aortic regurgitation. There was mild to moderate stenosis. ECHO: 09/17  Left ventricle: Systolic function was normal. Ejection fraction was estimated   in the range of 55 % to 60 %. There were no regional wall motion abnormalities. The study was not technically sufficient to allow evaluation of LV diastolic function. Right ventricle:  The ventricle was dilated. Systolic function was normal. A   pacing wire was present. Left atrium: The atrium was dilated. Atrial septum: There was no evidence of intracardiac shunt by   peripherally-administered agitated saline contrast.  Right atrium: The atrium was mildly dilated. Mitral valve: A bioprosthesis was present. It exhibited normal function. There was no evidence for stenosis. There was mild regurgitation. Mean transmitral gradient was 4 mmHg. Aortic valve: A bioprosthesis was present. It exhibited normal function. Valve mean gradient was 10 mmHg. Tricuspid valve: There was moderate regurgitation. STRESS TEST (EST, PHARM, NUC, ECHO etc): March 2011:  1. NORMAL SCAN. 2. NORMAL GATED ACQUISITION WITH AN EJECTION FRACTION OF 75%. CATHETERIZATION: November 2015:  HEMODYNAMICS:  LM - normal  LAD - normal  D1 - normal  Cx - normal  OM1 - normal  OM2 - normal  LPDA - patent  RCA - normal    LEFT:  RA - 6 mmHg  RV - 44/6 mmHg  PA - 38/14 mmHg  Wedge - 16 mmHg  CO / CI (DAVIAN) - 4.15 / 2.27      Impression / Plan:     Dyslipidemia    Rheumatic heart disease    Atrial fibrillation    Post-op ventricular asystole       Rheumatic heart disease:    Ms. Raechel Severs has rheumatic heart disease, which was complicated by moderate to severe aortic stenosis and moderate mitral stenosis. She has undergone bioprosthetic aortic and mitral valve replacement in February, 2016. Last Echocardiogram was performed in 09/17 and valve function appeared to be ok as mentioned above. Continue  mg daily    Hyperlipidemia:    Currently she is only on monotherapy with Zetia. She had tried Atorvastatin and simvastatin in past but had to stop it because of significant myalgia. Also thinks she tried other statins as well, does not remember the name. She has significant hyperlipidemia, not at goal despite being on Zetia. She has a last LDL on file of 160 in March 2017.    Repeat lipid profile showed LDL of 158 in 2019.  We discussed about PCSK9 inhibitor in the past several times and she had elected to pursue only dietary modification. Atrial fibrillation: This is paroxysmal in nature. 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  She has been in sinus rhythm on exam today. She had a permanent pacemaker paced with complete heart block at the time of her valvular surgery. 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 In addition, she has history of vaginal bleed reportedly due to fibroids in past.  She also had left sided surgical cryoablation and left atrial appendage resection, so she is only on  mg daily. Continue same. Pacemaker:  Ms. Laina Crook had postoperative ventricular asystole requiring pacemaker implantation in July, 2016. Currently she has no symptoms and issues with pacemaker. Continue with routine device check. Preoperative evaluation:  Patient is considering percutaneous coiling for intracranial aneurysm. Currently she does not have any decompensated heart failure or unstable coronary symptoms. She has no evidence of fluid overload on exam.  She is on appropriate cardiac medications. In absence of any unstable symptoms, I do not believe she needs any further cardiac work-up. She would be considered lowintermediate risk for any cardiac complication for percutaneous intra-cranial aneurysm coiling. Continue cardiac medication perioperatively as tolerated      Other than the above, or unless any additional issues arise, she should follow up in 6 months.

## 2020-07-15 NOTE — PROGRESS NOTES
Enma Jin presents today for   Chief Complaint   Patient presents with    Follow-up       Enma Jin preferred language for health care discussion is english/other. Is someone accompanying this pt? no    Is the patient using any DME equipment during [address]? no    Depression Screening:  3 most recent PHQ Screens 7/15/2020   PHQ Not Done -   Little interest or pleasure in doing things Not at all   Feeling down, depressed, irritable, or hopeless Not at all   Total Score PHQ 2 0   Trouble falling or staying asleep, or sleeping too much -   Feeling tired or having little energy -   Poor appetite, weight loss, or overeating -   Feeling bad about yourself - or that you are a failure or have let yourself or your family down -   Trouble concentrating on things such as school, work, reading, or watching TV -   Moving or speaking so slowly that other people could have noticed; or the opposite being so fidgety that others notice -   Thoughts of being better off dead, or hurting yourself in some way -   PHQ 9 Score -   How difficult have these problems made it for you to do your work, take care of your home and get along with others -       Learning Assessment:  Learning Assessment 3/27/2019   PRIMARY LEARNER Patient   HIGHEST LEVEL OF EDUCATION - PRIMARY LEARNER  SOME COLLEGE   BARRIERS PRIMARY LEARNER NONE   CO-LEARNER CAREGIVER No   PRIMARY LANGUAGE ENGLISH   LEARNER PREFERENCE PRIMARY READING     -   ANSWERED BY Patient   RELATIONSHIP SELF       Abuse Screening:  Abuse Screening Questionnaire 3/27/2019   Do you ever feel afraid of your partner? N   Are you in a relationship with someone who physically or mentally threatens you? N   Is it safe for you to go home? Y       Fall Risk  Fall Risk Assessment, last 12 mths 7/15/2020   Able to walk? Yes   Fall in past 12 months? No   Fall with injury? -   Number of falls in past 12 months -   Fall Risk Score -       Pt currently taking Anticoagulant therapy? no    Coordination of Care:  1. Have you been to the ER, urgent care clinic since your last visit? Hospitalized since your last visit? no    2. Have you seen or consulted any other health care providers outside of the [address] [place] [address] since your last visit? Include any pap smears or colon screening.   yes

## 2020-07-16 DIAGNOSIS — I09.9 RHEUMATIC HEART DISEASE: ICD-10-CM

## 2020-07-16 DIAGNOSIS — I48.91 ATRIAL FIBRILLATION, UNSPECIFIED TYPE (HCC): Chronic | ICD-10-CM

## 2020-07-16 DIAGNOSIS — K25.4 GASTROINTESTINAL HEMORRHAGE ASSOCIATED WITH GASTRIC ULCER: ICD-10-CM

## 2020-07-16 DIAGNOSIS — I35.0 AORTIC VALVE STENOSIS, UNSPECIFIED ETIOLOGY: Chronic | ICD-10-CM

## 2020-07-16 DIAGNOSIS — L30.9 ECZEMA, UNSPECIFIED TYPE: ICD-10-CM

## 2020-07-16 DIAGNOSIS — M19.90 ARTHRITIS: ICD-10-CM

## 2020-07-16 DIAGNOSIS — E78.5 DYSLIPIDEMIA: ICD-10-CM

## 2020-07-16 DIAGNOSIS — R13.19 ESOPHAGEAL DYSPHAGIA: ICD-10-CM

## 2020-07-16 RX ORDER — OMEPRAZOLE 20 MG/1
CAPSULE, DELAYED RELEASE ORAL
Qty: 180 CAP | Refills: 4 | Status: SHIPPED | OUTPATIENT
Start: 2020-07-16 | End: 2021-07-27

## 2020-08-19 NOTE — TELEPHONE ENCOUNTER
Pt called the office to speak with clinical staff in regards to an issue with taking Plavix since she had her brain procedure. She said she has been having trouble breathing and was instructed to call.

## 2020-08-19 NOTE — TELEPHONE ENCOUNTER
Contacted pt at [facility] number. Two patient Identifiers confirmed. Advised pt per Dr Jose Elias Velez notes. Advised if she continues to have sob she may need to go back to ER. Pt verbalized understanding and stated she was told to just take medication until 08/23/2020 and then she could d/c. Pt sated I only started Plavix and Protonix. Advised pt that one of the lesser side effects of Protonix was sob and to contact Dr Sebastian Thakkar and advise. Pt verbalized understanding.

## 2020-09-29 ENCOUNTER — OFFICE VISIT (OUTPATIENT)
Dept: FAMILY MEDICINE CLINIC | Age: 70
End: 2020-09-29
Payer: MEDICARE

## 2020-09-29 VITALS
BODY MASS INDEX: 29.19 KG/M2 | WEIGHT: 171 LBS | TEMPERATURE: 97.2 F | DIASTOLIC BLOOD PRESSURE: 88 MMHG | OXYGEN SATURATION: 98 % | HEIGHT: 64 IN | RESPIRATION RATE: 16 BRPM | HEART RATE: 84 BPM | SYSTOLIC BLOOD PRESSURE: 136 MMHG

## 2020-09-29 DIAGNOSIS — R20.2 PARESTHESIA: ICD-10-CM

## 2020-09-29 DIAGNOSIS — R35.0 URINE FREQUENCY: Primary | ICD-10-CM

## 2020-09-29 DIAGNOSIS — D10.39: ICD-10-CM

## 2020-09-29 DIAGNOSIS — N30.01 ACUTE CYSTITIS WITH HEMATURIA: ICD-10-CM

## 2020-09-29 DIAGNOSIS — R39.89 ABNORMAL URINE COLOR: ICD-10-CM

## 2020-09-29 DIAGNOSIS — M17.12 PRIMARY OSTEOARTHRITIS OF LEFT KNEE: ICD-10-CM

## 2020-09-29 LAB
BILIRUB UR QL STRIP: NEGATIVE
GLUCOSE UR-MCNC: NEGATIVE MG/DL
KETONES P FAST UR STRIP-MCNC: NEGATIVE MG/DL
PH UR STRIP: 6 [PH] (ref 4.6–8)
PROT UR QL STRIP: NEGATIVE
SP GR UR STRIP: 1.01 (ref 1–1.03)
UA UROBILINOGEN AMB POC: NORMAL (ref 0.2–1)
URINALYSIS CLARITY POC: CLEAR
URINALYSIS COLOR POC: YELLOW
URINE BLOOD POC: NORMAL
URINE LEUKOCYTES POC: NEGATIVE
URINE NITRITES POC: NEGATIVE

## 2020-09-29 PROCEDURE — G8536 NO DOC ELDER MAL SCRN: HCPCS | Performed by: FAMILY MEDICINE

## 2020-09-29 PROCEDURE — 81003 URINALYSIS AUTO W/O SCOPE: CPT | Performed by: FAMILY MEDICINE

## 2020-09-29 PROCEDURE — G8510 SCR DEP NEG, NO PLAN REQD: HCPCS | Performed by: FAMILY MEDICINE

## 2020-09-29 PROCEDURE — G8427 DOCREV CUR MEDS BY ELIG CLIN: HCPCS | Performed by: FAMILY MEDICINE

## 2020-09-29 PROCEDURE — G8419 CALC BMI OUT NRM PARAM NOF/U: HCPCS | Performed by: FAMILY MEDICINE

## 2020-09-29 PROCEDURE — G9899 SCRN MAM PERF RSLTS DOC: HCPCS | Performed by: FAMILY MEDICINE

## 2020-09-29 PROCEDURE — G8399 PT W/DXA RESULTS DOCUMENT: HCPCS | Performed by: FAMILY MEDICINE

## 2020-09-29 PROCEDURE — 99214 OFFICE O/P EST MOD 30 MIN: CPT | Performed by: FAMILY MEDICINE

## 2020-09-29 PROCEDURE — 3017F COLORECTAL CA SCREEN DOC REV: CPT | Performed by: FAMILY MEDICINE

## 2020-09-29 PROCEDURE — 1090F PRES/ABSN URINE INCON ASSESS: CPT | Performed by: FAMILY MEDICINE

## 2020-09-29 PROCEDURE — 1101F PT FALLS ASSESS-DOCD LE1/YR: CPT | Performed by: FAMILY MEDICINE

## 2020-09-29 RX ORDER — NITROFURANTOIN 25; 75 MG/1; MG/1
100 CAPSULE ORAL 2 TIMES DAILY
Qty: 14 CAP | Refills: 0 | Status: SHIPPED | OUTPATIENT
Start: 2020-09-29 | End: 2020-10-06 | Stop reason: ALTCHOICE

## 2020-09-29 RX ORDER — GABAPENTIN 100 MG/1
100 CAPSULE ORAL
Qty: 30 CAP | Refills: 5 | Status: SHIPPED | OUTPATIENT
Start: 2020-09-29

## 2020-09-29 RX ORDER — ACETAMINOPHEN AND CODEINE PHOSPHATE 300; 30 MG/1; MG/1
1 TABLET ORAL
Qty: 20 TAB | Refills: 0 | Status: SHIPPED | OUTPATIENT
Start: 2020-09-29 | End: 2020-10-06

## 2020-09-29 NOTE — PROGRESS NOTES
Yoko Labonilla presents today for   Chief Complaint   Patient presents with    Bladder Infection       Is someone accompanying this pt? no    Is the patient using any DME equipment during OV? no    Depression Screening:  3 most recent PHQ Screens 9/29/2020   PHQ Not Done -   Little interest or pleasure in doing things Several days   Feeling down, depressed, irritable, or hopeless Several days   Total Score PHQ 2 2   Trouble falling or staying asleep, or sleeping too much -   Feeling tired or having little energy -   Poor appetite, weight loss, or overeating -   Feeling bad about yourself - or that you are a failure or have let yourself or your family down -   Trouble concentrating on things such as school, work, reading, or watching TV -   Moving or speaking so slowly that other people could have noticed; or the opposite being so fidgety that others notice -   Thoughts of being better off dead, or hurting yourself in some way -   PHQ 9 Score -   How difficult have these problems made it for you to do your work, take care of your home and get along with others -       Learning Assessment:  Learning Assessment 3/27/2019   PRIMARY LEARNER Patient   HIGHEST LEVEL OF EDUCATION - PRIMARY LEARNER  SOME COLLEGE   BARRIERS PRIMARY LEARNER NONE   CO-LEARNER CAREGIVER No   PRIMARY LANGUAGE ENGLISH   LEARNER PREFERENCE PRIMARY READING     -   ANSWERED BY Patient   RELATIONSHIP SELF       Abuse Screening:  Abuse Screening Questionnaire 3/27/2019   Do you ever feel afraid of your partner? N   Are you in a relationship with someone who physically or mentally threatens you? N   Is it safe for you to go home? Y       Fall Risk  Fall Risk Assessment, last 12 mths 9/29/2020   Able to walk? Yes   Fall in past 12 months? No   Fall with injury? -   Number of falls in past 12 months -   Fall Risk Score -       Health Maintenance reviewed and discussed and ordered per Provider.     Health Maintenance Due   Topic Date Due    Statin Therapy  1950    MICROALBUMIN Q1  01/24/1962    Eye Exam Retinal or Dilated  01/24/1962    Flu Vaccine (1) 09/01/2020    Lipid Screen  09/20/2020   . Coordination of Care:  1. Have you been to the ER, urgent care clinic since your last visit? Hospitalized since your last visit? Yes, 8/8/20, SNG, SOB    2. Have you seen or consulted any other health care providers outside of the [address] [place] [address] since your last visit? Include any pap smears or colon screening. no      Last  Checked na  Last UDS Checked na  Last Pain contract signed: na    Patient presents in office today for routine care.   Patient concerns: possible UTI, and back, requesting refills

## 2020-09-29 NOTE — PROGRESS NOTES
Ronny Perez is a 78 y.o. black female and presents with    Chief Complaint   Patient presents with    Bladder Infection       Subjective:  Urinary Tract Infection  Patient complains of dysuria, frequency, urgency, abnormal smelling urine, dark urine. Onset was 1 week ago, gradually worsening since that time. Patient complains of stomach ache. Patient denies back pain, congestion, fever, headache, rhinitis and sorethroat. There is not any concern of sexual abuse. There is not a history of trauma to the genital area. Patient does not have a history of recurrent UTI. Patient does not have a history of pyelonephritis. She has a white spot in the back of her throat and she is coughing every time she eats; she is taking prilosec for heartburn    She has aneurysm and has had coil inserted    ROS   General ROS: negative for - chills, fatigue or fever  Ophthalmic ROS: blurry vision on left; excessive tearing; she was seen by ophthalmology and prescribed antibiotic drops reressa   ENT ROS: negative for - headaches, nasal congestion; sore throat X 3 days which is worse at night  Endocrine ROS: negative for - polydipsia/polyuria or temperature intolerance  Respiratory ROS: no cough, shortness of breath, or wheezing  Cardiovascular ROS: no chest pain or dyspnea on exertion  Gastrointestinal ROS: no abdominal pain, change in bowel habits, or black or bloody stools  Neurological ROS: no TIA or stroke symptoms  Dermatological ROS: negative for - rash or skin lesion changes     All other systems reviewed and are negative.       Objective:  Vitals:    09/29/20 1149   BP: 136/88   Pulse: 84   Resp: 16   Temp: 97.2 °F (36.2 °C)   TempSrc: Oral   SpO2: 98%   Weight: 171 lb (77.6 kg)   Height: 5' 4\" (1.626 m)   PainSc:   8   PainLoc: Back       alert, well appearing, and in no distress, oriented to person, place, and time and overweight  Mental status - normal mood, behavior, speech, dress, motor activity, and thought processes  Mouth - white cyst like lesion on soft palate 8 mm  Chest - clear to auscultation, no wheezes, rales or rhonchi, symmetric air entry  Heart - normal rate, regular rhythm, normal S1, S2, no murmurs, rubs, clicks or gallops  Abdomen - soft, nontender, nondistended, no masses or organomegaly  Back exam - full range of motion, no tenderness, palpable spasm or pain on motion  MSK - left knee pain with motion; crepitus  Neuro - antalgic gait    LABS   Urinalysis   TESTS      Assessment/Plan:    1. Urine frequency    - AMB POC URINALYSIS DIP STICK AUTO W/ MICRO    2. Abnormal urine color    - AMB POC URINALYSIS DIP STICK AUTO W/ MICRO    3. Paresthesia  Trial of gabapentin to use at bedtime  - gabapentin (NEURONTIN) 100 mg capsule; Take 1 Cap by mouth nightly. Max Daily Amount: 100 mg. Dispense: 30 Cap; Refill: 5    4. Acute cystitis with hematuria  Start abx    5. Benign neoplasm of palate  If not resolved in 1 month will refer to ENT; pt has been to dentist    6. Primary osteoarthritis of left knee  This is a chronic problem that is worsened. Per review of available records and patients , there are not sign of overuse, misuse, diversion, or concerning side effects. Today we reviewed: the risk of overdose, addiction, and dependency proper storage and disposal of medications the goals of treatment (improve functionality, quality of life, and pain)  The following changes were made to the patients current treatment plan: medications adjusted, see orders. - acetaminophen-codeine (TYLENOL #3) 300-30 mg per tablet; Take 1 Tab by mouth every four (4) hours as needed for Pain for up to 7 days. Max Daily Amount: 6 Tabs. Dispense: 20 Tab; Refill: 0      Lab review: labs reviewed, I note that urinalysis abnormal       I have discussed the diagnosis with the patient and the intended plan as seen in the above orders.   The patient has received an after-visit summary and questions were answered concerning future plans. I have discussed medication side effects and warnings with the patient as well. I have reviewed the plan of care with the patient, accepted their input and they are in agreement with the treatment goals.

## 2020-10-01 ENCOUNTER — TELEPHONE (OUTPATIENT)
Dept: FAMILY MEDICINE CLINIC | Age: 70
End: 2020-10-01

## 2020-10-02 DIAGNOSIS — N30.01 ACUTE CYSTITIS WITH HEMATURIA: Primary | ICD-10-CM

## 2020-10-02 RX ORDER — CEPHALEXIN 500 MG/1
500 CAPSULE ORAL 4 TIMES DAILY
Qty: 40 CAP | Refills: 0 | Status: SHIPPED | OUTPATIENT
Start: 2020-10-02 | End: 2020-10-06 | Stop reason: ALTCHOICE

## 2020-10-06 DIAGNOSIS — N30.01 ACUTE CYSTITIS WITH HEMATURIA: ICD-10-CM

## 2020-10-06 RX ORDER — CIPROFLOXACIN 500 MG/1
500 TABLET ORAL 2 TIMES DAILY
Qty: 20 TAB | Refills: 0 | Status: SHIPPED | OUTPATIENT
Start: 2020-10-06 | End: 2020-10-16

## 2020-12-11 DIAGNOSIS — I48.91 ATRIAL FIBRILLATION, UNSPECIFIED TYPE (HCC): Chronic | ICD-10-CM

## 2020-12-11 DIAGNOSIS — I09.9 RHEUMATIC HEART DISEASE: ICD-10-CM

## 2020-12-11 DIAGNOSIS — L30.9 ECZEMA, UNSPECIFIED TYPE: ICD-10-CM

## 2020-12-11 DIAGNOSIS — E78.5 DYSLIPIDEMIA: ICD-10-CM

## 2020-12-11 DIAGNOSIS — I35.0 AORTIC VALVE STENOSIS, UNSPECIFIED ETIOLOGY: Chronic | ICD-10-CM

## 2020-12-11 DIAGNOSIS — M19.90 ARTHRITIS: ICD-10-CM

## 2020-12-11 DIAGNOSIS — R13.19 ESOPHAGEAL DYSPHAGIA: ICD-10-CM

## 2020-12-11 DIAGNOSIS — K25.4 GASTROINTESTINAL HEMORRHAGE ASSOCIATED WITH GASTRIC ULCER: ICD-10-CM

## 2020-12-14 RX ORDER — PNV,CALCIUM 72/IRON/FOLIC ACID 27 MG-1 MG
TABLET ORAL
Qty: 90 TAB | Refills: 3 | Status: SHIPPED | OUTPATIENT
Start: 2020-12-14 | End: 2021-09-30

## 2020-12-23 DIAGNOSIS — I48.91 ATRIAL FIBRILLATION, UNSPECIFIED TYPE (HCC): Chronic | ICD-10-CM

## 2020-12-23 DIAGNOSIS — L30.9 ECZEMA, UNSPECIFIED TYPE: ICD-10-CM

## 2020-12-23 DIAGNOSIS — I09.9 RHEUMATIC HEART DISEASE: ICD-10-CM

## 2020-12-23 DIAGNOSIS — M19.90 ARTHRITIS: ICD-10-CM

## 2020-12-23 DIAGNOSIS — I35.0 AORTIC VALVE STENOSIS, UNSPECIFIED ETIOLOGY: Chronic | ICD-10-CM

## 2020-12-23 DIAGNOSIS — R13.19 ESOPHAGEAL DYSPHAGIA: ICD-10-CM

## 2020-12-23 DIAGNOSIS — E78.5 DYSLIPIDEMIA: ICD-10-CM

## 2020-12-23 DIAGNOSIS — K25.4 GASTROINTESTINAL HEMORRHAGE ASSOCIATED WITH GASTRIC ULCER: ICD-10-CM

## 2020-12-24 RX ORDER — EZETIMIBE 10 MG/1
TABLET ORAL
Qty: 90 TAB | Refills: 3 | Status: SHIPPED | OUTPATIENT
Start: 2020-12-24 | End: 2021-06-15 | Stop reason: SDUPTHER

## 2021-01-05 ENCOUNTER — APPOINTMENT (OUTPATIENT)
Dept: GENERAL RADIOLOGY | Age: 71
End: 2021-01-05
Attending: PHYSICIAN ASSISTANT
Payer: MEDICARE

## 2021-01-05 ENCOUNTER — APPOINTMENT (OUTPATIENT)
Dept: CT IMAGING | Age: 71
End: 2021-01-05
Attending: PHYSICIAN ASSISTANT
Payer: MEDICARE

## 2021-01-05 ENCOUNTER — HOSPITAL ENCOUNTER (EMERGENCY)
Age: 71
Discharge: HOME | End: 2021-01-05
Attending: EMERGENCY MEDICINE
Payer: MEDICARE

## 2021-01-05 VITALS
HEART RATE: 89 BPM | RESPIRATION RATE: 18 BRPM | WEIGHT: 170 LBS | TEMPERATURE: 97.8 F | DIASTOLIC BLOOD PRESSURE: 89 MMHG | OXYGEN SATURATION: 100 % | HEIGHT: 65 IN | BODY MASS INDEX: 28.32 KG/M2 | SYSTOLIC BLOOD PRESSURE: 136 MMHG

## 2021-01-05 DIAGNOSIS — K57.90 DIVERTICULOSIS: ICD-10-CM

## 2021-01-05 DIAGNOSIS — M54.50 ACUTE BILATERAL LOW BACK PAIN WITHOUT SCIATICA: Primary | ICD-10-CM

## 2021-01-05 DIAGNOSIS — K80.20 CALCULUS OF GALLBLADDER WITHOUT CHOLECYSTITIS WITHOUT OBSTRUCTION: ICD-10-CM

## 2021-01-05 LAB
APPEARANCE UR: CLEAR
BACTERIA URNS QL MICRO: NEGATIVE /HPF
BILIRUB UR QL: NEGATIVE
COLOR UR: YELLOW
EPITH CASTS URNS QL MICRO: NORMAL /LPF (ref 0–5)
GLUCOSE UR STRIP.AUTO-MCNC: NEGATIVE MG/DL
HGB UR QL STRIP: NEGATIVE
KETONES UR QL STRIP.AUTO: NEGATIVE MG/DL
LEUKOCYTE ESTERASE UR QL STRIP.AUTO: NEGATIVE
NITRITE UR QL STRIP.AUTO: NEGATIVE
PH UR STRIP: 5.5 [PH] (ref 5–8)
PROT UR STRIP-MCNC: ABNORMAL MG/DL
RBC #/AREA URNS HPF: NORMAL /HPF (ref 0–5)
SP GR UR REFRACTOMETRY: 1.02 (ref 1–1.03)
UROBILINOGEN UR QL STRIP.AUTO: 0.2 EU/DL (ref 0.2–1)
WBC URNS QL MICRO: NORMAL /HPF (ref 0–4)

## 2021-01-05 PROCEDURE — 99283 EMERGENCY DEPT VISIT LOW MDM: CPT

## 2021-01-05 PROCEDURE — 73502 X-RAY EXAM HIP UNI 2-3 VIEWS: CPT

## 2021-01-05 PROCEDURE — 81001 URINALYSIS AUTO W/SCOPE: CPT

## 2021-01-05 PROCEDURE — 74176 CT ABD & PELVIS W/O CONTRAST: CPT

## 2021-01-05 RX ORDER — LIDOCAINE 50 MG/G
PATCH TOPICAL
Qty: 15 EACH | Refills: 0 | Status: SHIPPED | OUTPATIENT
Start: 2021-01-05

## 2021-01-05 RX ORDER — NAPROXEN 500 MG/1
500 TABLET ORAL 2 TIMES DAILY WITH MEALS
Qty: 20 TAB | Refills: 0 | Status: SHIPPED | OUTPATIENT
Start: 2021-01-05

## 2021-01-05 NOTE — ED PROVIDER NOTES
EMERGENCY DEPARTMENT HISTORY AND PHYSICAL EXAM      Date: 1/5/2021  Patient Name: Miya Moran    History of Presenting Illness     Chief Complaint   Patient presents with    Back Pain       History Provided By: Patient    HPI: Miya Moran, 78 y.o. female PMHx significant for dyslipidemia, aortic stenosis, mitral valve stenosis, a fib, COPD, sleep apnea, glaucoma, presents ambulatory to the ED with cc of intermittent aching low back pain that radiates to left groin x 5 days. Denies recent trauma or injury. Denies numbness/tingling, weakness, vomiting diarrhea. Hx arthritis. Has not recently f/u with ortho. Pt reports sx are worse with movement. Denies increase in pain the morning. Denies dysuria, hematuria, urinary frequency. There are no other complaints, changes, or physical findings at this time. PCP: Madelaine Patel MD    No current facility-administered medications on file prior to encounter. Current Outpatient Medications on File Prior to Encounter   Medication Sig Dispense Refill    ezetimibe (ZETIA) 10 mg tablet TAKE 1 TABLET EVERY DAY 90 Tab 3    prenatal vit-calcium-iron-fa (PrePlus) 27 mg iron- 1 mg tab TAKE 1 TABLET EVERY DAY 90 Tab 3    gabapentin (NEURONTIN) 100 mg capsule Take 1 Cap by mouth nightly. Max Daily Amount: 100 mg. 30 Cap 5    mirabegron ER (Myrbetriq) 25 mg ER tablet TAKE 1 TABLET EVERY DAY 90 Tab 3    omeprazole (PRILOSEC) 20 mg capsule TAKE 1 CAPSULE TWICE DAILY 180 Cap 4    Blood-Glucose Meter (Accu-Chek Holly Plus Meter) misc Test blood glucose daily 1 Each 0    glucose blood VI test strips (CONTOUR NEXT TEST STRIPS) strip Test blood glucose daily 100 Strip 3    vit B Cmplx 3-FA-Vit C-Biotin (NEPHRO CANDACE RX) 1- mg-mg-mcg tablet Take 1 Tab by mouth daily.  meclizine (ANTIVERT) 12.5 mg tablet Take 1-2 Tabs by mouth three (3) times daily as needed for Dizziness.  30 Tab 0    prednisoLONE acetate (PRED FORTE) 1 % ophthalmic suspension Administer 1 Drop to both eyes four (4) times daily.  ketorolac (ACULAR LS) 0.4 % drop Administer 1 Drop to both eyes four (4) times daily.       ferrous sulfate 325 mg (65 mg iron) tablet take 1 tablet by mouth three times a day with food (Patient taking differently: two (2) times a day.) 100 Tab 9    aspirin delayed-release 81 mg tablet take 2 tablets by mouth once daily 180 Tab 3       Past History     Past Medical History:  Past Medical History:   Diagnosis Date    Advance directive in chart     6/28/2016    Amaurosis fugax     Aortic stenosis     mean gradient  26.5 mmHg (CATH 4/13), 32 mmHg (ECHO 9/15)    Aortic valve replacement     21mm MAGNA EASE pericardial  2/16    Arthritis     Atrial appendage resection     2/16      Atrial fibrillation     CHADS score 2  (-CHF, -HTN, -AGE, -DM +AMAUROSIS FUGAX)    Atrial fibrillation ablation     surgical left sided cryoablation  2/16    Cataract     OS    Cervical dysplasia     2009   post leep and cone    COPD     mild  2/16    Duodenal AVM     argon plasma coagulation 2/16    Dyslipidemia     Fibroids     Glaucoma     Lung nodule     3/19/2012   stable    Mitral stenosis     mean gradient  6 mmHg (CATH 4/13), 7.6 mmHg (MANUEL 10/15)    Mitral valve replacement     25 mm MAGNA EASE pericardial  2/16    Pacemaker     dual chamber MEDTRONIC 2/16    Post-op ventricular asystole     02/16    Remote tobacco     Sleep apnea     no CPAP    Thyroid nodule     3/20/2013   multiple    TIA (transient ischemic attack)        Past Surgical History:  Past Surgical History:   Procedure Laterality Date    COLONOSCOPY N/A 1/23/2017    COLONOSCOPY performed [facility] Tk Garcia MD at [facility] END[facility]COPY    HX AFIB ABLATION      2/16  surgical cryoablation    HX AORTIC VALVE REPLACEMENT      2/16    HX BREAST BIOPSY Right 10/13/2014    Right breast needle [facility]OC biopsy performed by Allison Garduno MD at [address] [place] [address] HX BREAST LUMPECTOMY      Right    HX BUNIONECTOMY left    HX GYN      VAINIII, VELASQUEZ 1, keratinous labial cyst    HX GYN      2012  Cold knife conization of cervix    HX LEEP PROCEDURE      2010    HX MITRAL VALVE REPLACEMENT          HX OTHER SURGICAL      lump left neck, benign    HX OTHER SURGICAL      multiple breast aspirations    HX PACEMAKER      I&D OF VULVA/PERINEUM ABSCESS  3/21/2017            Family History:  Family History   Problem Relation Age of Onset    Cancer Mother         oral,    [facility] Alzheimer Father            [facility] Breast Cancer Maternal Aunt         unsure age   [facility] Colon Cancer Maternal Grandfather        Social History:  Social History     Tobacco Use    Smoking status: Former Smoker     Years: 37.00     Quit date: 2004     Years since quittin.9    Smokeless tobacco: Never Used    Tobacco comment: stop around    Substance Use Topics    Alcohol use: No    Drug use: No       Allergies: Allergies   Allergen Reactions    Contrast Dye [Iodine] Itching and Swelling    Iodinated Contrast Media Itching and Swelling    Seafood Itching and Swelling     LOBSTER ONLY    Statins-Hmg-Coa Reductase Inhibitors Myalgia and Other (comments)     Myalgia    Sulfa (Sulfonamide Antibiotics) Itching and Swelling         Review of Systems   Review of Systems   Constitutional: Negative for chills and fever. Respiratory: Negative for shortness of breath. Cardiovascular: Negative for chest pain. Gastrointestinal: Negative for abdominal pain, nausea and vomiting. Genitourinary: Negative for flank pain. Musculoskeletal: Positive for arthralgias (left hip pain) and back pain. Negative for myalgias. Skin: Negative for color change, pallor, rash and wound. Neurological: Negative for dizziness, weakness and light-headedness. All other systems reviewed and are negative. Physical Exam   Physical Exam  Vitals signs and nursing note reviewed. Constitutional:       General: She is not in acute distress. Appearance: She is well-developed. Comments: Pt well-appearing in NAD   HENT:      Head: Normocephalic and atraumatic. Eyes:      Conjunctiva/sclera: Conjunctivae normal.   Cardiovascular:      Rate and Rhythm: Normal rate and regular rhythm. Heart sounds: Normal heart sounds. Pulmonary:      Effort: Pulmonary effort is normal. No respiratory distress. Breath sounds: Normal breath sounds. Abdominal:      General: Bowel sounds are normal. There is no distension. Palpations: Abdomen is soft. Comments: Abdomen soft, nontender  Nondistended  No guarding or rigidity   Musculoskeletal: Normal range of motion. Left hip: She exhibits tenderness and bony tenderness. She exhibits normal range of motion (Full AROM intact) and normal strength. Lumbar back: She exhibits tenderness. She exhibits normal range of motion and no bony tenderness (no midline tenderness). Comments: PT pulses strong and equal b/l  Sensation equal and intact to lower extremities b/l   Skin:     General: Skin is warm. Findings: No rash. Neurological:      Mental Status: She is alert and oriented to person, place, and time.    Psychiatric:         Behavior: Behavior normal.         Diagnostic Study Results     Labs -     Recent Results (from the past 12 hour(s))   URINALYSIS W/ RFLX MICROSCOPIC    Collection Time: 01/05/21  1:25 PM   Result Value Ref Range    Color YELLOW      Appearance CLEAR      Specific gravity 1.018 1.005 - 1.030      pH (UA) 5.5 5.0 - 8.0      Protein TRACE (A) NEG mg/dL    Glucose Negative NEG mg/dL    Ketone Negative NEG mg/dL    Bilirubin Negative NEG      Blood Negative NEG      Urobilinogen 0.2 0.2 - 1.0 EU/dL    Nitrites Negative NEG      Leukocyte Esterase Negative NEG     URINE MICROSCOPIC ONLY    Collection Time: 01/05/21  1:25 PM   Result Value Ref Range    WBC 0 to 3 0 - 4 /hpf    RBC NONE 0 - 5 /hpf    Epithelial cells FEW 0 - 5 /lpf    Bacteria Negative NEG /hpf Radiologic Studies -   CT ABD PELV WO CONT   Final Result   IMPRESSION:         1. No renal or ureteral calculi. No signs of obstructive uropathy. 2. Tiny gallstone, no signs of cholecystitis. 3. Colonic diverticulosis without signs of diverticulitis. Normal appendix. 4. Essentially stable left lower lobe pulmonary nodule x8 years, with imaging   features strongly suggestive of benign pulmonary hamartoma. 5. 8 mm circumscribed slightly high density partially exophytic lesion upper   pole left kidney, probably a benign hyperdense cyst. Recommend elective renal   ultrasound, which may or may not be able to characterize the lesion given its   deep upper pole location, and if not visible, six-month follow-up CT scan of the   kidneys. 6. Hepatic low densities, very likely benign cysts. XR HIP LT W OR WO PELV 2-3 VWS   Final Result   IMPRESSION:      No acute radiographic abnormality involving the left hip. CT Results  (Last 48 hours)               01/05/21 1354  CT ABD PELV WO CONT Final result    Impression:  IMPRESSION:           1. No renal or ureteral calculi. No signs of obstructive uropathy. 2. Tiny gallstone, no signs of cholecystitis. 3. Colonic diverticulosis without signs of diverticulitis. Normal appendix. 4. Essentially stable left lower lobe pulmonary nodule x8 years, with imaging   features strongly suggestive of benign pulmonary hamartoma. 5. 8 mm circumscribed slightly high density partially exophytic lesion upper   pole left kidney, probably a benign hyperdense cyst. Recommend elective renal   ultrasound, which may or may not be able to characterize the lesion given its   deep upper pole location, and if not visible, six-month follow-up CT scan of the   kidneys. 6. Hepatic low densities, very likely benign cysts. Narrative:  EXAM: CT of the abdomen and pelvis       INDICATION: Complaining of lower back pain that radiates to the groin since   Saturday. COMPARISON: Correlation with a chest CT scan on 11/14/2013       TECHNIQUE: Helical volumetric scanning through the abdomen and pelvis was   performed without intravenous contrast.  Axial, coronal and sagittal   reconstructions were created. One or more dose reduction techniques were used on   this CT: automated exposure control, adjustment of the mAs and/or kVp according   to patient size, and iterative reconstruction techniques. The specific   techniques used on this CT exam have been documented in the patient's electronic   medical record. Digital Imaging and Communications in Medicine (DICOM) format   image data are available to nonaffiliated external healthcare facilities or   entities on a secure, media free, reciprocally searchable basis with patient   authorization for at least a 12-month period after this study. _______________       FINDINGS:       LOWER CHEST: Circumscribed lobulated nodule within the medial basal left lower   lobe, slightly heterogeneous, with a couple areas that are visible are slightly   low density but too small for densitometry, and with a coarse calcification   measures 1.7 cm, 1.6 cm in 2013, compatible with a benign etiology, very likely   a pulmonary hamartoma. No acute consolidation. LIVER, BILIARY: Several small low densities are visible in the upper liver,   stable to perhaps slightly more prominent. Largest in the left lobe as an   internal density reading in the water attenuation range and measures 1.4 cm in   maximal dimension, all likely benign cysts. No biliary dilation. Tiny gallstone   is noted. No signs of cholecystitis. PANCREAS: Normal.       SPLEEN: Normal.       ADRENALS: Normal.       KIDNEYS: No renal or ureteral calculi. No signs of obstructive uropathy. 8 mm   smooth visibly mildly hyperdense lesion partially exophytic anterior upper pole   left kidney not definitively present in 2013. No other renal lesions   appreciated. LYMPH NODES: No enlarged lymph nodes. GASTROINTESTINAL TRACT: Scattered colonic diverticula are present, with   left-sided predominance, but with some right-sided diverticula as well. No signs   of diverticulitis. No bowel distention. Normal appendix. PELVIC ORGANS: Within normal limits. VASCULATURE: Mild calcific atherosclerosis is present. No AAA. BONES: No acute or aggressive osseous abnormalities identified. OTHER: Tiny fat-containing umbilical hernia is present. No inguinal or femoral   hernia.       _______________               CXR Results  (Last 48 hours)    None          Medical Decision Making   I am the first provider for this patient. I reviewed the vital signs, available nursing notes, past medical history, past surgical history, family history and social history. Vital Signs-Reviewed the patient's vital signs. Patient Vitals for the past 12 hrs:   Temp Pulse Resp BP SpO2   01/05/21 1321 97.8 °F (36.6 °C) 89 18 136/89 100 %       Records Reviewed: Nursing Notes and Old Medical Records    Provider Notes (Medical Decision Making):   DDx; Low back pain vs strain, Kidney stone, UTI, Hip arthritis    79 yo F who reports intermittent aching low back pain that radiates to left groin x 5 days. On exam, tenderness to left hip and groin. NVI. UA shows no WBC in urine. CT negative for kidney stone. Discussed incidental findings with pt. Possible muscle strain. Patient treated symptomatically. Discussed need for Ortho follow-up. Patient nontoxic-appearing in NAD. Patient stable for prompt outpatient follow-up with PCP in 1 to 2 days. Patient given strict instructions to return if symptoms worsen. ED Course:   Initial assessment performed. The patients presenting problems have been discussed, and they are in agreement with the care plan formulated and outlined with them. I have encouraged them to ask questions as they arise throughout their visit.       Discussed incidental findings on CT scan with patient. Disposition:  3:50 PM  Discussed lab and imaging results with pt along with dx and treatment plan. Discussed importance of PCP follow up. All questions answered. Pt voiced they understood. Return if sx worsen. PLAN:  1. Current Discharge Medication List        2. Follow-up Information     Follow up With Specialties Details Why Contact Info    Sudarshan Chavis MD Family Medicine, Internal Medicine Schedule an appointment as soon as possible for a visit today  [address] [address] [address] [phone]      [facility] EMERGENCY DEPT Emergency Medicine  As needed, If symptoms worsen [address] [place] [address] Specialists , [facility]  Schedule an appointment as soon as possible for a visit today  [address] [place] [address] [phone]        Return to ED if worse     Diagnosis     Clinical Impression:   1. Acute bilateral low back pain without sciatica        Attestations:    KINDRA Burks    Please note that this dictation was completed with [employer], the computer voice recognition software. Quite often unanticipated grammatical, syntax, homophones, and other interpretive errors are inadvertently transcribed by the computer software. Please disregard these errors. Please excuse any errors that have escaped final proofreading. Thank you.

## 2021-01-05 NOTE — ED TRIAGE NOTES
Patient co lower back pain that radiates to groin since Saturday. Patient denies any injury or urinary problems.  No hx kidney stones

## 2021-01-05 NOTE — ED PROVIDER NOTES
EMERGENCY DEPARTMENT HISTORY AND PHYSICAL EXAM 
 
 
Date: 1/5/2021 Patient Name: Richard Hatfield History of Presenting Illness Chief Complaint Patient presents with  Back Pain History Provided By: Patient HPI: Richard Hatfield, 78 y.o. female PMHx significant for dyslipidemia, aortic stenosis, mitral valve stenosis, a fib, COPD, sleep apnea, glaucoma, presents ambulatory to the ED with cc of intermittent aching low back pain that radiates to left groin x 5 days. Denies recent trauma or injury. Denies numbness/tingling, weakness. Hx arthritis. Has not recently f/u with ortho. Pt reports sx are worse with movement. Denies increase in pain the morning. Denies dysuria, hematuria, urinary frequency. There are no other complaints, changes, or physical findings at this time. PCP: Merced Weston MD 
 
No current facility-administered medications on file prior to encounter. Current Outpatient Medications on File Prior to Encounter Medication Sig Dispense Refill  ezetimibe (ZETIA) 10 mg tablet TAKE 1 TABLET EVERY DAY 90 Tab 3  prenatal vit-calcium-iron-fa (PrePlus) 27 mg iron- 1 mg tab TAKE 1 TABLET EVERY DAY 90 Tab 3  
 gabapentin (NEURONTIN) 100 mg capsule Take 1 Cap by mouth nightly. Max Daily Amount: 100 mg. 30 Cap 5  mirabegron ER (Myrbetriq) 25 mg ER tablet TAKE 1 TABLET EVERY DAY 90 Tab 3  
 omeprazole (PRILOSEC) 20 mg capsule TAKE 1 CAPSULE TWICE DAILY 180 Cap 4  Blood-Glucose Meter (Accu-Chek Holly Plus Meter) misc Test blood glucose daily 1 Each 0  
 glucose blood VI test strips (CONTOUR NEXT TEST STRIPS) strip Test blood glucose daily 100 Strip 3  vit B Cmplx 3-FA-Vit C-Biotin (NEPHRO CANDACE RX) 1- mg-mg-mcg tablet Take 1 Tab by mouth daily.  meclizine (ANTIVERT) 12.5 mg tablet Take 1-2 Tabs by mouth three (3) times daily as needed for Dizziness.  30 Tab 0  
  prednisoLONE acetate (PRED FORTE) 1 % ophthalmic suspension Administer 1 Drop to both eyes four (4) times daily.  ketorolac (ACULAR LS) 0.4 % drop Administer 1 Drop to both eyes four (4) times daily.  ferrous sulfate 325 mg (65 mg iron) tablet take 1 tablet by mouth three times a day with food (Patient taking differently: two (2) times a day.) 100 Tab 9  
 aspirin delayed-release 81 mg tablet take 2 tablets by mouth once daily 180 Tab 3 Past History Past Medical History: 
Past Medical History:  
Diagnosis Date  Advance directive in chart 6/28/2016  Amaurosis fugax  Aortic stenosis   
 mean gradient  26.5 mmHg (CATH 4/13), 32 mmHg (ECHO 9/15)  Aortic valve replacement 21mm MAGNA EASE pericardial  2/16  Arthritis  Atrial appendage resection 2/16  Atrial fibrillation CHADS score 2  (-CHF, -HTN, -AGE, -DM +AMAUROSIS FUGAX)  Atrial fibrillation ablation   
 surgical left sided cryoablation  2/16  Cataract OS  Cervical dysplasia   
 2009   post leep and cone  COPD   
 mild  2/16  Duodenal AVM   
 argon plasma coagulation 2/16  Dyslipidemia  Fibroids  Glaucoma  Lung nodule 3/19/2012   stable  Mitral stenosis   
 mean gradient  6 mmHg (CATH 4/13), 7.6 mmHg (MANUEL 10/15)  Mitral valve replacement 25 mm MAGNA EASE pericardial  2/16  Pacemaker   
 dual chamber MEDTRONIC 2/16  Post-op ventricular asystole 02/16  Remote tobacco   
 Sleep apnea   
 no CPAP  Thyroid nodule 3/20/2013   multiple  TIA (transient ischemic attack) Past Surgical History: 
Past Surgical History:  
Procedure Laterality Date  COLONOSCOPY N/A 1/23/2017 COLONOSCOPY performed [facility] Tamara Yeager MD at [facility] ENDOSCOPY  
 HX AFIB ABLATION    
 2/16  surgical cryoablation  HX AORTIC VALVE REPLACEMENT    
 2/16  HX BREAST BIOPSY Right 10/13/2014 Right breast needle [facility]OC biopsy performed by Pham Macias MD at [facility] HX BREAST LUMPECTOMY Right  HX BUNIONECTOMY    
 left  HX GYN    
 VAINIII, VELASQUEZ 1, keratinous labial cyst  
 HX GYN    
 2012  Cold knife conization of cervix  HX LEEP PROCEDURE    
 2010  HX MITRAL VALVE REPLACEMENT    
   HX OTHER SURGICAL    
 lump left neck, benign  HX OTHER SURGICAL    
 multiple breast aspirations  HX PACEMAKER    
 I&D OF VULVA/PERINEUM ABSCESS  3/21/2017 Family History: 
Family History Problem Relation Age of Onset  Cancer Mother   
     oral,   Alzheimer Father   
       Breast Cancer Maternal Aunt   
     unsure age  Colon Cancer Maternal Grandfather Social History: 
Social History Tobacco Use  Smoking status: Former Smoker Years: 37.00 Quit date: 2004 Years since quittin.9  Smokeless tobacco: Never Used  Tobacco comment: stop around  Substance Use Topics  Alcohol use: No  
 Drug use: No  
 
 
Allergies: Allergies Allergen Reactions  Contrast Dye [Iodine] Itching and Swelling  Iodinated Contrast Media Itching and Swelling  Seafood Itching and Swelling LOBSTER ONLY  
 Statins-Hmg-Coa Reductase Inhibitors Myalgia and Other (comments) Myalgia  Sulfa (Sulfonamide Antibiotics) Itching and Swelling Review of Systems Review of Systems Constitutional: Negative for chills and fever. Respiratory: Negative for shortness of breath. Cardiovascular: Negative for chest pain. Gastrointestinal: Negative for abdominal pain, nausea and vomiting. Genitourinary: Negative for flank pain. Musculoskeletal: Positive for arthralgias (left hip pain) and back pain. Negative for myalgias. Skin: Negative for color change, pallor, rash and wound. Neurological: Negative for dizziness, weakness and light-headedness. All other systems reviewed and are negative. Physical Exam  
Physical Exam 
 
Diagnostic Study Results Labs - No results found for this or any previous visit (from the past 12 hour(s)). Radiologic Studies -  
XR HIP LT W OR WO PELV 2-3 VWS    (Results Pending) CT ABD PELV WO CONT    (Results Pending) CT Results  (Last 48 hours) None CXR Results  (Last 48 hours) None Medical Decision Making I am the first provider for this patient. I reviewed the vital signs, available nursing notes, past medical history, past surgical history, family history and social history. Vital Signs-Reviewed the patient's vital signs. Patient Vitals for the past 12 hrs: 
 Temp Pulse Resp BP SpO2  
21 1321 97.8 °F (36.6 °C) 89 18 136/89 100 % EKG interpretation: (Preliminary) Rhythm: {[facility] EKG RHYTHM:15500}; and {[facility] EKG SEZB:87570}. Rate (approx.): ***; Axis: {[facility] EKG AXIS DEVIATION:36648}; OH interval: {[facility] EKG P WAVE:91740}; QRS interval: {[facility] EKG QRS INTERVAL:95646}; ST/T wave: {[facility] EKG ST/T ARB}; Other findings: {[facility] EKG OTHER MIPQRCTL:18396}. Records Reviewed: {CDIRECORDSREVIEWED:35962} Provider Notes (Medical Decision Making):  
*** 
 
ED Course:  
Initial assessment performed. The patients presenting problems have been discussed, and they are in agreement with the care plan formulated and outlined with them. I have encouraged them to ask questions as they arise throughout their visit. Critical Care Time:  
*** Disposition: 
*** PLAN: 
1. Current Discharge Medication List  
  
 
2. Follow-up Information None Return to ED if worse Diagnosis Clinical Impression: No diagnosis found.  
 
Attestations: 
 
KINDRA Hernandes 
 
 Please note that this dictation was completed with [employer], the computer voice recognition software. Quite often unanticipated grammatical, syntax, homophones, and other interpretive errors are inadvertently transcribed by the computer software. Please disregard these errors. Please excuse any errors that have escaped final proofreading. Thank you.

## 2021-04-26 DIAGNOSIS — K25.4 GASTROINTESTINAL HEMORRHAGE ASSOCIATED WITH GASTRIC ULCER: ICD-10-CM

## 2021-04-26 DIAGNOSIS — I09.9 RHEUMATIC HEART DISEASE: ICD-10-CM

## 2021-04-26 DIAGNOSIS — I35.0 AORTIC VALVE STENOSIS, UNSPECIFIED ETIOLOGY: Chronic | ICD-10-CM

## 2021-04-26 DIAGNOSIS — I48.91 ATRIAL FIBRILLATION, UNSPECIFIED TYPE (HCC): Chronic | ICD-10-CM

## 2021-04-26 DIAGNOSIS — R13.19 ESOPHAGEAL DYSPHAGIA: ICD-10-CM

## 2021-04-26 DIAGNOSIS — E78.5 DYSLIPIDEMIA: ICD-10-CM

## 2021-04-26 DIAGNOSIS — M19.90 ARTHRITIS: ICD-10-CM

## 2021-04-26 DIAGNOSIS — Z00.00 ROUTINE GENERAL MEDICAL EXAMINATION AT A HEALTH CARE FACILITY: ICD-10-CM

## 2021-04-26 DIAGNOSIS — L30.9 ECZEMA, UNSPECIFIED TYPE: ICD-10-CM

## 2021-04-29 RX ORDER — ASPIRIN 81 MG/1
TABLET ORAL
Qty: 180 TAB | Refills: 3 | Status: SHIPPED | OUTPATIENT
Start: 2021-04-29

## 2021-06-15 ENCOUNTER — CLINICAL SUPPORT (OUTPATIENT)
Dept: CARDIOLOGY CLINIC | Age: 71
End: 2021-06-15
Payer: MEDICARE

## 2021-06-15 ENCOUNTER — OFFICE VISIT (OUTPATIENT)
Dept: CARDIOLOGY CLINIC | Age: 71
End: 2021-06-15

## 2021-06-15 VITALS
SYSTOLIC BLOOD PRESSURE: 112 MMHG | HEIGHT: 64 IN | BODY MASS INDEX: 28.17 KG/M2 | WEIGHT: 165 LBS | OXYGEN SATURATION: 98 % | DIASTOLIC BLOOD PRESSURE: 80 MMHG | HEART RATE: 77 BPM

## 2021-06-15 DIAGNOSIS — I48.91 ATRIAL FIBRILLATION, UNSPECIFIED TYPE (HCC): Primary | ICD-10-CM

## 2021-06-15 DIAGNOSIS — L30.9 ECZEMA, UNSPECIFIED TYPE: ICD-10-CM

## 2021-06-15 DIAGNOSIS — M19.90 ARTHRITIS: ICD-10-CM

## 2021-06-15 DIAGNOSIS — R13.19 ESOPHAGEAL DYSPHAGIA: ICD-10-CM

## 2021-06-15 DIAGNOSIS — E78.5 DYSLIPIDEMIA: ICD-10-CM

## 2021-06-15 DIAGNOSIS — I09.9 RHEUMATIC HEART DISEASE: ICD-10-CM

## 2021-06-15 DIAGNOSIS — Z95.0 PACEMAKER: ICD-10-CM

## 2021-06-15 DIAGNOSIS — I35.0 AORTIC VALVE STENOSIS, UNSPECIFIED ETIOLOGY: Primary | Chronic | ICD-10-CM

## 2021-06-15 DIAGNOSIS — K25.4 GASTROINTESTINAL HEMORRHAGE ASSOCIATED WITH GASTRIC ULCER: ICD-10-CM

## 2021-06-15 DIAGNOSIS — I48.91 ATRIAL FIBRILLATION, UNSPECIFIED TYPE (HCC): Chronic | ICD-10-CM

## 2021-06-15 PROCEDURE — G9899 SCRN MAM PERF RSLTS DOC: HCPCS | Performed by: INTERNAL MEDICINE

## 2021-06-15 PROCEDURE — G8399 PT W/DXA RESULTS DOCUMENT: HCPCS | Performed by: INTERNAL MEDICINE

## 2021-06-15 PROCEDURE — 3017F COLORECTAL CA SCREEN DOC REV: CPT | Performed by: INTERNAL MEDICINE

## 2021-06-15 PROCEDURE — 1090F PRES/ABSN URINE INCON ASSESS: CPT | Performed by: INTERNAL MEDICINE

## 2021-06-15 PROCEDURE — G8536 NO DOC ELDER MAL SCRN: HCPCS | Performed by: INTERNAL MEDICINE

## 2021-06-15 PROCEDURE — G8428 CUR MEDS NOT DOCUMENT: HCPCS | Performed by: INTERNAL MEDICINE

## 2021-06-15 PROCEDURE — G8432 DEP SCR NOT DOC, RNG: HCPCS | Performed by: INTERNAL MEDICINE

## 2021-06-15 PROCEDURE — 1101F PT FALLS ASSESS-DOCD LE1/YR: CPT | Performed by: INTERNAL MEDICINE

## 2021-06-15 PROCEDURE — 99214 OFFICE O/P EST MOD 30 MIN: CPT | Performed by: INTERNAL MEDICINE

## 2021-06-15 PROCEDURE — 93280 PM DEVICE PROGR EVAL DUAL: CPT | Performed by: INTERNAL MEDICINE

## 2021-06-15 PROCEDURE — G8419 CALC BMI OUT NRM PARAM NOF/U: HCPCS | Performed by: INTERNAL MEDICINE

## 2021-06-15 RX ORDER — EZETIMIBE 10 MG/1
TABLET ORAL
Qty: 90 TABLET | Refills: 3 | Status: SHIPPED | OUTPATIENT
Start: 2021-06-15 | End: 2022-08-26

## 2021-06-15 NOTE — PROGRESS NOTES
Ben Males presents today for   Chief Complaint   Patient presents with    Follow-up     overdue follow up     Chest Pain     intermittent sharp left sided     Shortness of Breath     exertional     Leg Swelling     mild       Jay Moarles preferred language for health care discussion is english/other. Is someone accompanying this pt? no    Is the patient using any DME equipment during [address]? no    Depression Screening:  3 most recent PHQ Screens 9/29/2020   PHQ Not Done -   Little interest or pleasure in doing things Several days   Feeling down, depressed, irritable, or hopeless Several days   Total Score PHQ 2 2   Trouble falling or staying asleep, or sleeping too much -   Feeling tired or having little energy -   Poor appetite, weight loss, or overeating -   Feeling bad about yourself - or that you are a failure or have let yourself or your family down -   Trouble concentrating on things such as school, work, reading, or watching TV -   Moving or speaking so slowly that other people could have noticed; or the opposite being so fidgety that others notice -   Thoughts of being better off dead, or hurting yourself in some way -   PHQ 9 Score -   How difficult have these problems made it for you to do your work, take care of your home and get along with others -       Learning Assessment:  Learning Assessment 3/27/2019   PRIMARY LEARNER Patient   HIGHEST LEVEL OF EDUCATION - PRIMARY LEARNER  SOME COLLEGE   BARRIERS PRIMARY LEARNER NONE   CO-LEARNER CAREGIVER No   PRIMARY LANGUAGE ENGLISH   LEARNER PREFERENCE PRIMARY READING     -   ANSWERED BY Patient   RELATIONSHIP SELF       Abuse Screening:  Abuse Screening Questionnaire 3/27/2019   Do you ever feel afraid of your partner? N   Are you in a relationship with someone who physically or mentally threatens you? N   Is it safe for you to go home? Y       Fall Risk  Fall Risk Assessment, last 12 mths 9/29/2020   Able to walk? Yes   Fall in past 12 months? No   Number of falls in past 12 months -   Fall with injury? -       Pt currently taking Anticoagulant therapy? ASA 81mg every day     Coordination of Care:  1. Have you been to the ER, urgent care clinic since your last visit? Hospitalized since your last visit? no    2. Have you seen or consulted any other health care providers outside of the [address] [place] [address] since your last visit? Include any pap smears or colon screening.  no

## 2021-06-15 NOTE — PROGRESS NOTES
[facility]    HPI:   Ms. Chandana Carter is a 73 y.o. woman with dyslipidemia. She does not have obstructive atherosclerotic disease as evaluated by cardiac catheterization in  November 2015. Her anatomy is as follows:    HEMODYNAMICS:  LM - normal  LAD - normal  D1 - normal  Cx - normal  OM1 - normal  OM2 - normal  LPDA - patent  RCA - normal    LEFT:  RA - 6 mmHg  RV - 44/6 mmHg  PA - 38/14 mmHg  Wedge - 16 mmHg  CO / CI (DAVIAN) - 4.15 / 2.27    She has a history of rheumatic heart disease complicated by moderate to severe aortic stenosis and moderate mitral stenosis. She had aortic and mitral valve replacements in February of 2016 with the following: Aortic valve - 21 mm MAGNA EASE pericardial bioprosthesis  Mitral valve -  25 mm MAGNA EASE pericardial bioprosthesis    She has paroxysmal atrial fibrillation / flutter complicated by an embolic event manifesting as amaurosis fugax. She had surgical left sided cryoablation and atrial appendage resection in February of 2016. This occurred at the time of her aortic and mitral valve replacement. Her surgical course was notable for postoperative ventricular asystole requiring permanent pacemaker implantation in 2016. Ms. Chandana Carter is here today for follow up appointment after 12 months. She has undergone intracranial coiling for aneurysm without any complication. Patient has been expensing some shortness of breath with exertion and occasional lower extremity swelling. She also has been noting some palpitation on and off. Happens probably couple times a week lasting for less than a minute.     Past Medical History:   Diagnosis Date    Advance directive in chart     6/28/2016    Amaurosis fugax     Aortic stenosis     mean gradient  26.5 mmHg (CATH 4/13), 32 mmHg (ECHO 9/15)    Aortic valve replacement     21mm MAGNA EASE pericardial  2/16    Arthritis     Atrial appendage resection     2/16      Atrial fibrillation     CHADS score 2  (-CHF, -HTN, -AGE, -DM +AMAUROSIS FUGAX)    Atrial fibrillation ablation     surgical left sided cryoablation  2/16    Cataract     OS    Cervical dysplasia     2009   post leep and cone    COPD     mild  2/16    Duodenal AVM     argon plasma coagulation 2/16    Dyslipidemia     Fibroids     Glaucoma     Lung nodule     3/19/2012   stable    Mitral stenosis     mean gradient  6 mmHg (CATH 4/13), 7.6 mmHg (MANUEL 10/15)    Mitral valve replacement     25 mm MAGNA EASE pericardial  2/16    Pacemaker     dual chamber MEDTRONIC 2/16    Post-op ventricular asystole     02/16    Remote tobacco     Sleep apnea     no CPAP    Thyroid nodule     3/20/2013   multiple    TIA (transient ischemic attack)        Past Surgical History:   Procedure Laterality Date    COLONOSCOPY N/A 1/23/2017    COLONOSCOPY performed [facility] ENDOSCOPY    HX AFIB ABLATION      2/16  surgical cryoablation    HX AORTIC VALVE REPLACEMENT      2/16    HX BREAST BIOPSY Right 10/13/2014    Right breast needle [facility]OC biopsy performed by Santy Call MD at [address] HX BREAST LUMPECTOMY      Right    HX BUNIONECTOMY      left    HX GYN      VAINIII, VELASQUEZ 1, keratinous labial cyst    HX GYN      2012  Cold knife conization of cervix    HX LEEP PROCEDURE      2010    HX MITRAL VALVE REPLACEMENT      2/16    HX OTHER SURGICAL      lump left neck, benign    HX OTHER SURGICAL      multiple breast aspirations    HX PACEMAKER      I&D OF VULVA/PERINEUM ABSCESS  3/21/2017            Current Outpatient Medications   Medication Sig    mirabegron ER (Myrbetriq) 25 mg ER tablet TAKE 1 TABLET EVERY DAY    aspirin delayed-release 81 mg tablet take 2 tablets by mouth once daily    lidocaine (Lidoderm) 5 % Apply patch to the affected area for 12 hours a day and remove for 12 hours a day.     ezetimibe (ZETIA) 10 mg tablet TAKE 1 TABLET EVERY DAY    prenatal vit-calcium-iron-fa (PrePlus) 27 mg iron- 1 mg tab TAKE 1 TABLET EVERY DAY    gabapentin (NEURONTIN) 100 mg capsule Take 1 Cap by mouth nightly. Max Daily Amount: 100 mg.    omeprazole (PRILOSEC) 20 mg capsule TAKE 1 CAPSULE TWICE DAILY    Blood-Glucose Meter (Accu-Chek Holly Plus Meter) misc Test blood glucose daily    glucose blood VI test strips (CONTOUR NEXT TEST STRIPS) strip Test blood glucose daily    vit B Cmplx 3-FA-Vit C-Biotin (NEPHRO CANDACE RX) 1- mg-mg-mcg tablet Take 1 Tab by mouth daily.  meclizine (ANTIVERT) 12.5 mg tablet Take 1-2 Tabs by mouth three (3) times daily as needed for Dizziness.  prednisoLONE acetate (PRED FORTE) 1 % ophthalmic suspension Administer 1 Drop to both eyes four (4) times daily.  ketorolac (ACULAR LS) 0.4 % drop Administer 1 Drop to both eyes four (4) times daily.  ferrous sulfate 325 mg (65 mg iron) tablet take 1 tablet by mouth three times a day with food (Patient taking differently: two (2) times a day.)    naproxen (NAPROSYN) 500 mg tablet Take 1 Tab by mouth two (2) times daily (with meals). No current facility-administered medications for this visit. Allergies   Allergen Reactions    Contrast Dye [Iodine] Itching and Swelling    Iodinated Contrast Media Itching and Swelling    Seafood Itching and Swelling     LOBSTER ONLY    Statins-Hmg-Coa Reductase Inhibitors Myalgia and Other (comments)     Myalgia    Sulfa (Sulfonamide Antibiotics) Itching and Swelling       Family History:   Non contributory for premature coronary disease in first degree relatives (female <65, male <55). Social History:   She  reports that she quit smoking about 17 years ago. She quit after 37.00 years of use. She has never used smokeless tobacco.  She  reports no history of alcohol use. Physical:   Vitals:   BP: 112/80  HR: 77  Wt: 74.8 kg (165 lb)    Exam:   General: Middle aged woman, no complaints.     Head/Neck: No JVD    No bruits. No edema  Lungs:  No respiratory distress.     Clear with good air movement. Chest:  Keloid scar  Heart:  Regular rate and rhythm. No significant murmur. no rubs or gallops. Abdomen: Bowel sounds present  Extremities: Intact pulses. No edema.      Review of Data:   LABS:   Lab Results   Component Value Date/Time    WBC 7.4 02/25/2020 09:16 PM    Hemoglobin (POC) 9.8 02/22/2016 02:39 PM    Hemoglobin, POC 8.8 (L) 02/10/2016 11:18 AM    HGB 15.1 02/25/2020 09:16 PM    Hematocrit, POC 26 (L) 02/10/2016 11:18 AM    HCT 46.2 (H) 02/25/2020 09:16 PM    PLATELET 939 22/05/6691 09:16 PM    MCV 93.7 02/25/2020 09:16 PM     Lab Results   Component Value Date/Time    Sodium 139 02/25/2020 09:16 PM    Potassium 3.7 02/25/2020 09:16 PM    Chloride 105 02/25/2020 09:16 PM    CO2 29 02/25/2020 09:16 PM    Anion gap 5 02/25/2020 09:16 PM    Glucose 109 (H) 02/25/2020 09:16 PM    BUN 14 02/25/2020 09:16 PM    Creatinine 0.95 02/25/2020 09:16 PM    BUN/Creatinine ratio 15 02/25/2020 09:16 PM    GFR est AA >60 02/25/2020 09:16 PM    GFR est non-AA 59 (L) 02/25/2020 09:16 PM    Calcium 9.5 02/25/2020 09:16 PM    Bilirubin, total 0.5 02/25/2020 09:16 PM    Alk.  phosphatase 75 02/25/2020 09:16 PM    Protein, total 8.1 02/25/2020 09:16 PM    Albumin 4.0 02/25/2020 09:16 PM    Globulin 4.1 (H) 02/25/2020 09:16 PM    A-G Ratio 1.0 02/25/2020 09:16 PM    ALT (SGPT) 36 02/25/2020 09:16 PM     Lab Results   Component Value Date/Time    Cholesterol, total 254 (H) 09/20/2019 03:02 PM    HDL Cholesterol 53 09/20/2019 03:02 PM    LDL, calculated 156.2 (H) 09/20/2019 03:02 PM    VLDL, calculated 44.8 09/20/2019 03:02 PM    Triglyceride 224 (H) 09/20/2019 03:02 PM    CHOL/HDL Ratio 4.8 09/20/2019 03:02 PM     Lab Results   Component Value Date/Time    Hemoglobin A1c 6.2 (H) 01/22/2020 10:53 AM    Hemoglobin A1c (POC) 6.0 03/26/2019 02:51 PM     Lab Results   Component Value Date/Time    TSH 1.210 03/27/2019 11:01 AM    Triiodothyronine (T3), free 3.0 05/09/2017 10:04 AM    T4, Free 0.99 12/05/2017 09:49 AM 10 YEAR CVD RISK:   9.1 %    Age    62 yo    Race    AA     Gender   Female    Total cholesterol  272 mg/dL    HDL    71 mg/dL    SBP    134 mmHg    BP meds   No    Diabetes   No    Tobacco   No    EKG: August 2016:  100% AV paced 80 bpm.  (09/17) Sinus rhythm. Intermittent Atrial paced beats. ECHO: 09/17  Left ventricle: Systolic function was normal. Ejection fraction was estimated   in the range of 55 % to 60 %. There were no regional wall motion abnormalities. The study was not technically sufficient to allow evaluation of LV diastolic function. Right ventricle: The ventricle was dilated. Systolic function was normal. A   pacing wire was present. Left atrium: The atrium was dilated. Atrial septum: There was no evidence of intracardiac shunt by   peripherally-administered agitated saline contrast.  Right atrium: The atrium was mildly dilated. Mitral valve: A bioprosthesis was present. It exhibited normal function. There was no evidence for stenosis. There was mild regurgitation. Mean transmitral gradient was 4 mmHg. Aortic valve: A bioprosthesis was present. It exhibited normal function. STRESS TEST (EST, PHARM, NUC, ECHO etc): March 2011:  1. NORMAL SCAN. 2. NORMAL GATED ACQUISITION WITH AN EJECTION FRACTION OF 75%. CATHETERIZATION: November 2015:  HEMODYNAMICS:  LM - normal  LAD - normal  D1 - normal  Cx - normal  OM1 - normal  OM2 - normal  LPDA - patent  RCA - normal    LEFT:  RA - 6 mmHg  RV - 44/6 mmHg  PA - 38/14 mmHg  Wedge - 16 mmHg  CO / CI (DAVIAN) - 4.15 / 2.27      Impression / Plan:     Dyslipidemia    Rheumatic heart disease    Atrial fibrillation    Post-op ventricular asystole     Rheumatic heart disease:    Ms. Christel Valentine has rheumatic heart disease, which was complicated by moderate to severe aortic stenosis and moderate mitral stenosis. She has undergone bioprosthetic aortic and mitral valve replacement in February, 2016.   Last Echocardiogram was performed in 09/17 and valve function appeared to be ok as mentioned above. Continue  mg daily  Recent shortness of breath, would like to make sure valvular functions are still within acceptable range. We will proceed with echocardiogram to evaluate valves    Hyperlipidemia:    Currently she is only on monotherapy with Zetia. She had tried Atorvastatin and simvastatin in past but had to stop it because of significant myalgia. Also thinks she tried other statins as well, does not remember the name. She has significant hyperlipidemia, not at goal despite being on Zetia. She has a last LDL on file of 160 in March 2017. Repeat lipid profile showed LDL of 158 in 2019. We discussed about PCSK9 inhibitor in the past several times and she had elected to pursue only dietary modification. Atrial fibrillation: This is paroxysmal in nature. 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  She had a permanent pacemaker paced with complete heart block at the time of her valvular surgery. 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 In addition, she has history of vaginal bleed reportedly due to fibroids in past.  She also had left sided surgical cryoablation and left atrial appendage resection, so she is only on  mg daily. Continue same. Lately has been having some palpitation. Will place event monitor to make sure A. fib is not returning    Pacemaker:  Ms. Sandhya Carney had postoperative ventricular asystole requiring pacemaker implantation in July, 2016. Currently she has no symptoms and issues with pacemaker. Continue with routine device check. Other than the above, or unless any additional issues arise, she should follow up in 6 months.

## 2021-06-15 NOTE — LETTER
6/15/2021 Patient: Claudell Landsberg YOB: 1950 Date of Visit: 6/15/2021 Amador Mena MD 
[address] [place] [address] [address] [employer] Dear Amador Mena MD, Thank you for referring Ms. Rose Huber to [address] [place] [address] for evaluation. My notes for this consultation are attached. If you have questions, please do not hesitate to call me. I look forward to following your patient along with you. Sincerely, Fredy Ontiveros MD

## 2021-07-14 LAB — HBA1C MFR BLD HPLC: 6.4 %

## 2021-07-26 DIAGNOSIS — I35.0 AORTIC VALVE STENOSIS, UNSPECIFIED ETIOLOGY: Chronic | ICD-10-CM

## 2021-07-26 DIAGNOSIS — M19.90 ARTHRITIS: ICD-10-CM

## 2021-07-26 DIAGNOSIS — R13.19 ESOPHAGEAL DYSPHAGIA: ICD-10-CM

## 2021-07-26 DIAGNOSIS — L30.9 ECZEMA, UNSPECIFIED TYPE: ICD-10-CM

## 2021-07-26 DIAGNOSIS — K25.4 GASTROINTESTINAL HEMORRHAGE ASSOCIATED WITH GASTRIC ULCER: ICD-10-CM

## 2021-07-26 DIAGNOSIS — E78.5 DYSLIPIDEMIA: ICD-10-CM

## 2021-07-26 DIAGNOSIS — I48.91 ATRIAL FIBRILLATION, UNSPECIFIED TYPE (HCC): Chronic | ICD-10-CM

## 2021-07-26 DIAGNOSIS — I09.9 RHEUMATIC HEART DISEASE: ICD-10-CM

## 2021-07-27 ENCOUNTER — TELEPHONE (OUTPATIENT)
Dept: CARDIOLOGY CLINIC | Age: 71
End: 2021-07-27

## 2021-07-27 RX ORDER — OMEPRAZOLE 20 MG/1
CAPSULE, DELAYED RELEASE ORAL
Qty: 180 CAPSULE | Refills: 4 | Status: SHIPPED | OUTPATIENT
Start: 2021-07-27 | End: 2022-08-29

## 2021-07-27 NOTE — TELEPHONE ENCOUNTER
----- Message from Marlene Pantoja MD sent at 7/21/2021  3:06 PM EDT -----  Cardiac testing appears stable and unchanged  Inform patient please    Thanks  SP

## 2021-08-09 ENCOUNTER — PATIENT OUTREACH (OUTPATIENT)
Dept: CASE MANAGEMENT | Age: 71
End: 2021-08-09

## 2021-08-09 NOTE — PROGRESS NOTES
Complex Case Management      Date/Time:  8/9/2021 3:28 PM     Attempted to reach patient by telephone. Left HIPPA compliant message requesting a return call. Will attempt to reach patient at a later time.

## 2021-08-09 NOTE — PROGRESS NOTES
I have personally seen and evaluated the device findings. Interrogation reviewed and I agree with assessment.     Noe Avalos

## 2021-08-10 ENCOUNTER — PATIENT OUTREACH (OUTPATIENT)
Dept: CASE MANAGEMENT | Age: 71
End: 2021-08-10

## 2021-08-10 NOTE — PROGRESS NOTES
Complex Case Management      Date/Time:  8/10/2021 3:49 PM    Method of communication with patient:phone    2018 [facility] contacted the patient by telephone to perform Ambulatory Care Coordination. Verified name and  (PHI) with patient as identifiers. Provided introduction to self, and explanation of the Ambulatory Care Manager's role. Patient declined CCM.

## 2021-08-26 DIAGNOSIS — I48.91 ATRIAL FIBRILLATION, UNSPECIFIED TYPE (HCC): Chronic | ICD-10-CM

## 2021-09-16 ENCOUNTER — OFFICE VISIT (OUTPATIENT)
Dept: CARDIOLOGY CLINIC | Age: 71
End: 2021-09-16
Payer: MEDICARE

## 2021-09-16 DIAGNOSIS — I48.91 ATRIAL FIBRILLATION, UNSPECIFIED TYPE (HCC): Primary | ICD-10-CM

## 2021-09-16 PROCEDURE — 1090F PRES/ABSN URINE INCON ASSESS: CPT | Performed by: INTERNAL MEDICINE

## 2021-09-16 PROCEDURE — G8399 PT W/DXA RESULTS DOCUMENT: HCPCS | Performed by: INTERNAL MEDICINE

## 2021-09-16 PROCEDURE — G8419 CALC BMI OUT NRM PARAM NOF/U: HCPCS | Performed by: INTERNAL MEDICINE

## 2021-09-16 PROCEDURE — G8428 CUR MEDS NOT DOCUMENT: HCPCS | Performed by: INTERNAL MEDICINE

## 2021-09-16 PROCEDURE — G8510 SCR DEP NEG, NO PLAN REQD: HCPCS | Performed by: INTERNAL MEDICINE

## 2021-09-16 PROCEDURE — 3017F COLORECTAL CA SCREEN DOC REV: CPT | Performed by: INTERNAL MEDICINE

## 2021-09-16 PROCEDURE — 1101F PT FALLS ASSESS-DOCD LE1/YR: CPT | Performed by: INTERNAL MEDICINE

## 2021-09-16 PROCEDURE — G9899 SCRN MAM PERF RSLTS DOC: HCPCS | Performed by: INTERNAL MEDICINE

## 2021-09-16 PROCEDURE — 99213 OFFICE O/P EST LOW 20 MIN: CPT | Performed by: INTERNAL MEDICINE

## 2021-09-16 PROCEDURE — G8536 NO DOC ELDER MAL SCRN: HCPCS | Performed by: INTERNAL MEDICINE

## 2021-09-16 NOTE — PROGRESS NOTES
[facility]    HPI:   Ms. Dalila Bradshaw is a 73 y.o. woman with dyslipidemia. She does not have obstructive atherosclerotic disease as evaluated by cardiac catheterization in  November 2015. Her anatomy is as follows:    HEMODYNAMICS:  LM - normal  LAD - normal  D1 - normal  Cx - normal  OM1 - normal  OM2 - normal  LPDA - patent  RCA - normal    LEFT:  RA - 6 mmHg  RV - 44/6 mmHg  PA - 38/14 mmHg  Wedge - 16 mmHg  CO / CI (DAVIAN) - 4.15 / 2.27    She has a history of rheumatic heart disease complicated by moderate to severe aortic stenosis and moderate mitral stenosis. She had aortic and mitral valve replacements in February of 2016 with the following: Aortic valve - 21 mm MAGNA EASE pericardial bioprosthesis  Mitral valve -  25 mm MAGNA EASE pericardial bioprosthesis    She has paroxysmal atrial fibrillation / flutter complicated by an embolic event manifesting as amaurosis fugax. She had surgical left sided cryoablation and atrial appendage resection in February of 2016. This occurred at the time of her aortic and mitral valve replacement. Her surgical course was notable for postoperative ventricular asystole requiring permanent pacemaker implantation in 2016. Ms. Dalila Bradshaw is here today for follow up appointment  She has baseline mild dyspnea on moderate exertion. She underwent echocardiogram and event monitor placement since last visit.   Overall no other angina or heart failure symptoms    Past Medical History:   Diagnosis Date    Advance directive in chart     6/28/2016    Amaurosis fugax     Aortic stenosis     mean gradient  26.5 mmHg (CATH 4/13), 32 mmHg (ECHO 9/15)    Aortic valve replacement     21mm MAGNA EASE pericardial  2/16    Arthritis     Atrial appendage resection     2/16      Atrial fibrillation     Atrial fibrillation ablation     surgical left sided cryoablation  2/16    Cataract     OS    Cervical dysplasia     2009   post leep and cone    COPD     mild  2/16    Duodenal AVM     argon plasma coagulation 2/16    Dyslipidemia     Fibroids     Glaucoma     Intracranial aneurysm     S/P Coiling (2020)    Intracranial aneurysm     S/P Coiling in 2020 at [facility]    Lung nodule     3/19/2012   stable    Mitral stenosis     mean gradient  6 mmHg (CATH 4/13), 7.6 mmHg (MANUEL 10/15)    Mitral valve replacement     25 mm MAGNA EASE pericardial  2/16    Pacemaker     dual chamber MEDTRONIC 2/16    Post-op ventricular asystole     02/16    Remote tobacco     Sleep apnea     no CPAP    Thyroid nodule     3/20/2013   multiple    TIA (transient ischemic attack)        Past Surgical History:   Procedure Laterality Date    COLONOSCOPY N/A 1/23/2017    COLONOSCOPY performed [facility] Betina Prado MD at [address] HX AFIB ABLATION      2/16  surgical cryoablation    HX AORTIC VALVE REPLACEMENT      2/16    HX BREAST BIOPSY Right 10/13/2014    Right breast needle [facility]OC biopsy performed by Renata Solomon MD at [address] [place] [address] HX BREAST LUMPECTOMY      Right    HX BUNIONECTOMY      left    HX GYN      VAINIII, VELASQUEZ 1, keratinous labial cyst    HX GYN      2012  Cold knife conization of cervix    HX LEEP PROCEDURE      2010    HX MITRAL VALVE REPLACEMENT      2/16    HX OTHER SURGICAL      lump left neck, benign    HX OTHER SURGICAL      multiple breast aspirations    HX PACEMAKER      I&D OF VULVA/PERINEUM ABSCESS  3/21/2017            Current Outpatient Medications   Medication Sig    omeprazole (PRILOSEC) 20 mg capsule TAKE 1 CAPSULE TWICE DAILY    ezetimibe (ZETIA) 10 mg tablet TAKE 1 TABLET EVERY DAY    mirabegron ER (Myrbetriq) 25 mg ER tablet TAKE 1 TABLET EVERY DAY    aspirin delayed-release 81 mg tablet take 2 tablets by mouth once daily    naproxen (NAPROSYN) 500 mg tablet Take 1 Tab by mouth two (2) times daily (with meals).  lidocaine (Lidoderm) 5 % Apply patch to the affected area for 12 hours a day and remove for 12 hours a day.     prenatal vit-calcium-iron-fa (PrePlus) 27 mg iron- 1 mg tab TAKE 1 TABLET EVERY DAY    gabapentin (NEURONTIN) 100 mg capsule Take 1 Cap by mouth nightly. Max Daily Amount: 100 mg.  Blood-Glucose Meter (Accu-Chek Holly Plus Meter) misc Test blood glucose daily    glucose blood VI test strips (CONTOUR NEXT TEST STRIPS) strip Test blood glucose daily    vit B Cmplx 3-FA-Vit C-Biotin (NEPHRO CANDACE RX) 1- mg-mg-mcg tablet Take 1 Tab by mouth daily.  meclizine (ANTIVERT) 12.5 mg tablet Take 1-2 Tabs by mouth three (3) times daily as needed for Dizziness.  prednisoLONE acetate (PRED FORTE) 1 % ophthalmic suspension Administer 1 Drop to both eyes four (4) times daily.  ketorolac (ACULAR LS) 0.4 % drop Administer 1 Drop to both eyes four (4) times daily.  ferrous sulfate 325 mg (65 mg iron) tablet take 1 tablet by mouth three times a day with food (Patient taking differently: two (2) times a day.)     No current facility-administered medications for this visit. Allergies   Allergen Reactions    Contrast Dye [Iodine] Itching and Swelling    Iodinated Contrast Media Itching and Swelling    Seafood Itching and Swelling     LOBSTER ONLY    Statins-Hmg-Coa Reductase Inhibitors Myalgia and Other (comments)     Myalgia    Sulfa (Sulfonamide Antibiotics) Itching and Swelling       Family History:   Non contributory for premature coronary disease in first degree relatives (female <65, male <55). Social History:   She  reports that she quit smoking about 17 years ago. She quit after 37.00 years of use. She has never used smokeless tobacco.  She  reports no history of alcohol use. Physical:   Vitals:   BP:    HR:    Wt:      Exam:   General: Middle aged woman, no complaints.     Head/Neck: No JVD    No bruits. No edema  Lungs:  No respiratory distress. Clear with good air movement. Chest:  Keloid scar  Heart:  Regular rate and rhythm. No significant murmur. no rubs or gallops. Abdomen:  Bowel sounds present  Extremities: Intact pulses. No edema.      Review of Data:   LABS:   Lab Results   Component Value Date/Time    WBC 7.4 02/25/2020 09:16 PM    Hemoglobin (POC) 9.8 02/22/2016 02:39 PM    Hemoglobin, POC 8.8 (L) 02/10/2016 11:18 AM    HGB 15.1 02/25/2020 09:16 PM    Hematocrit, POC 26 (L) 02/10/2016 11:18 AM    HCT 46.2 (H) 02/25/2020 09:16 PM    PLATELET 961 96/68/8384 09:16 PM    MCV 93.7 02/25/2020 09:16 PM     Lab Results   Component Value Date/Time    Sodium 139 02/25/2020 09:16 PM    Potassium 3.7 02/25/2020 09:16 PM    Chloride 105 02/25/2020 09:16 PM    CO2 29 02/25/2020 09:16 PM    Anion gap 5 02/25/2020 09:16 PM    Glucose 109 (H) 02/25/2020 09:16 PM    BUN 14 02/25/2020 09:16 PM    Creatinine 0.95 02/25/2020 09:16 PM    BUN/Creatinine ratio 15 02/25/2020 09:16 PM    GFR est AA >60 02/25/2020 09:16 PM    GFR est non-AA 59 (L) 02/25/2020 09:16 PM    Calcium 9.5 02/25/2020 09:16 PM    Bilirubin, total 0.5 02/25/2020 09:16 PM    Alk.  phosphatase 75 02/25/2020 09:16 PM    Protein, total 8.1 02/25/2020 09:16 PM    Albumin 4.0 02/25/2020 09:16 PM    Globulin 4.1 (H) 02/25/2020 09:16 PM    A-G Ratio 1.0 02/25/2020 09:16 PM    ALT (SGPT) 36 02/25/2020 09:16 PM     Lab Results   Component Value Date/Time    Cholesterol, total 254 (H) 09/20/2019 03:02 PM    HDL Cholesterol 53 09/20/2019 03:02 PM    LDL, calculated 156.2 (H) 09/20/2019 03:02 PM    VLDL, calculated 44.8 09/20/2019 03:02 PM    Triglyceride 224 (H) 09/20/2019 03:02 PM    CHOL/HDL Ratio 4.8 09/20/2019 03:02 PM     Lab Results   Component Value Date/Time    Hemoglobin A1c 6.2 (H) 01/22/2020 10:53 AM    Hemoglobin A1c (POC) 6.4 07/14/2021 12:00 AM     Lab Results   Component Value Date/Time    TSH 1.210 03/27/2019 11:01 AM    Triiodothyronine (T3), free 3.0 05/09/2017 10:04 AM    T4, Free 0.99 12/05/2017 09:49 AM     10 YEAR CVD RISK:   9.1 %    Age    62 yo    Race    AA     Gender   Female    Total cholesterol  272 mg/dL    HDL    71 mg/dL    SBP    134 mmHg    BP meds   No    Diabetes   No    Tobacco   No    EKG: August 2016:  100% AV paced 80 bpm.  (09/17) Sinus rhythm. Intermittent Atrial paced beats. ECHO ADULT COMPLETE 07/20/2021  · LV: Estimated LVEF is 55 - 60%. Visually measured ejection fraction. Normal cavity size, wall thickness and systolic function (ejection fraction normal). Wall motion: normal.  · RV: Mildly dilated right ventricle. Borderline low systolic function. Pacing wire present  · LA: Moderately dilated left atrium. Left Atrium volume index is 57.33 mL/m2. · AV: Prosthetic aortic valve. Aortic valve mean gradient is 9 mmHg. Aortic valve area is 1.5 cm2. There is a 21 mm Lawrence bioprosthetic aortic valve. · MV: Mitral valve is prosthetic. Mitral valve mean gradient is 4.9 mmHg. Mild to moderate mitral valve regurgitation is present. There is a 25 mm Lawrence bioprosthetic mitral valve. · TV: Moderate tricuspid valve regurgitation is present. · PV: Mild to moderate pulmonic valve regurgitation is present. · PA: Mild to moderate pulmonary hypertension. Pulmonary arterial systolic pressure is 45 mmHg. STRESS TEST (EST, PHARM, NUC, ECHO etc): March 2011:  1. NORMAL SCAN. 2. NORMAL GATED ACQUISITION WITH AN EJECTION FRACTION OF 75%. CATHETERIZATION: November 2015:  HEMODYNAMICS:  LM - normal  LAD - normal  D1 - normal  Cx - normal  OM1 - normal  OM2 - normal  LPDA - patent  RCA - normal    LEFT:  RA - 6 mmHg  RV - 44/6 mmHg  PA - 38/14 mmHg  Wedge - 16 mmHg  CO / CI (DAVIAN) - 4.15 / 2.27    Impression / Plan:     Dyslipidemia    Rheumatic heart disease    Atrial fibrillation    Post-op ventricular asystole     Rheumatic heart disease:    Ms. Karlie Khan has rheumatic heart disease, which was complicated by moderate to severe aortic stenosis and moderate mitral stenosis. She has undergone bioprosthetic aortic and mitral valve replacement in February, 2016.   Last Echocardiogram was performed in 09/17 and valve function appeared to be ok as mentioned above. Echo in 07/2021 with preserved function for aortic and mitral valve as mentioned above in detail  Continue  mg daily    Hyperlipidemia:    Currently she is only on monotherapy with Zetia. She had tried Atorvastatin and simvastatin in past but had to stop it because of significant myalgia. Also thinks she tried other statins as well, does not remember the name. She has significant hyperlipidemia, not at goal despite being on Zetia. She has a last LDL on file of 160 in March 2017. She was prescribed rosuvastatin and coenzyme co-Q10 by [facility] however she has not started it. I discussed that she should consider a trial of statin again. We also discussed PCSK9 inhibitor again today during the visit however she is refusing to consider PCSK9 inhibitor    Atrial fibrillation: This is paroxysmal in nature. 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  She had a permanent pacemaker paced with complete heart block at the time of her valvular surgery. 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 In addition, she has history of vaginal bleed reportedly due to fibroids in past.  She also had left sided surgical cryoablation and left atrial appendage resection, so she is only on  mg daily. Continue same. Event monitor in 08/2021 showed sinus rhythm. No episode of atrial fibrillation    Pacemaker:  Ms. Natasha Shellye had postoperative ventricular asystole requiring pacemaker implantation in July, 2016. Currently she has no symptoms and issues with pacemaker. Continue with routine device check. Other than the above, or unless any additional issues arise, she should follow up in 6 months.

## 2021-09-16 NOTE — LETTER
9/16/2021    Patient: Jacob Schroeder   YOB: 1950   Date of Visit: 9/16/2021     Rolando Aguiar MD  [address] [address]  Via Fax: [phone]    Dear Rolando Aguiar MD,      Thank you for referring Ms. Sana Mcgarry to [address] [place] [address] for evaluation. My notes for this consultation are attached. If you have questions, please do not hesitate to call me. I look forward to following your patient along with you.       Sincerely,    Yolie aFrias MD

## 2021-09-16 NOTE — PROGRESS NOTES
Radha Roberts presents today for   Chief Complaint   Patient presents with    Follow-up     3m       Radha Roberts preferred language for health care discussion is english/other. Personal Protective Equipment:   Personal Protective Equipment was used including: mask-surgical and hands-gloves. Patient was placed on no precaution(s). Patient was masked. Precautions:   Patient currently on None  Patient currently roomed with door closed    Is someone accompanying this pt? no    Is the patient using any DME equipment during [address]? no    Depression Screening:  3 most recent PHQ Screens 9/16/2021   PHQ Not Done -   Little interest or pleasure in doing things Not at all   Feeling down, depressed, irritable, or hopeless Not at all   Total Score PHQ 2 0   Trouble falling or staying asleep, or sleeping too much -   Feeling tired or having little energy -   Poor appetite, weight loss, or overeating -   Feeling bad about yourself - or that you are a failure or have let yourself or your family down -   Trouble concentrating on things such as school, work, reading, or watching TV -   Moving or speaking so slowly that other people could have noticed; or the opposite being so fidgety that others notice -   Thoughts of being better off dead, or hurting yourself in some way -   PHQ 9 Score -   How difficult have these problems made it for you to do your work, take care of your home and get along with others -       Learning Assessment:  Learning Assessment 3/27/2019   PRIMARY LEARNER Patient   HIGHEST LEVEL OF EDUCATION - PRIMARY LEARNER  SOME COLLEGE   BARRIERS PRIMARY LEARNER NONE   CO-LEARNER CAREGIVER No   PRIMARY LANGUAGE ENGLISH   LEARNER PREFERENCE PRIMARY READING     -   ANSWERED BY Patient   RELATIONSHIP SELF       Abuse Screening:  Abuse Screening Questionnaire 3/27/2019   Do you ever feel afraid of your partner? N   Are you in a relationship with someone who physically or mentally threatens you?  N   Is it safe for you to go home? Y       Fall Risk  Fall Risk Assessment, last 12 mths 9/16/2021   Able to walk? Yes   Fall in past 12 months? 0   Do you feel unsteady? 0   Are you worried about falling 0   Number of falls in past 12 months -   Fall with injury? -       Pt currently taking Anticoagulant therapy? no    Coordination of Care:  1. Have you been to the ER, urgent care clinic since your last visit? Hospitalized since your last visit? no    2. Have you seen or consulted any other health care providers outside of the [address] [place] [address] since your last visit? Include any pap smears or colon screening.  no

## 2021-09-25 DIAGNOSIS — I48.91 ATRIAL FIBRILLATION, UNSPECIFIED TYPE (HCC): Chronic | ICD-10-CM

## 2021-09-25 DIAGNOSIS — K25.4 GASTROINTESTINAL HEMORRHAGE ASSOCIATED WITH GASTRIC ULCER: ICD-10-CM

## 2021-09-25 DIAGNOSIS — I35.0 AORTIC VALVE STENOSIS, UNSPECIFIED ETIOLOGY: Chronic | ICD-10-CM

## 2021-09-25 DIAGNOSIS — E78.5 DYSLIPIDEMIA: ICD-10-CM

## 2021-09-25 DIAGNOSIS — L30.9 ECZEMA, UNSPECIFIED TYPE: ICD-10-CM

## 2021-09-25 DIAGNOSIS — I09.9 RHEUMATIC HEART DISEASE: ICD-10-CM

## 2021-09-25 DIAGNOSIS — M19.90 ARTHRITIS: ICD-10-CM

## 2021-09-25 DIAGNOSIS — R13.19 ESOPHAGEAL DYSPHAGIA: ICD-10-CM

## 2021-09-30 RX ORDER — PNV,CALCIUM 72/IRON/FOLIC ACID 27 MG-1 MG
TABLET ORAL
Qty: 90 TABLET | Refills: 3 | Status: SHIPPED | OUTPATIENT
Start: 2021-09-30 | End: 2022-06-08

## 2021-11-16 ENCOUNTER — OFFICE VISIT (OUTPATIENT)
Dept: CARDIOLOGY CLINIC | Age: 71
End: 2021-11-16
Payer: MEDICARE

## 2021-11-16 ENCOUNTER — CLINICAL SUPPORT (OUTPATIENT)
Dept: CARDIOLOGY CLINIC | Age: 71
End: 2021-11-16

## 2021-11-16 VITALS
RESPIRATION RATE: 16 BRPM | TEMPERATURE: 98.1 F | BODY MASS INDEX: 28.49 KG/M2 | SYSTOLIC BLOOD PRESSURE: 118 MMHG | DIASTOLIC BLOOD PRESSURE: 89 MMHG | WEIGHT: 166 LBS | HEART RATE: 82 BPM | OXYGEN SATURATION: 100 %

## 2021-11-16 DIAGNOSIS — I48.91 ATRIAL FIBRILLATION, UNSPECIFIED TYPE (HCC): Primary | ICD-10-CM

## 2021-11-16 DIAGNOSIS — Z95.0 PACEMAKER: ICD-10-CM

## 2021-11-16 DIAGNOSIS — I35.0 AORTIC VALVE STENOSIS, UNSPECIFIED ETIOLOGY: ICD-10-CM

## 2021-11-16 PROCEDURE — G8428 CUR MEDS NOT DOCUMENT: HCPCS | Performed by: INTERNAL MEDICINE

## 2021-11-16 PROCEDURE — G8432 DEP SCR NOT DOC, RNG: HCPCS | Performed by: INTERNAL MEDICINE

## 2021-11-16 PROCEDURE — G8536 NO DOC ELDER MAL SCRN: HCPCS | Performed by: INTERNAL MEDICINE

## 2021-11-16 PROCEDURE — 99214 OFFICE O/P EST MOD 30 MIN: CPT | Performed by: INTERNAL MEDICINE

## 2021-11-16 PROCEDURE — 1101F PT FALLS ASSESS-DOCD LE1/YR: CPT | Performed by: INTERNAL MEDICINE

## 2021-11-16 PROCEDURE — G8419 CALC BMI OUT NRM PARAM NOF/U: HCPCS | Performed by: INTERNAL MEDICINE

## 2021-11-16 PROCEDURE — 1090F PRES/ABSN URINE INCON ASSESS: CPT | Performed by: INTERNAL MEDICINE

## 2021-11-16 PROCEDURE — G8399 PT W/DXA RESULTS DOCUMENT: HCPCS | Performed by: INTERNAL MEDICINE

## 2021-11-16 PROCEDURE — 3017F COLORECTAL CA SCREEN DOC REV: CPT | Performed by: INTERNAL MEDICINE

## 2021-11-16 PROCEDURE — G9899 SCRN MAM PERF RSLTS DOC: HCPCS | Performed by: INTERNAL MEDICINE

## 2021-11-16 PROCEDURE — 93280 PM DEVICE PROGR EVAL DUAL: CPT | Performed by: INTERNAL MEDICINE

## 2021-11-16 NOTE — LETTER
11/16/2021    Patient: Leo Begum   YOB: 1950   Date of Visit: 11/16/2021     Ivania Womack MD  [address] [place] [address] Dr HendricksO [id number]  Via Fax: [phone]    Dear Ivania Womack MD,      Thank you for referring Ms. Avi Dove to [address] [place] [address] for evaluation. My notes for this consultation are attached. If you have questions, please do not hesitate to call me. I look forward to following your patient along with you.       Sincerely,    Sukh Cuello MD

## 2021-11-16 NOTE — PROGRESS NOTES
[facility]    HPI:   Ms. Selina Chávez is a 73 y.o. woman with dyslipidemia. She does not have obstructive atherosclerotic disease as evaluated by cardiac catheterization in  November 2015. Her anatomy is as follows:    HEMODYNAMICS:  LM - normal  LAD - normal  D1 - normal  Cx - normal  OM1 - normal  OM2 - normal  LPDA - patent  RCA - normal    LEFT:  RA - 6 mmHg  RV - 44/6 mmHg  PA - 38/14 mmHg  Wedge - 16 mmHg  CO / CI (DAVIAN) - 4.15 / 2.27    She has a history of rheumatic heart disease complicated by moderate to severe aortic stenosis and moderate mitral stenosis. She had aortic and mitral valve replacements in February of 2016 with the following: Aortic valve - 21 mm MAGNA EASE pericardial bioprosthesis  Mitral valve -  25 mm MAGNA EASE pericardial bioprosthesis    She has paroxysmal atrial fibrillation / flutter complicated by an embolic event manifesting as amaurosis fugax. She had surgical left sided cryoablation and atrial appendage resection in February of 2016. This occurred at the time of her aortic and mitral valve replacement. Her surgical course was notable for postoperative ventricular asystole requiring permanent pacemaker implantation in 2016. Ms. Selina Chávez is here today for follow up appointment  She has baseline mild dyspnea on moderate exertion. This has been unchanged. She felt couple episode of funny feeling in the chest however she does not believe it was chest pain. Lasting for few seconds  This has resolved.   She is feeling back to her baseline    Past Medical History:   Diagnosis Date    Advance directive in chart     6/28/2016    Amaurosis fugax     Aortic stenosis     mean gradient  26.5 mmHg (CATH 4/13), 32 mmHg (ECHO 9/15)    Aortic valve replacement     21mm MAGNA EASE pericardial  2/16    Arthritis     Atrial appendage resection     2/16      Atrial fibrillation     Atrial fibrillation ablation     surgical left sided cryoablation  2/16    Cataract OS    Cervical dysplasia     2009   post leep and cone    COPD     mild  2/16    Duodenal AVM     argon plasma coagulation 2/16    Dyslipidemia     Fibroids     Glaucoma     Intracranial aneurysm     S/P Coiling (2020)    Intracranial aneurysm     S/P Coiling in 2020 at [facility]    Lung nodule     3/19/2012   stable    Mitral stenosis     mean gradient  6 mmHg (CATH 4/13), 7.6 mmHg (MANUEL 10/15)    Mitral valve replacement     25 mm MAGNA EASE pericardial  2/16    Pacemaker     dual chamber MEDTRONIC 2/16    Post-op ventricular asystole     02/16    Remote tobacco     Sleep apnea     no CPAP    Thyroid nodule     3/20/2013   multiple    TIA (transient ischemic attack)        Past Surgical History:   Procedure Laterality Date    COLONOSCOPY N/A 1/23/2017    COLONOSCOPY performed [facility] Yuriy Farooq MD at [address] HX AFIB ABLATION      2/16  surgical cryoablation    HX AORTIC VALVE REPLACEMENT      2/16    HX BREAST BIOPSY Right 10/13/2014    Right breast needle [facility]OC biopsy performed by Irish Steel MD at [address] [place] [address] HX BREAST LUMPECTOMY      Right    HX BUNIONECTOMY      left    HX GYN      VAINIII, VELASQUEZ 1, keratinous labial cyst    HX GYN      2012  Cold knife conization of cervix    HX LEEP PROCEDURE      2010    HX MITRAL VALVE REPLACEMENT      2/16    HX OTHER SURGICAL      lump left neck, benign    HX OTHER SURGICAL      multiple breast aspirations    HX PACEMAKER      I&D OF VULVA/PERINEUM ABSCESS  3/21/2017            Current Outpatient Medications   Medication Sig    omeprazole (PRILOSEC) 20 mg capsule TAKE 1 CAPSULE TWICE DAILY    ezetimibe (ZETIA) 10 mg tablet TAKE 1 TABLET EVERY DAY    aspirin delayed-release 81 mg tablet take 2 tablets by mouth once daily    PrePlus 27 mg iron- 1 mg tab TAKE 1 TABLET EVERY DAY    mirabegron ER (Myrbetriq) 25 mg ER tablet TAKE 1 TABLET EVERY DAY    naproxen (NAPROSYN) 500 mg tablet Take 1 Tab by mouth two (2) times daily (with meals).  lidocaine (Lidoderm) 5 % Apply patch to the affected area for 12 hours a day and remove for 12 hours a day.  gabapentin (NEURONTIN) 100 mg capsule Take 1 Cap by mouth nightly. Max Daily Amount: 100 mg.  Blood-Glucose Meter (Accu-Chek Holly Plus Meter) misc Test blood glucose daily    glucose blood VI test strips (CONTOUR NEXT TEST STRIPS) strip Test blood glucose daily    vit B Cmplx 3-FA-Vit C-Biotin (NEPHRO CANDACE RX) 1- mg-mg-mcg tablet Take 1 Tab by mouth daily.  meclizine (ANTIVERT) 12.5 mg tablet Take 1-2 Tabs by mouth three (3) times daily as needed for Dizziness.  prednisoLONE acetate (PRED FORTE) 1 % ophthalmic suspension Administer 1 Drop to both eyes four (4) times daily.  ketorolac (ACULAR LS) 0.4 % drop Administer 1 Drop to both eyes four (4) times daily.  ferrous sulfate 325 mg (65 mg iron) tablet take 1 tablet by mouth three times a day with food (Patient taking differently: two (2) times a day.)     No current facility-administered medications for this visit. Allergies   Allergen Reactions    Contrast Dye [Iodine] Itching and Swelling    Iodinated Contrast Media Itching and Swelling    Seafood Itching and Swelling     LOBSTER ONLY    Statins-Hmg-Coa Reductase Inhibitors Myalgia and Other (comments)     Myalgia    Sulfa (Sulfonamide Antibiotics) Itching and Swelling       Family History:   Non contributory for premature coronary disease in first degree relatives (female <65, male <55). Social History:   She  reports that she quit smoking about 17 years ago. She quit after 37.00 years of use. She has never used smokeless tobacco.  She  reports no history of alcohol use. Physical:   Vitals:   BP: 118/89  HR: 82  Wt: 75.3 kg (166 lb)    Exam:   General: Middle aged woman, no complaints.     Head/Neck: No JVD    No bruits. No edema  Lungs:  No respiratory distress. Clear with good air movement.   Chest:  Keloid scar  Heart:  Regular rate and rhythm. No significant murmur. no rubs or gallops. Abdomen: Bowel sounds present  Extremities: Intact pulses. No edema.      Review of Data:   LABS:   Lab Results   Component Value Date/Time    WBC 7.4 02/25/2020 09:16 PM    Hemoglobin (POC) 9.8 02/22/2016 02:39 PM    Hemoglobin, POC 8.8 (L) 02/10/2016 11:18 AM    HGB 15.1 02/25/2020 09:16 PM    Hematocrit, POC 26 (L) 02/10/2016 11:18 AM    HCT 46.2 (H) 02/25/2020 09:16 PM    PLATELET 937 32/02/9995 09:16 PM    MCV 93.7 02/25/2020 09:16 PM     Lab Results   Component Value Date/Time    Sodium 139 02/25/2020 09:16 PM    Potassium 3.7 02/25/2020 09:16 PM    Chloride 105 02/25/2020 09:16 PM    CO2 29 02/25/2020 09:16 PM    Anion gap 5 02/25/2020 09:16 PM    Glucose 109 (H) 02/25/2020 09:16 PM    BUN 14 02/25/2020 09:16 PM    Creatinine 0.95 02/25/2020 09:16 PM    BUN/Creatinine ratio 15 02/25/2020 09:16 PM    GFR est AA >60 02/25/2020 09:16 PM    GFR est non-AA 59 (L) 02/25/2020 09:16 PM    Calcium 9.5 02/25/2020 09:16 PM    Bilirubin, total 0.5 02/25/2020 09:16 PM    Alk.  phosphatase 75 02/25/2020 09:16 PM    Protein, total 8.1 02/25/2020 09:16 PM    Albumin 4.0 02/25/2020 09:16 PM    Globulin 4.1 (H) 02/25/2020 09:16 PM    A-G Ratio 1.0 02/25/2020 09:16 PM    ALT (SGPT) 36 02/25/2020 09:16 PM     Lab Results   Component Value Date/Time    Cholesterol, total 254 (H) 09/20/2019 03:02 PM    HDL Cholesterol 53 09/20/2019 03:02 PM    LDL, calculated 156.2 (H) 09/20/2019 03:02 PM    VLDL, calculated 44.8 09/20/2019 03:02 PM    Triglyceride 224 (H) 09/20/2019 03:02 PM    CHOL/HDL Ratio 4.8 09/20/2019 03:02 PM     Lab Results   Component Value Date/Time    Hemoglobin A1c 6.2 (H) 01/22/2020 10:53 AM    Hemoglobin A1c (POC) 6.4 07/14/2021 12:00 AM     Lab Results   Component Value Date/Time    TSH 1.210 03/27/2019 11:01 AM    Triiodothyronine (T3), free 3.0 05/09/2017 10:04 AM    T4, Free 0.99 12/05/2017 09:49 AM     10 YEAR CVD RISK:   9.1 %    Age    64 yo Race    AA     Gender   Female    Total cholesterol  272 mg/dL    HDL    71 mg/dL    SBP    134 mmHg    BP meds   No    Diabetes   No    Tobacco   No    EKG: August 2016:  100% AV paced 80 bpm.  (09/17) Sinus rhythm. Intermittent Atrial paced beats. ECHO ADULT COMPLETE 07/20/2021  · LV: Estimated LVEF is 55 - 60%. Visually measured ejection fraction. Normal cavity size, wall thickness and systolic function (ejection fraction normal). Wall motion: normal.  · RV: Mildly dilated right ventricle. Borderline low systolic function. Pacing wire present  · LA: Moderately dilated left atrium. Left Atrium volume index is 57.33 mL/m2. · AV: Prosthetic aortic valve. Aortic valve mean gradient is 9 mmHg. Aortic valve area is 1.5 cm2. There is a 21 mm Lawrence bioprosthetic aortic valve. · MV: Mitral valve is prosthetic. Mitral valve mean gradient is 4.9 mmHg. Mild to moderate mitral valve regurgitation is present. There is a 25 mm Lawrence bioprosthetic mitral valve. · TV: Moderate tricuspid valve regurgitation is present. · PV: Mild to moderate pulmonic valve regurgitation is present. · PA: Mild to moderate pulmonary hypertension. Pulmonary arterial systolic pressure is 45 mmHg. STRESS TEST (EST, PHARM, NUC, ECHO etc): March 2011:  1. NORMAL SCAN. 2. NORMAL GATED ACQUISITION WITH AN EJECTION FRACTION OF 75%. CATHETERIZATION: November 2015:  HEMODYNAMICS:  LM - normal  LAD - normal  D1 - normal  Cx - normal  OM1 - normal  OM2 - normal  LPDA - patent  RCA - normal    LEFT:  RA - 6 mmHg  RV - 44/6 mmHg  PA - 38/14 mmHg  Wedge - 16 mmHg  CO / CI (DAVIAN) - 4.15 / 2.27    Impression / Plan:     Dyslipidemia    Rheumatic heart disease    Atrial fibrillation    Post-op ventricular asystole     Rheumatic heart disease:    Ms. Albert Davidson has rheumatic heart disease, which was complicated by moderate to severe aortic stenosis and moderate mitral stenosis.   She has undergone bioprosthetic aortic and mitral valve replacement in February, 2016. Echo in 07/2021 with preserved function for aortic and mitral valve as mentioned above in detail  Continue  mg daily    Hyperlipidemia:    Currently she is only on monotherapy with Zetia. She had tried Atorvastatin and simvastatin in past but had to stop it because of significant myalgia. Also thinks she tried other statins as well, does not remember the name. She has significant hyperlipidemia, not at goal despite being on Zetia. She was prescribed rosuvastatin and coenzyme co-Q10 by BCP however she did not take it for long period of the time because of myalgia and cramps. We also discussed PCSK9 inhibitor again today during the visit however she is refusing to consider PCSK9 inhibitor despite multiple attempts today and in the past    Atrial fibrillation: This is paroxysmal in nature. 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  She had a permanent pacemaker paced with complete heart block at the time of her valvular surgery. 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 In addition, she has history of vaginal bleed reportedly due to fibroids in past.  She also had left sided surgical cryoablation and left atrial appendage resection, so she is only on  mg daily. Continue same. Event monitor in 08/2021 showed sinus rhythm. No episode of atrial fibrillation  Currently without any symptoms concerning for A. fib    Pacemaker:  Ms. Selina Chávez had postoperative ventricular asystole requiring pacemaker implantation in July, 2016. Currently she has no symptoms and issues with pacemaker. Continue with routine device check. Other than the above, or unless any additional issues arise, she should follow up in 6 months.

## 2021-11-16 NOTE — PROGRESS NOTES
Identified pt with two pt identifiers(name and ). Reviewed record in preparation for visit and have obtained necessary documentation. Adi Felix presents today for   Chief Complaint   Patient presents with    Follow-up       Adi Felix preferred language for health care discussion is english/other. Personal Protective Equipment:   Personal Protective Equipment was used including: mask-surgical and hands-gloves. Patient was placed on no precaution(s). Patient was masked.     Precautions:   Patient currently on None  Patient currently roomed with door closed    Is someone accompanying this pt? no    Is the patient using any DME equipment during [address]? no    Depression Screening:  3 most recent PHQ Screens 2021   PHQ Not Done -   Little interest or pleasure in doing things Not at all   Feeling down, depressed, irritable, or hopeless Not at all   Total Score PHQ 2 0   Trouble falling or staying asleep, or sleeping too much -   Feeling tired or having little energy -   Poor appetite, weight loss, or overeating -   Feeling bad about yourself - or that you are a failure or have let yourself or your family down -   Trouble concentrating on things such as school, work, reading, or watching TV -   Moving or speaking so slowly that other people could have noticed; or the opposite being so fidgety that others notice -   Thoughts of being better off dead, or hurting yourself in some way -   PHQ 9 Score -   How difficult have these problems made it for you to do your work, take care of your home and get along with others -       Learning Assessment:  Learning Assessment 3/27/2019   PRIMARY LEARNER Patient   HIGHEST LEVEL OF EDUCATION - PRIMARY LEARNER  SOME COLLEGE   BARRIERS PRIMARY LEARNER NONE   CO-LEARNER CAREGIVER No   PRIMARY LANGUAGE ENGLISH   LEARNER PREFERENCE PRIMARY READING     -   ANSWERED BY Patient   RELATIONSHIP SELF       Abuse Screening:  Abuse Screening Questionnaire 3/27/2019   Do you ever feel afraid of your partner? N   Are you in a relationship with someone who physically or mentally threatens you? N   Is it safe for you to go home? Y       Fall Risk  Fall Risk Assessment, last 12 mths 9/16/2021   Able to walk? Yes   Fall in past 12 months? 0   Do you feel unsteady? 0   Are you worried about falling 0   Number of falls in past 12 months -   Fall with injury? -       Pt currently taking Anticoagulant therapy? no    Coordination of Care:  1. Have you been to the ER, urgent care clinic since your last visit? Hospitalized since your last visit? no    2. Have you seen or consulted any other health care providers outside of the [address] [place] [address] since your last visit? Include any pap smears or colon screening. no      Please see Red banners under Allergies and Med Rec to remove outside inquires. All correct information has been verified with patient and added to chart.      Medication's patient's would liked removed has been marked not taking to be removed per Verbal order and read back per Gustavo Key MD

## 2022-02-16 ENCOUNTER — TELEPHONE (OUTPATIENT)
Dept: CARDIOLOGY CLINIC | Age: 72
End: 2022-02-16

## 2022-02-16 NOTE — TELEPHONE ENCOUNTER
Contacted pt a  number. Two patient Identifiers confirmed. Advised pt to contact [facility] at [phone] for troubleshooting. Pt verbalized understanding.

## 2022-02-16 NOTE — TELEPHONE ENCOUNTER
Patient tried to do carelink today and was getting multiple error messages and it was beeping.  She also states that she received a message that told her to take it away from her chest

## 2022-02-17 ENCOUNTER — OFFICE VISIT (OUTPATIENT)
Dept: CARDIOLOGY CLINIC | Age: 72
End: 2022-02-17
Payer: COMMERCIAL

## 2022-02-17 VITALS
OXYGEN SATURATION: 99 % | SYSTOLIC BLOOD PRESSURE: 100 MMHG | WEIGHT: 163 LBS | BODY MASS INDEX: 27.98 KG/M2 | RESPIRATION RATE: 14 BRPM | DIASTOLIC BLOOD PRESSURE: 62 MMHG | TEMPERATURE: 98.4 F | HEART RATE: 87 BPM

## 2022-02-17 DIAGNOSIS — I48.91 ATRIAL FIBRILLATION, UNSPECIFIED TYPE (HCC): Primary | ICD-10-CM

## 2022-02-17 DIAGNOSIS — Z95.0 CARDIAC PACEMAKER IN SITU: ICD-10-CM

## 2022-02-17 DIAGNOSIS — Z95.2 S/P AVR: ICD-10-CM

## 2022-02-17 DIAGNOSIS — I35.0 AORTIC VALVE STENOSIS, UNSPECIFIED ETIOLOGY: ICD-10-CM

## 2022-02-17 PROCEDURE — 99214 OFFICE O/P EST MOD 30 MIN: CPT | Performed by: INTERNAL MEDICINE

## 2022-02-17 RX ORDER — NITROGLYCERIN 0.4 MG/1
0.4 TABLET SUBLINGUAL
Qty: 75 TABLET | Refills: 3 | Status: SHIPPED | OUTPATIENT
Start: 2022-02-17 | End: 2022-02-18 | Stop reason: SDUPTHER

## 2022-02-17 NOTE — PROGRESS NOTES
Identified pt with two pt identifiers(name and ). Reviewed record in preparation for visit and have obtained necessary documentation. Felix Brown presents today for   Chief Complaint   Patient presents with    Follow-up           Felix Brown preferred language for health care discussion is english/other. Personal Protective Equipment:   Personal Protective Equipment was used including: mask-surgical and hands-gloves. Patient was placed on no precaution(s). Patient was masked. Precautions:   Patient currently on None  Patient currently roomed with door closed.     Is someone accompanying this pt? no    Is the patient using any DME equipment during [address]? no    Depression Screening:  3 most recent PHQ Screens 2022   PHQ Not Done -   Little interest or pleasure in doing things Not at all   Feeling down, depressed, irritable, or hopeless Not at all   Total Score PHQ 2 0   Trouble falling or staying asleep, or sleeping too much -   Feeling tired or having little energy -   Poor appetite, weight loss, or overeating -   Feeling bad about yourself - or that you are a failure or have let yourself or your family down -   Trouble concentrating on things such as school, work, reading, or watching TV -   Moving or speaking so slowly that other people could have noticed; or the opposite being so fidgety that others notice -   Thoughts of being better off dead, or hurting yourself in some way -   PHQ 9 Score -   How difficult have these problems made it for you to do your work, take care of your home and get along with others -       Learning Assessment:  Learning Assessment 3/27/2019   PRIMARY LEARNER Patient   HIGHEST LEVEL OF EDUCATION - PRIMARY LEARNER  SOME COLLEGE   BARRIERS PRIMARY LEARNER NONE   CO-LEARNER CAREGIVER No   PRIMARY LANGUAGE ENGLISH   LEARNER PREFERENCE PRIMARY READING     -   ANSWERED BY Patient   RELATIONSHIP SELF       Abuse Screening:  Abuse Screening Questionnaire 3/27/2019   Do you ever feel afraid of your partner? N   Are you in a relationship with someone who physically or mentally threatens you? N   Is it safe for you to go home? Y       Fall Risk  Fall Risk Assessment, last 12 mths 2/17/2022   Able to walk? Yes   Fall in past 12 months? -   Do you feel unsteady? -   Are you worried about falling -   Number of falls in past 12 months -   Fall with injury? -       Pt currently taking Anticoagulant therapy? no  Pt currently taking Antiplatelet therapy? no    Coordination of Care:  1. Have you been to the ER, urgent care clinic since your last visit? Hospitalized since your last visit? no    2. Have you seen or consulted any other health care providers outside of the [address] [place] [address] since your last visit? Include any pap smears or colon screening. no      Please see Red banners under Allergies and Med Rec to remove outside inquires. All correct information has been verified with patient and added to chart.      Medication's patient's would liked removed has been marked not taking to be removed per Verbal order and read back per Angie Ying MD

## 2022-02-17 NOTE — PATIENT INSTRUCTIONS
Testing    Nuclear Stress  Nuclear Stress Instructions-Nothing to eat or drink past midnight and no medicaitons the morning of cardiac testing. **call office 3-5 days after testing is completed for results**     New Medication/Medication Changes  Nitrostat 0.4 mg tab as needed for chest pain    **please allow 24-48 hrs for medication to be escribed to pharmacy** If you need any refills on medications please contact your pharmacy so that the request can be escribed to the provider for review.       Other  Referral to Dr Joe Jimenez

## 2022-02-17 NOTE — LETTER
2/17/2022    Patient: Katalina Perry   YOB: 1950   Date of Visit: 2/17/2022     David Drake MD  [address] [place] [address]   [address] [facility] [id number]  Via Fax: [phone]    Dear David Drake MD,      Thank you for referring Ms. Angélica Almonte to [address] [place] [address] for evaluation. My notes for this consultation are attached. If you have questions, please do not hesitate to call me. I look forward to following your patient along with you.       Sincerely,    Shea Brandt MD

## 2022-02-17 NOTE — PROGRESS NOTES
[facility]    HPI:   Ms. Sb Brooks is a 81 y.o. woman with dyslipidemia. She does not have obstructive atherosclerotic disease as evaluated by cardiac catheterization in  November 2015. Her anatomy is as follows:    HEMODYNAMICS:  LM - normal  LAD - normal  D1 - normal  Cx - normal  OM1 - normal  OM2 - normal  LPDA - patent  RCA - normal    LEFT:  RA - 6 mmHg  RV - 44/6 mmHg  PA - 38/14 mmHg  Wedge - 16 mmHg  CO / CI (DAVIAN) - 4.15 / 2.27    She has a history of rheumatic heart disease complicated by moderate to severe aortic stenosis and moderate mitral stenosis. She had aortic and mitral valve replacements in February of 2016 with the following: Aortic valve - 21 mm MAGNA EASE pericardial bioprosthesis  Mitral valve -  25 mm MAGNA EASE pericardial bioprosthesis    She has paroxysmal atrial fibrillation / flutter complicated by an embolic event manifesting as amaurosis fugax. She had surgical left sided cryoablation and atrial appendage resection in February of 2016. This occurred at the time of her aortic and mitral valve replacement. Her surgical course was notable for postoperative ventricular asystole requiring permanent pacemaker implantation in 2016. Ms. Sb Brooks is here today for follow up appointment  She has baseline mild dyspnea on moderate exertion. This has been unchanged. For last several months, patient has been experiencing some chest discomfort which she describes as a tight feeling lasting 10 to 15 minutes. Not exertional.  No associated nausea vomiting or diaphoresis. Denies presyncope or syncope.     Past Medical History:   Diagnosis Date    Advance directive in chart     6/28/2016    Amaurosis fugax     Aortic stenosis     mean gradient  26.5 mmHg (CATH 4/13), 32 mmHg (ECHO 9/15)    Aortic valve replacement     21mm MAGNA EASE pericardial  2/16    Arthritis     Atrial appendage resection     2/16      Atrial fibrillation     Atrial fibrillation ablation surgical left sided cryoablation  2/16    Cataract     OS    Cervical dysplasia     2009   post leep and cone    COPD     mild  2/16    Duodenal AVM     argon plasma coagulation 2/16    Dyslipidemia     Fibroids     Glaucoma     Intracranial aneurysm     S/P Coiling (2020)    Intracranial aneurysm     S/P Coiling in 2020 at [facility]    Lung nodule     3/19/2012   stable    Mitral stenosis     mean gradient  6 mmHg (CATH 4/13), 7.6 mmHg (MANUEL 10/15)    Mitral valve replacement     25 mm MAGNA EASE pericardial  2/16    Pacemaker     dual chamber MEDTRONIC 2/16    Post-op ventricular asystole     02/16    Remote tobacco     Sleep apnea     no CPAP    Thyroid nodule     3/20/2013   multiple    TIA (transient ischemic attack)        Past Surgical History:   Procedure Laterality Date    COLONOSCOPY N/A 1/23/2017    COLONOSCOPY performed [facility] Princess William MD at [address] HX AFIB ABLATION      2/16  surgical cryoablation    HX AORTIC VALVE REPLACEMENT      2/16    HX BREAST BIOPSY Right 10/13/2014    Right breast needle [facility]OC biopsy performed by Mendel Jumbo, MD at [address] HX BREAST LUMPECTOMY      Right    HX BUNIONECTOMY      left    HX GYN      VAINIII, VELASQUEZ 1, keratinous labial cyst    HX GYN      2012  Cold knife conization of cervix    HX LEEP PROCEDURE      2010    HX MITRAL VALVE REPLACEMENT      2/16    HX OTHER SURGICAL      lump left neck, benign    HX OTHER SURGICAL      multiple breast aspirations    HX PACEMAKER      I&D OF VULVA/PERINEUM ABSCESS  3/21/2017            Current Outpatient Medications   Medication Sig    ezetimibe (ZETIA) 10 mg tablet TAKE 1 TABLET EVERY DAY    aspirin delayed-release 81 mg tablet take 2 tablets by mouth once daily    PrePlus 27 mg iron- 1 mg tab TAKE 1 TABLET EVERY DAY    omeprazole (PRILOSEC) 20 mg capsule TAKE 1 CAPSULE TWICE DAILY    mirabegron ER (Myrbetriq) 25 mg ER tablet TAKE 1 TABLET EVERY DAY    naproxen (NAPROSYN) 500 mg tablet Take 1 Tab by mouth two (2) times daily (with meals).  lidocaine (Lidoderm) 5 % Apply patch to the affected area for 12 hours a day and remove for 12 hours a day.  gabapentin (NEURONTIN) 100 mg capsule Take 1 Cap by mouth nightly. Max Daily Amount: 100 mg.  Blood-Glucose Meter (Accu-Chek Holly Plus Meter) misc Test blood glucose daily    glucose blood VI test strips (CONTOUR NEXT TEST STRIPS) strip Test blood glucose daily    vit B Cmplx 3-FA-Vit C-Biotin (NEPHRO CANDACE RX) 1- mg-mg-mcg tablet Take 1 Tab by mouth daily.  meclizine (ANTIVERT) 12.5 mg tablet Take 1-2 Tabs by mouth three (3) times daily as needed for Dizziness.  prednisoLONE acetate (PRED FORTE) 1 % ophthalmic suspension Administer 1 Drop to both eyes four (4) times daily.  ketorolac (ACULAR LS) 0.4 % drop Administer 1 Drop to both eyes four (4) times daily.  ferrous sulfate 325 mg (65 mg iron) tablet take 1 tablet by mouth three times a day with food (Patient taking differently: two (2) times a day.)     No current facility-administered medications for this visit. Allergies   Allergen Reactions    Contrast Dye [Iodine] Itching and Swelling    Iodinated Contrast Media Itching and Swelling    Seafood Itching and Swelling     LOBSTER ONLY    Statins-Hmg-Coa Reductase Inhibitors Myalgia and Other (comments)     Myalgia    Sulfa (Sulfonamide Antibiotics) Itching and Swelling       Family History:   Non contributory for premature coronary disease in first degree relatives (female <65, male <55). Social History:   She  reports that she quit smoking about 18 years ago. She quit after 37.00 years of use. She has never used smokeless tobacco.  She  reports no history of alcohol use. Physical:   Vitals:   BP: 100/62  HR: 87  Wt: 73.9 kg (163 lb)    Exam:   General: Middle aged woman, no complaints.     Head/Neck: No JVD    No bruits. No edema  Lungs:  No respiratory distress.     Clear with good air movement. Chest:  Keloid scar  Heart:  Regular rate and rhythm. No significant murmur. no rubs or gallops. Abdomen: Bowel sounds present  Extremities: Intact pulses. No edema.      Review of Data:   LABS:   Lab Results   Component Value Date/Time    WBC 7.4 02/25/2020 09:16 PM    Hemoglobin (POC) 9.8 02/22/2016 02:39 PM    Hemoglobin, POC 8.8 (L) 02/10/2016 11:18 AM    HGB 15.1 02/25/2020 09:16 PM    Hematocrit, POC 26 (L) 02/10/2016 11:18 AM    HCT 46.2 (H) 02/25/2020 09:16 PM    PLATELET 195 43/67/7047 09:16 PM    MCV 93.7 02/25/2020 09:16 PM     Lab Results   Component Value Date/Time    Sodium 139 02/25/2020 09:16 PM    Potassium 3.7 02/25/2020 09:16 PM    Chloride 105 02/25/2020 09:16 PM    CO2 29 02/25/2020 09:16 PM    Anion gap 5 02/25/2020 09:16 PM    Glucose 109 (H) 02/25/2020 09:16 PM    BUN 14 02/25/2020 09:16 PM    Creatinine 0.95 02/25/2020 09:16 PM    BUN/Creatinine ratio 15 02/25/2020 09:16 PM    GFR est AA >60 02/25/2020 09:16 PM    GFR est non-AA 59 (L) 02/25/2020 09:16 PM    Calcium 9.5 02/25/2020 09:16 PM    Bilirubin, total 0.5 02/25/2020 09:16 PM    Alk.  phosphatase 75 02/25/2020 09:16 PM    Protein, total 8.1 02/25/2020 09:16 PM    Albumin 4.0 02/25/2020 09:16 PM    Globulin 4.1 (H) 02/25/2020 09:16 PM    A-G Ratio 1.0 02/25/2020 09:16 PM    ALT (SGPT) 36 02/25/2020 09:16 PM     Lab Results   Component Value Date/Time    Cholesterol, total 254 (H) 09/20/2019 03:02 PM    HDL Cholesterol 53 09/20/2019 03:02 PM    LDL, calculated 156.2 (H) 09/20/2019 03:02 PM    VLDL, calculated 44.8 09/20/2019 03:02 PM    Triglyceride 224 (H) 09/20/2019 03:02 PM    CHOL/HDL Ratio 4.8 09/20/2019 03:02 PM     Lab Results   Component Value Date/Time    Hemoglobin A1c 6.2 (H) 01/22/2020 10:53 AM    Hemoglobin A1c (POC) 6.4 07/14/2021 12:00 AM     Lab Results   Component Value Date/Time    TSH 1.210 03/27/2019 11:01 AM    Triiodothyronine (T3), free 3.0 05/09/2017 10:04 AM    T4, Free 0.99 12/05/2017 09:49 AM 10 YEAR CVD RISK:   9.1 %    Age    64 yo    Race    AA     Gender   Female    Total cholesterol  272 mg/dL    HDL    71 mg/dL    SBP    134 mmHg    BP meds   No    Diabetes   No    Tobacco   No    EKG: August 2016:  100% AV paced 80 bpm.  (09/17) Sinus rhythm. Intermittent Atrial paced beats. ECHO ADULT COMPLETE 07/20/2021  · LV: Estimated LVEF is 55 - 60%. Visually measured ejection fraction. Normal cavity size, wall thickness and systolic function (ejection fraction normal). Wall motion: normal.  · RV: Mildly dilated right ventricle. Borderline low systolic function. Pacing wire present  · LA: Moderately dilated left atrium. Left Atrium volume index is 57.33 mL/m2. · AV: Prosthetic aortic valve. Aortic valve mean gradient is 9 mmHg. Aortic valve area is 1.5 cm2. There is a 21 mm Lawrence bioprosthetic aortic valve. · MV: Mitral valve is prosthetic. Mitral valve mean gradient is 4.9 mmHg. Mild to moderate mitral valve regurgitation is present. There is a 25 mm Lawrence bioprosthetic mitral valve. · TV: Moderate tricuspid valve regurgitation is present. · PV: Mild to moderate pulmonic valve regurgitation is present. · PA: Mild to moderate pulmonary hypertension. Pulmonary arterial systolic pressure is 45 mmHg. STRESS TEST (EST, PHARM, NUC, ECHO etc): March 2011:  1. NORMAL SCAN. 2. NORMAL GATED ACQUISITION WITH AN EJECTION FRACTION OF 75%. CATHETERIZATION: November 2015:  HEMODYNAMICS:  LM - normal  LAD - normal  D1 - normal  Cx - normal  OM1 - normal  OM2 - normal  LPDA - patent  RCA - normal    LEFT:  RA - 6 mmHg  RV - 44/6 mmHg  PA - 38/14 mmHg  Wedge - 16 mmHg  CO / CI (DAVIAN) - 4.15 / 2.27    Impression / Plan:     Dyslipidemia    Rheumatic heart disease    Atrial fibrillation    Post-op ventricular asystole     Rheumatic heart disease:    Ms. Kerry Wilkerson has rheumatic heart disease, which was complicated by moderate to severe aortic stenosis and moderate mitral stenosis.   She has undergone bioprosthetic aortic and mitral valve replacement in February, 2016. Echo in 07/2021 with preserved function for aortic and mitral valve as mentioned above in detail  Continue  mg daily    Hyperlipidemia:    Currently she is only on monotherapy with Zetia. She had tried Atorvastatin and simvastatin in past but had to stop it because of significant myalgia. Also thinks she tried other statins as well, does not remember the name. She has significant hyperlipidemia, not at goal despite being on Zetia. She was prescribed rosuvastatin and coenzyme co-Q10 by BCP however she did not take it for long period of the time because of myalgia and cramps. We also discussed PCSK9 inhibitor again today during the visit however she is refusing to consider PCSK9 inhibitor despite multiple attempts today and in the past    Atrial fibrillation: This is paroxysmal in nature. 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  She had a permanent pacemaker paced with complete heart block at the time of her valvular surgery. 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 In addition, she has history of vaginal bleed reportedly due to fibroids in past.  She also had left sided surgical cryoablation and left atrial appendage resection, so she is only on  mg daily. Continue same. Event monitor in 08/2021 showed sinus rhythm. No episode of atrial fibrillation  We will have her evaluated by the structural heart clinic for consideration of watchman device. Pacemaker:  Ms. Pily Valencia had postoperative ventricular asystole requiring pacemaker implantation in July, 2016. Currently she has no symptoms and issues with pacemaker. Continue with routine device check. Chest pain:  Mixed feature. Continue aspirin. Will provide sublingual nitroglycerin.   Will proceed with nuclear stress test.  Advised patient to call 911 and go to ER if any chest pain not resolved with nitroglycerin    Other than the above, or unless any additional issues arise, she should follow up in 6 months.

## 2022-02-18 NOTE — TELEPHONE ENCOUNTER
PCP: Wilfrido Quintero MD    Last appt: 2022  Future Appointments   Date Time Provider Arely Hernandez   3/29/2022  9:00 AM [facility] AMB   2022  2:00 PM CSI, PACER [facility]   2022 11:15 AM Jeffery Smith MD [facility] AMB   2022 11:00 AM CSI, PACER NORF [facility] AMB   2022  2:00 PM CSIWILIAM [facility] AMB       Requested Prescriptions     Pending Prescriptions Disp Refills    nitroglycerin (NITROSTAT) 0.4 mg SL tablet 75 Tablet 3     Si Tablet by SubLINGual route every five (5) minutes as needed for Chest Pain. Up to 3 doses. Pharmacy: pharmacy change.

## 2022-02-18 NOTE — TELEPHONE ENCOUNTER
Patient called 2-18-22 to have nitroglycerin reorder because it was sent to wrong pharmacy.  [facility]

## 2022-02-21 ENCOUNTER — TELEPHONE (OUTPATIENT)
Dept: CARDIOTHORACIC SURGERY | Age: 72
End: 2022-02-21

## 2022-02-22 RX ORDER — NITROGLYCERIN 0.4 MG/1
0.4 TABLET SUBLINGUAL
Qty: 75 TABLET | Refills: 3 | Status: SHIPPED | OUTPATIENT
Start: 2022-02-22

## 2022-02-23 ENCOUNTER — TELEPHONE (OUTPATIENT)
Dept: CARDIOTHORACIC SURGERY | Age: 72
End: 2022-02-23

## 2022-02-23 NOTE — TELEPHONE ENCOUNTER
Pt called back and stated that she is not interested in scheduling appt for watchman evaluation at this time.

## 2022-03-03 NOTE — PROGRESS NOTES
I have personally seen and evaluated the device findings. Interrogation reviewed and I agree with assessment.     Arya Lancaster 136.9

## 2022-03-08 ENCOUNTER — TELEPHONE (OUTPATIENT)
Dept: CARDIOLOGY CLINIC | Age: 72
End: 2022-03-08

## 2022-03-08 NOTE — TELEPHONE ENCOUNTER
Attempted to contact pt at  number, no answer. Lvm for pt to return call to office at [phone]. Will continue to try to contact pt.

## 2022-03-10 NOTE — PROGRESS NOTES
Normal device function, battery, no events. No mode switches. I have personally seen and evaluated the device findings. Interrogation reviewed and I agree with assessment.     Tanesha Cardoso

## 2022-03-10 NOTE — TELEPHONE ENCOUNTER
Attempted to contact pt at  number, no answer. Lvm for pt to return call to office at [phone]. Will continue to try to contact pt.

## 2022-03-11 NOTE — TELEPHONE ENCOUNTER
[facility]. Two patient Identifiers confirmed. Advised she received an envelope asking her to return the second monitor she received. Transferred to [facility]. Advised they needed the serial number of device [id number] given. Advised a new monitor would be mailed within 7-10 business days and that the request was for tthe reader head to be returned not the device. Will advise pt.

## 2022-04-01 NOTE — PROGRESS NOTES
----- Message from Ghislaine Kaminski Brady is a 74 y.o.  female and presents with    Chief Complaint   Patient presents with    Urinary Pain    Foot Pain           Subjective:    1. uti  -- new problem wityh hematuria    2. Doing line dance now her feet hurt        Additional Concerns:          Patient Active Problem List    Diagnosis Date Noted    Advance directive in chart 06/28/2016    Dyslipidemia     Rheumatic heart disease     Atrial fibrillation     Arthritis 01/06/2016    Glaucoma 01/15/2014     Current Outpatient Prescriptions   Medication Sig Dispense Refill    ciprofloxacin HCl (CIPRO) 250 mg tablet Take 1 Tab by mouth every twelve (12) hours for 15 days. 30 Tab 0    cycloSPORINE (RESTASIS) 0.05 % ophthalmic emulsion Administer 1 Drop to both eyes two (2) times a day.  oxybutynin chloride XL (DITROPAN XL) 10 mg CR tablet Take 1 Tab by mouth daily. 90 Tab 3    acetaminophen-codeine (TYLENOL #2) 300-15 mg per tablet Take 1 Tab by mouth every four (4) hours as needed for Pain. Max Daily Amount: 6 Tabs. 30 Tab 5    bimatoprost (LUMIGAN) 0.01 % ophthalmic drops Administer 1 Drop to left eye nightly.  aspirin delayed-release 81 mg tablet take 1 tablet by mouth once daily 90 Tab 3    ezetimibe (ZETIA) 10 mg tablet Take 1 Tab by mouth daily. 90 Tab 3    omeprazole (PRILOSEC) 20 mg capsule Take 1 Cap by mouth two (2) times a day. 180 Cap 4    ferrous sulfate (IRON) 325 mg (65 mg iron) EC tablet Take 1 Tab by mouth three (3) times daily (with meals). 100 Tab 12    prenatal vit-iron fumarate-fa (PRENATAL PLUS WITH IRON) 28 mg iron- 800 mcg tab Take 1 Tab by mouth daily. Any reliable brand prenatal vitamins is fine.  100 Tab 12     Allergies   Allergen Reactions    Contrast Dye [Iodine] Itching and Swelling    Iodinated Contrast Media - Oral And Iv Dye Itching and Swelling    Seafood Itching and Swelling     LOBSTER ONLY    Statins-Hmg-Coa Reductase Inhibitors Myalgia and Other (comments) Myalgia    Sulfa (Sulfonamide Antibiotics) Itching and Swelling     Past Medical History:   Diagnosis Date    Advance directive in chart     6/28/2016    Amaurosis fugax     Aortic stenosis     mean gradient  26.5 mmHg (CATH 4/13), 32 mmHg (ECHO 9/15)    Aortic valve replacement     21mm MAGNA EASE pericardial  2/16    Arthritis     Atrial appendage resection     2/16      Atrial fibrillation     CHADS score 2  (-CHF, -HTN, -AGE, -DM +AMAUROSIS FUGAX)    Atrial fibrillation ablation     surgical left sided cryoablation  2/16    Cataract     OS    Cervical dysplasia     2009   post leep and cone    Chronic pain     COPD     mild  2/16    Duodenal AVM     argon plasma coagulation 2/16    Dyslipidemia     Fibroids     Glaucoma     1/15/2014    Lung nodule     3/19/2012   stable    Mitral stenosis     mean gradient  6 mmHg (CATH 4/13), 7.6 mmHg (MANUEL 10/15)    Mitral valve replacement     25 mm MAGNA EASE pericardial  2/16    Pacemaker     dual chamber MEDTRONIC 2/16    Post-op ventricular asystole     Remote tobacco     Rheumatic heart disease     Sleep apnea     no CPAP    Thyroid nodule     3/20/2013   multiple     Past Surgical History:   Procedure Laterality Date    COLONOSCOPY N/A 1/23/2017    COLONOSCOPY performed [facility] Zoraida Zeng MD [facility]    HX AFIB ABLATION      2/16  surgical cryoablation    HX AORTIC VALVE REPLACEMENT      2/16    HX BREAST BIOPSY Right 10/13/2014    Right breast needle [facility]OC biopsy [facility] HX BREAST LUMPECTOMY      Right    HX BUNIONECTOMY      left    HX GYN      VAINIII, VELASQUEZ 1, keratinous labial cyst    HX GYN      2012  Cold knife conization of cervix    HX LEEP PROCEDURE      2010    HX MITRAL VALVE REPLACEMENT      2/16    HX OTHER SURGICAL      lump left neck, benign    HX OTHER SURGICAL      multiple breast aspirations    HX PACEMAKER       Family History   Problem Relation Age of Onset  Cancer Mother      oral,    [facility] Alzheimer Father         [facility] Breast Cancer Maternal Aunt      unsure age   [facility] Colon Cancer Maternal Grandfather        ROS       All other systems reviewed and are negative. Objective:  Vitals:    17 1332   BP: 113/86   Pulse: 88   Resp: 16   Temp: 97.1 °F (36.2 °C)   TempSrc: Oral   SpO2: 100%   Weight: 171 lb 6.4 oz (77.7 kg)   Height: 5' 4\" (1.626 m)   PainSc:  10 - Worst pain ever                 alert, well appearing, and in no distress, oriented to person, place, and time and normal appearing weight  Chest - clear to auscultation, no wheezes, rales or rhonchi, symmetric air entry  Heart - normal rate, regular rhythm, normal S1, S2, no murmurs, rubs, clicks or gallops  Abdomen - soft, nontender, nondistended, no masses or organomegaly        LABS   ua with infection  TESTS      Assessment/Plan:    1. uti rx cipro f/u prn  2. Foot pain get good shoes and take advil or aleve    Lab review:       I have discussed the diagnosis with the patient and the intended plan as seen in the above orders. The patient has received an after-visit summary and questions were answered concerning future plans. I have discussed medication side effects and warnings with the patient as well. I have reviewed the plan of care with the patient, accepted their input and they are in agreement with the treatment goals.      Follow-up Disposition: Not on File

## 2022-04-07 NOTE — PROGRESS NOTES
Normal device function, no events. I have personally seen and evaluated the device findings. Interrogation reviewed and I agree with assessment.     Gabriela Bullock

## 2022-05-18 ENCOUNTER — OFFICE VISIT (OUTPATIENT)
Dept: CARDIOLOGY CLINIC | Age: 72
End: 2022-05-18
Payer: MEDICARE

## 2022-05-18 DIAGNOSIS — I48.91 ATRIAL FIBRILLATION, UNSPECIFIED TYPE (HCC): ICD-10-CM

## 2022-05-18 DIAGNOSIS — Z95.0 CARDIAC PACEMAKER IN SITU: Primary | ICD-10-CM

## 2022-05-23 PROCEDURE — 93296 REM INTERROG EVL PM/IDS: CPT | Performed by: INTERNAL MEDICINE

## 2022-05-23 PROCEDURE — 93294 REM INTERROG EVL PM/LDLS PM: CPT | Performed by: INTERNAL MEDICINE

## 2022-05-23 NOTE — PROGRESS NOTES
I have personally seen and evaluated the device findings. Interrogation reviewed and I agree with assessment.     Mercy Allred

## 2022-06-08 DIAGNOSIS — E78.5 DYSLIPIDEMIA: ICD-10-CM

## 2022-06-08 DIAGNOSIS — K25.4 GASTROINTESTINAL HEMORRHAGE ASSOCIATED WITH GASTRIC ULCER: ICD-10-CM

## 2022-06-08 DIAGNOSIS — M19.90 ARTHRITIS: ICD-10-CM

## 2022-06-08 DIAGNOSIS — L30.9 ECZEMA, UNSPECIFIED TYPE: ICD-10-CM

## 2022-06-08 DIAGNOSIS — I09.9 RHEUMATIC HEART DISEASE: ICD-10-CM

## 2022-06-08 DIAGNOSIS — R13.19 ESOPHAGEAL DYSPHAGIA: ICD-10-CM

## 2022-06-08 DIAGNOSIS — I48.91 ATRIAL FIBRILLATION, UNSPECIFIED TYPE (HCC): Chronic | ICD-10-CM

## 2022-06-08 DIAGNOSIS — I35.0 AORTIC VALVE STENOSIS, UNSPECIFIED ETIOLOGY: Chronic | ICD-10-CM

## 2022-06-08 RX ORDER — PNV,CALCIUM 72/IRON/FOLIC ACID 27 MG-1 MG
TABLET ORAL
Qty: 90 TABLET | Refills: 3 | Status: SHIPPED | OUTPATIENT
Start: 2022-06-08

## 2022-08-25 DIAGNOSIS — M19.90 ARTHRITIS: ICD-10-CM

## 2022-08-25 DIAGNOSIS — E78.5 DYSLIPIDEMIA: ICD-10-CM

## 2022-08-25 DIAGNOSIS — L30.9 ECZEMA, UNSPECIFIED TYPE: ICD-10-CM

## 2022-08-25 DIAGNOSIS — I35.0 AORTIC VALVE STENOSIS, UNSPECIFIED ETIOLOGY: Chronic | ICD-10-CM

## 2022-08-25 DIAGNOSIS — K25.4 GASTROINTESTINAL HEMORRHAGE ASSOCIATED WITH GASTRIC ULCER: ICD-10-CM

## 2022-08-25 DIAGNOSIS — I09.9 RHEUMATIC HEART DISEASE: ICD-10-CM

## 2022-08-25 DIAGNOSIS — I48.91 ATRIAL FIBRILLATION, UNSPECIFIED TYPE (HCC): Chronic | ICD-10-CM

## 2022-08-25 DIAGNOSIS — R13.19 ESOPHAGEAL DYSPHAGIA: ICD-10-CM

## 2022-08-26 RX ORDER — EZETIMIBE 10 MG/1
TABLET ORAL
Qty: 90 TABLET | Refills: 3 | Status: SHIPPED | OUTPATIENT
Start: 2022-08-26

## 2022-08-29 RX ORDER — OMEPRAZOLE 20 MG/1
CAPSULE, DELAYED RELEASE ORAL
Qty: 180 CAPSULE | Refills: 4 | Status: SHIPPED | OUTPATIENT
Start: 2022-08-29

## 2022-10-24 NOTE — TELEPHONE ENCOUNTER
Kedar Knox is no longer practicing and the office will be closing next month. Pt. Was last seen on 03/11/2020. Is appt. Needed? None

## 2023-09-28 RX ORDER — EZETIMIBE 10 MG/1
TABLET ORAL
Qty: 90 TABLET | Refills: 3 | Status: SHIPPED | OUTPATIENT
Start: 2023-09-28

## 2023-09-28 RX ORDER — OMEPRAZOLE 20 MG/1
CAPSULE, DELAYED RELEASE ORAL
Qty: 180 CAPSULE | OUTPATIENT
Start: 2023-09-28

## 2023-11-02 ENCOUNTER — OFFICE VISIT (OUTPATIENT)
Age: 73
End: 2023-11-02
Payer: MEDICARE

## 2023-11-02 VITALS
WEIGHT: 167 LBS | BODY MASS INDEX: 29.12 KG/M2 | SYSTOLIC BLOOD PRESSURE: 109 MMHG | OXYGEN SATURATION: 99 % | DIASTOLIC BLOOD PRESSURE: 78 MMHG | HEART RATE: 88 BPM

## 2023-11-02 DIAGNOSIS — I48.91 ATRIAL FIBRILLATION, UNSPECIFIED TYPE (HCC): Primary | ICD-10-CM

## 2023-11-02 DIAGNOSIS — I35.0 NONRHEUMATIC AORTIC (VALVE) STENOSIS: ICD-10-CM

## 2023-11-02 PROCEDURE — 1090F PRES/ABSN URINE INCON ASSESS: CPT | Performed by: INTERNAL MEDICINE

## 2023-11-02 PROCEDURE — G8399 PT W/DXA RESULTS DOCUMENT: HCPCS | Performed by: INTERNAL MEDICINE

## 2023-11-02 PROCEDURE — G8428 CUR MEDS NOT DOCUMENT: HCPCS | Performed by: INTERNAL MEDICINE

## 2023-11-02 PROCEDURE — G8484 FLU IMMUNIZE NO ADMIN: HCPCS | Performed by: INTERNAL MEDICINE

## 2023-11-02 PROCEDURE — 1036F TOBACCO NON-USER: CPT | Performed by: INTERNAL MEDICINE

## 2023-11-02 PROCEDURE — 3017F COLORECTAL CA SCREEN DOC REV: CPT | Performed by: INTERNAL MEDICINE

## 2023-11-02 PROCEDURE — 1123F ACP DISCUSS/DSCN MKR DOCD: CPT | Performed by: INTERNAL MEDICINE

## 2023-11-02 PROCEDURE — 99214 OFFICE O/P EST MOD 30 MIN: CPT | Performed by: INTERNAL MEDICINE

## 2023-11-02 PROCEDURE — G8419 CALC BMI OUT NRM PARAM NOF/U: HCPCS | Performed by: INTERNAL MEDICINE

## 2023-11-02 NOTE — PATIENT INSTRUCTIONS
Testing   Echo in 12 months same day appt - call January 2024 to schedule     **call office 3-5 days after testing is completed for results** Please ensure testing is completed prior to scheduled follow up appointment.  If it is not completed your appointment may be rescheduled**

## 2023-11-02 NOTE — PROGRESS NOTES
Identified pt with two pt identifiers(name and ). Reviewed record in preparation for visit and have obtained necessary documentation. Fito Guillermo presents today for   Chief Complaint   Patient presents with    Follow-up       Pt c/o SOB, CHEST PAIN/ PRESSURE, FATIGUE/WEAKNESS. Lucy Saba preferred language for health care discussion is english/other. Personal Protective Equipment:   Personal Protective Equipment was used including: mask-surgical and hands-gloves. Patient was placed on no precaution(s). Patient was masked. Precautions:   Patient currently on None  Patient currently roomed with door closed. Is someone accompanying this pt? no    Is the patient using any DME equipment during [address] [facility]? no    Depression Screenin/17/2022    10:03 AM 2021    10:35 AM   PHQ-9 Questionaire   Little interest or pleasure in doing things 0 0   Feeling down, depressed, or hopeless 0 0   PHQ-9 Total Score 0 0        Learning Assessment:  No question data found. Abuse Screenin/2/2023    10:00 AM   AMB Abuse Screening   Do you ever feel afraid of your partner? N   Are you in a relationship with someone who physically or mentally threatens you? N   Is it safe for you to go home? Y          Fall Risk      2023    10:21 AM   Fall Risk   2 or more falls in past year? no   Fall with injury in past year? no         Pt currently taking Anticoagulant therapy? no  Pt currently taking Antiplatelet therapy? Aspirin     Coordination of Care:  1. Have you been to the ER, urgent care clinic since your last visit? Hospitalized since your last visit? no    2. Have you seen or consulted any other health care providers outside of the [address] [place] [address] since your last visit? Include any pap smears or colon screening. no      Please see Red banners under Allergies and Med Rec to remove outside inquires. All correct information has been verified with patient and added to chart.
Plan:            Dyslipidemia       Rheumatic heart disease       Atrial fibrillation       Post-op ventricular asystole        Rheumatic heart disease:     Ms. Latesha Galan has rheumatic heart disease, which was complicated by moderate to severe aortic stenosis and moderate mitral stenosis. She has undergone bioprosthetic aortic and mitral valve replacement in February, 2016. Echo in 07/2021 with preserved function for aortic and mitral valve as mentioned above in detail  Echo in 05/2023 with bioprosthetic aortic and mitral valve. MV gradient of 6 and aortic gradient of 13 mmHg   Continue  mg daily      Hyperlipidemia:    Currently she is only on monotherapy with Zetia. She had tried Atorvastatin and simvastatin in past but had to stop it  because of significant myalgia. Also thinks she tried other statins as well, does not remember the name. She has significant hyperlipidemia, not at goal despite being on Zetia. She was prescribed rosuvastatin and coenzyme co-Q10 by BCP however she did not take it for long period of the time because of myalgia and cramps. We also discussed PCSK9 inhibitor again today during the visit however she is refusing to consider PCSK9 inhibitor despite multiple attempts today and in the past      Atrial fibrillation: This is paroxysmal in nature. 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  She had  a permanent pacemaker paced with complete heart block at the time of her valvular surgery. 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 In addition,  she has history of vaginal bleed reportedly due to fibroids in past.  She also had left sided surgical cryoablation and left atrial appendage resection, so she is only on  mg daily. Continue same. Event monitor in 08/2021 showed sinus rhythm.   No episode of atrial fibrillation  Not

## 2023-11-06 RX ORDER — NITROGLYCERIN 0.4 MG/1
0.4 TABLET SUBLINGUAL EVERY 5 MIN PRN
Qty: 25 TABLET | Refills: 3 | Status: SHIPPED | OUTPATIENT
Start: 2023-11-06

## 2023-11-06 NOTE — TELEPHONE ENCOUNTER
PCP: Erna Orozco MD    Last appt:  11/2/2023   Future Appointments   Date Time Provider [address]   12/19/2023 10:30 AM [facility] AMB   11/5/2024 10:30 AM Latesha Bansal MD [facility] AMB       Requested Prescriptions     Pending Prescriptions Disp Refills    nitroGLYCERIN (NITROSTAT) 0.4 MG SL tablet 25 tablet 3     Sig: Place 1 tablet under the tongue every 5 minutes as needed for Chest pain

## 2023-12-19 ENCOUNTER — NURSE ONLY (OUTPATIENT)
Age: 73
End: 2023-12-19
Payer: MEDICARE

## 2023-12-19 DIAGNOSIS — Z95.0 PRESENCE OF CARDIAC PACEMAKER: Primary | ICD-10-CM

## 2024-02-19 PROCEDURE — 93280 PM DEVICE PROGR EVAL DUAL: CPT | Performed by: INTERNAL MEDICINE

## 2024-10-28 NOTE — PROGRESS NOTES
Please inform patient that her vaginal examination was negative for any STD but was positive for yeast infection,she soul take the prescribed Diflucan as indicated,after completion of Cipro. Gene Coleman(Attending)

## 2024-11-27 NOTE — TELEPHONE ENCOUNTER
PCP: Brooke Phillips MD    Last appt:  11/2/2023   No future appointments.    Requested Prescriptions     Pending Prescriptions Disp Refills    ezetimibe (ZETIA) 10 MG tablet [Pharmacy Med Name: Ezetimibe Oral Tablet 10 MG] 90 tablet 0     Sig: Take 1 tablet by mouth daily Appt due       Request for a 30 or 90 day supply? Provider Discretion    Pharmacy: confirmed     Other Comments:  Verbal order with read back Andrew Varela MD.   Appt overdue - noted on refill

## 2024-12-02 RX ORDER — EZETIMIBE 10 MG/1
10 TABLET ORAL DAILY
Qty: 90 TABLET | Refills: 0 | OUTPATIENT
Start: 2024-12-02